# Patient Record
Sex: MALE | Race: WHITE | Employment: FULL TIME | URBAN - METROPOLITAN AREA
[De-identification: names, ages, dates, MRNs, and addresses within clinical notes are randomized per-mention and may not be internally consistent; named-entity substitution may affect disease eponyms.]

---

## 2018-05-21 ENCOUNTER — OFFICE VISIT (OUTPATIENT)
Dept: INTERNAL MEDICINE | Age: 28
End: 2018-05-21

## 2018-05-21 VITALS
DIASTOLIC BLOOD PRESSURE: 120 MMHG | RESPIRATION RATE: 16 BRPM | WEIGHT: 269.8 LBS | SYSTOLIC BLOOD PRESSURE: 160 MMHG | TEMPERATURE: 97.9 F | HEART RATE: 107 BPM | HEIGHT: 69 IN | BODY MASS INDEX: 39.96 KG/M2

## 2018-05-21 DIAGNOSIS — E10.10 TYPE 1 DIABETES MELLITUS WITH KETOACIDOSIS WITHOUT COMA (HCC): ICD-10-CM

## 2018-05-21 DIAGNOSIS — E78.2 MIXED HYPERLIPIDEMIA: ICD-10-CM

## 2018-05-21 DIAGNOSIS — I10 ESSENTIAL HYPERTENSION: Primary | ICD-10-CM

## 2018-05-21 PROCEDURE — 99212 OFFICE O/P EST SF 10 MIN: CPT | Performed by: INTERNAL MEDICINE

## 2018-05-21 PROCEDURE — 99213 OFFICE O/P EST LOW 20 MIN: CPT | Performed by: INTERNAL MEDICINE

## 2018-05-21 RX ORDER — LISINOPRIL 20 MG/1
20 TABLET ORAL DAILY
Qty: 30 TABLET | Refills: 2 | Status: SHIPPED | OUTPATIENT
Start: 2018-05-21 | End: 2018-05-21 | Stop reason: SDUPTHER

## 2018-05-21 RX ORDER — LISINOPRIL 20 MG/1
20 TABLET ORAL DAILY
Qty: 30 TABLET | Refills: 2 | Status: ON HOLD | OUTPATIENT
Start: 2018-05-21 | End: 2018-06-11

## 2018-05-21 RX ORDER — IBUPROFEN 200 MG
TABLET ORAL
Qty: 100 EACH | Refills: 3 | Status: SHIPPED | OUTPATIENT
Start: 2018-05-21 | End: 2018-12-10 | Stop reason: SDUPTHER

## 2018-05-21 RX ORDER — LANCETS 30 GAUGE
EACH MISCELLANEOUS
Qty: 100 EACH | Refills: 5 | Status: SHIPPED | OUTPATIENT
Start: 2018-05-21 | End: 2019-06-22 | Stop reason: ALTCHOICE

## 2018-05-21 RX ORDER — INSULIN GLARGINE 100 [IU]/ML
67 INJECTION, SOLUTION SUBCUTANEOUS NIGHTLY
Qty: 6 VIAL | Refills: 3 | Status: SHIPPED | OUTPATIENT
Start: 2018-05-21 | End: 2018-05-21 | Stop reason: SDUPTHER

## 2018-05-21 RX ORDER — INSULIN GLARGINE 100 [IU]/ML
67 INJECTION, SOLUTION SUBCUTANEOUS NIGHTLY
Qty: 7 VIAL | Refills: 3 | Status: SHIPPED | OUTPATIENT
Start: 2018-05-21 | End: 2018-12-10 | Stop reason: SDUPTHER

## 2018-05-21 RX ORDER — ATORVASTATIN CALCIUM 40 MG/1
40 TABLET, FILM COATED ORAL DAILY
Qty: 30 TABLET | Refills: 0 | Status: SHIPPED | OUTPATIENT
Start: 2018-05-21 | End: 2018-12-10 | Stop reason: SDUPTHER

## 2018-05-22 ASSESSMENT — ENCOUNTER SYMPTOMS
SORE THROAT: 0
SHORTNESS OF BREATH: 0
CONSTIPATION: 0
DIARRHEA: 0
ABDOMINAL PAIN: 0
NAUSEA: 0
VOMITING: 0
EYE DISCHARGE: 0
HEMOPTYSIS: 0
HEARTBURN: 0
COUGH: 0
BLURRED VISION: 0
WHEEZING: 0

## 2018-06-09 ENCOUNTER — HOSPITAL ENCOUNTER (INPATIENT)
Age: 28
LOS: 1 days | Discharge: HOME | DRG: 195 | End: 2018-06-11
Attending: EMERGENCY MEDICINE | Admitting: INTERNAL MEDICINE

## 2018-06-09 ENCOUNTER — APPOINTMENT (OUTPATIENT)
Dept: GENERAL RADIOLOGY | Age: 28
DRG: 195 | End: 2018-06-09

## 2018-06-09 DIAGNOSIS — E10.10 TYPE 1 DIABETES MELLITUS WITH KETOACIDOSIS WITHOUT COMA (HCC): ICD-10-CM

## 2018-06-09 DIAGNOSIS — J18.9 PNEUMONIA DUE TO ORGANISM: ICD-10-CM

## 2018-06-09 DIAGNOSIS — J02.9 ACUTE PHARYNGITIS, UNSPECIFIED ETIOLOGY: Primary | ICD-10-CM

## 2018-06-09 DIAGNOSIS — I10 ESSENTIAL HYPERTENSION: ICD-10-CM

## 2018-06-09 LAB
ALBUMIN SERPL-MCNC: 3.5 G/DL (ref 3.5–5.2)
ALP BLD-CCNC: 147 U/L (ref 40–129)
ALT SERPL-CCNC: 73 U/L (ref 0–40)
ANION GAP SERPL CALCULATED.3IONS-SCNC: 15 MMOL/L (ref 7–16)
AST SERPL-CCNC: 39 U/L (ref 0–39)
BASOPHILS ABSOLUTE: 0.1 E9/L (ref 0–0.2)
BASOPHILS RELATIVE PERCENT: 0.7 % (ref 0–2)
BETA-HYDROXYBUTYRATE: 1.25 MMOL/L (ref 0.02–0.27)
BILIRUB SERPL-MCNC: 0.2 MG/DL (ref 0–1.2)
BUN BLDV-MCNC: 7 MG/DL (ref 6–20)
CALCIUM SERPL-MCNC: 9 MG/DL (ref 8.6–10.2)
CHLORIDE BLD-SCNC: 97 MMOL/L (ref 98–107)
CHP ED QC CHECK: YES
CO2: 27 MMOL/L (ref 22–29)
CREAT SERPL-MCNC: 0.9 MG/DL (ref 0.7–1.2)
EOSINOPHILS ABSOLUTE: 0.3 E9/L (ref 0.05–0.5)
EOSINOPHILS RELATIVE PERCENT: 2 % (ref 0–6)
GFR AFRICAN AMERICAN: >60
GFR NON-AFRICAN AMERICAN: >60 ML/MIN/1.73
GLUCOSE BLD-MCNC: 133 MG/DL
GLUCOSE BLD-MCNC: 158 MG/DL (ref 74–109)
HCT VFR BLD CALC: 44.5 % (ref 37–54)
HEMOGLOBIN: 15.1 G/DL (ref 12.5–16.5)
IMMATURE GRANULOCYTES #: 0.05 E9/L
IMMATURE GRANULOCYTES %: 0.3 % (ref 0–5)
LACTIC ACID: 1 MMOL/L (ref 0.5–2.2)
LIPASE: 17 U/L (ref 13–60)
LYMPHOCYTES ABSOLUTE: 1.74 E9/L (ref 1.5–4)
LYMPHOCYTES RELATIVE PERCENT: 11.8 % (ref 20–42)
MCH RBC QN AUTO: 31.8 PG (ref 26–35)
MCHC RBC AUTO-ENTMCNC: 33.9 % (ref 32–34.5)
MCV RBC AUTO: 93.7 FL (ref 80–99.9)
METER GLUCOSE: 133 MG/DL (ref 70–110)
MONO TEST: NEGATIVE
MONOCYTES ABSOLUTE: 1.48 E9/L (ref 0.1–0.95)
MONOCYTES RELATIVE PERCENT: 10 % (ref 2–12)
NEUTROPHILS ABSOLUTE: 11.1 E9/L (ref 1.8–7.3)
NEUTROPHILS RELATIVE PERCENT: 75.2 % (ref 43–80)
PDW BLD-RTO: 12.4 FL (ref 11.5–15)
PLATELET # BLD: 409 E9/L (ref 130–450)
PMV BLD AUTO: 9.3 FL (ref 7–12)
POTASSIUM SERPL-SCNC: 3.8 MMOL/L (ref 3.5–5)
RBC # BLD: 4.75 E12/L (ref 3.8–5.8)
SODIUM BLD-SCNC: 139 MMOL/L (ref 132–146)
STREP GRP A PCR: NEGATIVE
STREP GRP A PCR: NORMAL
TOTAL PROTEIN: 7 G/DL (ref 6.4–8.3)
WBC # BLD: 14.8 E9/L (ref 4.5–11.5)

## 2018-06-09 PROCEDURE — 82010 KETONE BODYS QUAN: CPT

## 2018-06-09 PROCEDURE — 83605 ASSAY OF LACTIC ACID: CPT

## 2018-06-09 PROCEDURE — 2580000003 HC RX 258: Performed by: NURSE PRACTITIONER

## 2018-06-09 PROCEDURE — 83690 ASSAY OF LIPASE: CPT

## 2018-06-09 PROCEDURE — 6360000002 HC RX W HCPCS: Performed by: EMERGENCY MEDICINE

## 2018-06-09 PROCEDURE — 96375 TX/PRO/DX INJ NEW DRUG ADDON: CPT

## 2018-06-09 PROCEDURE — 99285 EMERGENCY DEPT VISIT HI MDM: CPT

## 2018-06-09 PROCEDURE — 2580000003 HC RX 258: Performed by: EMERGENCY MEDICINE

## 2018-06-09 PROCEDURE — 71046 X-RAY EXAM CHEST 2 VIEWS: CPT

## 2018-06-09 PROCEDURE — 80053 COMPREHEN METABOLIC PANEL: CPT

## 2018-06-09 PROCEDURE — 87880 STREP A ASSAY W/OPTIC: CPT

## 2018-06-09 PROCEDURE — 82962 GLUCOSE BLOOD TEST: CPT

## 2018-06-09 PROCEDURE — 96365 THER/PROPH/DIAG IV INF INIT: CPT

## 2018-06-09 PROCEDURE — 86308 HETEROPHILE ANTIBODY SCREEN: CPT

## 2018-06-09 PROCEDURE — 85025 COMPLETE CBC W/AUTO DIFF WBC: CPT

## 2018-06-09 RX ORDER — 0.9 % SODIUM CHLORIDE 0.9 %
1000 INTRAVENOUS SOLUTION INTRAVENOUS ONCE
Status: COMPLETED | OUTPATIENT
Start: 2018-06-09 | End: 2018-06-10

## 2018-06-09 RX ORDER — AZITHROMYCIN 250 MG/1
TABLET, FILM COATED ORAL
Qty: 1 PACKET | Refills: 0 | Status: SHIPPED | OUTPATIENT
Start: 2018-06-09 | End: 2018-06-10

## 2018-06-09 RX ORDER — SODIUM CHLORIDE 9 MG/ML
INJECTION, SOLUTION INTRAVENOUS ONCE
Status: COMPLETED | OUTPATIENT
Start: 2018-06-09 | End: 2018-06-10

## 2018-06-09 RX ORDER — 0.9 % SODIUM CHLORIDE 0.9 %
1000 INTRAVENOUS SOLUTION INTRAVENOUS ONCE
Status: COMPLETED | OUTPATIENT
Start: 2018-06-09 | End: 2018-06-09

## 2018-06-09 RX ORDER — DEXAMETHASONE SODIUM PHOSPHATE 10 MG/ML
10 INJECTION INTRAMUSCULAR; INTRAVENOUS ONCE
Status: COMPLETED | OUTPATIENT
Start: 2018-06-09 | End: 2018-06-09

## 2018-06-09 RX ADMIN — SODIUM CHLORIDE 1000 ML: 9 INJECTION, SOLUTION INTRAVENOUS at 21:25

## 2018-06-09 RX ADMIN — DEXAMETHASONE SODIUM PHOSPHATE 10 MG: 10 INJECTION INTRAMUSCULAR; INTRAVENOUS at 22:40

## 2018-06-09 RX ADMIN — AMPICILLIN AND SULBACTAM 3 G: 2; 1 INJECTION, POWDER, FOR SOLUTION INTRAVENOUS at 23:08

## 2018-06-09 RX ADMIN — SODIUM CHLORIDE 1000 ML: 9 INJECTION, SOLUTION INTRAVENOUS at 23:09

## 2018-06-09 ASSESSMENT — PAIN SCALES - GENERAL: PAINLEVEL_OUTOF10: 8

## 2018-06-09 ASSESSMENT — PAIN DESCRIPTION - LOCATION: LOCATION: THROAT

## 2018-06-09 ASSESSMENT — PAIN DESCRIPTION - PAIN TYPE: TYPE: ACUTE PAIN

## 2018-06-10 ENCOUNTER — APPOINTMENT (OUTPATIENT)
Dept: CT IMAGING | Age: 28
DRG: 195 | End: 2018-06-10

## 2018-06-10 PROBLEM — E78.5 HYPERLIPIDEMIA: Chronic | Status: ACTIVE | Noted: 2018-06-10

## 2018-06-10 PROBLEM — R03.0 ELEVATED BP WITHOUT DIAGNOSIS OF HYPERTENSION: Status: ACTIVE | Noted: 2018-06-10

## 2018-06-10 PROBLEM — R09.02 HYPOXEMIA: Status: ACTIVE | Noted: 2018-06-10

## 2018-06-10 PROBLEM — E10.65 TYPE 1 DIABETES MELLITUS WITH HYPERGLYCEMIA (HCC): Status: ACTIVE | Noted: 2018-06-10

## 2018-06-10 PROBLEM — J18.9 PNEUMONIA: Status: ACTIVE | Noted: 2018-06-10

## 2018-06-10 PROBLEM — I10 HTN (HYPERTENSION): Chronic | Status: ACTIVE | Noted: 2018-06-10

## 2018-06-10 LAB
ALBUMIN SERPL-MCNC: 2.7 G/DL (ref 3.5–5.2)
ALBUMIN SERPL-MCNC: 3 G/DL (ref 3.5–5.2)
ALP BLD-CCNC: 139 U/L (ref 40–129)
ALP BLD-CCNC: 141 U/L (ref 40–129)
ALT SERPL-CCNC: 59 U/L (ref 0–40)
ALT SERPL-CCNC: 63 U/L (ref 0–40)
ANION GAP SERPL CALCULATED.3IONS-SCNC: 10 MMOL/L (ref 7–16)
ANION GAP SERPL CALCULATED.3IONS-SCNC: 11 MMOL/L (ref 7–16)
ANION GAP SERPL CALCULATED.3IONS-SCNC: 14 MMOL/L (ref 7–16)
ANION GAP SERPL CALCULATED.3IONS-SCNC: 20 MMOL/L (ref 7–16)
ANION GAP SERPL CALCULATED.3IONS-SCNC: 21 MMOL/L (ref 7–16)
AST SERPL-CCNC: 33 U/L (ref 0–39)
AST SERPL-CCNC: 56 U/L (ref 0–39)
BASOPHILS ABSOLUTE: 0.04 E9/L (ref 0–0.2)
BASOPHILS RELATIVE PERCENT: 0.2 % (ref 0–2)
BETA-HYDROXYBUTYRATE: 3.9 MMOL/L (ref 0.02–0.27)
BILIRUB SERPL-MCNC: 0.2 MG/DL (ref 0–1.2)
BILIRUB SERPL-MCNC: <0.2 MG/DL (ref 0–1.2)
BUN BLDV-MCNC: 10 MG/DL (ref 6–20)
BUN BLDV-MCNC: 10 MG/DL (ref 6–20)
BUN BLDV-MCNC: 11 MG/DL (ref 6–20)
BUN BLDV-MCNC: 7 MG/DL (ref 6–20)
BUN BLDV-MCNC: 8 MG/DL (ref 6–20)
CALCIUM SERPL-MCNC: 7.9 MG/DL (ref 8.6–10.2)
CALCIUM SERPL-MCNC: 8.1 MG/DL (ref 8.6–10.2)
CALCIUM SERPL-MCNC: 8.2 MG/DL (ref 8.6–10.2)
CALCIUM SERPL-MCNC: 8.3 MG/DL (ref 8.6–10.2)
CALCIUM SERPL-MCNC: 8.3 MG/DL (ref 8.6–10.2)
CHLORIDE BLD-SCNC: 100 MMOL/L (ref 98–107)
CHLORIDE BLD-SCNC: 100 MMOL/L (ref 98–107)
CHLORIDE BLD-SCNC: 101 MMOL/L (ref 98–107)
CHLORIDE BLD-SCNC: 95 MMOL/L (ref 98–107)
CHLORIDE BLD-SCNC: 97 MMOL/L (ref 98–107)
CO2: 15 MMOL/L (ref 22–29)
CO2: 19 MMOL/L (ref 22–29)
CO2: 24 MMOL/L (ref 22–29)
CREAT SERPL-MCNC: 0.8 MG/DL (ref 0.7–1.2)
CREAT SERPL-MCNC: 0.9 MG/DL (ref 0.7–1.2)
EOSINOPHILS ABSOLUTE: 0 E9/L (ref 0.05–0.5)
EOSINOPHILS RELATIVE PERCENT: 0 % (ref 0–6)
GFR AFRICAN AMERICAN: >60
GFR NON-AFRICAN AMERICAN: >60 ML/MIN/1.73
GLUCOSE BLD-MCNC: 154 MG/DL (ref 74–109)
GLUCOSE BLD-MCNC: 196 MG/DL (ref 74–109)
GLUCOSE BLD-MCNC: 212 MG/DL (ref 74–109)
GLUCOSE BLD-MCNC: 337 MG/DL (ref 74–109)
GLUCOSE BLD-MCNC: 339 MG/DL (ref 74–109)
HCT VFR BLD CALC: 40.6 % (ref 37–54)
HEMOGLOBIN: 13.4 G/DL (ref 12.5–16.5)
IMMATURE GRANULOCYTES #: 0.08 E9/L
IMMATURE GRANULOCYTES %: 0.5 % (ref 0–5)
LACTIC ACID: 1.7 MMOL/L (ref 0.5–2.2)
LYMPHOCYTES ABSOLUTE: 0.91 E9/L (ref 1.5–4)
LYMPHOCYTES RELATIVE PERCENT: 5.5 % (ref 20–42)
MAGNESIUM: 1.9 MG/DL (ref 1.6–2.6)
MAGNESIUM: 2 MG/DL (ref 1.6–2.6)
MAGNESIUM: 2 MG/DL (ref 1.6–2.6)
MCH RBC QN AUTO: 32.1 PG (ref 26–35)
MCHC RBC AUTO-ENTMCNC: 33 % (ref 32–34.5)
MCV RBC AUTO: 97.1 FL (ref 80–99.9)
METER GLUCOSE: 166 MG/DL (ref 70–110)
METER GLUCOSE: 175 MG/DL (ref 70–110)
METER GLUCOSE: 178 MG/DL (ref 70–110)
METER GLUCOSE: 184 MG/DL (ref 70–110)
METER GLUCOSE: 186 MG/DL (ref 70–110)
METER GLUCOSE: 191 MG/DL (ref 70–110)
METER GLUCOSE: 196 MG/DL (ref 70–110)
METER GLUCOSE: 200 MG/DL (ref 70–110)
METER GLUCOSE: 223 MG/DL (ref 70–110)
METER GLUCOSE: 272 MG/DL (ref 70–110)
METER GLUCOSE: 298 MG/DL (ref 70–110)
METER GLUCOSE: 302 MG/DL (ref 70–110)
MONOCYTES ABSOLUTE: 0.26 E9/L (ref 0.1–0.95)
MONOCYTES RELATIVE PERCENT: 1.6 % (ref 2–12)
NEUTROPHILS ABSOLUTE: 15.13 E9/L (ref 1.8–7.3)
NEUTROPHILS RELATIVE PERCENT: 92.2 % (ref 43–80)
PDW BLD-RTO: 12.6 FL (ref 11.5–15)
PHOSPHORUS: 2 MG/DL (ref 2.5–4.5)
PHOSPHORUS: 2.1 MG/DL (ref 2.5–4.5)
PHOSPHORUS: 3.3 MG/DL (ref 2.5–4.5)
PLATELET # BLD: 372 E9/L (ref 130–450)
PMV BLD AUTO: 9.5 FL (ref 7–12)
POTASSIUM REFLEX MAGNESIUM: 5.8 MMOL/L (ref 3.5–5)
POTASSIUM SERPL-SCNC: 3.9 MMOL/L (ref 3.5–5)
POTASSIUM SERPL-SCNC: 4 MMOL/L (ref 3.5–5)
POTASSIUM SERPL-SCNC: 4.2 MMOL/L (ref 3.5–5)
POTASSIUM SERPL-SCNC: 4.5 MMOL/L (ref 3.5–5)
RBC # BLD: 4.18 E12/L (ref 3.8–5.8)
SODIUM BLD-SCNC: 131 MMOL/L (ref 132–146)
SODIUM BLD-SCNC: 135 MMOL/L (ref 132–146)
SODIUM BLD-SCNC: 135 MMOL/L (ref 132–146)
SODIUM BLD-SCNC: 136 MMOL/L (ref 132–146)
SODIUM BLD-SCNC: 138 MMOL/L (ref 132–146)
TOTAL PROTEIN: 6.4 G/DL (ref 6.4–8.3)
TOTAL PROTEIN: 6.9 G/DL (ref 6.4–8.3)
WBC # BLD: 16.4 E9/L (ref 4.5–11.5)

## 2018-06-10 PROCEDURE — 84100 ASSAY OF PHOSPHORUS: CPT

## 2018-06-10 PROCEDURE — 6370000000 HC RX 637 (ALT 250 FOR IP): Performed by: INTERNAL MEDICINE

## 2018-06-10 PROCEDURE — 6360000002 HC RX W HCPCS

## 2018-06-10 PROCEDURE — 2500000003 HC RX 250 WO HCPCS: Performed by: INTERNAL MEDICINE

## 2018-06-10 PROCEDURE — 6360000002 HC RX W HCPCS: Performed by: INTERNAL MEDICINE

## 2018-06-10 PROCEDURE — 2580000003 HC RX 258: Performed by: EMERGENCY MEDICINE

## 2018-06-10 PROCEDURE — 2500000003 HC RX 250 WO HCPCS: Performed by: EMERGENCY MEDICINE

## 2018-06-10 PROCEDURE — 2580000003 HC RX 258: Performed by: INTERNAL MEDICINE

## 2018-06-10 PROCEDURE — 94664 DEMO&/EVAL PT USE INHALER: CPT

## 2018-06-10 PROCEDURE — 71275 CT ANGIOGRAPHY CHEST: CPT

## 2018-06-10 PROCEDURE — 87081 CULTURE SCREEN ONLY: CPT

## 2018-06-10 PROCEDURE — 80053 COMPREHEN METABOLIC PANEL: CPT

## 2018-06-10 PROCEDURE — 85025 COMPLETE CBC W/AUTO DIFF WBC: CPT

## 2018-06-10 PROCEDURE — 87040 BLOOD CULTURE FOR BACTERIA: CPT

## 2018-06-10 PROCEDURE — 82010 KETONE BODYS QUAN: CPT

## 2018-06-10 PROCEDURE — 83735 ASSAY OF MAGNESIUM: CPT

## 2018-06-10 PROCEDURE — 83605 ASSAY OF LACTIC ACID: CPT

## 2018-06-10 PROCEDURE — 36415 COLL VENOUS BLD VENIPUNCTURE: CPT

## 2018-06-10 PROCEDURE — 82962 GLUCOSE BLOOD TEST: CPT

## 2018-06-10 PROCEDURE — 94640 AIRWAY INHALATION TREATMENT: CPT

## 2018-06-10 PROCEDURE — 80048 BASIC METABOLIC PNL TOTAL CA: CPT

## 2018-06-10 PROCEDURE — 2000000000 HC ICU R&B

## 2018-06-10 PROCEDURE — 6370000000 HC RX 637 (ALT 250 FOR IP): Performed by: EMERGENCY MEDICINE

## 2018-06-10 PROCEDURE — 6360000004 HC RX CONTRAST MEDICATION: Performed by: RADIOLOGY

## 2018-06-10 RX ORDER — SODIUM CHLORIDE 9 MG/ML
INJECTION, SOLUTION INTRAVENOUS CONTINUOUS
Status: DISCONTINUED | OUTPATIENT
Start: 2018-06-10 | End: 2018-06-10

## 2018-06-10 RX ORDER — BENZONATATE 100 MG/1
100 CAPSULE ORAL EVERY 8 HOURS
Status: DISCONTINUED | OUTPATIENT
Start: 2018-06-10 | End: 2018-06-11 | Stop reason: HOSPADM

## 2018-06-10 RX ORDER — ONDANSETRON 2 MG/ML
4 INJECTION INTRAMUSCULAR; INTRAVENOUS EVERY 6 HOURS PRN
Status: DISCONTINUED | OUTPATIENT
Start: 2018-06-10 | End: 2018-06-11 | Stop reason: HOSPADM

## 2018-06-10 RX ORDER — MAGNESIUM SULFATE 1 G/100ML
1 INJECTION INTRAVENOUS PRN
Status: DISCONTINUED | OUTPATIENT
Start: 2018-06-10 | End: 2018-06-11 | Stop reason: HOSPADM

## 2018-06-10 RX ORDER — LISINOPRIL 20 MG/1
20 TABLET ORAL DAILY
Status: DISCONTINUED | OUTPATIENT
Start: 2018-06-10 | End: 2018-06-11 | Stop reason: HOSPADM

## 2018-06-10 RX ORDER — ACETAMINOPHEN 325 MG/1
650 TABLET ORAL EVERY 4 HOURS PRN
Status: DISCONTINUED | OUTPATIENT
Start: 2018-06-10 | End: 2018-06-11 | Stop reason: HOSPADM

## 2018-06-10 RX ORDER — INSULIN GLARGINE 100 [IU]/ML
67 INJECTION, SOLUTION SUBCUTANEOUS NIGHTLY
Status: DISCONTINUED | OUTPATIENT
Start: 2018-06-10 | End: 2018-06-11 | Stop reason: HOSPADM

## 2018-06-10 RX ORDER — 0.9 % SODIUM CHLORIDE 0.9 %
1000 INTRAVENOUS SOLUTION INTRAVENOUS ONCE
Status: COMPLETED | OUTPATIENT
Start: 2018-06-10 | End: 2018-06-10

## 2018-06-10 RX ORDER — HYDRALAZINE HYDROCHLORIDE 20 MG/ML
INJECTION INTRAMUSCULAR; INTRAVENOUS
Status: COMPLETED
Start: 2018-06-10 | End: 2018-06-10

## 2018-06-10 RX ORDER — FAMOTIDINE 20 MG/1
20 TABLET, FILM COATED ORAL 2 TIMES DAILY
Status: DISCONTINUED | OUTPATIENT
Start: 2018-06-10 | End: 2018-06-11 | Stop reason: HOSPADM

## 2018-06-10 RX ORDER — SODIUM CHLORIDE 9 MG/ML
INJECTION, SOLUTION INTRAVENOUS CONTINUOUS
Status: DISCONTINUED | OUTPATIENT
Start: 2018-06-10 | End: 2018-06-11 | Stop reason: HOSPADM

## 2018-06-10 RX ORDER — POTASSIUM CHLORIDE 7.45 MG/ML
10 INJECTION INTRAVENOUS PRN
Status: DISCONTINUED | OUTPATIENT
Start: 2018-06-10 | End: 2018-06-11 | Stop reason: HOSPADM

## 2018-06-10 RX ORDER — 0.9 % SODIUM CHLORIDE 0.9 %
15 INTRAVENOUS SOLUTION INTRAVENOUS ONCE
Status: COMPLETED | OUTPATIENT
Start: 2018-06-10 | End: 2018-06-10

## 2018-06-10 RX ORDER — SODIUM CHLORIDE 0.9 % (FLUSH) 0.9 %
10 SYRINGE (ML) INJECTION EVERY 12 HOURS SCHEDULED
Status: DISCONTINUED | OUTPATIENT
Start: 2018-06-10 | End: 2018-06-11 | Stop reason: HOSPADM

## 2018-06-10 RX ORDER — SODIUM CHLORIDE 0.9 % (FLUSH) 0.9 %
10 SYRINGE (ML) INJECTION PRN
Status: DISCONTINUED | OUTPATIENT
Start: 2018-06-10 | End: 2018-06-11 | Stop reason: HOSPADM

## 2018-06-10 RX ORDER — GUAIFENESIN/DEXTROMETHORPHAN 100-10MG/5
5 SYRUP ORAL EVERY 4 HOURS PRN
Status: DISCONTINUED | OUTPATIENT
Start: 2018-06-10 | End: 2018-06-11 | Stop reason: HOSPADM

## 2018-06-10 RX ORDER — HYDRALAZINE HYDROCHLORIDE 20 MG/ML
10 INJECTION INTRAMUSCULAR; INTRAVENOUS EVERY 4 HOURS PRN
Status: DISCONTINUED | OUTPATIENT
Start: 2018-06-10 | End: 2018-06-11 | Stop reason: HOSPADM

## 2018-06-10 RX ORDER — IPRATROPIUM BROMIDE AND ALBUTEROL SULFATE 2.5; .5 MG/3ML; MG/3ML
1 SOLUTION RESPIRATORY (INHALATION)
Status: COMPLETED | OUTPATIENT
Start: 2018-06-10 | End: 2018-06-10

## 2018-06-10 RX ORDER — DEXTROSE MONOHYDRATE 25 G/50ML
12.5 INJECTION, SOLUTION INTRAVENOUS PRN
Status: DISCONTINUED | OUTPATIENT
Start: 2018-06-10 | End: 2018-06-11 | Stop reason: HOSPADM

## 2018-06-10 RX ORDER — IPRATROPIUM BROMIDE AND ALBUTEROL SULFATE 2.5; .5 MG/3ML; MG/3ML
1 SOLUTION RESPIRATORY (INHALATION) EVERY 4 HOURS PRN
Status: DISCONTINUED | OUTPATIENT
Start: 2018-06-10 | End: 2018-06-11 | Stop reason: HOSPADM

## 2018-06-10 RX ORDER — DEXTROSE, SODIUM CHLORIDE, AND POTASSIUM CHLORIDE 5; .45; .15 G/100ML; G/100ML; G/100ML
INJECTION INTRAVENOUS CONTINUOUS PRN
Status: DISCONTINUED | OUTPATIENT
Start: 2018-06-10 | End: 2018-06-10

## 2018-06-10 RX ORDER — HYDRALAZINE HYDROCHLORIDE 20 MG/ML
5 INJECTION INTRAMUSCULAR; INTRAVENOUS ONCE
Status: COMPLETED | OUTPATIENT
Start: 2018-06-10 | End: 2018-06-10

## 2018-06-10 RX ADMIN — LISINOPRIL 20 MG: 20 TABLET ORAL at 18:40

## 2018-06-10 RX ADMIN — Medication 10 ML: at 21:02

## 2018-06-10 RX ADMIN — INSULIN LISPRO 10 UNITS: 100 INJECTION, SOLUTION INTRAVENOUS; SUBCUTANEOUS at 09:19

## 2018-06-10 RX ADMIN — POTASSIUM CHLORIDE, DEXTROSE MONOHYDRATE AND SODIUM CHLORIDE: 150; 5; 450 INJECTION, SOLUTION INTRAVENOUS at 21:01

## 2018-06-10 RX ADMIN — IPRATROPIUM BROMIDE AND ALBUTEROL SULFATE 1 AMPULE: .5; 3 SOLUTION RESPIRATORY (INHALATION) at 01:03

## 2018-06-10 RX ADMIN — SODIUM CHLORIDE 11.7 UNITS/HR: 9 INJECTION, SOLUTION INTRAVENOUS at 13:29

## 2018-06-10 RX ADMIN — SODIUM CHLORIDE 1000 ML: 9 INJECTION, SOLUTION INTRAVENOUS at 13:20

## 2018-06-10 RX ADMIN — IOPAMIDOL 80 ML: 755 INJECTION, SOLUTION INTRAVENOUS at 00:46

## 2018-06-10 RX ADMIN — INSULIN LISPRO 8 UNITS: 100 INJECTION, SOLUTION INTRAVENOUS; SUBCUTANEOUS at 11:54

## 2018-06-10 RX ADMIN — POTASSIUM CHLORIDE 10 MEQ: 10 INJECTION, SOLUTION INTRAVENOUS at 18:18

## 2018-06-10 RX ADMIN — BENZONATATE 100 MG: 100 CAPSULE ORAL at 15:44

## 2018-06-10 RX ADMIN — BENZONATATE 100 MG: 100 CAPSULE ORAL at 23:37

## 2018-06-10 RX ADMIN — IPRATROPIUM BROMIDE AND ALBUTEROL SULFATE 1 AMPULE: .5; 3 SOLUTION RESPIRATORY (INHALATION) at 01:13

## 2018-06-10 RX ADMIN — DOXYCYCLINE 100 MG: 100 INJECTION, POWDER, LYOPHILIZED, FOR SOLUTION INTRAVENOUS at 01:37

## 2018-06-10 RX ADMIN — FAMOTIDINE 20 MG: 20 TABLET ORAL at 21:02

## 2018-06-10 RX ADMIN — WATER 1 G: 1 INJECTION INTRAMUSCULAR; INTRAVENOUS; SUBCUTANEOUS at 09:04

## 2018-06-10 RX ADMIN — SODIUM CHLORIDE: 9 INJECTION, SOLUTION INTRAVENOUS at 00:04

## 2018-06-10 RX ADMIN — INSULIN LISPRO 3 UNITS: 100 INJECTION, SOLUTION INTRAVENOUS; SUBCUTANEOUS at 09:21

## 2018-06-10 RX ADMIN — SODIUM PHOSPHATE, MONOBASIC, MONOHYDRATE 10 MMOL: 276; 142 INJECTION, SOLUTION INTRAVENOUS at 17:41

## 2018-06-10 RX ADMIN — INSULIN HUMAN 5 UNITS: 100 INJECTION, SOLUTION PARENTERAL at 10:33

## 2018-06-10 RX ADMIN — INSULIN GLARGINE 67 UNITS: 100 INJECTION, SOLUTION SUBCUTANEOUS at 21:35

## 2018-06-10 RX ADMIN — INSULIN LISPRO 11 UNITS: 100 INJECTION, SOLUTION INTRAVENOUS; SUBCUTANEOUS at 21:36

## 2018-06-10 RX ADMIN — HYDRALAZINE HYDROCHLORIDE 5 MG: 20 INJECTION INTRAMUSCULAR; INTRAVENOUS at 17:34

## 2018-06-10 RX ADMIN — POTASSIUM CHLORIDE 10 MEQ: 10 INJECTION, SOLUTION INTRAVENOUS at 17:15

## 2018-06-10 RX ADMIN — POTASSIUM CHLORIDE, DEXTROSE MONOHYDRATE AND SODIUM CHLORIDE: 150; 5; 450 INJECTION, SOLUTION INTRAVENOUS at 14:40

## 2018-06-10 RX ADMIN — DOXYCYCLINE 100 MG: 100 INJECTION, POWDER, LYOPHILIZED, FOR SOLUTION INTRAVENOUS at 15:42

## 2018-06-10 RX ADMIN — FAMOTIDINE 20 MG: 20 TABLET ORAL at 09:07

## 2018-06-10 RX ADMIN — BENZONATATE 100 MG: 100 CAPSULE ORAL at 09:04

## 2018-06-10 RX ADMIN — SODIUM CHLORIDE: 9 INJECTION, SOLUTION INTRAVENOUS at 03:57

## 2018-06-10 RX ADMIN — Medication 10 ML: at 09:05

## 2018-06-10 RX ADMIN — ENOXAPARIN SODIUM 40 MG: 40 INJECTION, SOLUTION INTRAVENOUS; SUBCUTANEOUS at 09:04

## 2018-06-10 RX ADMIN — IPRATROPIUM BROMIDE AND ALBUTEROL SULFATE 1 AMPULE: .5; 3 SOLUTION RESPIRATORY (INHALATION) at 00:53

## 2018-06-10 RX ADMIN — HYDRALAZINE HYDROCHLORIDE 10 MG: 20 INJECTION INTRAMUSCULAR; INTRAVENOUS at 23:37

## 2018-06-10 RX ADMIN — POTASSIUM CHLORIDE 10 MEQ: 10 INJECTION, SOLUTION INTRAVENOUS at 18:42

## 2018-06-10 RX ADMIN — SODIUM CHLORIDE 1000 ML: 9 INJECTION, SOLUTION INTRAVENOUS at 10:17

## 2018-06-10 ASSESSMENT — PAIN SCALES - GENERAL
PAINLEVEL_OUTOF10: 0

## 2018-06-11 VITALS
WEIGHT: 261.47 LBS | HEART RATE: 103 BPM | OXYGEN SATURATION: 94 % | DIASTOLIC BLOOD PRESSURE: 77 MMHG | HEIGHT: 70 IN | SYSTOLIC BLOOD PRESSURE: 161 MMHG | RESPIRATION RATE: 17 BRPM | TEMPERATURE: 98.5 F | BODY MASS INDEX: 37.43 KG/M2

## 2018-06-11 PROBLEM — E66.9 OBESITY: Chronic | Status: ACTIVE | Noted: 2018-06-10

## 2018-06-11 PROBLEM — G47.33 OSA (OBSTRUCTIVE SLEEP APNEA): Status: ACTIVE | Noted: 2018-06-11

## 2018-06-11 LAB
ALBUMIN SERPL-MCNC: 2.9 G/DL (ref 3.5–5.2)
ALP BLD-CCNC: 116 U/L (ref 40–129)
ALT SERPL-CCNC: 48 U/L (ref 0–40)
ANION GAP SERPL CALCULATED.3IONS-SCNC: 8 MMOL/L (ref 7–16)
AST SERPL-CCNC: 27 U/L (ref 0–39)
BASOPHILS ABSOLUTE: 0.09 E9/L (ref 0–0.2)
BASOPHILS RELATIVE PERCENT: 0.8 % (ref 0–2)
BILIRUB SERPL-MCNC: <0.2 MG/DL (ref 0–1.2)
BUN BLDV-MCNC: 8 MG/DL (ref 6–20)
CALCIUM SERPL-MCNC: 8.5 MG/DL (ref 8.6–10.2)
CHLORIDE BLD-SCNC: 101 MMOL/L (ref 98–107)
CO2: 25 MMOL/L (ref 22–29)
CREAT SERPL-MCNC: 0.8 MG/DL (ref 0.7–1.2)
EOSINOPHILS ABSOLUTE: 0.31 E9/L (ref 0.05–0.5)
EOSINOPHILS RELATIVE PERCENT: 2.7 % (ref 0–6)
GFR AFRICAN AMERICAN: >60
GFR NON-AFRICAN AMERICAN: >60 ML/MIN/1.73
GLUCOSE BLD-MCNC: 337 MG/DL (ref 74–109)
HCT VFR BLD CALC: 39.5 % (ref 37–54)
HEMOGLOBIN: 13.1 G/DL (ref 12.5–16.5)
IMMATURE GRANULOCYTES #: 0.03 E9/L
IMMATURE GRANULOCYTES %: 0.3 % (ref 0–5)
LYMPHOCYTES ABSOLUTE: 2.71 E9/L (ref 1.5–4)
LYMPHOCYTES RELATIVE PERCENT: 23.6 % (ref 20–42)
MAGNESIUM: 2 MG/DL (ref 1.6–2.6)
MCH RBC QN AUTO: 32 PG (ref 26–35)
MCHC RBC AUTO-ENTMCNC: 33.2 % (ref 32–34.5)
MCV RBC AUTO: 96.3 FL (ref 80–99.9)
METER GLUCOSE: 270 MG/DL (ref 70–110)
METER GLUCOSE: 300 MG/DL (ref 70–110)
MONOCYTES ABSOLUTE: 0.89 E9/L (ref 0.1–0.95)
MONOCYTES RELATIVE PERCENT: 7.7 % (ref 2–12)
NEUTROPHILS ABSOLUTE: 7.47 E9/L (ref 1.8–7.3)
NEUTROPHILS RELATIVE PERCENT: 64.9 % (ref 43–80)
PDW BLD-RTO: 12.5 FL (ref 11.5–15)
PHOSPHORUS: 3.3 MG/DL (ref 2.5–4.5)
PLATELET # BLD: 369 E9/L (ref 130–450)
PMV BLD AUTO: 9.3 FL (ref 7–12)
POTASSIUM REFLEX MAGNESIUM: 4 MMOL/L (ref 3.5–5)
RBC # BLD: 4.1 E12/L (ref 3.8–5.8)
SODIUM BLD-SCNC: 134 MMOL/L (ref 132–146)
TOTAL PROTEIN: 5.9 G/DL (ref 6.4–8.3)
WBC # BLD: 11.5 E9/L (ref 4.5–11.5)

## 2018-06-11 PROCEDURE — 85025 COMPLETE CBC W/AUTO DIFF WBC: CPT

## 2018-06-11 PROCEDURE — 83735 ASSAY OF MAGNESIUM: CPT

## 2018-06-11 PROCEDURE — 84100 ASSAY OF PHOSPHORUS: CPT

## 2018-06-11 PROCEDURE — 2580000003 HC RX 258: Performed by: INTERNAL MEDICINE

## 2018-06-11 PROCEDURE — 80053 COMPREHEN METABOLIC PANEL: CPT

## 2018-06-11 PROCEDURE — 36415 COLL VENOUS BLD VENIPUNCTURE: CPT

## 2018-06-11 PROCEDURE — 2500000003 HC RX 250 WO HCPCS: Performed by: INTERNAL MEDICINE

## 2018-06-11 PROCEDURE — 82962 GLUCOSE BLOOD TEST: CPT

## 2018-06-11 PROCEDURE — 6360000002 HC RX W HCPCS: Performed by: INTERNAL MEDICINE

## 2018-06-11 PROCEDURE — 6370000000 HC RX 637 (ALT 250 FOR IP): Performed by: INTERNAL MEDICINE

## 2018-06-11 RX ORDER — LEVOFLOXACIN 750 MG/1
750 TABLET ORAL DAILY
Qty: 10 TABLET | Refills: 0 | Status: SHIPPED | OUTPATIENT
Start: 2018-06-12 | End: 2018-06-22

## 2018-06-11 RX ORDER — GUAIFENESIN/DEXTROMETHORPHAN 100-10MG/5
5 SYRUP ORAL EVERY 4 HOURS PRN
Qty: 120 ML | Refills: 0 | Status: SHIPPED | OUTPATIENT
Start: 2018-06-11 | End: 2018-06-21

## 2018-06-11 RX ORDER — LISINOPRIL 20 MG/1
40 TABLET ORAL DAILY
Qty: 30 TABLET | Refills: 2 | Status: SHIPPED | OUTPATIENT
Start: 2018-06-11 | End: 2018-12-10 | Stop reason: SDUPTHER

## 2018-06-11 RX ADMIN — INSULIN LISPRO 8 UNITS: 100 INJECTION, SOLUTION INTRAVENOUS; SUBCUTANEOUS at 04:12

## 2018-06-11 RX ADMIN — SODIUM PHOSPHATE, MONOBASIC, MONOHYDRATE 15 MMOL: 276; 142 INJECTION, SOLUTION INTRAVENOUS at 00:06

## 2018-06-11 RX ADMIN — DOXYCYCLINE 100 MG: 100 INJECTION, POWDER, LYOPHILIZED, FOR SOLUTION INTRAVENOUS at 01:29

## 2018-06-11 RX ADMIN — HYDRALAZINE HYDROCHLORIDE 10 MG: 20 INJECTION INTRAMUSCULAR; INTRAVENOUS at 04:14

## 2018-06-11 ASSESSMENT — PAIN SCALES - GENERAL
PAINLEVEL_OUTOF10: 0
PAINLEVEL_OUTOF10: 0

## 2018-06-12 LAB — MRSA CULTURE ONLY: NORMAL

## 2018-06-15 LAB
BLOOD CULTURE, ROUTINE: NORMAL
CULTURE, BLOOD 2: NORMAL

## 2018-07-09 ENCOUNTER — TELEPHONE (OUTPATIENT)
Dept: INTERNAL MEDICINE | Age: 28
End: 2018-07-09

## 2018-09-21 ENCOUNTER — TELEPHONE (OUTPATIENT)
Dept: INTERNAL MEDICINE | Age: 28
End: 2018-09-21

## 2018-11-16 ENCOUNTER — TELEPHONE (OUTPATIENT)
Dept: INTERNAL MEDICINE | Age: 28
End: 2018-11-16

## 2018-11-16 NOTE — TELEPHONE ENCOUNTER
Pt's significant other here to  pt's PAP lantus and humalog. Pt last seen 5-21-18. Pt canceled his Aug appt via my chart. Voicemail message left for patient 9-21-18 that Humalog was here and that pt needs to reschedule appt. Alejandra Weber states pt is in school and will be finished in December. Notified that pt can come in Monday morning to be seen. States pt can't due to school schedule. Notified that pt off on Friday. Notified that office closed next Friday due to holiday. States pt is done with school at 1 PM. Notified we could see patient Wed at 3 PM. She called and spoke with pt and notified of same. States Pt does not wish to schedule an appt. Reviewed with Dr. Nakita Brian. Pt's SO was given 1 vial of his Lantus and 1 vial of his Humalog. Notified of times he could come in to be seen and or call. Per chart, Lantus should last approx 2 weeks. When patient comes in he can  the remaining 7 vials of Lantus and Humalog. Asked if pt would be seen without paying the $35.00 copay. Notified that we would see him without paying the copay. Instructed her to have patient ask for Mateo Zamorano  when he comes in if he has any questions regarding financial assistance or recent hospital bills.  Feels one reason pt does not want to come in is due to financial.

## 2018-12-10 ENCOUNTER — HOSPITAL ENCOUNTER (OUTPATIENT)
Age: 28
Discharge: HOME | End: 2018-12-12
Payer: MEDICAID

## 2018-12-10 ENCOUNTER — OFFICE VISIT (OUTPATIENT)
Dept: INTERNAL MEDICINE | Age: 28
End: 2018-12-10
Payer: MEDICAID

## 2018-12-10 VITALS
BODY MASS INDEX: 36.76 KG/M2 | RESPIRATION RATE: 18 BRPM | WEIGHT: 248.2 LBS | SYSTOLIC BLOOD PRESSURE: 175 MMHG | DIASTOLIC BLOOD PRESSURE: 105 MMHG | HEART RATE: 93 BPM | HEIGHT: 69 IN | TEMPERATURE: 98.1 F

## 2018-12-10 DIAGNOSIS — I10 ESSENTIAL HYPERTENSION: ICD-10-CM

## 2018-12-10 DIAGNOSIS — E10.10 TYPE 1 DIABETES MELLITUS WITH KETOACIDOSIS WITHOUT COMA (HCC): ICD-10-CM

## 2018-12-10 DIAGNOSIS — E78.2 MIXED HYPERLIPIDEMIA: ICD-10-CM

## 2018-12-10 LAB
ALBUMIN SERPL-MCNC: 4 G/DL (ref 3.5–5.2)
ALP BLD-CCNC: 80 U/L (ref 40–129)
ALT SERPL-CCNC: 22 U/L (ref 0–40)
ANION GAP SERPL CALCULATED.3IONS-SCNC: 13 MMOL/L (ref 7–16)
AST SERPL-CCNC: 23 U/L (ref 0–39)
BILIRUB SERPL-MCNC: 0.2 MG/DL (ref 0–1.2)
BUN BLDV-MCNC: 12 MG/DL (ref 6–20)
CALCIUM SERPL-MCNC: 9.1 MG/DL (ref 8.6–10.2)
CHLORIDE BLD-SCNC: 98 MMOL/L (ref 98–107)
CHOLESTEROL, TOTAL: 231 MG/DL (ref 0–199)
CO2: 24 MMOL/L (ref 22–29)
CREAT SERPL-MCNC: 1.2 MG/DL (ref 0.7–1.2)
GFR AFRICAN AMERICAN: >60
GFR NON-AFRICAN AMERICAN: >60 ML/MIN/1.73
GLUCOSE BLD-MCNC: 385 MG/DL (ref 74–99)
HBA1C MFR BLD: 11.4 %
HDLC SERPL-MCNC: 43 MG/DL
LDL CHOLESTEROL CALCULATED: 161 MG/DL (ref 0–99)
POTASSIUM SERPL-SCNC: 5.1 MMOL/L (ref 3.5–5)
SODIUM BLD-SCNC: 135 MMOL/L (ref 132–146)
TOTAL PROTEIN: 7.2 G/DL (ref 6.4–8.3)
TRIGL SERPL-MCNC: 137 MG/DL (ref 0–149)
VLDLC SERPL CALC-MCNC: 27 MG/DL

## 2018-12-10 PROCEDURE — 4004F PT TOBACCO SCREEN RCVD TLK: CPT | Performed by: INTERNAL MEDICINE

## 2018-12-10 PROCEDURE — G8484 FLU IMMUNIZE NO ADMIN: HCPCS | Performed by: INTERNAL MEDICINE

## 2018-12-10 PROCEDURE — 83036 HEMOGLOBIN GLYCOSYLATED A1C: CPT | Performed by: INTERNAL MEDICINE

## 2018-12-10 PROCEDURE — G8417 CALC BMI ABV UP PARAM F/U: HCPCS | Performed by: INTERNAL MEDICINE

## 2018-12-10 PROCEDURE — 99212 OFFICE O/P EST SF 10 MIN: CPT | Performed by: INTERNAL MEDICINE

## 2018-12-10 PROCEDURE — 80053 COMPREHEN METABOLIC PANEL: CPT

## 2018-12-10 PROCEDURE — 36415 COLL VENOUS BLD VENIPUNCTURE: CPT | Performed by: INTERNAL MEDICINE

## 2018-12-10 PROCEDURE — 2022F DILAT RTA XM EVC RTNOPTHY: CPT | Performed by: INTERNAL MEDICINE

## 2018-12-10 PROCEDURE — 36415 COLL VENOUS BLD VENIPUNCTURE: CPT

## 2018-12-10 PROCEDURE — 80061 LIPID PANEL: CPT

## 2018-12-10 PROCEDURE — G8427 DOCREV CUR MEDS BY ELIG CLIN: HCPCS | Performed by: INTERNAL MEDICINE

## 2018-12-10 PROCEDURE — 3046F HEMOGLOBIN A1C LEVEL >9.0%: CPT | Performed by: INTERNAL MEDICINE

## 2018-12-10 PROCEDURE — 99214 OFFICE O/P EST MOD 30 MIN: CPT | Performed by: INTERNAL MEDICINE

## 2018-12-10 RX ORDER — IBUPROFEN 200 MG
TABLET ORAL
Qty: 100 EACH | Refills: 3 | Status: SHIPPED | OUTPATIENT
Start: 2018-12-10 | End: 2019-06-22 | Stop reason: ALTCHOICE

## 2018-12-10 RX ORDER — LISINOPRIL 20 MG/1
40 TABLET ORAL DAILY
Qty: 30 TABLET | Refills: 3 | Status: SHIPPED | OUTPATIENT
Start: 2018-12-10 | End: 2018-12-17 | Stop reason: SDUPTHER

## 2018-12-10 RX ORDER — ATORVASTATIN CALCIUM 40 MG/1
40 TABLET, FILM COATED ORAL DAILY
Qty: 30 TABLET | Refills: 3 | Status: SHIPPED | OUTPATIENT
Start: 2018-12-10 | End: 2019-06-22 | Stop reason: ALTCHOICE

## 2018-12-10 RX ORDER — INSULIN GLARGINE 100 [IU]/ML
67 INJECTION, SOLUTION SUBCUTANEOUS NIGHTLY
Qty: 7 VIAL | Refills: 3 | Status: SHIPPED | OUTPATIENT
Start: 2018-12-10 | End: 2018-12-17 | Stop reason: SDUPTHER

## 2018-12-10 RX ORDER — HYDROCHLOROTHIAZIDE 25 MG/1
12.5 TABLET ORAL DAILY
Qty: 30 TABLET | Refills: 3 | Status: SHIPPED | OUTPATIENT
Start: 2018-12-10 | End: 2018-12-17 | Stop reason: SDUPTHER

## 2018-12-10 ASSESSMENT — ENCOUNTER SYMPTOMS
COUGH: 0
DIARRHEA: 0

## 2018-12-10 NOTE — PROGRESS NOTES
compatible with glucometer 100 strip 5    insulin glargine (LANTUS) 100 UNIT/ML injection vial Inject 67 Units into the skin nightly 7 vial 3    glucose monitoring kit (FREESTYLE) monitoring kit Use once. 1 kit 0     No current facility-administered medications on file prior to visit. OBJECTIVE:    VS: BP (!) 175/105 (Site: Left Upper Arm, Position: Sitting)   Pulse 93   Temp 98.1 °F (36.7 °C) (Oral)   Resp 18   Ht 5' 9\" (1.753 m)   Wt 248 lb 3.2 oz (112.6 kg)   BMI 36.65 kg/m²   Physical Exam   Constitutional: He is oriented to person, place, and time. He appears well-developed and well-nourished. HENT:   Head: Normocephalic and atraumatic. Eyes: Pupils are equal, round, and reactive to light. Conjunctivae and EOM are normal.   Neck: Normal range of motion. Cardiovascular: Normal rate, regular rhythm and normal heart sounds. Pulmonary/Chest: Effort normal and breath sounds normal.   Abdominal: Soft. Bowel sounds are normal.   Musculoskeletal: Normal range of motion. Neurological: He is alert and oriented to person, place, and time. Skin: Skin is warm. Capillary refill takes less than 2 seconds. Psychiatric: He has a normal mood and affect. ASSESSMENT/PLAN:    Essential hypertension  - lisinopril (PRINIVIL;ZESTRIL) 20 MG tablet; Take 2 tablets by mouth daily  -check CMP  -Start HCTZ 25 mg daily today     Mixed hyperlipidemia  - atorvastatin (LIPITOR) 40 MG tablet;  Take 1 tablet by mouth daily  -check lipid panel    Diabetes Mellitus  -Lantus 67 units daily  -Continue Humalog   -Discussed need for compliance    RTC: Follow up in 1 week for blood pressure recheck  I have reviewed my findings and recommendations with Yolette Welch and Dr Penny Crystal MD PGY-2  12/10/2018 9:18 AM

## 2018-12-17 ENCOUNTER — OFFICE VISIT (OUTPATIENT)
Dept: INTERNAL MEDICINE | Age: 28
End: 2018-12-17
Payer: MEDICAID

## 2018-12-17 VITALS
RESPIRATION RATE: 16 BRPM | WEIGHT: 252 LBS | HEART RATE: 96 BPM | DIASTOLIC BLOOD PRESSURE: 103 MMHG | TEMPERATURE: 97.8 F | BODY MASS INDEX: 37.33 KG/M2 | HEIGHT: 69 IN | SYSTOLIC BLOOD PRESSURE: 163 MMHG

## 2018-12-17 DIAGNOSIS — E10.10 TYPE 1 DIABETES MELLITUS WITH KETOACIDOSIS WITHOUT COMA (HCC): ICD-10-CM

## 2018-12-17 DIAGNOSIS — I10 ESSENTIAL HYPERTENSION: ICD-10-CM

## 2018-12-17 PROCEDURE — 99214 OFFICE O/P EST MOD 30 MIN: CPT | Performed by: STUDENT IN AN ORGANIZED HEALTH CARE EDUCATION/TRAINING PROGRAM

## 2018-12-17 PROCEDURE — G8417 CALC BMI ABV UP PARAM F/U: HCPCS | Performed by: STUDENT IN AN ORGANIZED HEALTH CARE EDUCATION/TRAINING PROGRAM

## 2018-12-17 PROCEDURE — 2022F DILAT RTA XM EVC RTNOPTHY: CPT | Performed by: STUDENT IN AN ORGANIZED HEALTH CARE EDUCATION/TRAINING PROGRAM

## 2018-12-17 PROCEDURE — 99212 OFFICE O/P EST SF 10 MIN: CPT | Performed by: STUDENT IN AN ORGANIZED HEALTH CARE EDUCATION/TRAINING PROGRAM

## 2018-12-17 PROCEDURE — G8427 DOCREV CUR MEDS BY ELIG CLIN: HCPCS | Performed by: STUDENT IN AN ORGANIZED HEALTH CARE EDUCATION/TRAINING PROGRAM

## 2018-12-17 PROCEDURE — 4004F PT TOBACCO SCREEN RCVD TLK: CPT | Performed by: STUDENT IN AN ORGANIZED HEALTH CARE EDUCATION/TRAINING PROGRAM

## 2018-12-17 PROCEDURE — G8484 FLU IMMUNIZE NO ADMIN: HCPCS | Performed by: STUDENT IN AN ORGANIZED HEALTH CARE EDUCATION/TRAINING PROGRAM

## 2018-12-17 PROCEDURE — 3046F HEMOGLOBIN A1C LEVEL >9.0%: CPT | Performed by: STUDENT IN AN ORGANIZED HEALTH CARE EDUCATION/TRAINING PROGRAM

## 2018-12-17 RX ORDER — INSULIN GLARGINE 100 [IU]/ML
68 INJECTION, SOLUTION SUBCUTANEOUS NIGHTLY
Qty: 7 VIAL | Refills: 3
Start: 2018-12-17 | End: 2019-02-20 | Stop reason: ALTCHOICE

## 2018-12-17 RX ORDER — LISINOPRIL 40 MG/1
40 TABLET ORAL DAILY
Qty: 30 TABLET | Refills: 3 | Status: SHIPPED | OUTPATIENT
Start: 2018-12-17 | End: 2019-06-22 | Stop reason: DRUGHIGH

## 2018-12-17 RX ORDER — HYDROCHLOROTHIAZIDE 25 MG/1
25 TABLET ORAL DAILY
Qty: 30 TABLET | Refills: 3 | Status: ON HOLD
Start: 2018-12-17 | End: 2019-06-19 | Stop reason: HOSPADM

## 2018-12-17 ASSESSMENT — ENCOUNTER SYMPTOMS
CONSTIPATION: 0
VOMITING: 0
TROUBLE SWALLOWING: 0
SHORTNESS OF BREATH: 0
NAUSEA: 0
CHEST TIGHTNESS: 0
DIARRHEA: 0

## 2018-12-17 ASSESSMENT — PATIENT HEALTH QUESTIONNAIRE - PHQ9
SUM OF ALL RESPONSES TO PHQ QUESTIONS 1-9: 0
SUM OF ALL RESPONSES TO PHQ QUESTIONS 1-9: 0
2. FEELING DOWN, DEPRESSED OR HOPELESS: 0
SUM OF ALL RESPONSES TO PHQ9 QUESTIONS 1 & 2: 0
1. LITTLE INTEREST OR PLEASURE IN DOING THINGS: 0

## 2018-12-17 NOTE — PATIENT INSTRUCTIONS
serving is 1 slice of bread, 1 ounce of dry cereal, or ½ cup of cooked rice, pasta, or cooked cereal. Try to choose whole-grain products as much as possible. · Limit lean meat, poultry, and fish to 2 servings each day. A serving is 3 ounces, about the size of a deck of cards. · Eat 4 to 5 servings of nuts, seeds, and legumes (cooked dried beans, lentils, and split peas) each week. A serving is 1/3 cup of nuts, 2 tablespoons of seeds, or ½ cup of cooked beans or peas. · Limit fats and oils to 2 to 3 servings each day. A serving is 1 teaspoon of vegetable oil or 2 tablespoons of salad dressing. · Limit sweets and added sugars to 5 servings or less a week. A serving is 1 tablespoon jelly or jam, ½ cup sorbet, or 1 cup of lemonade. · Eat less than 2,300 milligrams (mg) of sodium a day. If you limit your sodium to 1,500 mg a day, you can lower your blood pressure even more. Tips for success  · Start small. Do not try to make dramatic changes to your diet all at once. You might feel that you are missing out on your favorite foods and then be more likely to not follow the plan. Make small changes, and stick with them. Once those changes become habit, add a few more changes. · Try some of the following:  ? Make it a goal to eat a fruit or vegetable at every meal and at snacks. This will make it easy to get the recommended amount of fruits and vegetables each day. ? Try yogurt topped with fruit and nuts for a snack or healthy dessert. ? Add lettuce, tomato, cucumber, and onion to sandwiches. ? Combine a ready-made pizza crust with low-fat mozzarella cheese and lots of vegetable toppings. Try using tomatoes, squash, spinach, broccoli, carrots, cauliflower, and onions. ? Have a variety of cut-up vegetables with a low-fat dip as an appetizer instead of chips and dip. ? Sprinkle sunflower seeds or chopped almonds over salads. Or try adding chopped walnuts or almonds to cooked vegetables.   ? Try some vegetarian meals using beans and peas. Add garbanzo or kidney beans to salads. Make burritos and tacos with mashed crowe beans or black beans. Where can you learn more? Go to https://renan.loanDepot. org and sign in to your [employer] account. Enter I521 in the [employer] box to learn more about \"DASH Diet: Care Instructions. \"     If you do not have an account, please click on the \"Sign Up Now\" link. Current as of: December 6, 2017  Content Version: 11.8  © 7827-2261 Healthwise, Incorporated. Care instructions adapted under license by [facility] [place] ([facility]). If you have questions about a medical condition or this instruction, always ask your healthcare professional. [address] any warranty or liability for your use of this information. Medications adjusted. Will take the 40mg tab of Lisinopril daily. Will take the full 25 mg Tab of hydrochlorothiazide daily. Will take 68 instead of 67 units for Lantus. Please record blood sugar readings for next visit.

## 2018-12-17 NOTE — PROGRESS NOTES
Patient verbalized understanding of office instructions. He will call with questions or concerns. Pt was given discharge instructions, all questions were fully answered.   Instructed to stop at  for printed AVS

## 2018-12-19 DIAGNOSIS — E10.65 TYPE 1 DIABETES MELLITUS WITH HYPERGLYCEMIA (HCC): Primary | ICD-10-CM

## 2019-02-20 ENCOUNTER — OFFICE VISIT (OUTPATIENT)
Dept: INTERNAL MEDICINE | Age: 29
End: 2019-02-20
Payer: MEDICAID

## 2019-02-20 ENCOUNTER — TELEPHONE (OUTPATIENT)
Dept: INTERNAL MEDICINE | Age: 29
End: 2019-02-20

## 2019-02-20 VITALS
DIASTOLIC BLOOD PRESSURE: 86 MMHG | TEMPERATURE: 98.2 F | SYSTOLIC BLOOD PRESSURE: 141 MMHG | RESPIRATION RATE: 18 BRPM | HEIGHT: 69 IN | WEIGHT: 253 LBS | HEART RATE: 98 BPM | BODY MASS INDEX: 37.47 KG/M2

## 2019-02-20 DIAGNOSIS — E10.65 TYPE 1 DIABETES MELLITUS WITH HYPERGLYCEMIA (HCC): Primary | ICD-10-CM

## 2019-02-20 PROCEDURE — G8484 FLU IMMUNIZE NO ADMIN: HCPCS | Performed by: INTERNAL MEDICINE

## 2019-02-20 PROCEDURE — 4004F PT TOBACCO SCREEN RCVD TLK: CPT | Performed by: INTERNAL MEDICINE

## 2019-02-20 PROCEDURE — 99213 OFFICE O/P EST LOW 20 MIN: CPT | Performed by: INTERNAL MEDICINE

## 2019-02-20 PROCEDURE — 3046F HEMOGLOBIN A1C LEVEL >9.0%: CPT | Performed by: INTERNAL MEDICINE

## 2019-02-20 PROCEDURE — 2022F DILAT RTA XM EVC RTNOPTHY: CPT | Performed by: INTERNAL MEDICINE

## 2019-02-20 PROCEDURE — G8427 DOCREV CUR MEDS BY ELIG CLIN: HCPCS | Performed by: INTERNAL MEDICINE

## 2019-02-20 PROCEDURE — G8417 CALC BMI ABV UP PARAM F/U: HCPCS | Performed by: INTERNAL MEDICINE

## 2019-02-20 PROCEDURE — 99212 OFFICE O/P EST SF 10 MIN: CPT | Performed by: INTERNAL MEDICINE

## 2019-02-20 RX ORDER — INSULIN GLARGINE 100 [IU]/ML
65 INJECTION, SOLUTION SUBCUTANEOUS NIGHTLY
Qty: 1 VIAL | Refills: 5 | Status: SHIPPED | OUTPATIENT
Start: 2019-02-20 | End: 2019-02-20 | Stop reason: CLARIF

## 2019-02-20 ASSESSMENT — ENCOUNTER SYMPTOMS
SHORTNESS OF BREATH: 0
RHINORRHEA: 0
ABDOMINAL PAIN: 0
DIARRHEA: 0
BACK PAIN: 0
WHEEZING: 0

## 2019-02-20 ASSESSMENT — PATIENT HEALTH QUESTIONNAIRE - PHQ9
2. FEELING DOWN, DEPRESSED OR HOPELESS: 0
SUM OF ALL RESPONSES TO PHQ QUESTIONS 1-9: 0
1. LITTLE INTEREST OR PLEASURE IN DOING THINGS: 0
SUM OF ALL RESPONSES TO PHQ9 QUESTIONS 1 & 2: 0
SUM OF ALL RESPONSES TO PHQ QUESTIONS 1-9: 0

## 2019-03-06 ENCOUNTER — PATIENT MESSAGE (OUTPATIENT)
Dept: INTERNAL MEDICINE | Age: 29
End: 2019-03-06

## 2019-03-29 ENCOUNTER — TELEPHONE (OUTPATIENT)
Dept: INTERNAL MEDICINE | Age: 29
End: 2019-03-29

## 2019-04-24 ENCOUNTER — TELEPHONE (OUTPATIENT)
Dept: INTERNAL MEDICINE | Age: 29
End: 2019-04-24

## 2019-06-13 ENCOUNTER — HOSPITAL ENCOUNTER (EMERGENCY)
Age: 29
Discharge: PSYCHIATRIC OR SUBSTANCE USE FACILITY | DRG: 420 | End: 2019-06-13
Attending: EMERGENCY MEDICINE
Payer: MEDICAID

## 2019-06-13 VITALS
SYSTOLIC BLOOD PRESSURE: 148 MMHG | HEART RATE: 94 BPM | BODY MASS INDEX: 37.03 KG/M2 | TEMPERATURE: 98.5 F | HEIGHT: 69 IN | OXYGEN SATURATION: 98 % | DIASTOLIC BLOOD PRESSURE: 86 MMHG | RESPIRATION RATE: 16 BRPM | WEIGHT: 250 LBS

## 2019-06-13 DIAGNOSIS — R45.851 SUICIDAL IDEATION: Primary | ICD-10-CM

## 2019-06-13 LAB
ACETAMINOPHEN LEVEL: <5 MCG/ML (ref 10–30)
ALBUMIN SERPL-MCNC: 4.3 G/DL (ref 3.5–5.2)
ALP BLD-CCNC: 101 U/L (ref 40–129)
ALT SERPL-CCNC: 16 U/L (ref 0–40)
AMPHETAMINE SCREEN, URINE: NOT DETECTED
ANION GAP SERPL CALCULATED.3IONS-SCNC: 12 MMOL/L (ref 7–16)
AST SERPL-CCNC: 19 U/L (ref 0–39)
BACTERIA: NORMAL /HPF
BARBITURATE SCREEN URINE: NOT DETECTED
BASOPHILS ABSOLUTE: 0.08 E9/L (ref 0–0.2)
BASOPHILS RELATIVE PERCENT: 0.9 % (ref 0–2)
BENZODIAZEPINE SCREEN, URINE: NOT DETECTED
BILIRUB SERPL-MCNC: 0.6 MG/DL (ref 0–1.2)
BILIRUBIN URINE: NEGATIVE
BLOOD, URINE: ABNORMAL
BUN BLDV-MCNC: 15 MG/DL (ref 6–20)
CALCIUM SERPL-MCNC: 9.5 MG/DL (ref 8.6–10.2)
CANNABINOID SCREEN URINE: NOT DETECTED
CHLORIDE BLD-SCNC: 99 MMOL/L (ref 98–107)
CHP ED QC CHECK: NORMAL
CLARITY: CLEAR
CO2: 28 MMOL/L (ref 22–29)
COCAINE METABOLITE SCREEN URINE: NOT DETECTED
COLOR: YELLOW
CREAT SERPL-MCNC: 1.2 MG/DL (ref 0.7–1.2)
EKG ATRIAL RATE: 93 BPM
EKG P AXIS: 30 DEGREES
EKG P-R INTERVAL: 166 MS
EKG Q-T INTERVAL: 346 MS
EKG QRS DURATION: 104 MS
EKG QTC CALCULATION (BAZETT): 430 MS
EKG R AXIS: -11 DEGREES
EKG T AXIS: 29 DEGREES
EKG VENTRICULAR RATE: 93 BPM
EOSINOPHILS ABSOLUTE: 0.12 E9/L (ref 0.05–0.5)
EOSINOPHILS RELATIVE PERCENT: 1.4 % (ref 0–6)
ETHANOL: <10 MG/DL (ref 0–0.08)
GFR AFRICAN AMERICAN: >60
GFR NON-AFRICAN AMERICAN: >60 ML/MIN/1.73
GLUCOSE BLD-MCNC: 209 MG/DL
GLUCOSE BLD-MCNC: 228 MG/DL (ref 74–99)
GLUCOSE URINE: 250 MG/DL
HCT VFR BLD CALC: 46.6 % (ref 37–54)
HEMOGLOBIN: 15.7 G/DL (ref 12.5–16.5)
IMMATURE GRANULOCYTES #: 0.03 E9/L
IMMATURE GRANULOCYTES %: 0.4 % (ref 0–5)
KETONES, URINE: 15 MG/DL
LEUKOCYTE ESTERASE, URINE: NEGATIVE
LYMPHOCYTES ABSOLUTE: 1.61 E9/L (ref 1.5–4)
LYMPHOCYTES RELATIVE PERCENT: 18.9 % (ref 20–42)
MCH RBC QN AUTO: 30.8 PG (ref 26–35)
MCHC RBC AUTO-ENTMCNC: 33.7 % (ref 32–34.5)
MCV RBC AUTO: 91.6 FL (ref 80–99.9)
METER GLUCOSE: 209 MG/DL (ref 74–99)
METHADONE SCREEN, URINE: NOT DETECTED
MONOCYTES ABSOLUTE: 0.5 E9/L (ref 0.1–0.95)
MONOCYTES RELATIVE PERCENT: 5.9 % (ref 2–12)
NEUTROPHILS ABSOLUTE: 6.17 E9/L (ref 1.8–7.3)
NEUTROPHILS RELATIVE PERCENT: 72.5 % (ref 43–80)
NITRITE, URINE: NEGATIVE
OPIATE SCREEN URINE: NOT DETECTED
PDW BLD-RTO: 11.7 FL (ref 11.5–15)
PH UA: 6.5 (ref 5–9)
PHENCYCLIDINE SCREEN URINE: NOT DETECTED
PLATELET # BLD: 324 E9/L (ref 130–450)
PMV BLD AUTO: 9.8 FL (ref 7–12)
POTASSIUM SERPL-SCNC: 4.7 MMOL/L (ref 3.5–5)
PROPOXYPHENE SCREEN: NOT DETECTED
PROTEIN UA: 100 MG/DL
RBC # BLD: 5.09 E12/L (ref 3.8–5.8)
RBC UA: NORMAL /HPF (ref 0–2)
SALICYLATE, SERUM: <0.3 MG/DL (ref 0–30)
SODIUM BLD-SCNC: 139 MMOL/L (ref 132–146)
SPECIFIC GRAVITY UA: 1.02 (ref 1–1.03)
T4 TOTAL: 4.7 MCG/DL (ref 4.5–11.7)
TOTAL PROTEIN: 7.4 G/DL (ref 6.4–8.3)
TRICYCLIC ANTIDEPRESSANTS SCREEN SERUM: NEGATIVE NG/ML
TSH SERPL DL<=0.05 MIU/L-ACNC: 8.72 UIU/ML (ref 0.27–4.2)
UROBILINOGEN, URINE: 0.2 E.U./DL
WBC # BLD: 8.5 E9/L (ref 4.5–11.5)
WBC UA: NORMAL /HPF (ref 0–5)

## 2019-06-13 PROCEDURE — 84443 ASSAY THYROID STIM HORMONE: CPT

## 2019-06-13 PROCEDURE — 82962 GLUCOSE BLOOD TEST: CPT

## 2019-06-13 PROCEDURE — 81001 URINALYSIS AUTO W/SCOPE: CPT

## 2019-06-13 PROCEDURE — 85025 COMPLETE CBC W/AUTO DIFF WBC: CPT

## 2019-06-13 PROCEDURE — 93005 ELECTROCARDIOGRAM TRACING: CPT | Performed by: EMERGENCY MEDICINE

## 2019-06-13 PROCEDURE — 93010 ELECTROCARDIOGRAM REPORT: CPT | Performed by: INTERNAL MEDICINE

## 2019-06-13 PROCEDURE — 99285 EMERGENCY DEPT VISIT HI MDM: CPT

## 2019-06-13 PROCEDURE — 36415 COLL VENOUS BLD VENIPUNCTURE: CPT

## 2019-06-13 PROCEDURE — 80053 COMPREHEN METABOLIC PANEL: CPT

## 2019-06-13 PROCEDURE — G0480 DRUG TEST DEF 1-7 CLASSES: HCPCS

## 2019-06-13 PROCEDURE — 80307 DRUG TEST PRSMV CHEM ANLYZR: CPT

## 2019-06-13 PROCEDURE — 84436 ASSAY OF TOTAL THYROXINE: CPT

## 2019-06-13 NOTE — ED NOTES
Pt instructed on need for urine offered fluids pt refused fluids at this time     Demetrius Gamez RN  06/13/19 2050
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
## 2019-06-13 NOTE — ED NOTES
PT HAS BEEN ACCEPTED TO [address] [place] [address] BY DR. Jacqui Bartholomew TO ROOM 204B    [id number] [phone] PRESS 1      SCARLETT IS ARRANGING [address] [facility]  06/13/19 3557

## 2019-06-13 NOTE — ED NOTES
Offered pt lunch tray pt refused pt cont with flat affect makes no eye contact.  Will cont to monitor Co order remains in place     [facility]  06/13/19 [id number]

## 2019-06-15 ENCOUNTER — APPOINTMENT (OUTPATIENT)
Dept: GENERAL RADIOLOGY | Age: 29
DRG: 420 | End: 2019-06-15
Payer: MEDICAID

## 2019-06-15 ENCOUNTER — APPOINTMENT (OUTPATIENT)
Dept: ULTRASOUND IMAGING | Age: 29
DRG: 420 | End: 2019-06-15
Payer: MEDICAID

## 2019-06-15 ENCOUNTER — HOSPITAL ENCOUNTER (INPATIENT)
Age: 29
LOS: 4 days | Discharge: ANOTHER HOSPITAL | DRG: 420 | End: 2019-06-19
Attending: EMERGENCY MEDICINE | Admitting: INTERNAL MEDICINE
Payer: MEDICAID

## 2019-06-15 DIAGNOSIS — E87.5 HYPERKALEMIA: ICD-10-CM

## 2019-06-15 DIAGNOSIS — E86.0 DEHYDRATION: ICD-10-CM

## 2019-06-15 DIAGNOSIS — E87.20 LACTIC ACIDOSIS: ICD-10-CM

## 2019-06-15 DIAGNOSIS — E13.10 DIABETIC KETOACIDOSIS WITHOUT COMA ASSOCIATED WITH OTHER SPECIFIED DIABETES MELLITUS (HCC): Primary | ICD-10-CM

## 2019-06-15 PROBLEM — E10.10 DKA, TYPE 1, NOT AT GOAL (HCC): Status: ACTIVE | Noted: 2019-06-15

## 2019-06-15 LAB
ACETAMINOPHEN LEVEL: <5 MCG/ML (ref 10–30)
ALBUMIN SERPL-MCNC: 4.3 G/DL (ref 3.5–5.2)
ALP BLD-CCNC: 107 U/L (ref 40–129)
ALT SERPL-CCNC: 14 U/L (ref 0–40)
AMPHETAMINE SCREEN, URINE: NOT DETECTED
ANION GAP SERPL CALCULATED.3IONS-SCNC: 34 MMOL/L (ref 7–16)
ANION GAP SERPL CALCULATED.3IONS-SCNC: 41 MMOL/L (ref 7–16)
AST SERPL-CCNC: 16 U/L (ref 0–39)
BACTERIA: ABNORMAL /HPF
BARBITURATE SCREEN URINE: NOT DETECTED
BASOPHILS ABSOLUTE: 0.1 E9/L (ref 0–0.2)
BASOPHILS RELATIVE PERCENT: 0.4 % (ref 0–2)
BENZODIAZEPINE SCREEN, URINE: NOT DETECTED
BETA-HYDROXYBUTYRATE: >4.5 MMOL/L (ref 0.02–0.27)
BILIRUB SERPL-MCNC: 0.4 MG/DL (ref 0–1.2)
BILIRUBIN URINE: ABNORMAL
BLOOD, URINE: NEGATIVE
BUN BLDV-MCNC: 61 MG/DL (ref 6–20)
BUN BLDV-MCNC: 66 MG/DL (ref 6–20)
CALCIUM SERPL-MCNC: 8 MG/DL (ref 8.6–10.2)
CALCIUM SERPL-MCNC: 8.9 MG/DL (ref 8.6–10.2)
CANNABINOID SCREEN URINE: NOT DETECTED
CHLORIDE BLD-SCNC: 82 MMOL/L (ref 98–107)
CHLORIDE BLD-SCNC: 95 MMOL/L (ref 98–107)
CHP ED QC CHECK: NORMAL
CLARITY: CLEAR
CO2: 6 MMOL/L (ref 22–29)
CO2: 8 MMOL/L (ref 22–29)
COCAINE METABOLITE SCREEN URINE: NOT DETECTED
COLOR: YELLOW
CREAT SERPL-MCNC: 2.1 MG/DL (ref 0.7–1.2)
CREAT SERPL-MCNC: 2.5 MG/DL (ref 0.7–1.2)
EOSINOPHILS ABSOLUTE: 0.01 E9/L (ref 0.05–0.5)
EOSINOPHILS RELATIVE PERCENT: 0 % (ref 0–6)
ETHANOL: <10 MG/DL (ref 0–0.08)
GFR AFRICAN AMERICAN: 37
GFR AFRICAN AMERICAN: 45
GFR AFRICAN AMERICAN: 45
GFR AFRICAN AMERICAN: 47
GFR NON-AFRICAN AMERICAN: 30 ML/MIN/1.73
GFR NON-AFRICAN AMERICAN: 37 ML/MIN/1.73
GFR NON-AFRICAN AMERICAN: 37 ML/MIN/1.73
GFR NON-AFRICAN AMERICAN: 39 ML/MIN/1.73
GLUCOSE BLD-MCNC: 645 MG/DL (ref 74–99)
GLUCOSE BLD-MCNC: 663 MG/DL (ref 74–99)
GLUCOSE BLD-MCNC: 696 MG/DL (ref 74–99)
GLUCOSE BLD-MCNC: 820 MG/DL (ref 74–99)
GLUCOSE URINE: >=1000 MG/DL
HCT VFR BLD CALC: 44.9 % (ref 37–54)
HEMOGLOBIN: 14.4 G/DL (ref 12.5–16.5)
IMMATURE GRANULOCYTES #: 0.38 E9/L
IMMATURE GRANULOCYTES %: 1.4 % (ref 0–5)
KETONES, URINE: >=80 MG/DL
LACTIC ACID: 4.9 MMOL/L (ref 0.5–2.2)
LEUKOCYTE ESTERASE, URINE: NEGATIVE
LYMPHOCYTES ABSOLUTE: 1.79 E9/L (ref 1.5–4)
LYMPHOCYTES RELATIVE PERCENT: 6.5 % (ref 20–42)
MAGNESIUM: 2.4 MG/DL (ref 1.6–2.6)
MCH RBC QN AUTO: 31.6 PG (ref 26–35)
MCHC RBC AUTO-ENTMCNC: 32.1 % (ref 32–34.5)
MCV RBC AUTO: 98.5 FL (ref 80–99.9)
METER GLUCOSE: 432 MG/DL (ref 74–99)
METER GLUCOSE: >500 MG/DL (ref 74–99)
METHADONE SCREEN, URINE: NOT DETECTED
MONOCYTES ABSOLUTE: 1.4 E9/L (ref 0.1–0.95)
MONOCYTES RELATIVE PERCENT: 5.1 % (ref 2–12)
NEUTROPHILS ABSOLUTE: 23.89 E9/L (ref 1.8–7.3)
NEUTROPHILS RELATIVE PERCENT: 86.6 % (ref 43–80)
NITRITE, URINE: NEGATIVE
OPIATE SCREEN URINE: NOT DETECTED
OSMOLALITY: 357 MOSM/KG (ref 285–310)
PDW BLD-RTO: 11.9 FL (ref 11.5–15)
PERFORMED ON: ABNORMAL
PERFORMED ON: ABNORMAL
PH UA: 5.5 (ref 5–9)
PH VENOUS: 7.11 (ref 7.35–7.45)
PH VENOUS: 7.2 (ref 7.35–7.45)
PHENCYCLIDINE SCREEN URINE: NOT DETECTED
PHOSPHORUS: 4 MG/DL (ref 2.5–4.5)
PLATELET # BLD: 372 E9/L (ref 130–450)
PMV BLD AUTO: 10.7 FL (ref 7–12)
POC CHLORIDE: 106 MMOL/L (ref 100–108)
POC CHLORIDE: 108 MMOL/L (ref 100–108)
POC CREATININE: 2 MG/DL (ref 0.7–1.2)
POC CREATININE: 2.1 MG/DL (ref 0.7–1.2)
POC POTASSIUM: 5.2 MMOL/L (ref 3.5–5)
POC POTASSIUM: 5.9 MMOL/L (ref 3.5–5)
POC SODIUM: 133 MMOL/L (ref 132–146)
POC SODIUM: 134 MMOL/L (ref 132–146)
POTASSIUM SERPL-SCNC: 4.9 MMOL/L (ref 3.5–5)
POTASSIUM SERPL-SCNC: 8.2 MMOL/L (ref 3.5–5)
PROPOXYPHENE SCREEN: NOT DETECTED
PROTEIN UA: ABNORMAL MG/DL
RBC # BLD: 4.56 E12/L (ref 3.8–5.8)
RBC UA: ABNORMAL /HPF (ref 0–2)
SALICYLATE, SERUM: <0.3 MG/DL (ref 0–30)
SODIUM BLD-SCNC: 129 MMOL/L (ref 132–146)
SODIUM BLD-SCNC: 137 MMOL/L (ref 132–146)
SPECIFIC GRAVITY UA: 1.02 (ref 1–1.03)
TOTAL PROTEIN: 7.4 G/DL (ref 6.4–8.3)
TRICYCLIC ANTIDEPRESSANTS SCREEN SERUM: NEGATIVE NG/ML
TROPONIN: <0.01 NG/ML (ref 0–0.03)
UROBILINOGEN, URINE: 0.2 E.U./DL
WBC # BLD: 27.6 E9/L (ref 4.5–11.5)
WBC UA: ABNORMAL /HPF (ref 0–5)

## 2019-06-15 PROCEDURE — 82947 ASSAY GLUCOSE BLOOD QUANT: CPT

## 2019-06-15 PROCEDURE — 76770 US EXAM ABDO BACK WALL COMP: CPT

## 2019-06-15 PROCEDURE — 83605 ASSAY OF LACTIC ACID: CPT

## 2019-06-15 PROCEDURE — 93005 ELECTROCARDIOGRAM TRACING: CPT | Performed by: EMERGENCY MEDICINE

## 2019-06-15 PROCEDURE — 82565 ASSAY OF CREATININE: CPT

## 2019-06-15 PROCEDURE — 87040 BLOOD CULTURE FOR BACTERIA: CPT

## 2019-06-15 PROCEDURE — 81001 URINALYSIS AUTO W/SCOPE: CPT

## 2019-06-15 PROCEDURE — 82435 ASSAY OF BLOOD CHLORIDE: CPT

## 2019-06-15 PROCEDURE — 85025 COMPLETE CBC W/AUTO DIFF WBC: CPT

## 2019-06-15 PROCEDURE — 2000000000 HC ICU R&B

## 2019-06-15 PROCEDURE — 82800 BLOOD PH: CPT

## 2019-06-15 PROCEDURE — 80048 BASIC METABOLIC PNL TOTAL CA: CPT

## 2019-06-15 PROCEDURE — 84484 ASSAY OF TROPONIN QUANT: CPT

## 2019-06-15 PROCEDURE — 83735 ASSAY OF MAGNESIUM: CPT

## 2019-06-15 PROCEDURE — 83930 ASSAY OF BLOOD OSMOLALITY: CPT

## 2019-06-15 PROCEDURE — 2580000003 HC RX 258: Performed by: EMERGENCY MEDICINE

## 2019-06-15 PROCEDURE — 2500000003 HC RX 250 WO HCPCS: Performed by: EMERGENCY MEDICINE

## 2019-06-15 PROCEDURE — 36415 COLL VENOUS BLD VENIPUNCTURE: CPT

## 2019-06-15 PROCEDURE — 6360000002 HC RX W HCPCS: Performed by: EMERGENCY MEDICINE

## 2019-06-15 PROCEDURE — G0480 DRUG TEST DEF 1-7 CLASSES: HCPCS

## 2019-06-15 PROCEDURE — 6370000000 HC RX 637 (ALT 250 FOR IP): Performed by: EMERGENCY MEDICINE

## 2019-06-15 PROCEDURE — 87088 URINE BACTERIA CULTURE: CPT

## 2019-06-15 PROCEDURE — 99285 EMERGENCY DEPT VISIT HI MDM: CPT

## 2019-06-15 PROCEDURE — 84100 ASSAY OF PHOSPHORUS: CPT

## 2019-06-15 PROCEDURE — 82010 KETONE BODYS QUAN: CPT

## 2019-06-15 PROCEDURE — 82962 GLUCOSE BLOOD TEST: CPT

## 2019-06-15 PROCEDURE — 84295 ASSAY OF SERUM SODIUM: CPT

## 2019-06-15 PROCEDURE — 80053 COMPREHEN METABOLIC PANEL: CPT

## 2019-06-15 PROCEDURE — 80307 DRUG TEST PRSMV CHEM ANLYZR: CPT

## 2019-06-15 PROCEDURE — 84132 ASSAY OF SERUM POTASSIUM: CPT

## 2019-06-15 PROCEDURE — 71045 X-RAY EXAM CHEST 1 VIEW: CPT

## 2019-06-15 RX ORDER — HYDROXYZINE 50 MG/1
50 TABLET, FILM COATED ORAL EVERY 6 HOURS PRN
COMMUNITY
End: 2019-06-22 | Stop reason: ALTCHOICE

## 2019-06-15 RX ORDER — HALOPERIDOL 5 MG
5 TABLET ORAL EVERY 6 HOURS PRN
COMMUNITY
End: 2019-06-22 | Stop reason: ALTCHOICE

## 2019-06-15 RX ORDER — ONDANSETRON 2 MG/ML
4 INJECTION INTRAMUSCULAR; INTRAVENOUS EVERY 6 HOURS PRN
Status: DISCONTINUED | OUTPATIENT
Start: 2019-06-15 | End: 2019-06-20 | Stop reason: HOSPADM

## 2019-06-15 RX ORDER — ACETAMINOPHEN 650 MG/1
650 SUPPOSITORY RECTAL EVERY 4 HOURS PRN
Status: DISCONTINUED | OUTPATIENT
Start: 2019-06-15 | End: 2019-06-16

## 2019-06-15 RX ORDER — POTASSIUM CHLORIDE 7.45 MG/ML
10 INJECTION INTRAVENOUS PRN
Status: DISCONTINUED | OUTPATIENT
Start: 2019-06-15 | End: 2019-06-15 | Stop reason: SDUPTHER

## 2019-06-15 RX ORDER — ROSUVASTATIN CALCIUM 10 MG/1
10 TABLET, COATED ORAL NIGHTLY
Status: ON HOLD | COMMUNITY
End: 2020-04-09

## 2019-06-15 RX ORDER — DEXTROSE AND SODIUM CHLORIDE 5; .45 G/100ML; G/100ML
INJECTION, SOLUTION INTRAVENOUS CONTINUOUS PRN
Status: DISCONTINUED | OUTPATIENT
Start: 2019-06-15 | End: 2019-06-15 | Stop reason: SDUPTHER

## 2019-06-15 RX ORDER — DEXTROSE MONOHYDRATE 25 G/50ML
12.5 INJECTION, SOLUTION INTRAVENOUS PRN
Status: DISCONTINUED | OUTPATIENT
Start: 2019-06-15 | End: 2019-06-20 | Stop reason: HOSPADM

## 2019-06-15 RX ORDER — ONDANSETRON 2 MG/ML
8 INJECTION INTRAMUSCULAR; INTRAVENOUS ONCE
Status: COMPLETED | OUTPATIENT
Start: 2019-06-15 | End: 2019-06-15

## 2019-06-15 RX ORDER — POTASSIUM CHLORIDE 7.45 MG/ML
10 INJECTION INTRAVENOUS PRN
Status: DISCONTINUED | OUTPATIENT
Start: 2019-06-15 | End: 2019-06-17

## 2019-06-15 RX ORDER — DIPHENHYDRAMINE HCL 50 MG
50 CAPSULE ORAL EVERY 6 HOURS PRN
COMMUNITY
End: 2019-06-22 | Stop reason: ALTCHOICE

## 2019-06-15 RX ORDER — LEVOTHYROXINE SODIUM 0.03 MG/1
25 TABLET ORAL DAILY
Status: ON HOLD | COMMUNITY
End: 2019-06-19 | Stop reason: HOSPADM

## 2019-06-15 RX ORDER — 0.9 % SODIUM CHLORIDE 0.9 %
1000 INTRAVENOUS SOLUTION INTRAVENOUS ONCE
Status: COMPLETED | OUTPATIENT
Start: 2019-06-15 | End: 2019-06-15

## 2019-06-15 RX ORDER — SODIUM CHLORIDE 9 MG/ML
INJECTION, SOLUTION INTRAVENOUS CONTINUOUS
Status: DISCONTINUED | OUTPATIENT
Start: 2019-06-15 | End: 2019-06-17

## 2019-06-15 RX ORDER — DIPHENHYDRAMINE HYDROCHLORIDE 50 MG/ML
25 INJECTION INTRAMUSCULAR; INTRAVENOUS EVERY 6 HOURS PRN
COMMUNITY
End: 2019-06-22 | Stop reason: ALTCHOICE

## 2019-06-15 RX ORDER — DEXTROSE AND SODIUM CHLORIDE 5; .45 G/100ML; G/100ML
INJECTION, SOLUTION INTRAVENOUS CONTINUOUS PRN
Status: DISCONTINUED | OUTPATIENT
Start: 2019-06-15 | End: 2019-06-20 | Stop reason: HOSPADM

## 2019-06-15 RX ORDER — MAGNESIUM SULFATE 1 G/100ML
1 INJECTION INTRAVENOUS PRN
Status: DISCONTINUED | OUTPATIENT
Start: 2019-06-15 | End: 2019-06-20 | Stop reason: HOSPADM

## 2019-06-15 RX ORDER — SODIUM CHLORIDE 0.9 % (FLUSH) 0.9 %
10 SYRINGE (ML) INJECTION PRN
Status: DISCONTINUED | OUTPATIENT
Start: 2019-06-15 | End: 2019-06-20 | Stop reason: HOSPADM

## 2019-06-15 RX ORDER — HEPARIN SODIUM 10000 [USP'U]/ML
5000 INJECTION, SOLUTION INTRAVENOUS; SUBCUTANEOUS EVERY 8 HOURS SCHEDULED
Status: DISCONTINUED | OUTPATIENT
Start: 2019-06-15 | End: 2019-06-18

## 2019-06-15 RX ORDER — ONDANSETRON 4 MG/1
4 TABLET, FILM COATED ORAL EVERY 8 HOURS PRN
Status: ON HOLD | COMMUNITY
End: 2019-06-25 | Stop reason: HOSPADM

## 2019-06-15 RX ORDER — SODIUM CHLORIDE 9 MG/ML
INJECTION, SOLUTION INTRAVENOUS CONTINUOUS
Status: DISCONTINUED | OUTPATIENT
Start: 2019-06-15 | End: 2019-06-15 | Stop reason: SDUPTHER

## 2019-06-15 RX ORDER — DEXTROSE MONOHYDRATE 25 G/50ML
12.5 INJECTION, SOLUTION INTRAVENOUS PRN
Status: DISCONTINUED | OUTPATIENT
Start: 2019-06-15 | End: 2019-06-15 | Stop reason: SDUPTHER

## 2019-06-15 RX ORDER — 0.9 % SODIUM CHLORIDE 0.9 %
2400 INTRAVENOUS SOLUTION INTRAVENOUS ONCE
Status: DISCONTINUED | OUTPATIENT
Start: 2019-06-15 | End: 2019-06-20 | Stop reason: HOSPADM

## 2019-06-15 RX ORDER — HALOPERIDOL 5 MG/ML
INJECTION INTRAMUSCULAR EVERY 6 HOURS PRN
COMMUNITY
End: 2019-06-22 | Stop reason: ALTCHOICE

## 2019-06-15 RX ORDER — MAGNESIUM SULFATE 1 G/100ML
1 INJECTION INTRAVENOUS PRN
Status: DISCONTINUED | OUTPATIENT
Start: 2019-06-15 | End: 2019-06-15 | Stop reason: SDUPTHER

## 2019-06-15 RX ORDER — 0.9 % SODIUM CHLORIDE 0.9 %
15 INTRAVENOUS SOLUTION INTRAVENOUS ONCE
Status: COMPLETED | OUTPATIENT
Start: 2019-06-15 | End: 2019-06-15

## 2019-06-15 RX ADMIN — SODIUM CHLORIDE 16.9 UNITS/HR: 9 INJECTION, SOLUTION INTRAVENOUS at 21:28

## 2019-06-15 RX ADMIN — Medication 1 G: at 16:44

## 2019-06-15 RX ADMIN — SODIUM CHLORIDE: 9 INJECTION, SOLUTION INTRAVENOUS at 16:36

## 2019-06-15 RX ADMIN — ONDANSETRON 8 MG: 2 INJECTION INTRAMUSCULAR; INTRAVENOUS at 16:52

## 2019-06-15 RX ADMIN — SODIUM BICARBONATE 50 MEQ: 84 INJECTION, SOLUTION INTRAVENOUS at 17:20

## 2019-06-15 RX ADMIN — SODIUM BICARBONATE 50 MEQ: 84 INJECTION, SOLUTION INTRAVENOUS at 17:03

## 2019-06-15 RX ADMIN — SODIUM CHLORIDE 0.1 UNITS/KG/HR: 9 INJECTION, SOLUTION INTRAVENOUS at 19:15

## 2019-06-15 RX ADMIN — SODIUM CHLORIDE: 9 INJECTION, SOLUTION INTRAVENOUS at 21:18

## 2019-06-15 RX ADMIN — SODIUM CHLORIDE 0.05 UNITS/KG/HR: 9 INJECTION, SOLUTION INTRAVENOUS at 16:56

## 2019-06-15 RX ADMIN — SODIUM CHLORIDE 1000 ML: 9 INJECTION, SOLUTION INTRAVENOUS at 15:27

## 2019-06-15 RX ADMIN — SODIUM BICARBONATE: 84 INJECTION, SOLUTION INTRAVENOUS at 17:05

## 2019-06-15 RX ADMIN — SODIUM CHLORIDE 1701 ML: 9 INJECTION, SOLUTION INTRAVENOUS at 16:36

## 2019-06-15 ASSESSMENT — ENCOUNTER SYMPTOMS
SORE THROAT: 0
SHORTNESS OF BREATH: 0
WHEEZING: 0
STRIDOR: 0
COUGH: 0
EYE DISCHARGE: 0
EYE PAIN: 0
VOMITING: 1
NAUSEA: 1
NAUSEA: 0
RHINORRHEA: 0
CONSTIPATION: 0
SINUS PAIN: 0
EYE REDNESS: 0
BACK PAIN: 0
ABDOMINAL PAIN: 0
VOMITING: 0
SINUS PRESSURE: 0
BLOOD IN STOOL: 0
DIARRHEA: 0

## 2019-06-15 ASSESSMENT — PAIN DESCRIPTION - LOCATION: LOCATION: GENERALIZED

## 2019-06-15 ASSESSMENT — PAIN DESCRIPTION - PAIN TYPE: TYPE: ACUTE PAIN

## 2019-06-15 NOTE — ED NOTES
Bed: 17B-17  Expected date:   Expected time:   Means of arrival:   Comments:  Aletha Chapa 56, RN  06/15/19 5298

## 2019-06-15 NOTE — ED NOTES
Dr. Umang Sánchez notified of critical [facility]     Nichelle Leonard, EMT-P  06/15/19 [id number]

## 2019-06-15 NOTE — CONSULTS
[facility])  Department of Internal Medicine   MICU H&P Note    Patient:  Willy Piedra 30 y.o. male   MRN: [id number]       Date of Service: 6/15/2019      Chief Complaint:  Nausea/vomiting/abdominal pain    History of Present Illness      History obtained from the patient  This is a 30year old male with T1DM diagnosed 21 years ago, HTN, HLD, Psychiatric history with suicidal ideation 2 days ago where patient was found by police with a loaded gun and attempting to put the gun in his mouth. He was brought to the ED 6/13/19 and discharged to [facility]. He mentions a 3 day history of nausea, vomiting x 11 times a day, poor appetite and skipping out on a couple of doses of Insulin. He also mentions his BG running high and endorses some fever, chills, polyuria, polydipsia, chest tightness and productive cough. He denies diarrhea, dysuria, sob, leg swelling, headache and visual changes. While in the ED, VS were significant for tachycardia, hypotension. Labs revealed Na 129, K 8.2, Cl 82, HCO3 6, BUN 66, Cr 2.5, AG 41, LA 4.9, , measured serum osmolality 357, calculated 327, BHB >4.5, wbc 27.6, pH 7.11  EKG showing sinus tachycardia with hyperacute T waves. CXR and Retroperitoneal US unremarkable. He was started on DKA protocol, bicarb drip, received IVF boluses, got calcium gluconate and was transferred to the Unit for further management    PastMedical History:      Diagnosis Date    Abscess of back 8/2011    [facility] obstruction ([facility] [address]     Diabetes mellitus type 1 ([facility] [address] Diagnosed at 6years of age/ in 1998    HTN (hypertension)     Hyperlipidemia        Past Surgical History:    History reviewed. No pertinent surgical history. Prior to Admission medications    Medication Sig Start Date End Date Taking?  Authorizing Provider   insulin lispro (HUMALOG) 100 UNIT/ML injection vial Inject into the skin 4 times daily (before meals and nightly) 0-10 units AC and HS   Yes Historical Provider, MD   levothyroxine (SYNTHROID) 25 MCG tablet Take 25 mcg by mouth Daily   Yes Historical Provider, MD   rosuvastatin (CRESTOR) 10 MG tablet Take 10 mg by mouth daily   Yes Historical Provider, MD   sertraline (ZOLOFT) 50 MG tablet Take 50 mg by mouth daily   Yes Historical Provider, MD   diphenhydrAMINE (BENADRYL) 50 MG/ML injection Infuse 25 mg intravenously every 6 hours as needed for Itching   Yes Historical Provider, MD   diphenhydrAMINE (BENADRYL) 50 MG capsule Take 50 mg by mouth every 6 hours as needed for Itching   Yes Historical Provider, MD   Dextrose, Diabetic Use, (GLUCOSE PO) Take by mouth daily as needed   Yes Historical Provider, MD   haloperidol lactate (HALDOL) 5 MG/ML injection Inject into the muscle every 6 hours as needed for Agitation   Yes Historical Provider, MD   haloperidol (HALDOL) 5 MG tablet Take 5 mg by mouth every 6 hours as needed for Agitation   Yes Historical Provider, MD   hydrOXYzine (ATARAX) 50 MG tablet Take 50 mg by mouth every 6 hours as needed for Itching   Yes Historical Provider, MD   ondansetron (ZOFRAN) 4 MG tablet Take 4 mg by mouth every 8 hours as needed for Nausea or Vomiting   Yes Historical Provider, MD   insulin glargine (BASAGLAR KWIKPEN) 100 UNIT/ML injection pen Inject 65 Units into the skin nightly 3/7/19  Yes Kushal Reid MD   lisinopril (PRINIVIL;ZESTRIL) 40 MG tablet Take 1 tablet by mouth daily  Patient taking differently: Take 20 mg by mouth daily  12/17/18  Yes Girish Ramírez MD   hydrochlorothiazide (HYDRODIURIL) 25 MG tablet Take 1 tablet by mouth daily  Patient taking differently: Take 12.5 mg by mouth daily  12/17/18  Yes Girish Ramírez MD   Insulin Pen Needle 31G X 6 MM MISC Pt to inject medication two times daily.  3/25/19   Kushal Reid MD   Insulin Syringe-Needle U-100 25G X 1\" 1 ML MISC Use as directed 2/20/19   Rony Webster MD   INSULIN SYRINGE 1CC/29G (AIMSCO INSULIN SYR ULTRA THIN) 29G X 1/2\" 1 ML MISC Inject insulin up to 4 times daily 12/10/18   Celia MD Elizabeth   atorvastatin (LIPITOR) 40 MG tablet Take 1 tablet by mouth daily 12/10/18   Bibi Ron MD   Lancets MISC Provide any brand that is covered 5/21/18   Best Giraldo MD   glucose blood VI test strips (ASCENSIA AUTODISC VI;ONE TOUCH ULTRA TEST VI) strip Provide any that is covered and compatible with glucometer 5/21/18   Best Giraldo MD   glucose monitoring kit (FREESTYLE) monitoring kit Use once. 5/23/17   Elisabet Alvarez MD       Allergies:  Pineapple    Social History:   TOBACCO:   reports that he has never smoked. His smokeless tobacco use includes chew. ETOH:   reports that he drinks alcohol. OCCUPATION:      Family History:       Problem Relation Age of Onset    Diabetes Mother         REVIEW OF SYSTEMS:  Review of Systems   Constitutional: Negative for chills, fatigue and fever. HENT: Negative for congestion, rhinorrhea and sinus pain. Respiratory: Negative for cough, shortness of breath, wheezing and stridor. Cardiovascular: Negative for chest pain, palpitations and leg swelling. Gastrointestinal: Negative for abdominal pain, blood in stool, constipation, diarrhea, nausea and vomiting. Genitourinary: Negative for dysuria, frequency and hematuria. Musculoskeletal: Negative for gait problem and myalgias. Skin: Negative for rash. Allergic/Immunologic: Negative for environmental allergies. Neurological: Negative for dizziness, weakness, numbness and headaches. Hematological: Does not bruise/bleed easily. Psychiatric/Behavioral: Negative for suicidal ideas. Physical Exam       Physical Exam:  · Vitals: /64   Pulse 128   Temp 97.4 °F (36.3 °C)   Resp 26   Ht 5' 9\" (1.753 m)   Wt 250 lb (113.4 kg)   SpO2 100%   BMI 36.92 kg/m²     · I & O - 24hr: I/O this shift:  · In: 1000 [IV Piggyback:1000]  · Out: -    Physical Exam   Constitutional: He is oriented to person, place, and time. He appears well-developed and well-nourished.  He appears distressed. HENT:   Head: Normocephalic and atraumatic. Eyes: Pupils are equal, round, and reactive to light. Conjunctivae are normal. No scleral icterus. Cardiovascular: Regular rhythm, S1 normal, S2 normal, normal heart sounds and intact distal pulses. No murmur heard. Tachycardic    Pulmonary/Chest: Effort normal and breath sounds normal. No respiratory distress. He has no wheezes. He has no rales. Abdominal: Soft. Bowel sounds are normal. He exhibits no distension and no mass. There is no tenderness. There is no guarding. Musculoskeletal: He exhibits no edema or tenderness. Neurological: He is alert and oriented to person, place, and time. No cranial nerve deficit. Skin: Skin is warm. He is diaphoretic.      Labs     CBC:   Lab Results   Component Value Date    WBC 27.6 06/15/2019    RBC 4.56 06/15/2019    HGB 14.4 06/15/2019    HCT 44.9 06/15/2019     06/15/2019    MCV 98.5 06/15/2019     BMP:  @BRIEFLAB(NA,K,CL,CO2,BUN,CREATININE,GLUCOSE,CALCIUM)@  Hepatic Function Panel:    Lab Results   Component Value Date    ALKPHOS 107 06/15/2019    AST 16 06/15/2019    ALT 14 06/15/2019    PROT 7.4 06/15/2019    LABALBU 4.3 06/15/2019    LABALBU 5.3 05/18/2012    BILITOT 0.4 06/15/2019     Cardiac Enzymes: @RESUFAST(CKTOTAL:3,CKMB:3,CKMBINDEX:3,TROPONINI:3)@  PT/INR:    Lab Results   Component Value Date    PROTIME 11.3 01/20/2013    PROTIME 10.4 05/18/2012    INR 1.0 01/20/2013     ABG:    Lab Results   Component Value Date    K6FGVCQS 98.0 06/26/2012     TSH:    Lab Results   Component Value Date    TSH 8.720 06/13/2019     EKG: Rhythm Strip:     Resident's Assessment & Plan     Endocrine  DKA   Likely 2/2 noncompliance in the setting of an underlying infection  Blood glucose 820 on presentation  Na 129  K 8.1 CO2 6,Anion gap 41, BHB >4.5, pH 7.1  HbA1c 11.6 6/16/19  Continue DKA protocol for now with Insulin gtt and IVF  BMP with Mg and phos Q 4, replace electrolytes as needed  NPO for now, hold all home Insulin  Hypoglycemia protocol  Resume diet and bridge once gap closes x 2  Consult diabetic educator  F/U Pan cultures    Type 1 DM  Diagnosed at the age of 6  Suboptimally controlled    Hypothyroidism  On synthroid 25 mcg daily    Cardiovascular  HTN  On HCTZ and lisinopril at home (held for ASHLEY)  Continue PRN HLZ  Monitor blood pressure    Elevated troponins  Initially <0.01-->0.06--->0.11  Likely 2/2 ASHLEY  Pt is currently without chest pain  Repeat EKG this am negative for signs of ischemia  Continue to trend    Renal  ASHLEY  Improving  Likely hemodynamically mediated 2/2 dehydration  Baseline Cr normal, admitting 2.5--->1.5  Continue to monitor urine output and renal function  Nephrology following    Hyperkalemia  2/2 Acidosis  Now resolved  Presenting K 8.2 with EKG changes, now down to 3.8    HAGMA  Likely 2/2 DKA vs LA  Acidosis resolved-->24 AG 12  S/p bicarb drip  Serum osmolar gap 30, concerns for ingestion of other toxins  Examination of the eyes normal, urinalysis negative for oxalate crystals  F/U methanol and ethylene glycol    Lactic acidosis  Resolved: 4.9--->1.7-->1.2  D/C trend    Infectious Disease  Likely CAP  Pt complained of productive cough with subjective fever and chills  Leukocytosis 27.6, PMN 86.6%, procalcitonin 2.83  CXR showing right hilar infiltrates  F/u respiratory culture, viral panel, strep and legionella Ag  Start Levaquin 500 mg daily  Monitor respiratory status     DVT/GI prophylaxis: Heparin/Protonix  [facility]    Evelyn Sosa M.D. , PGY-1  Internal Medicine    Attending Physician: Dr. Silviano Quijano Attending Addendum:    Patient seen and examined with the house staff. EKG and X-rays personally reviewed through the PACS. Family is updated at the bedside as available. Additional findings listed below as necessary. Additional comments:  1. DKA resolving but will need close monitoring of glucose today so will keep in ICU.  Bridged this am..  2. ASHLEY improved with hydration   3. Hyperkalemia resolved. 4. Elevated procalcitonin cxr clear but watch on levofloxacin today. 5. Agree ekg without acute ischemic signs.

## 2019-06-15 NOTE — ED NOTES
Spoke with Gurmeet Crow at [facility], patient was previously, but now voluntary at their facility, but states that he is still obligated to stay and not discharged.       Timothy Vasquez RN  06/15/19 [id number]

## 2019-06-15 NOTE — CONSULTS
Department of Internal Medicine  Nephrology Attending Consult Note      Reason for Consult: Renal failure, metabolic acidosis and hyperkalemia  Requesting Physician: Dr. Sharla Harley: Nausea and vomiting and hyperglycemia    History Obtained From:  patient, electronic medical record    HISTORY OF PRESENT ILLNESS: Briefly Mr. Sergio Lawrence is a 30year old man with h/o type 1 DM, suicidal ideation recently admitted to a [facility] facility, who was admitted to this hospital on Tammi 15, 2019 after he presented to the ER reporting nausea and vomiting and elevated blood sugar. After evaluation he was found to have hyperkalemia with a potassium of 8.2, metabolic acidosis with a bicarbonate of 6 and renal failure with a creatinine level 2.5 m/dL. Of note his blood work on June 13 was completely normal.      Past Medical History:        Diagnosis Date    Abscess of back 8/2011    [facility]el obstruction ([facility]     Diabetes mellitus type 1 ([facility] [address] Diagnosed at 6years of age/ in 1998    HTN (hypertension)     Hyperlipidemia      Past Surgical History:    History reviewed. No pertinent surgical history.   Current Medications:    Current Facility-Administered Medications: sodium chloride flush 0.9 % injection 10 mL, 10 mL, Intravenous, PRN  dextrose 50 % IV solution, 12.5 g, Intravenous, PRN  potassium chloride 10 mEq/100 mL IVPB (Peripheral Line), 10 mEq, Intravenous, PRN  magnesium sulfate 1 g in dextrose 5% 100 mL IVPB, 1 g, Intravenous, PRN  sodium phosphate 10 mmol in dextrose 5 % 250 mL IVPB, 10 mmol, Intravenous, PRN **OR** sodium phosphate 15 mmol in dextrose 5 % 250 mL IVPB, 15 mmol, Intravenous, PRN **OR** sodium phosphate 20 mmol in dextrose 5 % 500 mL IVPB, 20 mmol, Intravenous, PRN  [COMPLETED] 0.9 % sodium chloride bolus, 15 mL/kg, Intravenous, Once **FOLLOWED BY** 0.9 % sodium chloride infusion, , Intravenous, Continuous  dextrose 5 % and 0.45 % sodium chloride infusion, , Intravenous, Continuous PRN  insulin 98.5 06/15/2019    MCH 31.6 06/15/2019    MCHC 32.1 06/15/2019    RDW 11.9 06/15/2019     06/15/2019    MPV 10.7 06/15/2019     CMP:    Lab Results   Component Value Date     06/15/2019    K 8.2 06/15/2019    K 4.0 06/11/2018    CL 82 06/15/2019    CO2 6 06/15/2019    BUN 66 06/15/2019    CREATININE 2.1 06/15/2019    CREATININE 2.5 06/15/2019    GFRAA 45 06/15/2019    LABGLOM 37 06/15/2019    GLUCOSE 820 06/15/2019    GLUCOSE 57 05/18/2012    PROT 7.4 06/15/2019    LABALBU 4.3 06/15/2019    LABALBU 5.3 05/18/2012    CALCIUM 8.9 06/15/2019    BILITOT 0.4 06/15/2019    ALKPHOS 107 06/15/2019    AST 16 06/15/2019    ALT 14 06/15/2019     Magnesium:    Lab Results   Component Value Date    MG 2.0 06/11/2018     Phosphorus:    Lab Results   Component Value Date    PHOS 3.3 06/11/2018     Radiology Review:      Chest x-ray Tammi 15, 2018       NO ACUTE CARDIOPULMONARY PROCESS             IMPRESSION/RECOMMENDATIONS:      Briefly Mr. Blank Rey is a 30year old man with h/o type 1 DM, suicidal ideation recently admitted to a [facility] facility, who was admitted to this hospital on Tammi 15, 2019 after he presented to the ER reporting nausea and vomiting and elevated blood sugar. After evaluation he was found to have hyperkalemia with a potassium of 8.2, metabolic acidosis with a bicarbonate of 6 and renal failure with a creatinine level 2.5 m/dL. Of note his blood work on June 13 was completely normal.    1. Hyperkalemia, multifactorial, due to extracellular shifting (potassium dragging, driven by hyperglycemia, osmotic water movement), insulin depletion, and decreased GFR. 2. ASHLEY stage II, consistent with volume responsive prerenal ASHLEY, due to urinary losses related to osmotic diuresis and vomiting  3. Severe metabolic acidosis secondary to DKA  4. Relative hypochloremia (expected corrected chloride 94), due to vomiting  5.  Translocation hyponatremia due to hyperglycemia, corrected sodium 140 mEq/L    Plan:    · Continue bicarbonate drip  · Change bicarbonate drip to normal saline when bicarbonate level > 10 meq/L  · Monitor potassium levels  · Monitor bicarbonate levels  · Monitor renal function    Thank you very much Dr. Niles Andrade for allowing us to participate in the care of Mr. Leny Ryan.

## 2019-06-15 NOTE — ED NOTES
Blood culture specimen obtained, per policy, from [facility] by Artur Casarez RN. Specimen one of two collected at this time.      Cristobal Lester RN  06/15/19 [id number]

## 2019-06-15 NOTE — ED PROVIDER NOTES
49-year-old male history of type 1 diabetes who is currently being held at [facility] for suicidal ideation that involved him attempting to put a gun in his mouth. Patient states over the last 2 days she had some nausea and found his blood sugars to be 449. Patient states some nausea and chest pain. Patient states he was taking his insulin at [facility] but the symptoms progressed over the last 2 days and eventually sent him in here for further evaluation. The history is provided by the patient. Hyperglycemia   Blood sugar level PTA:  449  Severity:  Mild  Onset quality:  Gradual  Duration:  2 days  Timing:  Intermittent  Progression:  Waxing and waning  Chronicity:  Recurrent  Diabetes status:  Controlled with insulin  Current diabetic therapy:  Lispro, Lantus  Context: noncompliance    Relieved by:  Nothing  Ineffective treatments:  None tried  Associated symptoms: chest pain, nausea and vomiting    Associated symptoms: no abdominal pain, no dehydration, no diaphoresis, no dysuria, no fever, no increased appetite, no malaise, no polyuria, no shortness of breath, no syncope, no weakness and no weight change    Chest pain:     Quality: aching      Severity:  Mild    Onset quality:  Gradual    Duration:  2 days    Timing:  Intermittent    Progression:  Waxing and waning    Chronicity:  New  Nausea:     Severity:  Mild    Onset quality:  Gradual    Duration:  2 days    Timing:  Intermittent    Progression:  Waxing and waning  Vomiting:     Quality:  Stomach contents    Severity:  Mild    Duration:  2 days    Timing:  Intermittent  Risk factors: hx of DKA        Review of Systems   Constitutional: Negative for chills, diaphoresis and fever. HENT: Negative for ear pain, sinus pressure and sore throat. Eyes: Negative for pain, discharge and redness. Respiratory: Negative for cough, shortness of breath and wheezing. Cardiovascular: Positive for chest pain. Negative for syncope.    Gastrointestinal: Positive for nausea and vomiting. Negative for abdominal pain and diarrhea. Endocrine: Negative for polyuria. Genitourinary: Negative for dysuria and frequency. Musculoskeletal: Negative for arthralgias and back pain. Skin: Negative for rash and wound. Neurological: Negative for weakness and headaches. Hematological: Negative for adenopathy. All other systems reviewed and are negative. Physical Exam   Constitutional: He is oriented to person, place, and time. He appears well-developed and well-nourished. HENT:   Head: Normocephalic and atraumatic. Eyes: Pupils are equal, round, and reactive to light. EOM are normal.   Neck: Normal range of motion. Neck supple. Cardiovascular: Regular rhythm. Tachycardia present. Abdominal: Soft. Normal appearance and bowel sounds are normal. There is no tenderness. Neurological: He is alert and oriented to person, place, and time. Skin: He is diaphoretic. Procedures    MDM    ED Course as of Jim 15 1715   Sat Jim 15, 2019   1545 Patient was seen and examined concern for DKA patient having chest pain so a EKG and troponin has been ordered. IV fluids initiated waiting concern for DKA. Venous pH and beta hydroxybutyrate ordered. [CF]   1644 The report of abnormal labs including a potassium 8.2. Potassium bicarbonate with bicarb drip IV fluids additionally an insulin drip have been ordered for DKA. Spoke with Dr. Ricci Martin who will admit to the ICU, Dr. Ayden Bryson will provide consultation and a consult was placed to nephrology given the potassium >8.    [CF]   [phone] with nephrology who requested patient receive another amp of bicarbonate for repeat potassium performed.     [CF]      ED Course User Index  [CF] Elizabeth Hernandez, DO     --------------------------------------------- PAST HISTORY ---------------------------------------------  Past Medical History:  has a past medical history of Abscess of back, Bowel obstruction ([address] Diabetes mellitus type 1 ([facility] [address] HTN (hypertension), and Hyperlipidemia. Past Surgical History:  has no past surgical history on file. Social History:  reports that he has never smoked. His smokeless tobacco use includes chew. He reports that he drinks alcohol. He reports that he does not use drugs. Family History: family history includes Diabetes in his mother. The patients home medications have been reviewed.     Allergies: Pineapple    -------------------------------------------------- RESULTS -------------------------------------------------    Lab  Results for orders placed or performed during the hospital encounter of 06/15/19   CBC Auto Differential   Result Value Ref Range    WBC 27.6 (H) 4.5 - 11.5 E9/L    RBC 4.56 3.80 - 5.80 E12/L    Hemoglobin 14.4 12.5 - 16.5 g/dL    Hematocrit 44.9 37.0 - 54.0 %    MCV 98.5 80.0 - 99.9 fL    MCH 31.6 26.0 - 35.0 pg    MCHC 32.1 32.0 - 34.5 %    RDW 11.9 11.5 - 15.0 fL    Platelets 814 636 - 516 E9/L    MPV 10.7 7.0 - 12.0 fL    Neutrophils % 86.6 (H) 43.0 - 80.0 %    Immature Granulocytes % 1.4 0.0 - 5.0 %    Lymphocytes % 6.5 (L) 20.0 - 42.0 %    Monocytes % 5.1 2.0 - 12.0 %    Eosinophils % 0.0 0.0 - 6.0 %    Basophils % 0.4 0.0 - 2.0 %    Neutrophils # 23.89 (H) 1.80 - 7.30 E9/L    Immature Granulocytes # 0.38 E9/L    Lymphocytes # 1.79 1.50 - 4.00 E9/L    Monocytes # 1.40 (H) 0.10 - 0.95 E9/L    Eosinophils # 0.01 (L) 0.05 - 0.50 E9/L    Basophils # 0.10 0.00 - 0.20 E9/L   Comprehensive Metabolic Panel   Result Value Ref Range    Sodium 129 (L) 132 - 146 mmol/L    Potassium 8.2 (HH) 3.5 - 5.0 mmol/L    Chloride 82 (L) 98 - 107 mmol/L    CO2 6 (LL) 22 - 29 mmol/L    Anion Gap 41 (H) 7 - 16 mmol/L    Glucose 820 (HH) 74 - 99 mg/dL    BUN 66 (H) 6 - 20 mg/dL    CREATININE 2.5 (H) 0.7 - 1.2 mg/dL    GFR Non-African American 30 >=60 mL/min/1.73    GFR African American 37     Calcium 8.9 8.6 - 10.2 mg/dL    Total Protein 7.4 6.4 - 8.3 g/dL    Alb 4.3 3.5 - 5.2 g/dL Total Bilirubin 0.4 0.0 - 1.2 mg/dL    Alkaline Phosphatase 107 40 - 129 U/L    ALT 14 0 - 40 U/L    AST 16 0 - 39 U/L   Troponin   Result Value Ref Range    Troponin <0.01 0.00 - 0.03 ng/mL   Lactic Acid, Plasma   Result Value Ref Range    Lactic Acid 4.9 (HH) 0.5 - 2.2 mmol/L   Serum Drug Screen   Result Value Ref Range    Ethanol Lvl <10 mg/dL    Acetaminophen Level <5.0 (L) 10.0 - 79.4 mcg/mL    Salicylate, Serum <4.8 0.0 - 30.0 mg/dL    TCA Scrn NEGATIVE Cutoff:300 ng/mL   pH, venous   Result Value Ref Range    pH, Arik 7.11 (LL) 7.35 - 7.45   POCT Glucose   Result Value Ref Range    QC OK? y    POCT Glucose   Result Value Ref Range    Meter Glucose >500 (H) 74 - 99 mg/dL   POCT Glucose   Result Value Ref Range    Meter Glucose >500 (H) 74 - 99 mg/dL       Radiology  Xr Chest Portable    Result Date: 6/15/2019  Patient MRN: [id number] : 1990 Age:  30 years Gender: Male Order Date: 6/15/2019 3:45 PM Exam: XR CHEST PORTABLE Number of Images: 1 view Indication:   chest pain, DKA chest pain, DKA Comparison: Prior chest radiograph from 2018 is available Findings: The lungs are clear. There is no evidence of pulmonary infiltrate or pleural effusion. The pulmonary vascularity is unremarkable. The cardiac, hilar and mediastinal silhouettes are satisfactory. The bony thorax demonstrates no gross abnormality. NO ACUTE CARDIOPULMONARY PROCESS       EKG: This EKG is signed and interpreted by me. Rate: 125  Rhythm: Sinus  Interpretation: sinus tachycardia  Comparison: changes compared to previous EKG      ------------------------- NURSING NOTES AND VITALS REVIEWED ---------------------------  Date / Time Roomed:  6/15/2019  3:22 PM  ED Bed Assignment:  [id number]    The nursing notes within the ED encounter and vital signs as below have been reviewed.    Patient Vitals for the past 24 hrs:   BP Temp Pulse Resp SpO2 Height Weight   06/15/19 1630 103/64 -- 128 26 100 % -- --   06/15/19 1620 119/66 -- 127 23 100 % -- --   06/15/19 1610 (!) 135/58 -- 129 25 100 % -- --   06/15/19 1605 137/66 -- 128 26 100 % -- --   06/15/19 1558 -- -- 127 -- -- -- --   06/15/19 1555 (!) 130/55 -- 135 26 100 % -- --   06/15/19 1540 129/61 -- 127 26 100 % -- --   06/15/19 1526 117/60 97.4 °F (36.3 °C) 129 18 100 % 5' 9\" (1.753 m) 250 lb (113.4 kg)       Oxygen Saturation Interpretation: Normal      ------------------------------------------ PROGRESS NOTES ------------------------------------------  I have spoken with the patient and discussed todays results, in addition to providing specific details for the plan of care and counseling regarding the diagnosis and prognosis. Their questions are answered at this time and they are agreeable with the plan.      --------------------------------- ADDITIONAL PROVIDER NOTES ---------------------------------  This patient's ED course included: a personal history and physicial examination, re-evaluation prior to disposition, multiple bedside re-evaluations, IV medications, cardiac monitoring and continuous pulse oximetry    This patient has remained hemodynamically stable and remained unchanged during their ED course. Please note that the withdrawal or failure to initiate urgent interventions for this patient would likely result in a life threatening deterioration or permanent disability. Accordingly this patient received 30 minutes of critical care time, excluding separately billable procedures. Clinical Impression  1. Diabetic ketoacidosis without coma associated with other specified diabetes mellitus ([facility] [address]    2. Hyperkalemia    3. Dehydration    4. Lactic acidosis          Disposition  Patient's disposition: Admit to CCU/ICU  Patient's condition is serious.            Travis Quintanilla DO  Resident  06/15/19 [address] [facility],   Resident  06/15/19 1207

## 2019-06-16 LAB
ACETAMINOPHEN LEVEL: <5 MCG/ML (ref 10–30)
AMYLASE: 59 U/L (ref 20–100)
ANION GAP SERPL CALCULATED.3IONS-SCNC: 12 MMOL/L (ref 7–16)
ANION GAP SERPL CALCULATED.3IONS-SCNC: 21 MMOL/L (ref 7–16)
ANION GAP SERPL CALCULATED.3IONS-SCNC: 9 MMOL/L (ref 7–16)
B.E.: -3.1 MMOL/L (ref -3–3)
BASOPHILS ABSOLUTE: 0.05 E9/L (ref 0–0.2)
BASOPHILS RELATIVE PERCENT: 0.2 % (ref 0–2)
BETA-HYDROXYBUTYRATE: >4.5 MMOL/L (ref 0.02–0.27)
BUN BLDV-MCNC: 41 MG/DL (ref 6–20)
BUN BLDV-MCNC: 44 MG/DL (ref 6–20)
BUN BLDV-MCNC: 52 MG/DL (ref 6–20)
CALCIUM SERPL-MCNC: 7.8 MG/DL (ref 8.6–10.2)
CALCIUM SERPL-MCNC: 7.9 MG/DL (ref 8.6–10.2)
CALCIUM SERPL-MCNC: 8 MG/DL (ref 8.6–10.2)
CHLORIDE BLD-SCNC: 102 MMOL/L (ref 98–107)
CHLORIDE BLD-SCNC: 106 MMOL/L (ref 98–107)
CHLORIDE BLD-SCNC: 107 MMOL/L (ref 98–107)
CK MB: 7.4 NG/ML (ref 0–7.7)
CO2: 18 MMOL/L (ref 22–29)
CO2: 24 MMOL/L (ref 22–29)
CO2: 28 MMOL/L (ref 22–29)
COHB: 0.6 % (ref 0–1.5)
CREAT SERPL-MCNC: 1.5 MG/DL (ref 0.7–1.2)
CREAT SERPL-MCNC: 1.6 MG/DL (ref 0.7–1.2)
CREAT SERPL-MCNC: 2 MG/DL (ref 0.7–1.2)
CRITICAL: ABNORMAL
DATE ANALYZED: ABNORMAL
DATE OF COLLECTION: ABNORMAL
EOSINOPHILS ABSOLUTE: 0 E9/L (ref 0.05–0.5)
EOSINOPHILS RELATIVE PERCENT: 0 % (ref 0–6)
ETHANOL: <10 MG/DL (ref 0–0.08)
FILM ARRAY ADENOVIRUS: NORMAL
FILM ARRAY BORDETELLA PERTUSSIS: NORMAL
FILM ARRAY CHLAMYDOPHILIA PNEUMONIAE: NORMAL
FILM ARRAY CORONAVIRUS 229E: NORMAL
FILM ARRAY CORONAVIRUS HKU1: NORMAL
FILM ARRAY CORONAVIRUS NL63: NORMAL
FILM ARRAY CORONAVIRUS OC43: NORMAL
FILM ARRAY INFLUENZA A VIRUS 09H1: NORMAL
FILM ARRAY INFLUENZA A VIRUS H1: NORMAL
FILM ARRAY INFLUENZA A VIRUS H3: NORMAL
FILM ARRAY INFLUENZA A VIRUS: NORMAL
FILM ARRAY INFLUENZA B: NORMAL
FILM ARRAY METAPNEUMOVIRUS: NORMAL
FILM ARRAY MYCOPLASMA PNEUMONIAE: NORMAL
FILM ARRAY PARAINFLUENZA VIRUS 1: NORMAL
FILM ARRAY PARAINFLUENZA VIRUS 2: NORMAL
FILM ARRAY PARAINFLUENZA VIRUS 3: NORMAL
FILM ARRAY PARAINFLUENZA VIRUS 4: NORMAL
FILM ARRAY RESPIRATORY SYNCITIAL VIRUS: NORMAL
FILM ARRAY RHINOVIRUS/ENTEROVIRUS: NORMAL
GFR AFRICAN AMERICAN: 47
GFR AFRICAN AMERICAN: >60
GFR AFRICAN AMERICAN: >60
GFR NON-AFRICAN AMERICAN: 39 ML/MIN/1.73
GFR NON-AFRICAN AMERICAN: 51 ML/MIN/1.73
GFR NON-AFRICAN AMERICAN: 54 ML/MIN/1.73
GLUCOSE BLD-MCNC: 219 MG/DL (ref 74–99)
GLUCOSE BLD-MCNC: 264 MG/DL (ref 74–99)
GLUCOSE BLD-MCNC: 454 MG/DL (ref 74–99)
HBA1C MFR BLD: 11.6 % (ref 4–5.6)
HCO3: 21.2 MMOL/L (ref 22–26)
HCT VFR BLD CALC: 36.7 % (ref 37–54)
HEMOGLOBIN: 12.4 G/DL (ref 12.5–16.5)
HHB: 3.2 % (ref 0–5)
IMMATURE GRANULOCYTES #: 0.15 E9/L
IMMATURE GRANULOCYTES %: 0.7 % (ref 0–5)
L. PNEUMOPHILA SEROGP 1 UR AG: NORMAL
LAB: ABNORMAL
LACTIC ACID: 1.2 MMOL/L (ref 0.5–2.2)
LACTIC ACID: 1.7 MMOL/L (ref 0.5–2.2)
LIPASE: 20 U/L (ref 13–60)
LYMPHOCYTES ABSOLUTE: 2.44 E9/L (ref 1.5–4)
LYMPHOCYTES RELATIVE PERCENT: 10.6 % (ref 20–42)
Lab: ABNORMAL
MAGNESIUM: 2.4 MG/DL (ref 1.6–2.6)
MAGNESIUM: 2.6 MG/DL (ref 1.6–2.6)
MCH RBC QN AUTO: 31.4 PG (ref 26–35)
MCHC RBC AUTO-ENTMCNC: 33.8 % (ref 32–34.5)
MCV RBC AUTO: 92.9 FL (ref 80–99.9)
METER GLUCOSE: 130 MG/DL (ref 74–99)
METER GLUCOSE: 164 MG/DL (ref 74–99)
METER GLUCOSE: 166 MG/DL (ref 74–99)
METER GLUCOSE: 170 MG/DL (ref 74–99)
METER GLUCOSE: 173 MG/DL (ref 74–99)
METER GLUCOSE: 177 MG/DL (ref 74–99)
METER GLUCOSE: 195 MG/DL (ref 74–99)
METER GLUCOSE: 218 MG/DL (ref 74–99)
METER GLUCOSE: 227 MG/DL (ref 74–99)
METER GLUCOSE: 302 MG/DL (ref 74–99)
METER GLUCOSE: 308 MG/DL (ref 74–99)
METER GLUCOSE: 313 MG/DL (ref 74–99)
METER GLUCOSE: 422 MG/DL (ref 74–99)
METHB: 0.3 % (ref 0–1.5)
MODE: ABNORMAL
MONOCYTES ABSOLUTE: 2.22 E9/L (ref 0.1–0.95)
MONOCYTES RELATIVE PERCENT: 9.7 % (ref 2–12)
NEUTROPHILS ABSOLUTE: 18.1 E9/L (ref 1.8–7.3)
NEUTROPHILS RELATIVE PERCENT: 78.8 % (ref 43–80)
O2 SATURATION: 96.8 % (ref 92–98.5)
O2HB: 95.9 % (ref 94–97)
OPERATOR ID: ABNORMAL
PATIENT TEMP: 37 C
PCO2: 35.7 MMHG (ref 35–45)
PDW BLD-RTO: 11.7 FL (ref 11.5–15)
PH BLOOD GAS: 7.39 (ref 7.35–7.45)
PHOSPHORUS: 2.3 MG/DL (ref 2.5–4.5)
PHOSPHORUS: 2.4 MG/DL (ref 2.5–4.5)
PLATELET # BLD: 284 E9/L (ref 130–450)
PMV BLD AUTO: 9.9 FL (ref 7–12)
PO2: 90 MMHG (ref 60–100)
POTASSIUM SERPL-SCNC: 3.8 MMOL/L (ref 3.5–5)
POTASSIUM SERPL-SCNC: 4.2 MMOL/L (ref 3.5–5)
POTASSIUM SERPL-SCNC: 4.6 MMOL/L (ref 3.5–5)
PROCALCITONIN: 2.83 NG/ML (ref 0–0.08)
RBC # BLD: 3.95 E12/L (ref 3.8–5.8)
SALICYLATE, SERUM: <0.3 MG/DL (ref 0–30)
SODIUM BLD-SCNC: 141 MMOL/L (ref 132–146)
SODIUM BLD-SCNC: 143 MMOL/L (ref 132–146)
SODIUM BLD-SCNC: 143 MMOL/L (ref 132–146)
SOURCE, BLOOD GAS: ABNORMAL
STREP PNEUMONIAE ANTIGEN, URINE: NORMAL
THB: 12.6 G/DL (ref 11.5–16.5)
TIME ANALYZED: 152
TOTAL CK: 283 U/L (ref 20–200)
TRICYCLIC ANTIDEPRESSANTS SCREEN SERUM: NEGATIVE NG/ML
TROPONIN: 0.06 NG/ML (ref 0–0.03)
TROPONIN: 0.11 NG/ML (ref 0–0.03)
WBC # BLD: 23 E9/L (ref 4.5–11.5)

## 2019-06-16 PROCEDURE — 93005 ELECTROCARDIOGRAM TRACING: CPT | Performed by: INTERNAL MEDICINE

## 2019-06-16 PROCEDURE — 87450 HC DIRECT STREP B ANTIGEN: CPT

## 2019-06-16 PROCEDURE — 82550 ASSAY OF CK (CPK): CPT

## 2019-06-16 PROCEDURE — 6370000000 HC RX 637 (ALT 250 FOR IP): Performed by: INTERNAL MEDICINE

## 2019-06-16 PROCEDURE — 83735 ASSAY OF MAGNESIUM: CPT

## 2019-06-16 PROCEDURE — 6360000002 HC RX W HCPCS: Performed by: INTERNAL MEDICINE

## 2019-06-16 PROCEDURE — 80307 DRUG TEST PRSMV CHEM ANLYZR: CPT

## 2019-06-16 PROCEDURE — 82693 ASSAY OF ETHYLENE GLYCOL: CPT

## 2019-06-16 PROCEDURE — 2000000000 HC ICU R&B

## 2019-06-16 PROCEDURE — 85025 COMPLETE CBC W/AUTO DIFF WBC: CPT

## 2019-06-16 PROCEDURE — 82150 ASSAY OF AMYLASE: CPT

## 2019-06-16 PROCEDURE — 84484 ASSAY OF TROPONIN QUANT: CPT

## 2019-06-16 PROCEDURE — 82553 CREATINE MB FRACTION: CPT

## 2019-06-16 PROCEDURE — G0480 DRUG TEST DEF 1-7 CLASSES: HCPCS

## 2019-06-16 PROCEDURE — 87081 CULTURE SCREEN ONLY: CPT

## 2019-06-16 PROCEDURE — 82962 GLUCOSE BLOOD TEST: CPT

## 2019-06-16 PROCEDURE — 80048 BASIC METABOLIC PNL TOTAL CA: CPT

## 2019-06-16 PROCEDURE — 2500000003 HC RX 250 WO HCPCS: Performed by: INTERNAL MEDICINE

## 2019-06-16 PROCEDURE — 2580000003 HC RX 258: Performed by: INTERNAL MEDICINE

## 2019-06-16 PROCEDURE — 99223 1ST HOSP IP/OBS HIGH 75: CPT | Performed by: INTERNAL MEDICINE

## 2019-06-16 PROCEDURE — 87633 RESP VIRUS 12-25 TARGETS: CPT

## 2019-06-16 PROCEDURE — 87486 CHLMYD PNEUM DNA AMP PROBE: CPT

## 2019-06-16 PROCEDURE — 83690 ASSAY OF LIPASE: CPT

## 2019-06-16 PROCEDURE — 87581 M.PNEUMON DNA AMP PROBE: CPT

## 2019-06-16 PROCEDURE — 82010 KETONE BODYS QUAN: CPT

## 2019-06-16 PROCEDURE — 36415 COLL VENOUS BLD VENIPUNCTURE: CPT

## 2019-06-16 PROCEDURE — 83605 ASSAY OF LACTIC ACID: CPT

## 2019-06-16 PROCEDURE — 84100 ASSAY OF PHOSPHORUS: CPT

## 2019-06-16 PROCEDURE — 84145 PROCALCITONIN (PCT): CPT

## 2019-06-16 PROCEDURE — 82805 BLOOD GASES W/O2 SATURATION: CPT

## 2019-06-16 PROCEDURE — 87798 DETECT AGENT NOS DNA AMP: CPT

## 2019-06-16 PROCEDURE — 83036 HEMOGLOBIN GLYCOSYLATED A1C: CPT

## 2019-06-16 RX ORDER — DEXTROSE MONOHYDRATE 25 G/50ML
12.5 INJECTION, SOLUTION INTRAVENOUS PRN
Status: DISCONTINUED | OUTPATIENT
Start: 2019-06-16 | End: 2019-06-20 | Stop reason: HOSPADM

## 2019-06-16 RX ORDER — LEVOTHYROXINE SODIUM 0.03 MG/1
25 TABLET ORAL DAILY
Status: DISCONTINUED | OUTPATIENT
Start: 2019-06-16 | End: 2019-06-20 | Stop reason: HOSPADM

## 2019-06-16 RX ORDER — NICOTINE POLACRILEX 4 MG
15 LOZENGE BUCCAL PRN
Status: DISCONTINUED | OUTPATIENT
Start: 2019-06-16 | End: 2019-06-20 | Stop reason: HOSPADM

## 2019-06-16 RX ORDER — ACETAMINOPHEN 325 MG/1
650 TABLET ORAL EVERY 4 HOURS PRN
Status: DISCONTINUED | OUTPATIENT
Start: 2019-06-16 | End: 2019-06-20 | Stop reason: HOSPADM

## 2019-06-16 RX ORDER — LEVOFLOXACIN 500 MG/1
500 TABLET, FILM COATED ORAL DAILY
Status: DISCONTINUED | OUTPATIENT
Start: 2019-06-17 | End: 2019-06-18

## 2019-06-16 RX ORDER — HYDROCHLOROTHIAZIDE 25 MG/1
25 TABLET ORAL DAILY
Status: DISCONTINUED | OUTPATIENT
Start: 2019-06-16 | End: 2019-06-17

## 2019-06-16 RX ORDER — DEXTROSE MONOHYDRATE 50 MG/ML
100 INJECTION, SOLUTION INTRAVENOUS PRN
Status: DISCONTINUED | OUTPATIENT
Start: 2019-06-16 | End: 2019-06-20 | Stop reason: HOSPADM

## 2019-06-16 RX ORDER — LEVOFLOXACIN 5 MG/ML
500 INJECTION, SOLUTION INTRAVENOUS EVERY 24 HOURS
Status: DISCONTINUED | OUTPATIENT
Start: 2019-06-16 | End: 2019-06-16

## 2019-06-16 RX ORDER — AMLODIPINE BESYLATE 5 MG/1
5 TABLET ORAL DAILY
Status: DISCONTINUED | OUTPATIENT
Start: 2019-06-16 | End: 2019-06-17

## 2019-06-16 RX ORDER — LABETALOL HYDROCHLORIDE 5 MG/ML
10 INJECTION, SOLUTION INTRAVENOUS EVERY 4 HOURS PRN
Status: DISCONTINUED | OUTPATIENT
Start: 2019-06-16 | End: 2019-06-19

## 2019-06-16 RX ORDER — INSULIN GLARGINE 100 [IU]/ML
50 INJECTION, SOLUTION SUBCUTANEOUS DAILY
Status: DISCONTINUED | OUTPATIENT
Start: 2019-06-16 | End: 2019-06-17

## 2019-06-16 RX ADMIN — ACETAMINOPHEN 650 MG: 325 TABLET, FILM COATED ORAL at 12:11

## 2019-06-16 RX ADMIN — HEPARIN SODIUM 5000 UNITS: 10000 INJECTION INTRAVENOUS; SUBCUTANEOUS at 06:26

## 2019-06-16 RX ADMIN — HEPARIN SODIUM 5000 UNITS: 10000 INJECTION INTRAVENOUS; SUBCUTANEOUS at 01:23

## 2019-06-16 RX ADMIN — ACETAMINOPHEN 650 MG: 325 TABLET, FILM COATED ORAL at 20:00

## 2019-06-16 RX ADMIN — DEXTROSE AND SODIUM CHLORIDE: 5; 450 INJECTION, SOLUTION INTRAVENOUS at 05:00

## 2019-06-16 RX ADMIN — HEPARIN SODIUM 5000 UNITS: 10000 INJECTION INTRAVENOUS; SUBCUTANEOUS at 21:55

## 2019-06-16 RX ADMIN — INSULIN LISPRO 5 UNITS: 100 INJECTION, SOLUTION INTRAVENOUS; SUBCUTANEOUS at 13:02

## 2019-06-16 RX ADMIN — SODIUM CHLORIDE 12.75 UNITS/HR: 9 INJECTION, SOLUTION INTRAVENOUS at 00:33

## 2019-06-16 RX ADMIN — HYDROCHLOROTHIAZIDE 25 MG: 25 TABLET ORAL at 10:27

## 2019-06-16 RX ADMIN — SERTRALINE 25 MG: 50 TABLET, FILM COATED ORAL at 10:27

## 2019-06-16 RX ADMIN — SODIUM BICARBONATE: 84 INJECTION, SOLUTION INTRAVENOUS at 00:17

## 2019-06-16 RX ADMIN — AMLODIPINE BESYLATE 5 MG: 5 TABLET ORAL at 20:00

## 2019-06-16 RX ADMIN — HEPARIN SODIUM 5000 UNITS: 10000 INJECTION INTRAVENOUS; SUBCUTANEOUS at 13:45

## 2019-06-16 RX ADMIN — LABETALOL HYDROCHLORIDE 10 MG: 5 INJECTION INTRAVENOUS at 20:00

## 2019-06-16 RX ADMIN — INSULIN LISPRO 5 UNITS: 100 INJECTION, SOLUTION INTRAVENOUS; SUBCUTANEOUS at 16:24

## 2019-06-16 RX ADMIN — INSULIN GLARGINE 50 UNITS: 100 INJECTION, SOLUTION SUBCUTANEOUS at 10:28

## 2019-06-16 RX ADMIN — LEVOFLOXACIN 500 MG: 5 INJECTION, SOLUTION INTRAVENOUS at 07:04

## 2019-06-16 RX ADMIN — SODIUM CHLORIDE: 9 INJECTION, SOLUTION INTRAVENOUS at 01:36

## 2019-06-16 RX ADMIN — LEVOTHYROXINE SODIUM 25 MCG: 25 TABLET ORAL at 07:04

## 2019-06-16 ASSESSMENT — PAIN DESCRIPTION - FREQUENCY: FREQUENCY: CONTINUOUS

## 2019-06-16 ASSESSMENT — PAIN DESCRIPTION - LOCATION: LOCATION: HEAD

## 2019-06-16 ASSESSMENT — PAIN DESCRIPTION - PROGRESSION: CLINICAL_PROGRESSION: GRADUALLY WORSENING

## 2019-06-16 ASSESSMENT — PAIN SCALES - GENERAL
PAINLEVEL_OUTOF10: 0
PAINLEVEL_OUTOF10: 6
PAINLEVEL_OUTOF10: 6
PAINLEVEL_OUTOF10: 0

## 2019-06-16 ASSESSMENT — PAIN DESCRIPTION - PAIN TYPE: TYPE: ACUTE PAIN

## 2019-06-16 ASSESSMENT — PAIN DESCRIPTION - DESCRIPTORS: DESCRIPTORS: HEADACHE

## 2019-06-16 ASSESSMENT — PAIN DESCRIPTION - ONSET: ONSET: GRADUAL

## 2019-06-16 ASSESSMENT — PAIN - FUNCTIONAL ASSESSMENT: PAIN_FUNCTIONAL_ASSESSMENT: PREVENTS OR INTERFERES SOME ACTIVE ACTIVITIES AND ADLS

## 2019-06-16 NOTE — H&P
nightly) 0-10 units AC and HS   Yes Historical Provider, MD   levothyroxine (SYNTHROID) 25 MCG tablet Take 25 mcg by mouth Daily   Yes Historical Provider, MD   rosuvastatin (CRESTOR) 10 MG tablet Take 10 mg by mouth daily   Yes Historical Provider, MD   sertraline (ZOLOFT) 50 MG tablet Take 50 mg by mouth daily   Yes Historical Provider, MD   diphenhydrAMINE (BENADRYL) 50 MG/ML injection Infuse 25 mg intravenously every 6 hours as needed for Itching   Yes Historical Provider, MD   diphenhydrAMINE (BENADRYL) 50 MG capsule Take 50 mg by mouth every 6 hours as needed for Itching   Yes Historical Provider, MD   Dextrose, Diabetic Use, (GLUCOSE PO) Take by mouth daily as needed   Yes Historical Provider, MD   haloperidol lactate (HALDOL) 5 MG/ML injection Inject into the muscle every 6 hours as needed for Agitation   Yes Historical Provider, MD   haloperidol (HALDOL) 5 MG tablet Take 5 mg by mouth every 6 hours as needed for Agitation   Yes Historical Provider, MD   hydrOXYzine (ATARAX) 50 MG tablet Take 50 mg by mouth every 6 hours as needed for Itching   Yes Historical Provider, MD   ondansetron (ZOFRAN) 4 MG tablet Take 4 mg by mouth every 8 hours as needed for Nausea or Vomiting   Yes Historical Provider, MD   insulin glargine (BASAGLAR KWIKPEN) 100 UNIT/ML injection pen Inject 65 Units into the skin nightly 3/7/19  Yes Edvin Mills MD   lisinopril (PRINIVIL;ZESTRIL) 40 MG tablet Take 1 tablet by mouth daily  Patient taking differently: Take 20 mg by mouth daily  12/17/18  Yes Blair Fallon MD   hydrochlorothiazide (HYDRODIURIL) 25 MG tablet Take 1 tablet by mouth daily  Patient taking differently: Take 12.5 mg by mouth daily  12/17/18  Yes Blair Fallon MD   Insulin Pen Needle 31G X 6 MM MISC Pt to inject medication two times daily.  3/25/19   Edvin Mills MD   Insulin Syringe-Needle U-100 25G X 1\" 1 ML MISC Use as directed 2/20/19   Karlene Shaffer MD   INSULIN SYRINGE 1CC/29G (AIMSCO INSULIN SYR ULTRA THIN) 29G X 1/2\" 1 ML MISC Inject insulin up to 4 times daily 12/10/18   Eyal Jean MD   atorvastatin (LIPITOR) 40 MG tablet Take 1 tablet by mouth daily 12/10/18   Eyal Jean MD   Lancets MISC Provide any brand that is covered 5/21/18   Get Waldrop MD   glucose blood VI test strips (ASCENSIA AUTODISC VI;ONE TOUCH ULTRA TEST VI) strip Provide any that is covered and compatible with glucometer 5/21/18   Get Waldrop MD   glucose monitoring kit (FREESTYLE) monitoring kit Use once. 5/23/17   Elisabet Baxter MD       Allergies:  Pineapple    Social History:   TOBACCO:   reports that he has never smoked. His smokeless tobacco use includes chew. ETOH:   reports that he drinks alcohol. Family History:       Problem Relation Age of Onset    Diabetes Mother        REVIEW OF SYSTEMS:    · Review of Systems  Constitutional: Negative for chills, fatigue and fever. HENT: Negative for congestion, rhinorrhea and sinus pain. Respiratory: Negative for cough, shortness of breath, wheezing and stridor. Cardiovascular: Negative for chest pain, palpitations and leg swelling. Gastrointestinal: Negative for abdominal pain, blood in stool, constipation, diarrhea, nausea and vomiting. Genitourinary: Negative for dysuria, frequency and hematuria. Musculoskeletal: Negative for gait problem and myalgias. Skin: Negative for rash. Allergic/Immunologic: Negative for environmental allergies. Neurological: Negative for dizziness, weakness, numbness and headaches. Hematological: Does not bruise/bleed easily. Psychiatric/Behavioral: Negative for suicidal ideas. Physical Exam     Vitals:    06/16/19 0600   BP:    Pulse: 110   Resp: 16   Temp:    SpO2: 97%   ·     · Physical Exam   Constitutional: He is oriented to person, place, and time. He appears well-developed and well-nourished. He appears distressed. HENT:   Head: Normocephalic and atraumatic.    Eyes: Pupils are equal, round, and reactive to cough with subjective fever and chills  Leukocytosis 27.6, PMN 86.6%, procalcitonin 2.83  CXR showing right hilar infiltrates  F/u respiratory culture, viral panel, strep and legionella Ag  Start Levaquin 500 mg daily  Monitor respiratory status      DVT/GI prophylaxis: Heparin/Protonix  Disposition: MARCY Tripp M.D. , PGY-1  Internal Medicine    Attending Physician: Dr. Katlin Luevano  [facility]    Attending Physician Statement:  Devika Flower M.D., F.A.C.P. I have discussed the case, including pertinent history and exam findings with the resident/NP. I have seen and examined the patient and the key elements of the encounter have been performed by me. I agree with the resident ROS, PMHx, PSHx, meds reviewed and assessment, plan and orders as documented by the resident/NP      Hospital charts reviewed, including other providers notes, relevant labs and imaging. Severe DKA to vomiting and NOT taking and insulin (he wasn't allowed insulin or glucose candy at [facility] procalcitonin,  treating pneumonia, Productive cough, fevers-- ?uri  ruleout aspiration pnuemonia  Vomiting recently -- ? He claims bc PUD/stomach acid, but also recently entered drug rehab, ? opiate withdrawals vomiting etc..  DKA and dehyration and initially high K+ treated  Lactic acid better  Abuse issues concern but UA on admission negative, rule out withdrawals      >50% of time spent coordinating care with other providers and/or counseling patient/family  Remainder of medical problems as per resident note.

## 2019-06-16 NOTE — PROGRESS NOTES
Department of Internal Medicine  Nephrology Attending Consult Note      SUBJECTIVE: We are following Mr. Brady for ASHLEY, metabolic acidosis and hyperkalemia. Reports feeling better.       PHYSICAL EXAM:      Vitals:    VITALS:  BP (!) 152/81   Pulse 108   Temp 98.5 °F (36.9 °C) (Oral)   Resp 20   Ht 5' 9\" (1.753 m)   Wt 238 lb (108 kg)   SpO2 97%   BMI 35.15 kg/m²   24HR INTAKE/OUTPUT:      Intake/Output Summary (Last 24 hours) at 6/16/2019 1025  Last data filed at 6/16/2019 0900  Gross per 24 hour   Intake 6030 ml   Output 3450 ml   Net 2580 ml       Constitutional:  Awake, somewhat lethargic  HEENT:  PERRLA  Respiratory:  Lungs clear  Cardiovascular/Edema:  Heart sounds regular  Gastrointestinal:  Abdomen soft   Neurologic:  Non focal   Skin:  No lesions  Other:  No edema      DATA:    CBC:   Lab Results   Component Value Date    WBC 23.0 06/16/2019    RBC 3.95 06/16/2019    HGB 12.4 06/16/2019    HCT 36.7 06/16/2019    MCV 92.9 06/16/2019    MCH 31.4 06/16/2019    MCHC 33.8 06/16/2019    RDW 11.7 06/16/2019     06/16/2019    MPV 9.9 06/16/2019     CMP:    Lab Results   Component Value Date     06/16/2019    K 4.2 06/16/2019    K 4.0 06/11/2018     06/16/2019    CO2 28 06/16/2019    BUN 41 06/16/2019    CREATININE 1.5 06/16/2019    GFRAA >60 06/16/2019    LABGLOM 54 06/16/2019    GLUCOSE 219 06/16/2019    GLUCOSE 57 05/18/2012    PROT 7.4 06/15/2019    LABALBU 4.3 06/15/2019    LABALBU 5.3 05/18/2012    CALCIUM 7.9 06/16/2019    BILITOT 0.4 06/15/2019    ALKPHOS 107 06/15/2019    AST 16 06/15/2019    ALT 14 06/15/2019     Magnesium:    Lab Results   Component Value Date    MG 2.4 06/16/2019     Phosphorus:    Lab Results   Component Value Date    PHOS 2.3 06/16/2019     Recent Labs     06/16/19  0152   PH 7.392   PO2 90.0   PCO2 35.7   HCO3 21.2*   BE -3.1*   O2SAT 96.8       Radiology Review:      Chest x-ray Tammi 15, 2018       NO ACUTE CARDIOPULMONARY PROCESS             BRIEF SUMMARY OF INITIAL CONSULT:    Briefly Mr. Earl Schilder is a 30year old man with h/o type 1 DM, suicidal ideation recently admitted to a psych facility, who was admitted to this hospital on Tammi 15, 2019 after he presented to the ER reporting nausea and vomiting and elevated blood sugar. After evaluation he was found to have hyperkalemia with a potassium of 8.2, metabolic acidosis with a bicarbonate of 6 and renal failure with a creatinine level 2.5 m/dL. Of note his blood work on June 13 was completely normal.    IMPRESSION/RECOMMENDATIONS:      1. Hyperkalemia, multifactorial, due to extracellular shifting (potassium dragging, driven by hyperglycemia, osmotic water movement), insulin depletion, and decreased GFR. Resolved    2. ASHLEY stage II, consistent with volume responsive prerenal ASHLEY, due to urinary losses related to osmotic diuresis and vomiting. Renal function improved with adequate urine output. 3. Severe HAG metabolic acidosis secondary to DKA. Resolved  4. Relative hypochloremia (expected corrected chloride 94), due to vomiting, resolved  5.  Translocation hyponatremia due to hyperglycemia, corrected sodium 140 mEq/L; resolved    Plan:    · Replace as per DKA protocol  · Continue to monitor renal function

## 2019-06-16 NOTE — PROGRESS NOTES
P Quality Flow/Interdisciplinary Rounds Progress Note        Quality Flow Rounds held on June 16, 2019    Disciplines Attending:  Bedside Nurse, Charge nurse, SAMMY, OT, PT, , and . Mary Jo Man was admitted on 6/15/2019  3:22 PM    Anticipated Discharge Date:       Disposition:    Lev Score:  Lev Scale Score: 20    Readmission Risk              Risk of Unplanned Readmission:        25           Discussed patient goal for the day, patient clinical progression, and barriers to discharge.   The following Goal(s) of the Day/Commitment(s) have been identified:  Bridge insulin gtt with Lantus and d/c gtt 2 hours after, maintain in ICU      Dwight Rodriguez  June 16, 2019

## 2019-06-16 NOTE — PLAN OF CARE
Gap has been closed x 2. (+) dry mouth and abdominal discomfort (likely from the ketoacidosis) but no nausea, vomiting. Also denies cough, SOB, chest pain, palpitations. 1. Total insulin requirements since midnight was 58.95 units. Currently taking 65 units at home, was on scheduled Novolog (3 units before bfast, 6 units before lunch, 8 units before dinner) + SS but were discontinued for hypoglycemic episodes. Will likely benefit from long acting + premeals +/- SS. Also prescribed victoza but did not receive pre-auth. · Will start 50 units this am + 5 units premeals + hypoglycemic protocol. Will likely need dose adjustments or add another GLP - 1 agonist to increase sensitivity to insulin. · Monitor sugars every 2 hours until this pm then achs if stable or continue Q2 is sugars are labile. 2. Troponin also trending up (creatinine trending down). Although hypertensive, patient asymptomatic. Will rpt EKG and get CK ration. Resume htctz, hold lisinopril until renal function has fully recovered. 3. IV levaquin to oral, will reassess karie. Monitor respiratory status. 4. Denies SI and HI at this time. Resume SSRI.    5. Constant observer in room    Electronically signed by Momo Rodriguez MD on 6/16/2019 at 9:56 AM

## 2019-06-16 NOTE — PLAN OF CARE
Problem: Serum Glucose Level - Abnormal:  Goal: Ability to maintain appropriate glucose levels will improve  Description  Ability to maintain appropriate glucose levels will improve  6/16/2019 0838 by Samantha Orozco RN  Outcome: Met This Shift     Problem: Risk for Impaired Skin Integrity  Goal: Tissue integrity - skin and mucous membranes  Description  Structural intactness and normal physiological function of skin and  mucous membranes. 6/16/2019 0838 by Samantha Orozco RN  Outcome: Met This Shift     Problem: Falls - Risk of:  Goal: Will remain free from falls  Description  Will remain free from falls  6/16/2019 0838 by Samantha Orozco RN  Outcome: Met This Shift     Problem: Falls - Risk of:  Goal: Absence of physical injury  Description  Absence of physical injury  6/16/2019 0838 by Samantha Orozco RN  Outcome: Met This Shift   Plan of care discussed with patient / family.

## 2019-06-17 PROBLEM — N17.9 ACUTE KIDNEY INJURY (HCC): Status: ACTIVE | Noted: 2019-06-17

## 2019-06-17 PROBLEM — E10.10 TYPE 1 DIABETES MELLITUS WITH KETOACIDOSIS (HCC): Status: ACTIVE | Noted: 2018-06-10

## 2019-06-17 LAB
ANION GAP SERPL CALCULATED.3IONS-SCNC: 15 MMOL/L (ref 7–16)
BASOPHILS ABSOLUTE: 0.05 E9/L (ref 0–0.2)
BASOPHILS RELATIVE PERCENT: 0.3 % (ref 0–2)
BUN BLDV-MCNC: 16 MG/DL (ref 6–20)
CALCIUM SERPL-MCNC: 8.6 MG/DL (ref 8.6–10.2)
CHLORIDE BLD-SCNC: 96 MMOL/L (ref 98–107)
CO2: 25 MMOL/L (ref 22–29)
CREAT SERPL-MCNC: 1 MG/DL (ref 0.7–1.2)
CREATININE URINE: 48 MG/DL (ref 40–278)
EKG ATRIAL RATE: 107 BPM
EKG ATRIAL RATE: 112 BPM
EKG ATRIAL RATE: 125 BPM
EKG P AXIS: 45 DEGREES
EKG P AXIS: 48 DEGREES
EKG P-R INTERVAL: 128 MS
EKG P-R INTERVAL: 162 MS
EKG P-R INTERVAL: 164 MS
EKG Q-T INTERVAL: 308 MS
EKG Q-T INTERVAL: 342 MS
EKG Q-T INTERVAL: 368 MS
EKG QRS DURATION: 106 MS
EKG QRS DURATION: 106 MS
EKG QRS DURATION: 126 MS
EKG QTC CALCULATION (BAZETT): 420 MS
EKG QTC CALCULATION (BAZETT): 456 MS
EKG QTC CALCULATION (BAZETT): 531 MS
EKG R AXIS: 26 DEGREES
EKG R AXIS: 6 DEGREES
EKG R AXIS: 94 DEGREES
EKG T AXIS: 31 DEGREES
EKG T AXIS: 34 DEGREES
EKG T AXIS: 97 DEGREES
EKG VENTRICULAR RATE: 107 BPM
EKG VENTRICULAR RATE: 112 BPM
EKG VENTRICULAR RATE: 125 BPM
EOSINOPHILS ABSOLUTE: 0.04 E9/L (ref 0.05–0.5)
EOSINOPHILS RELATIVE PERCENT: 0.3 % (ref 0–6)
GFR AFRICAN AMERICAN: >60
GFR NON-AFRICAN AMERICAN: >60 ML/MIN/1.73
GLUCOSE BLD-MCNC: 248 MG/DL (ref 74–99)
HCT VFR BLD CALC: 38.5 % (ref 37–54)
HEMOGLOBIN: 13 G/DL (ref 12.5–16.5)
IMMATURE GRANULOCYTES #: 0.06 E9/L
IMMATURE GRANULOCYTES %: 0.4 % (ref 0–5)
LYMPHOCYTES ABSOLUTE: 2.12 E9/L (ref 1.5–4)
LYMPHOCYTES RELATIVE PERCENT: 14.5 % (ref 20–42)
MAGNESIUM: 2.1 MG/DL (ref 1.6–2.6)
MCH RBC QN AUTO: 31.2 PG (ref 26–35)
MCHC RBC AUTO-ENTMCNC: 33.8 % (ref 32–34.5)
MCV RBC AUTO: 92.3 FL (ref 80–99.9)
METER GLUCOSE: 146 MG/DL (ref 74–99)
METER GLUCOSE: 163 MG/DL (ref 74–99)
METER GLUCOSE: 174 MG/DL (ref 74–99)
METER GLUCOSE: 210 MG/DL (ref 74–99)
METER GLUCOSE: 240 MG/DL (ref 74–99)
METER GLUCOSE: 242 MG/DL (ref 74–99)
METER GLUCOSE: 253 MG/DL (ref 74–99)
MICROALBUMIN UR-MCNC: 132.1 MG/L
MICROALBUMIN/CREAT UR-RTO: 275.2 (ref 0–30)
MONOCYTES ABSOLUTE: 0.97 E9/L (ref 0.1–0.95)
MONOCYTES RELATIVE PERCENT: 6.6 % (ref 2–12)
MRSA CULTURE ONLY: NORMAL
NEUTROPHILS ABSOLUTE: 11.36 E9/L (ref 1.8–7.3)
NEUTROPHILS RELATIVE PERCENT: 77.9 % (ref 43–80)
PDW BLD-RTO: 11.8 FL (ref 11.5–15)
PHOSPHORUS: 2 MG/DL (ref 2.5–4.5)
PLATELET # BLD: 256 E9/L (ref 130–450)
PMV BLD AUTO: 10.2 FL (ref 7–12)
POTASSIUM SERPL-SCNC: 4.2 MMOL/L (ref 3.5–5)
PROCALCITONIN: 1.19 NG/ML (ref 0–0.08)
PROTEIN PROTEIN: 26 MG/DL (ref 0–12)
PROTEIN/CREAT RATIO: 0.5
PROTEIN/CREAT RATIO: 0.5 (ref 0–0.2)
RBC # BLD: 4.17 E12/L (ref 3.8–5.8)
REPORT: NORMAL
REPORT: NORMAL
SODIUM BLD-SCNC: 136 MMOL/L (ref 132–146)
TROPONIN: 0.04 NG/ML (ref 0–0.03)
URINE CULTURE, ROUTINE: NORMAL
WBC # BLD: 14.6 E9/L (ref 4.5–11.5)

## 2019-06-17 PROCEDURE — 93010 ELECTROCARDIOGRAM REPORT: CPT | Performed by: INTERNAL MEDICINE

## 2019-06-17 PROCEDURE — 84100 ASSAY OF PHOSPHORUS: CPT

## 2019-06-17 PROCEDURE — 2580000003 HC RX 258: Performed by: INTERNAL MEDICINE

## 2019-06-17 PROCEDURE — 6370000000 HC RX 637 (ALT 250 FOR IP): Performed by: INTERNAL MEDICINE

## 2019-06-17 PROCEDURE — 1200000000 HC SEMI PRIVATE

## 2019-06-17 PROCEDURE — 99233 SBSQ HOSP IP/OBS HIGH 50: CPT | Performed by: INTERNAL MEDICINE

## 2019-06-17 PROCEDURE — 82962 GLUCOSE BLOOD TEST: CPT

## 2019-06-17 PROCEDURE — 97530 THERAPEUTIC ACTIVITIES: CPT

## 2019-06-17 PROCEDURE — 6360000002 HC RX W HCPCS: Performed by: INTERNAL MEDICINE

## 2019-06-17 PROCEDURE — 80048 BASIC METABOLIC PNL TOTAL CA: CPT

## 2019-06-17 PROCEDURE — 82044 UR ALBUMIN SEMIQUANTITATIVE: CPT

## 2019-06-17 PROCEDURE — 84156 ASSAY OF PROTEIN URINE: CPT

## 2019-06-17 PROCEDURE — 85025 COMPLETE CBC W/AUTO DIFF WBC: CPT

## 2019-06-17 PROCEDURE — 97162 PT EVAL MOD COMPLEX 30 MIN: CPT

## 2019-06-17 PROCEDURE — 2500000003 HC RX 250 WO HCPCS: Performed by: INTERNAL MEDICINE

## 2019-06-17 PROCEDURE — 36415 COLL VENOUS BLD VENIPUNCTURE: CPT

## 2019-06-17 PROCEDURE — 82570 ASSAY OF URINE CREATININE: CPT

## 2019-06-17 PROCEDURE — 83735 ASSAY OF MAGNESIUM: CPT

## 2019-06-17 PROCEDURE — 84145 PROCALCITONIN (PCT): CPT

## 2019-06-17 PROCEDURE — 84484 ASSAY OF TROPONIN QUANT: CPT

## 2019-06-17 RX ORDER — HYDRALAZINE HYDROCHLORIDE 20 MG/ML
10 INJECTION INTRAMUSCULAR; INTRAVENOUS EVERY 4 HOURS PRN
Status: DISCONTINUED | OUTPATIENT
Start: 2019-06-17 | End: 2019-06-19

## 2019-06-17 RX ORDER — INSULIN GLARGINE 100 [IU]/ML
55 INJECTION, SOLUTION SUBCUTANEOUS DAILY
Status: DISCONTINUED | OUTPATIENT
Start: 2019-06-18 | End: 2019-06-18

## 2019-06-17 RX ORDER — HYDROCHLOROTHIAZIDE 25 MG/1
25 TABLET ORAL 2 TIMES DAILY
Status: DISCONTINUED | OUTPATIENT
Start: 2019-06-17 | End: 2019-06-19

## 2019-06-17 RX ORDER — LISINOPRIL 20 MG/1
40 TABLET ORAL DAILY
Status: DISCONTINUED | OUTPATIENT
Start: 2019-06-17 | End: 2019-06-20 | Stop reason: HOSPADM

## 2019-06-17 RX ORDER — AMLODIPINE BESYLATE 10 MG/1
10 TABLET ORAL DAILY
Status: DISCONTINUED | OUTPATIENT
Start: 2019-06-17 | End: 2019-06-17

## 2019-06-17 RX ADMIN — LABETALOL HYDROCHLORIDE 10 MG: 5 INJECTION INTRAVENOUS at 07:08

## 2019-06-17 RX ADMIN — ACETAMINOPHEN 650 MG: 325 TABLET, FILM COATED ORAL at 21:26

## 2019-06-17 RX ADMIN — SERTRALINE 25 MG: 50 TABLET, FILM COATED ORAL at 08:30

## 2019-06-17 RX ADMIN — INSULIN LISPRO 6 UNITS: 100 INJECTION, SOLUTION INTRAVENOUS; SUBCUTANEOUS at 11:40

## 2019-06-17 RX ADMIN — INSULIN LISPRO 4 UNITS: 100 INJECTION, SOLUTION INTRAVENOUS; SUBCUTANEOUS at 08:31

## 2019-06-17 RX ADMIN — INSULIN LISPRO 5 UNITS: 100 INJECTION, SOLUTION INTRAVENOUS; SUBCUTANEOUS at 06:50

## 2019-06-17 RX ADMIN — INSULIN LISPRO 2 UNITS: 100 INJECTION, SOLUTION INTRAVENOUS; SUBCUTANEOUS at 21:20

## 2019-06-17 RX ADMIN — ACETAMINOPHEN 650 MG: 325 TABLET, FILM COATED ORAL at 11:45

## 2019-06-17 RX ADMIN — LABETALOL HYDROCHLORIDE 10 MG: 5 INJECTION INTRAVENOUS at 15:06

## 2019-06-17 RX ADMIN — LISINOPRIL 40 MG: 20 TABLET ORAL at 08:30

## 2019-06-17 RX ADMIN — INSULIN LISPRO 6 UNITS: 100 INJECTION, SOLUTION INTRAVENOUS; SUBCUTANEOUS at 18:24

## 2019-06-17 RX ADMIN — HEPARIN SODIUM 5000 UNITS: 10000 INJECTION INTRAVENOUS; SUBCUTANEOUS at 21:20

## 2019-06-17 RX ADMIN — INSULIN GLARGINE 50 UNITS: 100 INJECTION, SOLUTION SUBCUTANEOUS at 08:31

## 2019-06-17 RX ADMIN — LEVOFLOXACIN 500 MG: 500 TABLET, FILM COATED ORAL at 08:30

## 2019-06-17 RX ADMIN — HEPARIN SODIUM 5000 UNITS: 10000 INJECTION INTRAVENOUS; SUBCUTANEOUS at 13:44

## 2019-06-17 RX ADMIN — HYDRALAZINE HYDROCHLORIDE 10 MG: 20 INJECTION INTRAMUSCULAR; INTRAVENOUS at 16:20

## 2019-06-17 RX ADMIN — HEPARIN SODIUM 5000 UNITS: 10000 INJECTION INTRAVENOUS; SUBCUTANEOUS at 05:12

## 2019-06-17 RX ADMIN — LEVOTHYROXINE SODIUM 25 MCG: 25 TABLET ORAL at 05:12

## 2019-06-17 RX ADMIN — POTASSIUM & SODIUM PHOSPHATES POWDER PACK 280-160-250 MG 250 MG: 280-160-250 PACK at 08:30

## 2019-06-17 RX ADMIN — Medication 10 ML: at 08:32

## 2019-06-17 RX ADMIN — HYDROCHLOROTHIAZIDE 25 MG: 25 TABLET ORAL at 08:30

## 2019-06-17 RX ADMIN — INSULIN LISPRO 6 UNITS: 100 INJECTION, SOLUTION INTRAVENOUS; SUBCUTANEOUS at 18:25

## 2019-06-17 RX ADMIN — LABETALOL HYDROCHLORIDE 10 MG: 5 INJECTION INTRAVENOUS at 00:29

## 2019-06-17 RX ADMIN — HYDROCHLOROTHIAZIDE 25 MG: 25 TABLET ORAL at 19:17

## 2019-06-17 RX ADMIN — LABETALOL HYDROCHLORIDE 10 MG: 5 INJECTION INTRAVENOUS at 04:33

## 2019-06-17 RX ADMIN — ONDANSETRON HYDROCHLORIDE 4 MG: 2 SOLUTION INTRAMUSCULAR; INTRAVENOUS at 10:35

## 2019-06-17 ASSESSMENT — PAIN DESCRIPTION - PROGRESSION: CLINICAL_PROGRESSION: NOT CHANGED

## 2019-06-17 ASSESSMENT — PAIN DESCRIPTION - FREQUENCY: FREQUENCY: CONTINUOUS

## 2019-06-17 ASSESSMENT — PAIN SCALES - GENERAL
PAINLEVEL_OUTOF10: 0
PAINLEVEL_OUTOF10: 5
PAINLEVEL_OUTOF10: 9
PAINLEVEL_OUTOF10: 0

## 2019-06-17 ASSESSMENT — PAIN DESCRIPTION - PAIN TYPE: TYPE: ACUTE PAIN

## 2019-06-17 ASSESSMENT — PAIN - FUNCTIONAL ASSESSMENT: PAIN_FUNCTIONAL_ASSESSMENT: PREVENTS OR INTERFERES SOME ACTIVE ACTIVITIES AND ADLS

## 2019-06-17 ASSESSMENT — PAIN DESCRIPTION - LOCATION: LOCATION: HEAD

## 2019-06-17 ASSESSMENT — PAIN DESCRIPTION - DESCRIPTORS: DESCRIPTORS: HEADACHE

## 2019-06-17 ASSESSMENT — PAIN DESCRIPTION - ONSET: ONSET: ON-GOING

## 2019-06-17 NOTE — PROGRESS NOTES
Department of Internal Medicine  Nephrology Attending Consult Note      SUBJECTIVE: We are following Mr. Brady for ASHLEY, metabolic acidosis and hyperkalemia. Reports feeling better.       PHYSICAL EXAM:      Vitals:    VITALS:  BP (!) 161/99   Pulse 98   Temp 98.3 °F (36.8 °C) (Oral)   Resp 16   Ht 5' 9\" (1.753 m)   Wt 240 lb (108.9 kg)   SpO2 98%   BMI 35.44 kg/m²   24HR INTAKE/OUTPUT:      Intake/Output Summary (Last 24 hours) at 6/17/2019 1025  Last data filed at 6/17/2019 1022  Gross per 24 hour   Intake 2270 ml   Output 2175 ml   Net 95 ml       Constitutional:  Awake, somewhat lethargic  HEENT:  PERRLA  Respiratory:  Lungs clear  Cardiovascular/Edema:  Heart sounds regular  Gastrointestinal:  Abdomen soft   Neurologic:  Non focal   Skin:  No lesions  Other:  No edema      DATA:    CBC:   Lab Results   Component Value Date    WBC 14.6 06/17/2019    RBC 4.17 06/17/2019    HGB 13.0 06/17/2019    HCT 38.5 06/17/2019    MCV 92.3 06/17/2019    MCH 31.2 06/17/2019    MCHC 33.8 06/17/2019    RDW 11.8 06/17/2019     06/17/2019    MPV 10.2 06/17/2019     CMP:    Lab Results   Component Value Date     06/17/2019    K 4.2 06/17/2019    K 4.0 06/11/2018    CL 96 06/17/2019    CO2 25 06/17/2019    BUN 16 06/17/2019    CREATININE 1.0 06/17/2019    GFRAA >60 06/17/2019    LABGLOM >60 06/17/2019    GLUCOSE 248 06/17/2019    GLUCOSE 57 05/18/2012    PROT 7.4 06/15/2019    LABALBU 4.3 06/15/2019    LABALBU 5.3 05/18/2012    CALCIUM 8.6 06/17/2019    BILITOT 0.4 06/15/2019    ALKPHOS 107 06/15/2019    AST 16 06/15/2019    ALT 14 06/15/2019     Magnesium:    Lab Results   Component Value Date    MG 2.1 06/17/2019     Phosphorus:    Lab Results   Component Value Date    PHOS 2.0 06/17/2019     Recent Labs     06/16/19  0152   PH 7.392   PO2 90.0   PCO2 35.7   HCO3 21.2*   BE -3.1*   O2SAT 96.8     Urine albumin/creatinine: 275 mg/gr  Urine protein/creatinine: 542 mg/gr    Radiology Review:      Chest x-ray Tammi 15, 2018       NO ACUTE CARDIOPULMONARY PROCESS           BRIEF SUMMARY OF INITIAL CONSULT:    Briefly Mr. Carmella Lopez is a 30year old man with h/o type 1 DM, suicidal ideation recently admitted to a psych facility, who was admitted to this hospital on Tammi 15, 2019 after he presented to the ER reporting nausea and vomiting and elevated blood sugar. After evaluation he was found to have hyperkalemia with a potassium of 8.2, metabolic acidosis with a bicarbonate of 6 and renal failure with a creatinine level 2.5 m/dL. Of note his blood work on June 13 was completely normal.    Problems resolved:    · Hyperkalemia, multifactorial, due to extracellular shifting (potassium dragging, driven by hyperglycemia, osmotic water movement), insulin depletion, and decreased GFR. Resolved  · Severe HAG metabolic acidosis secondary to DKA. Resolved  · Translocation hyponatremia due to hyperglycemia, corrected sodium 140 mEq/L; resolved  · S/p DKA    IMPRESSION/RECOMMENDATIONS:        1. ASHLEY stage II, consistent with volume responsive prerenal ASHLEY, due to urinary losses related to osmotic diuresis and vomiting. Resolved. 2. Probably CKD stage I, with mild proteinuria (urine albumin/creatinine: 275 mg/gr), early diabetic nephropathy  3.  Type I DM    Plan:    · Discontinue IV fluids  · Continue to monitor renal function  · Repeat urine albumin/creatinine ratio in 3 to 6 months

## 2019-06-17 NOTE — PROGRESS NOTES
Patient denies any suicidal or homicidal ideations. Resting comfortably in bed. Stable vital signs.  Will continue to monitor

## 2019-06-17 NOTE — PROGRESS NOTES
[facility]  Internal Medicine Residency Program  Progress Note - House Team 1    Patient:  Mckayla Cabezas 30 y.o. male MRN: [id number]     Date of Service: 2019     CC: nausea/vomiting/abd pain  Subjective   Overnight events: B-240, Insulin requirements 65 units    Pt seen, met lying in bed. Has no complaints this am.  Was bridged to Lantus 50 units and 5 pre-meals. ASHLEY resolved, HAGMA resolved  Cultures so far negative, on Levaquin 500 mg PO daily (day 2). Overall appears to be improving, will probably transfer out of the Unit    Objective     Physical Exam:  Vitals:    19 0700   BP: (!) 177/101   Pulse: 95   Resp: 17   Temp:    SpO2: 96%   ·   Physical Exam   Constitutional: He is oriented to person, place, and time. He appears well-developed and well-nourished. No distress. HENT:   Head: Normocephalic and atraumatic. Eyes: Pupils are equal, round, and reactive to light. Conjunctivae are normal. No scleral icterus. Cardiovascular: Normal rate, regular rhythm, S1 normal, S2 normal, normal heart sounds and intact distal pulses. No murmur heard. Pulmonary/Chest: Effort normal and breath sounds normal. No respiratory distress. He has no wheezes. He has no rales. Abdominal: Soft. Bowel sounds are normal. He exhibits no distension and no mass. There is no tenderness. There is no guarding. Musculoskeletal: He exhibits no edema or tenderness. Neurological: He is alert and oriented to person, place, and time. No cranial nerve deficit. Skin: Skin is warm and dry. He is not diaphoretic.      Subject  Pertinent Labs & Imaging Studies   Basic Labs  CBC:   Recent Labs     06/15/19  1540 19  0500 19  0423   WBC 27.6* 23.0* 14.6*   RBC 4.56 3.95 4.17   HGB 14.4 12.4* 13.0   HCT 44.9 36.7* 38.5   MCV 98.5 92.9 92.3   MCH 31.6 31.4 31.2   MCHC 32.1 33.8 33.8   RDW 11.9 11.7 11.8    284 256   MPV 10.7 9.9 10.2       BMP:    Recent Labs     06/15/19  1540 06/16/19  0500 06/16/19  0800 06/17/19  0423   *   < > 143 143 136   K 8.2*   < > 3.8 4.2 4.2   CL 82*   < > 107 106 96*   CO2 6*   < > 24 28 25   BUN 66*   < > 44* 41* 16   CREATININE 2.5*   < > 1.6* 1.5* 1.0   GLUCOSE 820*   < > 264* 219* 248*   CALCIUM 8.9   < > 7.8* 7.9* 8.6   PROT 7.4  --   --   --   --    LABALBU 4.3  --   --   --   --    BILITOT 0.4  --   --   --   --    ALKPHOS 107  --   --   --   --    AST 16  --   --   --   --    ALT 14  --   --   --   --     < > = values in this interval not displayed. LIVER PROFILE:   Recent Labs     06/15/19  1540 06/16/19  0030   AST 16  --    ALT 14  --    LIPASE  --  20   BILITOT 0.4  --    ALKPHOS 107  --        PT/INR:   No results for input(s): PROTIME, INR in the last 72 hours. APTT:   No results for input(s): APTT in the last 72 hours. Fasting Lipid Panel:    Lab Results   Component Value Date    CHOL 231 12/10/2018    TRIG 137 12/10/2018    HDL 43 12/10/2018       Cardiac Enzymes:    Lab Results   Component Value Date    CKTOTAL 283 (H) 06/16/2019    CKTOTAL 65 05/21/2017    CKTOTAL 41 07/08/2013    CKMB 7.4 06/16/2019    CKMB 1.1 07/07/2013    CKMB 0.7 04/20/2013    TROPONINI 0.11 (H) 06/16/2019    TROPONINI 0.06 (H) 06/16/2019    TROPONINI <0.01 06/15/2019       Resident's Assessment and Plan     Endocrine  DKA -resolved     Type 1 DM  Diagnosed at the age of 6  Suboptimally controlled, HbA1c 11.6 6/16/19  Total insulin requirements since midnight was 65 units. Currently on 50 Lantus, 5 novolog pre-meals  Was on 65 units at home, was on scheduled Novolog (3 units before bfast, 6 units before lunch, 8 units before dinner) + SS but were discontinued for hypoglycemic episodes.    Consult diabetic educator    Hypothyroidism  On synthroid 25 mcg daily     Cardiovascular  HTN-suboptimally controlled  Norvasc increased to 10 mg, HCTZ reintroduced at 25 mg daily, and Labetalol PRN  Lisinopril at home (held for ASHLEY)  Monitor blood pressure     Elevated troponins  Initially <0.01-->0.06--->0.11  Likely 2/2 ASHLEY  Pt is currently without chest pain  Repeat EKG this am negative for signs of ischemia    Renal  ASHLEY-resolved  Likely hemodynamically mediated 2/2 dehydration  Baseline Cr normal, admitting 2.5--->1.5-->1.0  Continue to monitor urine output and renal function  Nephrology following     Hyperkalemia-resolved  Presenting K 8.2 with EKG changes, now down to 4.2     HAGMA  Likely 2/2 DKA vs LA  Acidosis resolved-->24 AG 12  S/p bicarb drip  Serum osmolar gap 30, concerns for ingestion of other toxins  Methanol and ethylene glycol negative     Lactic acidosis  Resolved: 4.9--->1.7-->1. 2     Infectious Disease  Likely CAP  Pt complained of productive cough with subjective fever and chills  Leukocytosis 27.6-->14.6, procalcitonin 2.83  Cultures so far negative  Switched Levaquin 500 mg daily to PO (day 2)  Monitor respiratory status     Psychiatry  Hx of suicidal ideation  Presented from generations  Contact sitter     DVT/GI prophylaxis: Heparin/Protonix  Caitlyn Leo M.D. , PGY-1  Internal Medicine     Attending Physician: Dr. Zaina Cao     Attending Physician Statement:  Daniel Campos M.D., F.A.C.P.     I have discussed the case, including pertinent history and exam findings with the resident/NP. I have seen and examined the patient and the key elements of the encounter have been performed by me. I agree with the resident ROS, PMHx, PSHx, meds reviewed and assessment, plan and orders as documented by the resident/NP    [facility] charts reviewed, including other providers notes, relevant labs and imaging. Severe DKA to vomiting and NOT taking and insulin (he wasn't allowed insulin or glucose candy at [facility] 6 procalcitonin,  treating pneumonia, Productive cough, fevers-- ?uri  ruleout aspiration pnuemonia  Vomiting recently -- ?  He claims bc PUD/stomach acid, but also recently entered drug rehab, ? opiate withdrawals vomiting etc..  DKA and dehyration and initially high K+ treated  Lactic acid better  Abuse issues concern but Urine on admission negative, rule out withdrawals   --now improved GI distress, no fevers, better stomach upset  - switch to augmentin to cover aspiration pneumonia    Used to be on premeals-- but recurrent hypoglycemia  Only on lantus qd  65--might adjust then back to facility in next 24 hours    >50% of time spent coordinating care with other providers and/or counseling patient/family  Remainder of medical problems as per resident note.

## 2019-06-17 NOTE — PLAN OF CARE
Problem: Serum Glucose Level - Abnormal:  Goal: Ability to maintain appropriate glucose levels will improve  Description  Ability to maintain appropriate glucose levels will improve  6/17/2019 0817 by Alis Galdamez RN  Outcome: Met This Shift     Problem: Suicide risk  Goal: Provide patient with safe environment  Description  Provide patient with safe environment  6/17/2019 0817 by Alis Galdamez RN  Outcome: Met This Shift     Problem: Risk for Impaired Skin Integrity  Goal: Tissue integrity - skin and mucous membranes  Description  Structural intactness and normal physiological function of skin and  mucous membranes. Outcome: Met This Shift     Problem: Falls - Risk of:  Goal: Will remain free from falls  Description  Will remain free from falls  Outcome: Met This Shift     Problem: Falls - Risk of:  Goal: Absence of physical injury  Description  Absence of physical injury  Outcome: Met This Shift   Plan of care discussed with patient / family.

## 2019-06-17 NOTE — PLAN OF CARE
Problem: Serum Glucose Level - Abnormal:  Goal: Ability to maintain appropriate glucose levels will improve  Description  Ability to maintain appropriate glucose levels will improve  Outcome: Met This Shift     Problem: Suicide risk  Goal: Provide patient with safe environment  Description  Provide patient with safe environment  6/16/2019 1737 by Lamar Adams RN  Outcome: Met This Shift

## 2019-06-17 NOTE — PROGRESS NOTES
Physical Therapy  Initial Assessment     Name: Marjorie Dill  : 1990  MRN: [id number]    Date of Service: 2019    Evaluating PT:  Suri Yates, PT, DPT [id number]    Room #:  7883/8882-P  Diagnosis:  DKA  PMHx:  Abscess of back, bowel obstruction, DM, HTN, HLD  Precautions:  Falls, O2,    Equipment Needs:  NA    Pt reports he was living in a house but is not returning there at discharge. States his home set-up is TBD. Pt was independent prior with no AD. Initial Evaluation  Date:    AM-PAC 6 Clicks    Was pt agreeable to Eval/treatment? Yes   Does pt have pain? Pt reports pain in head, throat, and chest.  Unspecified severity    Bed Mobility  Rolling: Independent   Supine to sit: Independent   Sit to supine: Independent   Scooting: Independent    Transfers Sit to stand: Supervision  Stand to sit: Supervision  Stand pivot: Supervision with no AD   Ambulation    Few steps to chair with no AD SBA   Stair negotiation: ascended and descended NT   ROM BUE:  WFL  BLE:  WFL   Strength BUE:  NT  BLE:  Grossly 4+/5   Balance Sitting EOB:  Independent   Dynamic Standing:  SBA with no AD     Pt is A & O x 4  RASS:  0  CAM-ICU:  Negative   Sensation:  Pt reports numbness and tingling to B feet d/t neuropathy   Edema:  Unremarkable     Vitals:  Blood Pressure at rest 161/98 Blood Pressure post session 177/106   Heart Rate at rest 92 bpm Heart Rate post session 93 bpm   SPO2 at rest 97% on 2 L SPO2 post session 97% on 2 L     Functional Status Score-Intensive Care Unit (FSS-ICU)   Rolling    Supine to sit transfer    Unsupported sitting     Sit to stand transfers    Ambulation    Total       Patient education  Pt educated on PT role, safety during functional mobility, and importance of mobility.      Patient response to education:   Pt verbalized understanding Pt demonstrated skill Pt requires further education in this area   Yes  Yes  No      Comments:  Pt received supine with sitter present and agreeable to PT evaluation. Pt cleared for participation by RN prior to session. Vitals monitored during session. Pt c/o fatigue and HA. No physical assist during bed mobility. Skilled monitoring of vitals and line management. Pt sat EOB and c/o dizziness and states it \"feels like my teeth are going to pop out. \"  BP and vitals assessed. /83. HR and SpO2 WNL. Pt performed STS and transfer to chair. Assisted with donning pants. Educated on safe transfers. Pt declined further ambulation d/t fatigue. Pt states he wanted to return to bed. Assisted back to bed and returned himself to supine. Pt placed in semi-chair position. Pt left with call button in reach, lines attached, and needs met. Sitting present. Discussed pt case at interdisciplinary rounds in AM.      Pts/ family goals   1. None stated     Patient and or family understand(s) diagnosis, prognosis, and plan of care. Yes     PLAN  PT care will be discontinued. Pt limited by dizziness and fatigue but is not displaying a physical strength, endurance, or balance deficit. Pt would benefit from mobility in hospital via gait team and defer positioning to nursing at this time. Please re-consult physical therapy if pt displays weakness or functional mobility deficits.       Time in  1205  Time out  425 Zackery Lin, PT, DPT  [id number]

## 2019-06-17 NOTE — PROGRESS NOTES
[address] [facility]  Department of Internal Medicine  Internal Medicine Residency Program  Resident MICU Progress  Note      Patient:  Mckayla Cabezas 30 y.o. male MRN: [id number]     Date of Service: 6/17/2019    Allergy: Pineapple    Follow DKA  Subjective       Patient seen and examined this morning. Still with some nausea but no vomiting, (+) occasional crampy abdominal pain. He is eating only a little, feels like food is dry but is able to tolerate soups and liquids. No headache, chest pain, palpitations, SOB. Objective   Physical Exam:  · Vitals: BP (!) 170/102   Pulse 94   Temp 98.4 °F (36.9 °C)   Resp 13   Ht 5' 9\" (1.753 m)   Wt 240 lb (108.9 kg)   SpO2 97%   BMI 35.44 kg/m²     · I & O - 24hr: In: 690 [P.O.:680; I.V.:10]  · Out: 9953 [Urine:3675]    Physical Exam   Constitutional: He is oriented to person, place, and time. No distress. HENT:   Mouth/Throat: Oropharynx is clear and moist. No oropharyngeal exudate. Eyes: Conjunctivae are normal. Right eye exhibits no discharge. Left eye exhibits no discharge. Neck: Neck supple. Cardiovascular: Normal rate and regular rhythm. No murmur heard. Pulmonary/Chest: Effort normal and breath sounds normal. No stridor. No respiratory distress. Abdominal: Soft. Bowel sounds are normal. He exhibits no distension. There is no tenderness. Musculoskeletal: He exhibits no edema or tenderness. Lymphadenopathy:     He has no cervical adenopathy. Neurological: He is alert and oriented to person, place, and time. Skin: Skin is warm. No rash noted. He is not diaphoretic. No pallor.        Pertinent New Labs & Imaging Studies     CBC:   Recent Labs     06/15/19  1540 06/16/19  0500 06/17/19  0423   WBC 27.6* 23.0* 14.6*   HGB 14.4 12.4* 13.0   HCT 44.9 36.7* 38.5   MCV 98.5 92.9 92.3    284 256       BMP:    Recent Labs     06/16/19  0500 06/16/19  0800 06/17/19  0423    143 136   K 3.8 4.2 4.2    106 96*   CO2 24 28 25 BUN 44* 41* 16   CREATININE 1.6* 1.5* 1.0   GLUCOSE 264* 219* 248*       LIVER PROFILE:   Recent Labs     06/15/19  1540 19  0030   AST 16  --    ALT 14  --    LIPASE  --  20   BILITOT 0.4  --    ALKPHOS 107  --        PT/INR:   No results for input(s): PROTIME, INR in the last 72 hours. APTT:   No results for input(s): APTT in the last 72 hours. Fasting Lipid Panel:    Lab Results   Component Value Date    CHOL 231 12/10/2018    TRIG 137 12/10/2018    HDL 43 12/10/2018       Cardiac Enzymes:    Lab Results   Component Value Date    CKTOTAL 283 (H) 2019    CKTOTAL 65 2017    CKTOTAL 41 2013    CKMB 7.4 2019    CKMB 1.1 2013    CKMB 0.7 2013    TROPONINI 0.04 (H) 2019    TROPONINI 0.11 (H) 2019    TROPONINI 0.06 (H) 2019         Notable Cultures:       Culture  Date sent  Result    Nasal      Sputum      Urine Strep pneumonia Ag        Urine Legionella Ag        Blood        Urine          Antibiotic  Day started  Days     Levaquin                              Oxygen:   Nasal cannula L/min  2   Face mask %     Reservoirs mask %         Lines:  Site   Date inserted Days     TLC              PICC              Arterial line              Peripheral line   x                        DVT Prophylaxis      Heparin         Enoxaparin   x     PCDs          PPI Prophylaxis      Protonix        Famotidine        Diet   x     Imaging studies:  Xr Chest Portable    Result Date: 6/15/2019  Patient MRN: [id number] : 1990 Age:  30 years Gender: Male Order Date: 6/15/2019 3:45 PM Exam: XR CHEST PORTABLE Number of Images: 1 view Indication:   chest pain, DKA chest pain, DKA Comparison: Prior chest radiograph from 2018 is available Findings: The lungs are clear. There is no evidence of pulmonary infiltrate or pleural effusion. The pulmonary vascularity is unremarkable. The cardiac, hilar and mediastinal silhouettes are satisfactory.   The bony thorax demonstrates no gross abnormality. NO ACUTE CARDIOPULMONARY PROCESS     Us Retroperitoneal Complete    Result Date: 6/15/2019  Real-time and Doppler sonography of the kidneys and urinary bladder (retroperitoneal). Right kidney measures 13.6 x 6.8 x 5.7 cm. Left kidney measures 12.4 x 5.8 x 6.3 cm. Urinary bladder volume is 93.3 mL. De Leon catheter has largely evacuated urinary bladder. The kidneys are negative for evidence of solid or cystic mass, hydronephrosis or calculus disease. Unremarkable appearance of the kidneys. De Leon catheter within the urinary bladder. Resident's Assessment & Plan     Assessment  1. Uncontrolled DM Type 1 in DKA 2/2 non-compliance, resolved. AG on am labs 15 but he is not acidotic, chloride also low. 2. Hypertension. Diagnosed about 2 weeks ago, has also not been getting his medications at the facility. Hctz started yesterday, remains hypertensive today, responds to prn labe  3. HyperK, resolved  4. ASHLEY, resolved  5. CAP, on levaquin  6. Leukocytosis from #1 and # 5, improved with control of DKA and antitbiotics  7. Suicidal ideation    Plan  1. Lantus increased to 55 units + 6 units premeals + SS. Continue to monitor BS ACHS. Rpt BMP at 9pm.  2. Lisinopril 40 mg OD added this morning, also increased hctz to BID. He also has prn labetalol and hydralazine as well. Continue to monitor BP. 3. Procalcitonin, leukocytosos improved with levaquin - continue abx  4.  Continue CO     Diet: Carb control  DVT/GI prophylaxis: lovenox/diet  Disposition: transfer to general floor      Jeri Villegas MD, PGY-2    Attending physician: Dr. Kimberly Serrano  DKA   Resolved  BMP 9 pm today  Transfer  Carefull follow up for hsi BS to avoid hypoglycemia  House medicine following  Glenda Yung

## 2019-06-18 LAB
ANION GAP SERPL CALCULATED.3IONS-SCNC: 13 MMOL/L (ref 7–16)
ANION GAP SERPL CALCULATED.3IONS-SCNC: 16 MMOL/L (ref 7–16)
BASOPHILS ABSOLUTE: 0.04 E9/L (ref 0–0.2)
BASOPHILS RELATIVE PERCENT: 0.4 % (ref 0–2)
BUN BLDV-MCNC: 9 MG/DL (ref 6–20)
BUN BLDV-MCNC: 9 MG/DL (ref 6–20)
CALCIUM SERPL-MCNC: 9.5 MG/DL (ref 8.6–10.2)
CALCIUM SERPL-MCNC: 9.5 MG/DL (ref 8.6–10.2)
CHLORIDE BLD-SCNC: 95 MMOL/L (ref 98–107)
CHLORIDE BLD-SCNC: 96 MMOL/L (ref 98–107)
CO2: 26 MMOL/L (ref 22–29)
CO2: 28 MMOL/L (ref 22–29)
CREAT SERPL-MCNC: 0.9 MG/DL (ref 0.7–1.2)
CREAT SERPL-MCNC: 1 MG/DL (ref 0.7–1.2)
EOSINOPHILS ABSOLUTE: 0.07 E9/L (ref 0.05–0.5)
EOSINOPHILS RELATIVE PERCENT: 0.7 % (ref 0–6)
GFR AFRICAN AMERICAN: >60
GFR AFRICAN AMERICAN: >60
GFR NON-AFRICAN AMERICAN: >60 ML/MIN/1.73
GFR NON-AFRICAN AMERICAN: >60 ML/MIN/1.73
GLUCOSE BLD-MCNC: 144 MG/DL (ref 74–99)
GLUCOSE BLD-MCNC: 247 MG/DL (ref 74–99)
HCT VFR BLD CALC: 41.7 % (ref 37–54)
HEMOGLOBIN: 14 G/DL (ref 12.5–16.5)
IMMATURE GRANULOCYTES #: 0.03 E9/L
IMMATURE GRANULOCYTES %: 0.3 % (ref 0–5)
LYMPHOCYTES ABSOLUTE: 2.05 E9/L (ref 1.5–4)
LYMPHOCYTES RELATIVE PERCENT: 21.8 % (ref 20–42)
MAGNESIUM: 2.2 MG/DL (ref 1.6–2.6)
MCH RBC QN AUTO: 30.7 PG (ref 26–35)
MCHC RBC AUTO-ENTMCNC: 33.6 % (ref 32–34.5)
MCV RBC AUTO: 91.4 FL (ref 80–99.9)
METER GLUCOSE: 236 MG/DL (ref 74–99)
METER GLUCOSE: 236 MG/DL (ref 74–99)
METER GLUCOSE: 256 MG/DL (ref 74–99)
METER GLUCOSE: 326 MG/DL (ref 74–99)
MONOCYTES ABSOLUTE: 0.85 E9/L (ref 0.1–0.95)
MONOCYTES RELATIVE PERCENT: 9 % (ref 2–12)
NEUTROPHILS ABSOLUTE: 6.37 E9/L (ref 1.8–7.3)
NEUTROPHILS RELATIVE PERCENT: 67.8 % (ref 43–80)
PDW BLD-RTO: 11.4 FL (ref 11.5–15)
PHOSPHORUS: 3.1 MG/DL (ref 2.5–4.5)
PLATELET # BLD: 259 E9/L (ref 130–450)
PMV BLD AUTO: 10.1 FL (ref 7–12)
POTASSIUM SERPL-SCNC: 3.8 MMOL/L (ref 3.5–5)
POTASSIUM SERPL-SCNC: 4.4 MMOL/L (ref 3.5–5)
RBC # BLD: 4.56 E12/L (ref 3.8–5.8)
SODIUM BLD-SCNC: 137 MMOL/L (ref 132–146)
SODIUM BLD-SCNC: 137 MMOL/L (ref 132–146)
WBC # BLD: 9.4 E9/L (ref 4.5–11.5)

## 2019-06-18 PROCEDURE — 36415 COLL VENOUS BLD VENIPUNCTURE: CPT

## 2019-06-18 PROCEDURE — 6360000002 HC RX W HCPCS: Performed by: INTERNAL MEDICINE

## 2019-06-18 PROCEDURE — 84100 ASSAY OF PHOSPHORUS: CPT

## 2019-06-18 PROCEDURE — 6370000000 HC RX 637 (ALT 250 FOR IP): Performed by: INTERNAL MEDICINE

## 2019-06-18 PROCEDURE — 1200000000 HC SEMI PRIVATE

## 2019-06-18 PROCEDURE — 80048 BASIC METABOLIC PNL TOTAL CA: CPT

## 2019-06-18 PROCEDURE — 85025 COMPLETE CBC W/AUTO DIFF WBC: CPT

## 2019-06-18 PROCEDURE — 83735 ASSAY OF MAGNESIUM: CPT

## 2019-06-18 PROCEDURE — 99233 SBSQ HOSP IP/OBS HIGH 50: CPT | Performed by: INTERNAL MEDICINE

## 2019-06-18 PROCEDURE — 82962 GLUCOSE BLOOD TEST: CPT

## 2019-06-18 RX ORDER — INSULIN GLARGINE 100 [IU]/ML
50 INJECTION, SOLUTION SUBCUTANEOUS DAILY
Status: DISCONTINUED | OUTPATIENT
Start: 2019-06-18 | End: 2019-06-19

## 2019-06-18 RX ORDER — AMOXICILLIN AND CLAVULANATE POTASSIUM 500; 125 MG/1; MG/1
1 TABLET, FILM COATED ORAL EVERY 12 HOURS SCHEDULED
Status: DISCONTINUED | OUTPATIENT
Start: 2019-06-18 | End: 2019-06-20 | Stop reason: HOSPADM

## 2019-06-18 RX ORDER — KETOROLAC TROMETHAMINE 30 MG/ML
15 INJECTION, SOLUTION INTRAMUSCULAR; INTRAVENOUS ONCE
Status: COMPLETED | OUTPATIENT
Start: 2019-06-18 | End: 2019-06-18

## 2019-06-18 RX ORDER — AMLODIPINE BESYLATE 10 MG/1
10 TABLET ORAL DAILY
Status: DISCONTINUED | OUTPATIENT
Start: 2019-06-18 | End: 2019-06-20 | Stop reason: HOSPADM

## 2019-06-18 RX ORDER — PANTOPRAZOLE SODIUM 40 MG/1
40 TABLET, DELAYED RELEASE ORAL
Status: DISCONTINUED | OUTPATIENT
Start: 2019-06-18 | End: 2019-06-20 | Stop reason: HOSPADM

## 2019-06-18 RX ADMIN — LEVOTHYROXINE SODIUM 25 MCG: 25 TABLET ORAL at 06:05

## 2019-06-18 RX ADMIN — INSULIN LISPRO 8 UNITS: 100 INJECTION, SOLUTION INTRAVENOUS; SUBCUTANEOUS at 12:11

## 2019-06-18 RX ADMIN — ACETAMINOPHEN 650 MG: 325 TABLET, FILM COATED ORAL at 23:02

## 2019-06-18 RX ADMIN — AMLODIPINE BESYLATE 10 MG: 10 TABLET ORAL at 12:10

## 2019-06-18 RX ADMIN — HYDROCHLOROTHIAZIDE 25 MG: 25 TABLET ORAL at 17:11

## 2019-06-18 RX ADMIN — AMOXICILLIN AND CLAVULANATE POTASSIUM 1 TABLET: 500; 125 TABLET, FILM COATED ORAL at 22:52

## 2019-06-18 RX ADMIN — AMOXICILLIN AND CLAVULANATE POTASSIUM 1 TABLET: 500; 125 TABLET, FILM COATED ORAL at 08:53

## 2019-06-18 RX ADMIN — INSULIN LISPRO 4 UNITS: 100 INJECTION, SOLUTION INTRAVENOUS; SUBCUTANEOUS at 17:11

## 2019-06-18 RX ADMIN — ONDANSETRON HYDROCHLORIDE 4 MG: 2 SOLUTION INTRAMUSCULAR; INTRAVENOUS at 06:05

## 2019-06-18 RX ADMIN — ACETAMINOPHEN 650 MG: 325 TABLET, FILM COATED ORAL at 06:05

## 2019-06-18 RX ADMIN — INSULIN LISPRO 10 UNITS: 100 INJECTION, SOLUTION INTRAVENOUS; SUBCUTANEOUS at 12:10

## 2019-06-18 RX ADMIN — INSULIN LISPRO 3 UNITS: 100 INJECTION, SOLUTION INTRAVENOUS; SUBCUTANEOUS at 22:58

## 2019-06-18 RX ADMIN — PANTOPRAZOLE SODIUM 40 MG: 40 TABLET, DELAYED RELEASE ORAL at 17:11

## 2019-06-18 RX ADMIN — INSULIN GLARGINE 50 UNITS: 100 INJECTION, SOLUTION SUBCUTANEOUS at 08:54

## 2019-06-18 RX ADMIN — LISINOPRIL 40 MG: 20 TABLET ORAL at 08:53

## 2019-06-18 RX ADMIN — INSULIN LISPRO 4 UNITS: 100 INJECTION, SOLUTION INTRAVENOUS; SUBCUTANEOUS at 08:55

## 2019-06-18 RX ADMIN — HYDROCHLOROTHIAZIDE 25 MG: 25 TABLET ORAL at 08:53

## 2019-06-18 RX ADMIN — SERTRALINE 25 MG: 50 TABLET, FILM COATED ORAL at 08:53

## 2019-06-18 RX ADMIN — INSULIN LISPRO 10 UNITS: 100 INJECTION, SOLUTION INTRAVENOUS; SUBCUTANEOUS at 17:10

## 2019-06-18 RX ADMIN — KETOROLAC TROMETHAMINE 15 MG: 30 INJECTION, SOLUTION INTRAMUSCULAR at 08:53

## 2019-06-18 RX ADMIN — INSULIN LISPRO 5 UNITS: 100 INJECTION, SOLUTION INTRAVENOUS; SUBCUTANEOUS at 08:55

## 2019-06-18 ASSESSMENT — PAIN DESCRIPTION - PAIN TYPE: TYPE: ACUTE PAIN

## 2019-06-18 ASSESSMENT — PAIN DESCRIPTION - DESCRIPTORS: DESCRIPTORS: HEADACHE

## 2019-06-18 ASSESSMENT — PAIN SCALES - GENERAL
PAINLEVEL_OUTOF10: 10
PAINLEVEL_OUTOF10: 4
PAINLEVEL_OUTOF10: 4

## 2019-06-18 ASSESSMENT — PAIN DESCRIPTION - PROGRESSION: CLINICAL_PROGRESSION: NOT CHANGED

## 2019-06-18 ASSESSMENT — PAIN DESCRIPTION - ONSET: ONSET: ON-GOING

## 2019-06-18 ASSESSMENT — PAIN - FUNCTIONAL ASSESSMENT: PAIN_FUNCTIONAL_ASSESSMENT: PREVENTS OR INTERFERES SOME ACTIVE ACTIVITIES AND ADLS

## 2019-06-18 ASSESSMENT — PAIN DESCRIPTION - LOCATION: LOCATION: HEAD

## 2019-06-18 ASSESSMENT — PAIN DESCRIPTION - FREQUENCY: FREQUENCY: CONTINUOUS

## 2019-06-18 NOTE — PROGRESS NOTES
[facility]  Internal Medicine Residency Program  Progress Note - House Team 2    Patient:  Mary Jo Man 30 y.o. male MRN: [id number]     Date of Service: 2019     CC: nausea/vomiting/abd pain  Subjective   Overnight events: B-230, Insulin requirements 74 units    Pt seen, met lying in bed. Has no complaints this am.      Objective     Physical Exam:  Vitals:    19 2330   BP: (!) 149/91   Pulse: 107   Resp: 16   Temp: 98.7 °F (37.1 °C)   SpO2: 98%     Physical Exam   Constitutional: He is oriented to person, place, and time. He appears well-developed and well-nourished. No distress. HENT:   Head: Normocephalic and atraumatic. Eyes: Pupils are equal, round, and reactive to light. Conjunctivae are normal. No scleral icterus. Cardiovascular: Normal rate, regular rhythm, S1 normal, S2 normal, normal heart sounds and intact distal pulses. No murmur heard. Pulmonary/Chest: Effort normal and breath sounds normal. No respiratory distress. He has no wheezes. He has no rales. Abdominal: Soft. Bowel sounds are normal. He exhibits no distension and no mass. There is no tenderness. There is no guarding. Musculoskeletal: He exhibits no edema or tenderness. Neurological: He is alert and oriented to person, place, and time. No cranial nerve deficit. Skin: Skin is warm and dry. He is not diaphoretic.      Subject  Pertinent Labs & Imaging Studies   Basic Labs  CBC:   Recent Labs     19  0500 19  0423 19  0455   WBC 23.0* 14.6* 9.4   RBC 3.95 4.17 4.56   HGB 12.4* 13.0 14.0   HCT 36.7* 38.5 41.7   MCV 92.9 92.3 91.4   MCH 31.4 31.2 30.7   MCHC 33.8 33.8 33.6   RDW 11.7 11.8 11.4*    256 259   MPV 9.9 10.2 10.1       BMP:    Recent Labs     06/15/19  1540  19  0423 19  0048 19  0455   *   < > 136 137 137   K 8.2*   < > 4.2 3.8 4.4   CL 82*   < > 96* 96* 95*   CO2 6*   < > 25 28 26   BUN 66*   < > 16 9 9   CREATININE 2.5*   < > 1.0 1.0 admitting 2.5--->1.5-->1.0  Continue to monitor urine output and renal function  Nephrology following     Hyperkalemia-resolved  Presenting K 8.2 with EKG changes, now down to 4.2     HAGMA, resolved  Likely 2/2 DKA vs LA     Lactic acidosis, resolved  Resolved: 4.9--->1.7-->1. 2     Infectious Disease  Likely CAP  Pt complained of productive cough with subjective fever and chills  Leukocytosis 27.6-->14.6, procalcitonin 2.83  Cultures so far negative  Switched Levaquin 500 mg daily to PO (day 2)  Monitor respiratory status     Psychiatry  Hx of suicidal ideation  Presented from [facility]  Contact sitter     Discharge today    DVT/GI prophylaxis: Heparin/Protonix  Disposition: general floor     Ankit Beth M.D. , PGY-2  Internal Medicine     Attending Physician: Dr. Tamela Florez     Attending Physician Statement:  Ana Frost M.D., F.A.C.P.     I have discussed the case, including pertinent history and exam findings with the resident/NP. I have seen and examined the patient and the key elements of the encounter have been performed by me.  I agree with the resident ROS, PMHx, PSHx, meds reviewed and assessment, plan and orders as documented by the resident/NP [facility] [place] [facility] charts reviewed, including other providers notes, relevant labs and imaging.   Severe DKA to vomiting and NOT taking and insulin ([facility] pro[facility]onin,  treating pneumonia, Productive cough, fevers-- ?uri  ruleout aspiration pnuemonia  Vomiting recently -- ? He claims bc PUD/stomach acid, but also recently entered drug rehab, ? opiate withdrawals vomiting etc..  DKA and dehyration and initially high K+ treated  Lactic acid better  Abuse issues concern but Urine on admission negative, rule out withdrawals   --now improved GI distress, no fevers, better stomach upset  - switch to augmentin to cover aspiration pneumonia   -- ongoing sore throat, viral-- no thrush, rule out gerd/rachel-- but likely URI related      Adjusting premeals-- dec long acting bc  recurrent hypoglycemia   -- adjusting insulin regiment    BP high -- resumed hctz, ace,, resume norvasc (wasn't given)  tsh high, ?25 taking synthroid prior compliance - if so then inc dose    >50% of time spent coordinating care with other providers and/or counseling patient/family  Remainder of medical problems as per resident note.

## 2019-06-18 NOTE — CARE COORDINATION
6/18/2019 social work:discharge planning   Initial assessment done with patient. Patient states he lives with his wife and family. He has a glucometer and no hhc. Discused sw role and discharge plan/transiton of care. Per patient he was sent to [facility] for [facility] ideations and states he was not there long but lost trac of time and how long he was there. He states he has a gun and thought about killing himself but did not go through with it and he has hunting guns and pocket knives and the guns are in a gun safe. Patient was alert and had good eye contact with sw. He denies jonny suicidal ideation now and states he was just so stressed out and it got to him. He states he goes nowhere for counseling for behavioral health at this time. awaiting psych consult.

## 2019-06-18 NOTE — PROGRESS NOTES
Department of Internal Medicine  Nephrology Attending Consult Note      SUBJECTIVE: We are following Mr. Brady for ASHLEY, metabolic acidosis and hyperkalemia. Reports feeling better.       PHYSICAL EXAM:      Vitals:    VITALS:  BP (!) 155/80   Pulse 110   Temp 98.3 °F (36.8 °C) (Temporal)   Resp 18   Ht 5' 9\" (1.753 m)   Wt 233 lb 9.6 oz (106 kg)   SpO2 94%   BMI 34.50 kg/m²   24HR INTAKE/OUTPUT:      Intake/Output Summary (Last 24 hours) at 6/18/2019 0932  Last data filed at 6/18/2019 0610  Gross per 24 hour   Intake 1052 ml   Output 2500 ml   Net -1448 ml       Constitutional:  Awake, somewhat lethargic  HEENT:  PERRLA  Respiratory:  Lungs clear  Cardiovascular/Edema:  Heart sounds regular  Gastrointestinal:  Abdomen soft   Neurologic:  Non focal   Skin:  No lesions  Other:  No edema      DATA:    CBC:   Lab Results   Component Value Date    WBC 9.4 06/18/2019    RBC 4.56 06/18/2019    HGB 14.0 06/18/2019    HCT 41.7 06/18/2019    MCV 91.4 06/18/2019    MCH 30.7 06/18/2019    MCHC 33.6 06/18/2019    RDW 11.4 06/18/2019     06/18/2019    MPV 10.1 06/18/2019     CMP:    Lab Results   Component Value Date     06/18/2019    K 4.4 06/18/2019    K 4.0 06/11/2018    CL 95 06/18/2019    CO2 26 06/18/2019    BUN 9 06/18/2019    CREATININE 0.9 06/18/2019    GFRAA >60 06/18/2019    LABGLOM >60 06/18/2019    GLUCOSE 247 06/18/2019    GLUCOSE 57 05/18/2012    PROT 7.4 06/15/2019    LABALBU 4.3 06/15/2019    LABALBU 5.3 05/18/2012    CALCIUM 9.5 06/18/2019    BILITOT 0.4 06/15/2019    ALKPHOS 107 06/15/2019    AST 16 06/15/2019    ALT 14 06/15/2019     Magnesium:    Lab Results   Component Value Date    MG 2.2 06/18/2019     Phosphorus:    Lab Results   Component Value Date    PHOS 3.1 06/18/2019     Recent Labs     06/16/19  0152   PH 7.392   PO2 90.0   PCO2 35.7   HCO3 21.2*   BE -3.1*   O2SAT 96.8     Urine albumin/creatinine: 275 mg/gr  Urine protein/creatinine: 542 mg/gr    Radiology Review:      Chest x-ray Tammi 15, 2018       NO ACUTE CARDIOPULMONARY PROCESS           BRIEF SUMMARY OF INITIAL CONSULT:    Briefly Mr. Darshana Duenas is a 30year old man with h/o type 1 DM, suicidal ideation recently admitted to a psych facility, who was admitted to this hospital on Tammi 15, 2019 after he presented to the ER reporting nausea and vomiting and elevated blood sugar. After evaluation he was found to have hyperkalemia with a potassium of 8.2, metabolic acidosis with a bicarbonate of 6 and renal failure with a creatinine level 2.5 m/dL. Of note his blood work on June 13 was completely normal.    Problems resolved:    · Hyperkalemia, multifactorial, due to extracellular shifting (potassium dragging, driven by hyperglycemia, osmotic water movement), insulin depletion, and decreased GFR. Resolved  · Severe HAG metabolic acidosis secondary to DKA. Resolved  · Translocation hyponatremia due to hyperglycemia, corrected sodium 140 mEq/L; resolved  · S/p DKA  · ASHLEY stage II, consistent with volume responsive prerenal ASHLEY, due to urinary losses related to osmotic diuresis and vomiting. Resolved. IMPRESSION/RECOMMENDATIONS:        1. Probably CKD stage I, with mild proteinuria (urine albumin/creatinine: 275 mg/gr), early diabetic nephropathy  2.  Type I DM    Plan:    · Repeat urine albumin/creatinine ratio in 3 to 6 months  · Follow-up as an outpatient in about 4 to 8 weeks  · We will sign off

## 2019-06-18 NOTE — CARE COORDINATION
Per RN, patient is from [facility]. I called and spoke to Agnieszka Moore at [facility] to check the patient's bed status there. She said once the patient leaves [facility] to come to the hospital, they are discharged from there and would need a new referral to come back. Charge nurse notified to request a psych consult for full evaluation.  Harvey Read RN, CM

## 2019-06-18 NOTE — PLAN OF CARE
Problem: Suicide risk  Goal: Provide patient with safe environment  Description  Provide patient with safe environment  Outcome: Met This Shift     Problem: Risk for Impaired Skin Integrity  Goal: Tissue integrity - skin and mucous membranes  Description  Structural intactness and normal physiological function of skin and  mucous membranes.   Outcome: Met This Shift     Problem: Falls - Risk of:  Goal: Will remain free from falls  Description  Will remain free from falls  Outcome: Met This Shift  Goal: Absence of physical injury  Description  Absence of physical injury  Outcome: Met This Shift

## 2019-06-19 VITALS
RESPIRATION RATE: 18 BRPM | BODY MASS INDEX: 34.6 KG/M2 | DIASTOLIC BLOOD PRESSURE: 72 MMHG | SYSTOLIC BLOOD PRESSURE: 130 MMHG | OXYGEN SATURATION: 94 % | WEIGHT: 233.6 LBS | HEART RATE: 102 BPM | HEIGHT: 69 IN | TEMPERATURE: 97.6 F

## 2019-06-19 LAB
ANION GAP SERPL CALCULATED.3IONS-SCNC: 16 MMOL/L (ref 7–16)
BASOPHILS ABSOLUTE: 0.08 E9/L (ref 0–0.2)
BASOPHILS RELATIVE PERCENT: 0.9 % (ref 0–2)
BUN BLDV-MCNC: 17 MG/DL (ref 6–20)
CALCIUM SERPL-MCNC: 9.6 MG/DL (ref 8.6–10.2)
CHLORIDE BLD-SCNC: 93 MMOL/L (ref 98–107)
CO2: 26 MMOL/L (ref 22–29)
CREAT SERPL-MCNC: 1 MG/DL (ref 0.7–1.2)
EOSINOPHILS ABSOLUTE: 0.23 E9/L (ref 0.05–0.5)
EOSINOPHILS RELATIVE PERCENT: 2.5 % (ref 0–6)
GFR AFRICAN AMERICAN: >60
GFR NON-AFRICAN AMERICAN: >60 ML/MIN/1.73
GLUCOSE BLD-MCNC: 320 MG/DL (ref 74–99)
HCT VFR BLD CALC: 45.2 % (ref 37–54)
HEMOGLOBIN: 15 G/DL (ref 12.5–16.5)
IMMATURE GRANULOCYTES #: 0.04 E9/L
IMMATURE GRANULOCYTES %: 0.4 % (ref 0–5)
LYMPHOCYTES ABSOLUTE: 3.08 E9/L (ref 1.5–4)
LYMPHOCYTES RELATIVE PERCENT: 32.8 % (ref 20–42)
MAGNESIUM: 2.2 MG/DL (ref 1.6–2.6)
MCH RBC QN AUTO: 30.9 PG (ref 26–35)
MCHC RBC AUTO-ENTMCNC: 33.2 % (ref 32–34.5)
MCV RBC AUTO: 93.2 FL (ref 80–99.9)
METER GLUCOSE: 109 MG/DL (ref 74–99)
METER GLUCOSE: 154 MG/DL (ref 74–99)
METER GLUCOSE: 302 MG/DL (ref 74–99)
METER GLUCOSE: 331 MG/DL (ref 74–99)
METER GLUCOSE: 50 MG/DL (ref 74–99)
MONOCYTES ABSOLUTE: 0.82 E9/L (ref 0.1–0.95)
MONOCYTES RELATIVE PERCENT: 8.7 % (ref 2–12)
NEUTROPHILS ABSOLUTE: 5.13 E9/L (ref 1.8–7.3)
NEUTROPHILS RELATIVE PERCENT: 54.7 % (ref 43–80)
PDW BLD-RTO: 11.4 FL (ref 11.5–15)
PHOSPHORUS: 3.9 MG/DL (ref 2.5–4.5)
PLATELET # BLD: 204 E9/L (ref 130–450)
PMV BLD AUTO: 10.7 FL (ref 7–12)
POTASSIUM SERPL-SCNC: 4.4 MMOL/L (ref 3.5–5)
RBC # BLD: 4.85 E12/L (ref 3.8–5.8)
SODIUM BLD-SCNC: 135 MMOL/L (ref 132–146)
WBC # BLD: 9.4 E9/L (ref 4.5–11.5)

## 2019-06-19 PROCEDURE — 82962 GLUCOSE BLOOD TEST: CPT

## 2019-06-19 PROCEDURE — 83735 ASSAY OF MAGNESIUM: CPT

## 2019-06-19 PROCEDURE — 84100 ASSAY OF PHOSPHORUS: CPT

## 2019-06-19 PROCEDURE — 6370000000 HC RX 637 (ALT 250 FOR IP): Performed by: INTERNAL MEDICINE

## 2019-06-19 PROCEDURE — 80048 BASIC METABOLIC PNL TOTAL CA: CPT

## 2019-06-19 PROCEDURE — 85025 COMPLETE CBC W/AUTO DIFF WBC: CPT

## 2019-06-19 PROCEDURE — 36415 COLL VENOUS BLD VENIPUNCTURE: CPT

## 2019-06-19 PROCEDURE — 99233 SBSQ HOSP IP/OBS HIGH 50: CPT | Performed by: INTERNAL MEDICINE

## 2019-06-19 RX ORDER — INSULIN GLARGINE 100 [IU]/ML
45 INJECTION, SOLUTION SUBCUTANEOUS DAILY
Qty: 1 VIAL | Refills: 3 | Status: SHIPPED | OUTPATIENT
Start: 2019-06-20 | End: 2019-06-22 | Stop reason: ALTCHOICE

## 2019-06-19 RX ORDER — ACETAMINOPHEN 325 MG/1
650 TABLET ORAL EVERY 4 HOURS PRN
Status: CANCELLED | OUTPATIENT
Start: 2019-06-19

## 2019-06-19 RX ORDER — DIPHENHYDRAMINE HCL 50 MG
50 CAPSULE ORAL EVERY 6 HOURS PRN
Status: CANCELLED | OUTPATIENT
Start: 2019-06-19

## 2019-06-19 RX ORDER — PANTOPRAZOLE SODIUM 40 MG/1
40 TABLET, DELAYED RELEASE ORAL
Status: CANCELLED | OUTPATIENT
Start: 2019-06-20

## 2019-06-19 RX ORDER — INSULIN GLARGINE 100 [IU]/ML
45 INJECTION, SOLUTION SUBCUTANEOUS DAILY
Status: CANCELLED | OUTPATIENT
Start: 2019-06-20

## 2019-06-19 RX ORDER — AMOXICILLIN AND CLAVULANATE POTASSIUM 500; 125 MG/1; MG/1
1 TABLET, FILM COATED ORAL EVERY 12 HOURS SCHEDULED
Status: CANCELLED | OUTPATIENT
Start: 2019-06-19

## 2019-06-19 RX ORDER — SERTRALINE HYDROCHLORIDE 25 MG/1
25 TABLET, FILM COATED ORAL DAILY
Qty: 30 TABLET | Refills: 3 | Status: SHIPPED | OUTPATIENT
Start: 2019-06-20 | End: 2019-06-22 | Stop reason: DRUGHIGH

## 2019-06-19 RX ORDER — LANCETS 30 GAUGE
100 EACH MISCELLANEOUS DAILY
Status: CANCELLED | OUTPATIENT
Start: 2019-06-19

## 2019-06-19 RX ORDER — CARVEDILOL 6.25 MG/1
6.25 TABLET ORAL 2 TIMES DAILY PRN
Qty: 60 TABLET | Refills: 3 | Status: SHIPPED | OUTPATIENT
Start: 2019-06-19 | End: 2019-06-22 | Stop reason: DRUGHIGH

## 2019-06-19 RX ORDER — PANTOPRAZOLE SODIUM 40 MG/1
40 TABLET, DELAYED RELEASE ORAL
Qty: 30 TABLET | Refills: 3 | Status: SHIPPED | OUTPATIENT
Start: 2019-06-20 | End: 2019-07-31 | Stop reason: SDUPTHER

## 2019-06-19 RX ORDER — LISINOPRIL 20 MG/1
40 TABLET ORAL DAILY
Status: CANCELLED | OUTPATIENT
Start: 2019-06-20

## 2019-06-19 RX ORDER — IBUPROFEN 200 MG
100 TABLET ORAL DAILY
Status: CANCELLED | OUTPATIENT
Start: 2019-06-19

## 2019-06-19 RX ORDER — AMLODIPINE BESYLATE 10 MG/1
10 TABLET ORAL DAILY
Qty: 30 TABLET | Refills: 3 | Status: SHIPPED | OUTPATIENT
Start: 2019-06-20 | End: 2019-07-31 | Stop reason: SDUPTHER

## 2019-06-19 RX ORDER — DEXTROSE MONOHYDRATE 50 MG/ML
100 INJECTION, SOLUTION INTRAVENOUS PRN
Status: CANCELLED | OUTPATIENT
Start: 2019-06-19

## 2019-06-19 RX ORDER — SODIUM CHLORIDE 0.9 % (FLUSH) 0.9 %
10 SYRINGE (ML) INJECTION PRN
Status: CANCELLED | OUTPATIENT
Start: 2019-06-19

## 2019-06-19 RX ORDER — ONDANSETRON 2 MG/ML
4 INJECTION INTRAMUSCULAR; INTRAVENOUS EVERY 6 HOURS PRN
Status: CANCELLED | OUTPATIENT
Start: 2019-06-19

## 2019-06-19 RX ORDER — LEVOTHYROXINE SODIUM 0.03 MG/1
25 TABLET ORAL DAILY
Status: CANCELLED | OUTPATIENT
Start: 2019-06-20

## 2019-06-19 RX ORDER — LEVOTHYROXINE SODIUM 0.03 MG/1
25 TABLET ORAL DAILY
Qty: 30 TABLET | Refills: 3 | Status: SHIPPED | OUTPATIENT
Start: 2019-06-20 | End: 2019-07-31 | Stop reason: SDUPTHER

## 2019-06-19 RX ORDER — CARVEDILOL 6.25 MG/1
6.25 TABLET ORAL 2 TIMES DAILY PRN
Status: DISCONTINUED | OUTPATIENT
Start: 2019-06-19 | End: 2019-06-20 | Stop reason: HOSPADM

## 2019-06-19 RX ORDER — BLOOD-GLUCOSE METER
1 KIT MISCELLANEOUS DAILY
Status: CANCELLED | OUTPATIENT
Start: 2019-06-19

## 2019-06-19 RX ORDER — HYDROCHLOROTHIAZIDE 12.5 MG/1
12.5 TABLET ORAL 2 TIMES DAILY
Status: DISCONTINUED | OUTPATIENT
Start: 2019-06-19 | End: 2019-06-20 | Stop reason: HOSPADM

## 2019-06-19 RX ORDER — AMLODIPINE BESYLATE 10 MG/1
10 TABLET ORAL DAILY
Status: CANCELLED | OUTPATIENT
Start: 2019-06-20

## 2019-06-19 RX ORDER — CARVEDILOL 6.25 MG/1
6.25 TABLET ORAL 2 TIMES DAILY WITH MEALS
Status: CANCELLED | OUTPATIENT
Start: 2019-06-19

## 2019-06-19 RX ORDER — INSULIN GLARGINE 100 [IU]/ML
45 INJECTION, SOLUTION SUBCUTANEOUS DAILY
Status: DISCONTINUED | OUTPATIENT
Start: 2019-06-20 | End: 2019-06-20 | Stop reason: HOSPADM

## 2019-06-19 RX ORDER — AMOXICILLIN AND CLAVULANATE POTASSIUM 500; 125 MG/1; MG/1
1 TABLET, FILM COATED ORAL EVERY 12 HOURS SCHEDULED
Qty: 10 TABLET | Refills: 0 | Status: ON HOLD | OUTPATIENT
Start: 2019-06-19 | End: 2019-06-25 | Stop reason: HOSPADM

## 2019-06-19 RX ORDER — DEXTROSE MONOHYDRATE 25 G/50ML
12.5 INJECTION, SOLUTION INTRAVENOUS PRN
Status: CANCELLED | OUTPATIENT
Start: 2019-06-19

## 2019-06-19 RX ORDER — HYDROCHLOROTHIAZIDE 12.5 MG/1
12.5 TABLET ORAL 2 TIMES DAILY
Qty: 30 TABLET | Refills: 3 | Status: SHIPPED | OUTPATIENT
Start: 2019-06-20 | End: 2019-07-31 | Stop reason: SDUPTHER

## 2019-06-19 RX ORDER — HYDROCHLOROTHIAZIDE 12.5 MG/1
12.5 TABLET ORAL 2 TIMES DAILY
Status: CANCELLED | OUTPATIENT
Start: 2019-06-19

## 2019-06-19 RX ORDER — NICOTINE POLACRILEX 4 MG
15 LOZENGE BUCCAL PRN
Status: CANCELLED | OUTPATIENT
Start: 2019-06-19

## 2019-06-19 RX ADMIN — HYDROCHLOROTHIAZIDE 25 MG: 25 TABLET ORAL at 08:13

## 2019-06-19 RX ADMIN — AMOXICILLIN AND CLAVULANATE POTASSIUM 1 TABLET: 500; 125 TABLET, FILM COATED ORAL at 08:13

## 2019-06-19 RX ADMIN — INSULIN LISPRO 15 UNITS: 100 INJECTION, SOLUTION INTRAVENOUS; SUBCUTANEOUS at 17:08

## 2019-06-19 RX ADMIN — INSULIN GLARGINE 50 UNITS: 100 INJECTION, SOLUTION SUBCUTANEOUS at 08:15

## 2019-06-19 RX ADMIN — AMLODIPINE BESYLATE 10 MG: 10 TABLET ORAL at 08:13

## 2019-06-19 RX ADMIN — AMOXICILLIN AND CLAVULANATE POTASSIUM 1 TABLET: 500; 125 TABLET, FILM COATED ORAL at 21:13

## 2019-06-19 RX ADMIN — INSULIN LISPRO 8 UNITS: 100 INJECTION, SOLUTION INTRAVENOUS; SUBCUTANEOUS at 08:15

## 2019-06-19 RX ADMIN — DEXTROSE 15 G: 15 GEL ORAL at 18:29

## 2019-06-19 RX ADMIN — ACETAMINOPHEN 650 MG: 325 TABLET, FILM COATED ORAL at 21:51

## 2019-06-19 RX ADMIN — LISINOPRIL 40 MG: 20 TABLET ORAL at 08:13

## 2019-06-19 RX ADMIN — SERTRALINE 25 MG: 50 TABLET, FILM COATED ORAL at 08:13

## 2019-06-19 RX ADMIN — LEVOTHYROXINE SODIUM 25 MCG: 25 TABLET ORAL at 06:40

## 2019-06-19 RX ADMIN — INSULIN LISPRO 15 UNITS: 100 INJECTION, SOLUTION INTRAVENOUS; SUBCUTANEOUS at 13:00

## 2019-06-19 RX ADMIN — PANTOPRAZOLE SODIUM 40 MG: 40 TABLET, DELAYED RELEASE ORAL at 06:40

## 2019-06-19 RX ADMIN — HYDROCHLOROTHIAZIDE 12.5 MG: 25 TABLET ORAL at 17:08

## 2019-06-19 RX ADMIN — DEXTROSE 15 G: 15 GEL ORAL at 18:30

## 2019-06-19 ASSESSMENT — PAIN SCALES - GENERAL
PAINLEVEL_OUTOF10: 0

## 2019-06-19 NOTE — CONSULTS
PSYCHIATRIC EVALUATION  (CONSULT)     CHIEF COMPLAINT:   [x] Mood Problems [x] Anxiety Problems [] Psychosis                    [x] Suicidal/Homicidal   [] Aggression  [] Other    HISTORY OF PRESENT ILLNESS: Marjorie Dill  is a 30 y.o. male who has a previous psychiatric history of depression and presents for admission after not buying his insulin and ending up in DKA and also states that on Thursday he put a loaded gun in his mouth. Patient admits to a lot of things going on in his life and admits to feeling sad. Denies HI or AVH. Please transfer to psych when medically clear.      Precipitating Factors:     [] Family Stress   [] Recent loss/grief Stress   [] Health Stress   [] Relationship Stress    [] Legal Stress   [x] Environmental Stress    [] Occupational Stress   [] Financial Stress   [] Substance Abuse [] Other      PAST PSYCHIATRIC HISTORY:   History of psychiatric Hospitalization:    [x] Denies    [] yes  [] Days ago     []  Weeks Ago    [] Months ago  [] Years ago              [] [facility]  [] [facility]  [] Other:        [] Once  [] More than once    Outpatient treatment:  [] [facility] Eisen[facility]  [] [facility]  [] [facility]              [] [facility]  [] Chandra Mohs      [] [address] [facility] [] [facility]      [] [facility] [] [facility]  [] [facility] [] [facility]             [] currently  [x] in the past  [] Non-Compliant    [x] Denies    Previous suicide attempt: [x]Denies            [] yes  [] OD  [] Cutting  [] Hanging  [] Gun  [] Other    Previous psych medications:  [] Was prescribed               [] Currently Taking       [x] Never taken medications      PAST MEDICAL HISTORY:       Diagnosis Date    Abscess of back 8/2011    Bowel obstruction ([facility] [address]     Diabetes mellitus type 1 ([facility] [address] Diagnosed at 6years of age/ in 1998    HTN (hypertension)     Hyperlipidemia        FAMILY PSYCHIATRIC HISTORY:  Family History   Problem Relation Age of Onset    Diabetes Mother            [x] Denies       [] Endorses [] Father                [] Depression  [] Anxiety  [] Bipolar  [] Psychosis  []  Other                  [] Mother               [] Depression  [] Anxiety  [] Bipolar  [] Psychosis  []  Other                  [] Sibling               [] Depression  [] Anxiety  [] Bipolar  [] Psychosis  []  Other                  [] Grandparent               [] Depression  [] Anxiety  [] Bipolar  [] Psychosis  []  Other       SOCIAL HISTORY:     1. Living Situation:[x] Private Residence [] Homeless [] [address]             [] Assisted Living [] [address]  [] Shelter [] Other   2. Employment:  [] Unemployed  [x] Employed  [] Disabled  [] Retired   1. Legal History: [] No Arrest [x] Arrest  [] Theft  []  Assault  [] Substances   4. History of Trama/ Abuse: [x] Denies  [] Emotional [] Physical [] Sexual   5. Spirituality: [x] Spiritual [] Not Spiritual   6. Substance Abuse: [] Denies  [x] Drug of choice      [] Amphetamines [] Marijuana [] Cocaine      [] Opioids  [x] Alcohol  [] Benzodiazepines     For further SH review SW note. Risk Assessment:  1. Risk Factors:   [x] Depression  [x] Anxiety  [] Psychosis   [x] Suicidal/Homicidal Thoughts [x] Suicide Attempt [x] Substance Abuse     2. Protective Factors: [x] Controlled Environment         [x] Supportive Family []         [] Rastafari Support     3. Level of Risk: [] Mild [] Moderate [x] Severe      Strengths & Weaknesses:    1. Strengths: [x] Ability to communicate feelings     [x] Independent ADL's     [x] Supportive Family    [] Current Health Status     2.  Weaknesses: [x] Emotional          [x] Motivational     MEDICATIONS: Current Facility-Administered Medications: [START ON 6/20/2019] insulin lispro (HUMALOG) injection vial 7 Units, 7 Units, Subcutaneous, QAM AC  insulin lispro (HUMALOG) injection vial 15 Units, 15 Units, Subcutaneous, BID AC  [START ON 6/20/2019] insulin glargine (LANTUS) injection vial 45 Units, 45 Units, Subcutaneous, Daily  carvedilol (COREG) tablet 6.25 mg, 6.25 mg, Oral, BID PRN  hydrochlorothiazide (HYDRODIURIL) tablet 12.5 mg, 12.5 mg, Oral, BID  amoxicillin-clavulanate (AUGMENTIN) 500-125 MG per tablet 1 tablet, 1 tablet, Oral, 2 times per day  amLODIPine (NORVASC) tablet 10 mg, 10 mg, Oral, Daily  pantoprazole (PROTONIX) tablet 40 mg, 40 mg, Oral, QAM AC  benzocaine-menthol (CEPACOL SORE THROAT) lozenge 1 lozenge, 1 lozenge, Oral, Q2H PRN  lisinopril (PRINIVIL;ZESTRIL) tablet 40 mg, 40 mg, Oral, Daily  levothyroxine (SYNTHROID) tablet 25 mcg, 25 mcg, Oral, Daily  glucose (GLUTOSE) 40 % oral gel 15 g, 15 g, Oral, PRN  dextrose 50 % IV solution, 12.5 g, Intravenous, PRN  glucagon (rDNA) injection 1 mg, 1 mg, Intramuscular, PRN  dextrose 5 % solution, 100 mL/hr, Intravenous, PRN  sertraline (ZOLOFT) tablet 25 mg, 25 mg, Oral, Daily  acetaminophen (TYLENOL) tablet 650 mg, 650 mg, Oral, Q4H PRN  sodium chloride flush 0.9 % injection 10 mL, 10 mL, Intravenous, PRN  magnesium sulfate 1 g in dextrose 5% 100 mL IVPB, 1 g, Intravenous, PRN  dextrose 5 % and 0.45 % sodium chloride infusion, , Intravenous, Continuous PRN  0.9 % sodium chloride bolus, 2,400 mL, Intravenous, Once  dextrose 50 % IV solution, 12.5 g, Intravenous, PRN  ondansetron (ZOFRAN) injection 4 mg, 4 mg, Intravenous, Q6H PRN        VITALS: BP (!) 140/92   Pulse 102   Temp 98.5 °F (36.9 °C) (Temporal)   Resp 16   Ht 5' 9\" (1.753 m)   Wt 233 lb 9.6 oz (106 kg)   SpO2 94%   BMI 34.50 kg/m²     ALLERGIES: Pineapple        MENTAL STATUS EXAM:       Mental Status Examination:    Cognition:      [x] Alert  [x] Awake  [x] Oriented  [x] Person  [x] Place [x] Time      [] drowsy  [] tired  [] lethargic  [] distractable  []     Attention/Concentration:   [x] Attentive  [] Distracted        Memory Recent and Remote: [x] Intact   [] Impaired [] Partially Impaired     Language: [] Able to recognize and name objects          [] Unable to recognize and name Objects    Fund of Knowledge:  [] Poor [] Please transfer to psych when medically clear.         Signed:  Sarbjit Bryson  6/19/2019  1:48 PM

## 2019-06-19 NOTE — PROGRESS NOTES
Patient denies suicidal ideations at this time. Patient has been calm and cooperative through out the night. Will continue to monitor.

## 2019-06-19 NOTE — DISCHARGE INSTR - COC
Continuity of Care Form    Patient Name: Humble Riley   :  1990  MRN:  [id number]    Admit date:  6/15/2019  Discharge date:  2019    Code Status Order: Full Code   Advance Directives:   [facility] Documentation     Date/Time Healthcare Directive Type of Healthcare Directive Copy in [address] [place] [address] [address] Agent's Name Healthcare Agent's Phone Number    [phone]  No, patient does not have an advance directive for healthcare treatment -- -- -- -- --          Admitting Physician:  Leann Carballo MD  PCP: Erick Rodriguez MD    Discharging Nurse: Abi Lazar Unit/Room#: 9676/5275-B  Discharging Unit Phone Number: [phone]    Emergency Contact:   Extended Emergency Contact Information  Primary Emergency Contact: Milagro Brady  Address: [address] Phone: [phone]  Relation: Parent    Past Surgical History:  History reviewed. No pertinent surgical history.     Immunization History:   Immunization History   Administered Date(s) Administered    Influenza, Quadv, 6 mo and older, IM (Flulaval) 10/05/2017    Td, unspecified formulation 2013       Active Problems:  Patient Active Problem List   Diagnosis Code    Type 1 diabetes mellitus with ketoacidosis without coma ([facility] [address] E10.10    Diabetic nephropathy ([facility] [address] E11.21    Hypothyroid E03.9    Type 1 diabetes mellitus with ketoacidosis ([address] E10.10    DKA (diabetic ketoacidoses) ([address] E13.10    Pneumonia J18.9    Hypoxemia R09.02    HTN (hypertension) I10    Hyperlipidemia E78.5    C/f of JOSÉ MIGUEL (obstructive sleep apnea) G47.33    Obesity E66.9    DKA, type 1, not at goal [facility]) E10.10    Acute kidney injury ([facility] [address] N17.9       Isolation/Infection:   Isolation          No Isolation            Nurse Assessment:  Last Vital Signs: /77   Pulse 104   Temp 97.8 °F (36.6 °C) (Temporal)   Resp 16   Ht 5' 9\" (1.753 m)   Wt 233 lb · Name:  · Address:  · Dialysis Schedule:  · Phone:  · Fax:    / signature: {Esignature:[id number]}    PHYSICIAN SECTION    Prognosis: {Prognosis:0799926418}    Condition at Discharge: [address] Virgen Patient Condition:865859923}    Rehab Potential (if transferring to Rehab): {Prognosis:5095917306}    Recommended Labs or Other Treatments After Discharge: ***    Physician Certification: I certify the above information and transfer of Frankey Satchel  is necessary for the continuing treatment of the diagnosis listed and that he requires {Admit to Appropriate Level of Care:82023} for {GREATER/LESS:672662382} 30 days.      Update Admission H&P: {[facility]05[facility]85}    PHYSICIAN SIGNATURE:  {Esignature:[id number]}

## 2019-06-19 NOTE — DISCHARGE SUMMARY
[address]  Discharge Summary    PCP: Francisco Napier MD    Admit Date:6/15/2019  Discharge Date: 6/19/2019    Admission Diagnosis:   1. DKA-resolved   2. T1DM  3. ASHLEY-resolved  4. Hyperkalemia-corrected  5. Hypothyroidism  6. HAGMA-resolved  7. Lactic acidosis-resolved  8. HTN  9. CAP  10. Elevated troponins  11. Hx of suicidal ideation    Discharge Diagnosis:  1. DKA-resolved  2. T1DM  3. ASHLEY-resolved  4. CAP  5. Hx of suicidal ideation  6. Hypothyroidism  7. HTN    Hospital Course: This is a 30year old male with T1DM diagnosed 21 years ago, HTN, HLD, Psychiatric history with suicidal ideation 2 days ago where patient was found by police with a loaded gun and attempting to put the gun in his mouth. He was brought to the ED 6/13/19 and discharged to [facility]. He mentions a 3 day history of nausea, vomiting x 11 times a day, poor appetite and skipping out on a couple of doses of Insulin. He also mentions his BG running high and endorses some fever, chills, polyuria, polydipsia, chest tightness and productive cough. He denies diarrhea, dysuria, sob, leg swelling, headache and visual changes.     While in the ED, VS were significant for tachycardia, hypotension. Labs revealed Na 129, K 8.2, Cl 82, HCO3 6, BUN 66, Cr 2.5, AG 41, LA 4.9, , measured serum osmolality 357, calculated 327, BHB >4.5, wbc 27.6, pH 7.11  EKG showing sinus tachycardia with hyperacute T waves. CXR and Retroperitoneal US unremarkable. He was started on DKA protocol, bicarb drip, received IVF boluses, got calcium gluconate and was transferred to the Unit for further management. DKA resolved, renal function improved and patient was transferred out of the unit. He will be discharged on 50 units of Lantus, pre-meal regimen of 7-15-15 and SSI. He was treated for CAP initially with Levaquin and then switched to Augmentin to complete a 7 day course.   He will be transferred to [facility] for continued management. Significant findings (history and exam, laboratory, radiological, pathology, other tests):   · Physical Exam  Constitutional: He is oriented to person, place, and time. He appears well-developed and well-nourished. No distress. HENT:   Head: Normocephalic and atraumatic. Eyes: Pupils are equal, round, and reactive to light. Conjunctivae are normal. No scleral icterus. Cardiovascular: Normal rate, regular rhythm, S1 normal, S2 normal, normal heart sounds and intact distal pulses. No murmur heard. Pulmonary/Chest: Effort normal and breath sounds normal. No respiratory distress. He has no wheezes. He has no rales. Abdominal: Soft. Bowel sounds are normal. He exhibits no distension and no mass. There is no tenderness. There is no guarding. Musculoskeletal: He exhibits no edema or tenderness. Neurological: He is alert and oriented to person, place, and time. No cranial nerve deficit. Skin: Skin is warm and dry. He is not diaphoretic. Consults:  1. Nephrology  2. Critical care  3.  Psychiatry    Condition at discharge: Stable    Disposition: [facility]    Discharge Medications:  Current Discharge Medication List      CONTINUE these medications which have NOT CHANGED    Details   insulin lispro (HUMALOG) 100 UNIT/ML injection vial Inject into the skin 4 times daily (before meals and nightly) 0-10 units AC and HS      levothyroxine (SYNTHROID) 25 MCG tablet Take 25 mcg by mouth Daily      rosuvastatin (CRESTOR) 10 MG tablet Take 10 mg by mouth daily      sertraline (ZOLOFT) 50 MG tablet Take 50 mg by mouth daily      diphenhydrAMINE (BENADRYL) 50 MG/ML injection Infuse 25 mg intravenously every 6 hours as needed for Itching      diphenhydrAMINE (BENADRYL) 50 MG capsule Take 50 mg by mouth every 6 hours as needed for Itching      Dextrose, Diabetic Use, (GLUCOSE PO) Take by mouth daily as needed      haloperidol lactate (HALDOL) 5 MG/ML injection Inject into the diabetes mellitus with ketoacidosis without coma (HCC)      glucose monitoring kit (FREESTYLE) monitoring kit Use once. Qty: 1 kit, Refills: 0       !! - Potential duplicate medications found. Please discuss with provider. Activity: activity as tolerated  Diet: diabetic diet    Special Instructions:     Hospital Follow up:   1) With Dr Jessi Quiles MD   Be compliant with your medications and take them as prescribed. Other than any new prescriptions given to you today, the list of home going meds on this After Visit Summary are based on information provided to us from you. This information, including the list, dose, and frequency of medications is only as accurate as the information you provided. If you have any questions or concerns about your home  medications, please contact your Primary Care Physician for further clarification.           Ronnell Rodriguez MD  PGY 1   3:00 PM 6/19/2019

## 2019-06-19 NOTE — CARE COORDINATION
Called the [facility] AN AFFILIATE OF [facility] and spoke to [facility] to make sure they were aware that the patient has been medically cleared for inpatient psych and also that he is from [facility]. 2.. I asked her to check, if there is no inpatient psych bed available today if he could return to [facility] today since they know him well and there is documentation of him actively presenting with severe psychiatric symptoms. She said that she will check. Await bed availability for our inpatient psych or return to [facility] today.   Kenrick Humphrey RN CM

## 2019-06-19 NOTE — CARE COORDINATION
Per internal med note today, Instead of \"[facility]\" -- plan psych transfer; Suicidal ideation,  Per psych up to floor; Medically stable. Charge nurse aware.   Evaristo Felty RN CM

## 2019-06-19 NOTE — CARE COORDINATION
Received call back from our JOHNATHON. Patient can discharge to [facility] phone# [phone], fax# [phone]. Called and spoke to wayne Bean for the referral. Faxed all requested information to her. Await acceptance. If patient is accepted to [facility], call the access center to set up ambulance transport, phone# [phone]. Ambulance form and face sheet in envelope in soft chart. Sw notifying patient of referral to [facility], awaiting acceptance.   León Pinto RN CM

## 2019-06-19 NOTE — CARE COORDINATION
6/19/2019 social work:discharge planning   Patient notified need for inpatient behavioral health and pink slip and referral made to [facility] as [facility] has no beds here. He was upset and angry states he does not want to wait for a bed. He preceded to try to call a  and would not talk with sw anymore. Will follow.

## 2019-06-19 NOTE — PROGRESS NOTES
[facility]  Internal Medicine Residency Program  Progress Note - House Team 2    Patient:  Demario Núñez 30 y.o. male MRN: [id number]     Date of Service: 2019     CC: nausea/vomiting/abd pain  Subjective   Overnight events: B-326 Insulin requirements 94 units (50 Lantus and 44 short acting). Pt seen, met lying in bed. Has no complaints this am.  Awaiting placement as [facility] denied. Consult placed to Psychiatry, awaiting recommendations. Objective     Physical Exam:  Vitals:    19 1500   BP: (!) 148/70   Pulse: 104   Resp: 18   Temp: 98.2 °F (36.8 °C)   SpO2: 92%     Physical Exam   Constitutional: He is oriented to person, place, and time. He appears well-developed and well-nourished. No distress. HENT:   Head: Normocephalic and atraumatic. Eyes: Pupils are equal, round, and reactive to light. Conjunctivae are normal. No scleral icterus. Cardiovascular: Normal rate, regular rhythm, S1 normal, S2 normal, normal heart sounds and intact distal pulses. No murmur heard. Pulmonary/Chest: Effort normal and breath sounds normal. No respiratory distress. He has no wheezes. He has no rales. Abdominal: Soft. Bowel sounds are normal. He exhibits no distension and no mass. There is no tenderness. There is no guarding. Musculoskeletal: He exhibits no edema or tenderness. Neurological: He is alert and oriented to person, place, and time. No cranial nerve deficit. Skin: Skin is warm and dry. He is not diaphoretic.      Subject  Pertinent Labs & Imaging Studies   Basic Labs  CBC:   Recent Labs     19  0423 19  0455 19  0513   WBC 14.6* 9.4 9.4   RBC 4.17 4.56 4.85   HGB 13.0 14.0 15.0   HCT 38.5 41.7 45.2   MCV 92.3 91.4 93.2   MCH 31.2 30.7 30.9   MCHC 33.8 33.6 33.2   RDW 11.8 11.4* 11.4*    259 204   MPV 10.2 10.1 10.7       BMP:    Recent Labs     19  0048 19  0455 19  0513    137 135   K 3.8 4.4 4.4   CL 96* 95* 93* CO2 28 26 26   BUN 9 9 17   CREATININE 1.0 0.9 1.0   GLUCOSE 144* 247* 320*   CALCIUM 9.5 9.5 9.6       LIVER PROFILE:   No results for input(s): AST, ALT, LIPASE, BILIDIR, BILITOT, ALKPHOS in the last 72 hours. Invalid input(s): AMYLASE,  ALB    PT/INR:   No results for input(s): PROTIME, INR in the last 72 hours. APTT:   No results for input(s): APTT in the last 72 hours. Fasting Lipid Panel:    Lab Results   Component Value Date    CHOL 231 12/10/2018    TRIG 137 12/10/2018    HDL 43 12/10/2018       Cardiac Enzymes:    Lab Results   Component Value Date    CKTOTAL 283 (H) 06/16/2019    CKTOTAL 65 05/21/2017    CKTOTAL 41 07/08/2013    CKMB 7.4 06/16/2019    CKMB 1.1 07/07/2013    CKMB 0.7 04/20/2013    TROPONINI 0.04 (H) 06/17/2019    TROPONINI 0.11 (H) 06/16/2019    TROPONINI 0.06 (H) 06/16/2019       Resident's Assessment and Plan     Endocrine  DKA -resolved     Type 1 DM  Diagnosed at the age of 6  Suboptimally controlled, HbA1c 11.6 6/16/19  Was on 65 units at home, was on scheduled Novolog (3 units before bfast, 6 units before lunch, 8 units before dinner) + SS but were discontinued for hypoglycemic episodes. BG in the last 24 hours ranging from 236-326  Required 94 units during past 24 hours without hypoglycemic episode  Will increase regimen to 50 units lantus + 7-15-15 with meals and SSI  Consult diabetic educator    Hypothyroidism  On synthroid 25 mcg daily     Cardiovascular  HTN  On Norvasc 10 mg, HCTZ 25 mg bid and Lisinopril 40 mg daily resumed  Now better controlled     Elevated troponins  Initially <0.01-->0.06--->0.11  Likely 2/2 ASHLEY  Repeated troponin was 0.04 once ASHLEY resolved  Never complained chest pain, no EKG changeds    Renal  ASHLEY-resolved  Nephrology signed off     Hyperkalemia-resolved  Presenting K 8.2 with EKG changes, now down to 4.2     HAGMA, resolved  Likely 2/2 DKA vs LA     Lactic acidosis, resolved  Resolved: 4.9--->1.7-->1. 2     Infectious Disease  Likely

## 2019-06-20 LAB — BLOOD CULTURE, ROUTINE: NORMAL

## 2019-06-22 ENCOUNTER — APPOINTMENT (OUTPATIENT)
Dept: GENERAL RADIOLOGY | Age: 29
DRG: 420 | End: 2019-06-22
Payer: MEDICAID

## 2019-06-22 ENCOUNTER — HOSPITAL ENCOUNTER (INPATIENT)
Age: 29
LOS: 3 days | Discharge: HOME | DRG: 420 | End: 2019-06-25
Attending: EMERGENCY MEDICINE | Admitting: INTERNAL MEDICINE
Payer: MEDICAID

## 2019-06-22 ENCOUNTER — APPOINTMENT (OUTPATIENT)
Dept: CT IMAGING | Age: 29
DRG: 420 | End: 2019-06-22
Payer: MEDICAID

## 2019-06-22 DIAGNOSIS — N17.9 ACUTE RENAL FAILURE, UNSPECIFIED ACUTE RENAL FAILURE TYPE (HCC): ICD-10-CM

## 2019-06-22 DIAGNOSIS — E87.5 HYPERKALEMIA: ICD-10-CM

## 2019-06-22 DIAGNOSIS — E10.10 DIABETIC KETOACIDOSIS WITHOUT COMA ASSOCIATED WITH TYPE 1 DIABETES MELLITUS (HCC): Primary | ICD-10-CM

## 2019-06-22 LAB
ACETAMINOPHEN LEVEL: <5 MCG/ML (ref 10–30)
ALBUMIN SERPL-MCNC: 4.1 G/DL (ref 3.5–5.2)
ALP BLD-CCNC: 104 U/L (ref 40–129)
ALT SERPL-CCNC: 18 U/L (ref 0–40)
AMPHETAMINE SCREEN, URINE: NOT DETECTED
ANION GAP SERPL CALCULATED.3IONS-SCNC: 33 MMOL/L (ref 7–16)
ANION GAP SERPL CALCULATED.3IONS-SCNC: 34 MMOL/L (ref 7–16)
ANION GAP SERPL CALCULATED.3IONS-SCNC: 41 MMOL/L (ref 7–16)
APTT: 33.7 SEC (ref 24.5–35.1)
AST SERPL-CCNC: 18 U/L (ref 0–39)
B.E.: -20.4 MMOL/L (ref -3–3)
BARBITURATE SCREEN URINE: NOT DETECTED
BASOPHILS ABSOLUTE: 0.08 E9/L (ref 0–0.2)
BASOPHILS RELATIVE PERCENT: 0.4 % (ref 0–2)
BENZODIAZEPINE SCREEN, URINE: NOT DETECTED
BETA-HYDROXYBUTYRATE: >4.5 MMOL/L (ref 0.02–0.27)
BILIRUB SERPL-MCNC: 0.5 MG/DL (ref 0–1.2)
BILIRUBIN URINE: NEGATIVE
BLOOD, URINE: NEGATIVE
BUN BLDV-MCNC: 65 MG/DL (ref 6–20)
BUN BLDV-MCNC: 66 MG/DL (ref 6–20)
BUN BLDV-MCNC: 70 MG/DL (ref 6–20)
CALCIUM SERPL-MCNC: 8.3 MG/DL (ref 8.6–10.2)
CALCIUM SERPL-MCNC: 8.6 MG/DL (ref 8.6–10.2)
CALCIUM SERPL-MCNC: 9.5 MG/DL (ref 8.6–10.2)
CANNABINOID SCREEN URINE: NOT DETECTED
CHLORIDE BLD-SCNC: 77 MMOL/L (ref 98–107)
CHLORIDE BLD-SCNC: 88 MMOL/L (ref 98–107)
CHLORIDE BLD-SCNC: 92 MMOL/L (ref 98–107)
CHLORIDE URINE RANDOM: <20 MMOL/L
CHP ED QC CHECK: NORMAL
CHP ED QC CHECK: NORMAL
CHP ED QC CHECK: YES
CK MB: 2.6 NG/ML (ref 0–7.7)
CLARITY: CLEAR
CO2: 7 MMOL/L (ref 22–29)
CO2: 8 MMOL/L (ref 22–29)
CO2: 8 MMOL/L (ref 22–29)
COCAINE METABOLITE SCREEN URINE: NOT DETECTED
COHB: 0.1 % (ref 0–1.5)
COHB: 0.5 % (ref 0–1.5)
COLOR: YELLOW
COMMENT: ABNORMAL
CORTISOL TOTAL: 77.1 MCG/DL (ref 2.68–18.4)
CREAT SERPL-MCNC: 2.1 MG/DL (ref 0.7–1.2)
CREAT SERPL-MCNC: 2.3 MG/DL (ref 0.7–1.2)
CREAT SERPL-MCNC: 2.4 MG/DL (ref 0.7–1.2)
CREATININE URINE: 57 MG/DL (ref 40–278)
CRITICAL: ABNORMAL
CRITICAL: ABNORMAL
DATE ANALYZED: ABNORMAL
DATE ANALYZED: ABNORMAL
DATE OF COLLECTION: ABNORMAL
DATE OF COLLECTION: ABNORMAL
EKG ATRIAL RATE: 111 BPM
EKG P AXIS: 54 DEGREES
EKG P-R INTERVAL: 158 MS
EKG Q-T INTERVAL: 356 MS
EKG QRS DURATION: 110 MS
EKG QTC CALCULATION (BAZETT): 484 MS
EKG R AXIS: 54 DEGREES
EKG T AXIS: 23 DEGREES
EKG VENTRICULAR RATE: 111 BPM
EOSINOPHILS ABSOLUTE: 0.01 E9/L (ref 0.05–0.5)
EOSINOPHILS RELATIVE PERCENT: 0.1 % (ref 0–6)
ETHANOL: <10 MG/DL (ref 0–0.08)
GFR AFRICAN AMERICAN: 37
GFR AFRICAN AMERICAN: 38
GFR AFRICAN AMERICAN: 40
GFR AFRICAN AMERICAN: 45
GFR AFRICAN AMERICAN: 47
GFR NON-AFRICAN AMERICAN: 30 ML/MIN/1.73
GFR NON-AFRICAN AMERICAN: 32 ML/MIN/1.73
GFR NON-AFRICAN AMERICAN: 33 ML/MIN/1.73
GFR NON-AFRICAN AMERICAN: 37 ML/MIN/1.73
GFR NON-AFRICAN AMERICAN: 39 ML/MIN/1.73
GLUCOSE BLD-MCNC: 467 MG/DL (ref 74–99)
GLUCOSE BLD-MCNC: 470 MG/DL
GLUCOSE BLD-MCNC: 484 MG/DL (ref 74–99)
GLUCOSE BLD-MCNC: 637 MG/DL (ref 74–99)
GLUCOSE BLD-MCNC: 891 MG/DL (ref 74–99)
GLUCOSE BLD-MCNC: >700 MG/DL (ref 74–99)
GLUCOSE BLD-MCNC: NORMAL MG/DL
GLUCOSE BLD-MCNC: NORMAL MG/DL
GLUCOSE URINE: >=1000 MG/DL
HCO3: 5.5 MMOL/L (ref 22–26)
HCT VFR BLD CALC: 44.5 % (ref 37–54)
HEMOGLOBIN: 14.3 G/DL (ref 12.5–16.5)
HHB: 2.8 % (ref 0–5)
HHB: 9.9 % (ref 0–5)
IMMATURE GRANULOCYTES #: 0.1 E9/L
IMMATURE GRANULOCYTES %: 0.6 % (ref 0–5)
INR BLD: 1
KETONES, URINE: >=80 MG/DL
LAB: ABNORMAL
LAB: ABNORMAL
LACTIC ACID: 2 MMOL/L (ref 0.5–2.2)
LACTIC ACID: 3 MMOL/L (ref 0.5–2.2)
LEUKOCYTE ESTERASE, URINE: NEGATIVE
LIPASE: 50 U/L (ref 13–60)
LYMPHOCYTES ABSOLUTE: 1.16 E9/L (ref 1.5–4)
LYMPHOCYTES RELATIVE PERCENT: 6.5 % (ref 20–42)
Lab: ABNORMAL
Lab: ABNORMAL
MAGNESIUM: 2.5 MG/DL (ref 1.6–2.6)
MAGNESIUM: 2.7 MG/DL (ref 1.6–2.6)
MAGNESIUM: 2.7 MG/DL (ref 1.6–2.6)
MCH RBC QN AUTO: 31 PG (ref 26–35)
MCHC RBC AUTO-ENTMCNC: 32.1 % (ref 32–34.5)
MCV RBC AUTO: 96.5 FL (ref 80–99.9)
METER GLUCOSE: 355 MG/DL (ref 74–99)
METER GLUCOSE: 409 MG/DL (ref 74–99)
METER GLUCOSE: 446 MG/DL (ref 74–99)
METER GLUCOSE: 450 MG/DL (ref 74–99)
METER GLUCOSE: 470 MG/DL (ref 74–99)
METER GLUCOSE: >500 MG/DL (ref 74–99)
METHADONE SCREEN, URINE: NOT DETECTED
METHB: 0.4 % (ref 0–1.5)
METHB: 0.5 % (ref 0–1.5)
MODE: ABNORMAL
MODE: ABNORMAL
MONOCYTES ABSOLUTE: 0.32 E9/L (ref 0.1–0.95)
MONOCYTES RELATIVE PERCENT: 1.8 % (ref 2–12)
NEUTROPHILS ABSOLUTE: 16.28 E9/L (ref 1.8–7.3)
NEUTROPHILS RELATIVE PERCENT: 90.6 % (ref 43–80)
NITRITE, URINE: NEGATIVE
O2 SATURATION: 90.1 % (ref 92–98.5)
O2 SATURATION: 97.2 % (ref 92–98.5)
O2HB: 89.6 % (ref 94–97)
O2HB: 96.2 % (ref 94–97)
OPERATOR ID: 1868
OPERATOR ID: ABNORMAL
OPIATE SCREEN URINE: NOT DETECTED
OSMOLALITY: 344 MOSM/KG (ref 285–310)
PATIENT TEMP: 37 C
PATIENT TEMP: 37 C
PCO2: 15 MMHG (ref 35–45)
PCO2: <15 MMHG (ref 35–45)
PDW BLD-RTO: 11.7 FL (ref 11.5–15)
PERFORMED ON: ABNORMAL
PERFORMED ON: ABNORMAL
PH BLOOD GAS: 7.19 (ref 7.35–7.45)
PH BLOOD GAS: 7.28 (ref 7.35–7.45)
PH UA: 5 (ref 5–9)
PHENCYCLIDINE SCREEN URINE: NOT DETECTED
PHOSPHORUS: 3.5 MG/DL (ref 2.5–4.5)
PHOSPHORUS: 5.3 MG/DL (ref 2.5–4.5)
PLATELET # BLD: 376 E9/L (ref 130–450)
PMV BLD AUTO: 10.8 FL (ref 7–12)
PO2: 122.2 MMHG (ref 60–100)
PO2: 64.9 MMHG (ref 60–100)
POC CHLORIDE: 104 MMOL/L (ref 100–108)
POC CHLORIDE: 94 MMOL/L (ref 100–108)
POC CREATININE: 2 MG/DL (ref 0.7–1.2)
POC CREATININE: 2.5 MG/DL (ref 0.7–1.2)
POC POTASSIUM: 5.1 MMOL/L (ref 3.5–5)
POC POTASSIUM: 6.6 MMOL/L (ref 3.5–5)
POC SODIUM: 120 MMOL/L (ref 132–146)
POC SODIUM: 130 MMOL/L (ref 132–146)
POTASSIUM SERPL-SCNC: 5.1 MMOL/L (ref 3.5–5)
POTASSIUM SERPL-SCNC: 5.7 MMOL/L (ref 3.5–5)
POTASSIUM SERPL-SCNC: 6.8 MMOL/L (ref 3.5–5)
POTASSIUM, UR: 39.2 MMOL/L
PROCALCITONIN: 2.24 NG/ML (ref 0–0.08)
PROPOXYPHENE SCREEN: NOT DETECTED
PROTEIN UA: NEGATIVE MG/DL
PROTHROMBIN TIME: 11.8 SEC (ref 9.3–12.4)
RBC # BLD: 4.61 E12/L (ref 3.8–5.8)
SALICYLATE, SERUM: <0.3 MG/DL (ref 0–30)
SODIUM BLD-SCNC: 125 MMOL/L (ref 132–146)
SODIUM BLD-SCNC: 130 MMOL/L (ref 132–146)
SODIUM BLD-SCNC: 133 MMOL/L (ref 132–146)
SODIUM URINE: 50 MMOL/L
SOURCE, BLOOD GAS: ABNORMAL
SOURCE, BLOOD GAS: ABNORMAL
SPECIFIC GRAVITY UA: 1.01 (ref 1–1.03)
T4 TOTAL: 5.8 MCG/DL (ref 4.5–11.7)
THB: 10.7 G/DL (ref 11.5–16.5)
THB: 12.7 G/DL (ref 11.5–16.5)
TIME ANALYZED: 1450
TIME ANALYZED: 2042
TOTAL CK: 63 U/L (ref 20–200)
TOTAL CK: 68 U/L (ref 20–200)
TOTAL PROTEIN: 7.4 G/DL (ref 6.4–8.3)
TRICYCLIC ANTIDEPRESSANTS SCREEN SERUM: NEGATIVE NG/ML
TROPONIN: 0.03 NG/ML (ref 0–0.03)
TROPONIN: 0.06 NG/ML (ref 0–0.03)
TSH SERPL DL<=0.05 MIU/L-ACNC: 8.84 UIU/ML (ref 0.27–4.2)
UREA NITROGEN, UR: 605 MG/DL (ref 800–1666)
UROBILINOGEN, URINE: 0.2 E.U./DL
WBC # BLD: 18 E9/L (ref 4.5–11.5)

## 2019-06-22 PROCEDURE — 80307 DRUG TEST PRSMV CHEM ANLYZR: CPT

## 2019-06-22 PROCEDURE — 6360000002 HC RX W HCPCS: Performed by: EMERGENCY MEDICINE

## 2019-06-22 PROCEDURE — 2500000003 HC RX 250 WO HCPCS: Performed by: EMERGENCY MEDICINE

## 2019-06-22 PROCEDURE — 85610 PROTHROMBIN TIME: CPT

## 2019-06-22 PROCEDURE — 84300 ASSAY OF URINE SODIUM: CPT

## 2019-06-22 PROCEDURE — 87040 BLOOD CULTURE FOR BACTERIA: CPT

## 2019-06-22 PROCEDURE — 36592 COLLECT BLOOD FROM PICC: CPT

## 2019-06-22 PROCEDURE — 96375 TX/PRO/DX INJ NEW DRUG ADDON: CPT

## 2019-06-22 PROCEDURE — 83605 ASSAY OF LACTIC ACID: CPT

## 2019-06-22 PROCEDURE — 84145 PROCALCITONIN (PCT): CPT

## 2019-06-22 PROCEDURE — 6360000002 HC RX W HCPCS: Performed by: INTERNAL MEDICINE

## 2019-06-22 PROCEDURE — 82533 TOTAL CORTISOL: CPT

## 2019-06-22 PROCEDURE — 82553 CREATINE MB FRACTION: CPT

## 2019-06-22 PROCEDURE — 2580000003 HC RX 258: Performed by: EMERGENCY MEDICINE

## 2019-06-22 PROCEDURE — 80053 COMPREHEN METABOLIC PANEL: CPT

## 2019-06-22 PROCEDURE — 6370000000 HC RX 637 (ALT 250 FOR IP): Performed by: INTERNAL MEDICINE

## 2019-06-22 PROCEDURE — 82436 ASSAY OF URINE CHLORIDE: CPT

## 2019-06-22 PROCEDURE — 36556 INSERT NON-TUNNEL CV CATH: CPT

## 2019-06-22 PROCEDURE — 02HV33Z INSERTION OF INFUSION DEVICE INTO SUPERIOR VENA CAVA, PERCUTANEOUS APPROACH: ICD-10-PCS | Performed by: INTERNAL MEDICINE

## 2019-06-22 PROCEDURE — 2000000000 HC ICU R&B

## 2019-06-22 PROCEDURE — 82947 ASSAY GLUCOSE BLOOD QUANT: CPT

## 2019-06-22 PROCEDURE — 82435 ASSAY OF BLOOD CHLORIDE: CPT

## 2019-06-22 PROCEDURE — 83930 ASSAY OF BLOOD OSMOLALITY: CPT

## 2019-06-22 PROCEDURE — 82570 ASSAY OF URINE CREATININE: CPT

## 2019-06-22 PROCEDURE — 87633 RESP VIRUS 12-25 TARGETS: CPT

## 2019-06-22 PROCEDURE — 93005 ELECTROCARDIOGRAM TRACING: CPT | Performed by: EMERGENCY MEDICINE

## 2019-06-22 PROCEDURE — 82962 GLUCOSE BLOOD TEST: CPT

## 2019-06-22 PROCEDURE — 87581 M.PNEUMON DNA AMP PROBE: CPT

## 2019-06-22 PROCEDURE — 83690 ASSAY OF LIPASE: CPT

## 2019-06-22 PROCEDURE — 99223 1ST HOSP IP/OBS HIGH 75: CPT | Performed by: INTERNAL MEDICINE

## 2019-06-22 PROCEDURE — 71045 X-RAY EXAM CHEST 1 VIEW: CPT

## 2019-06-22 PROCEDURE — 36415 COLL VENOUS BLD VENIPUNCTURE: CPT

## 2019-06-22 PROCEDURE — 96374 THER/PROPH/DIAG INJ IV PUSH: CPT

## 2019-06-22 PROCEDURE — 84295 ASSAY OF SERUM SODIUM: CPT

## 2019-06-22 PROCEDURE — 87486 CHLMYD PNEUM DNA AMP PROBE: CPT

## 2019-06-22 PROCEDURE — 87798 DETECT AGENT NOS DNA AMP: CPT

## 2019-06-22 PROCEDURE — 87088 URINE BACTERIA CULTURE: CPT

## 2019-06-22 PROCEDURE — 85730 THROMBOPLASTIN TIME PARTIAL: CPT

## 2019-06-22 PROCEDURE — 84132 ASSAY OF SERUM POTASSIUM: CPT

## 2019-06-22 PROCEDURE — G0480 DRUG TEST DEF 1-7 CLASSES: HCPCS

## 2019-06-22 PROCEDURE — 93010 ELECTROCARDIOGRAM REPORT: CPT | Performed by: INTERNAL MEDICINE

## 2019-06-22 PROCEDURE — 51702 INSERT TEMP BLADDER CATH: CPT

## 2019-06-22 PROCEDURE — 82565 ASSAY OF CREATININE: CPT

## 2019-06-22 PROCEDURE — 80048 BASIC METABOLIC PNL TOTAL CA: CPT

## 2019-06-22 PROCEDURE — 85025 COMPLETE CBC W/AUTO DIFF WBC: CPT

## 2019-06-22 PROCEDURE — 84540 ASSAY OF URINE/UREA-N: CPT

## 2019-06-22 PROCEDURE — 81003 URINALYSIS AUTO W/O SCOPE: CPT

## 2019-06-22 PROCEDURE — 84484 ASSAY OF TROPONIN QUANT: CPT

## 2019-06-22 PROCEDURE — 99285 EMERGENCY DEPT VISIT HI MDM: CPT

## 2019-06-22 PROCEDURE — 6370000000 HC RX 637 (ALT 250 FOR IP): Performed by: EMERGENCY MEDICINE

## 2019-06-22 PROCEDURE — 74176 CT ABD & PELVIS W/O CONTRAST: CPT

## 2019-06-22 PROCEDURE — 82805 BLOOD GASES W/O2 SATURATION: CPT

## 2019-06-22 PROCEDURE — 87081 CULTURE SCREEN ONLY: CPT

## 2019-06-22 PROCEDURE — 82010 KETONE BODYS QUAN: CPT

## 2019-06-22 PROCEDURE — 84443 ASSAY THYROID STIM HORMONE: CPT

## 2019-06-22 PROCEDURE — 82550 ASSAY OF CK (CPK): CPT

## 2019-06-22 PROCEDURE — 83735 ASSAY OF MAGNESIUM: CPT

## 2019-06-22 PROCEDURE — 84436 ASSAY OF TOTAL THYROXINE: CPT

## 2019-06-22 PROCEDURE — 84133 ASSAY OF URINE POTASSIUM: CPT

## 2019-06-22 PROCEDURE — 2580000003 HC RX 258: Performed by: INTERNAL MEDICINE

## 2019-06-22 PROCEDURE — 84100 ASSAY OF PHOSPHORUS: CPT

## 2019-06-22 RX ORDER — MAGNESIUM SULFATE 1 G/100ML
1 INJECTION INTRAVENOUS PRN
Status: DISCONTINUED | OUTPATIENT
Start: 2019-06-22 | End: 2019-06-23

## 2019-06-22 RX ORDER — SODIUM CHLORIDE 0.9 % (FLUSH) 0.9 %
10 SYRINGE (ML) INJECTION PRN
Status: DISCONTINUED | OUTPATIENT
Start: 2019-06-22 | End: 2019-06-25 | Stop reason: HOSPADM

## 2019-06-22 RX ORDER — 0.9 % SODIUM CHLORIDE 0.9 %
1000 INTRAVENOUS SOLUTION INTRAVENOUS ONCE
Status: COMPLETED | OUTPATIENT
Start: 2019-06-22 | End: 2019-06-22

## 2019-06-22 RX ORDER — DEXTROSE MONOHYDRATE 25 G/50ML
12.5 INJECTION, SOLUTION INTRAVENOUS PRN
Status: DISCONTINUED | OUTPATIENT
Start: 2019-06-22 | End: 2019-06-25 | Stop reason: HOSPADM

## 2019-06-22 RX ORDER — DIPHENHYDRAMINE HYDROCHLORIDE 50 MG/ML
25 INJECTION INTRAMUSCULAR; INTRAVENOUS ONCE
Status: COMPLETED | OUTPATIENT
Start: 2019-06-22 | End: 2019-06-22

## 2019-06-22 RX ORDER — SODIUM CHLORIDE 450 MG/100ML
INJECTION, SOLUTION INTRAVENOUS CONTINUOUS
Status: DISCONTINUED | OUTPATIENT
Start: 2019-06-22 | End: 2019-06-23

## 2019-06-22 RX ORDER — 0.9 % SODIUM CHLORIDE 0.9 %
500 INTRAVENOUS SOLUTION INTRAVENOUS ONCE
Status: COMPLETED | OUTPATIENT
Start: 2019-06-22 | End: 2019-06-22

## 2019-06-22 RX ORDER — LISINOPRIL 20 MG/1
20 TABLET ORAL DAILY
Status: ON HOLD | COMMUNITY
End: 2020-04-09

## 2019-06-22 RX ORDER — DEXTROSE AND SODIUM CHLORIDE 5; .45 G/100ML; G/100ML
INJECTION, SOLUTION INTRAVENOUS CONTINUOUS PRN
Status: DISCONTINUED | OUTPATIENT
Start: 2019-06-22 | End: 2019-06-23

## 2019-06-22 RX ORDER — 0.9 % SODIUM CHLORIDE 0.9 %
2000 INTRAVENOUS SOLUTION INTRAVENOUS ONCE
Status: COMPLETED | OUTPATIENT
Start: 2019-06-22 | End: 2019-06-22

## 2019-06-22 RX ORDER — LEVOTHYROXINE SODIUM 0.03 MG/1
25 TABLET ORAL DAILY
Status: DISCONTINUED | OUTPATIENT
Start: 2019-06-23 | End: 2019-06-25 | Stop reason: HOSPADM

## 2019-06-22 RX ORDER — POTASSIUM CHLORIDE 7.45 MG/ML
10 INJECTION INTRAVENOUS PRN
Status: DISCONTINUED | OUTPATIENT
Start: 2019-06-22 | End: 2019-06-22

## 2019-06-22 RX ORDER — SODIUM CHLORIDE 0.9 % (FLUSH) 0.9 %
10 SYRINGE (ML) INJECTION EVERY 12 HOURS SCHEDULED
Status: DISCONTINUED | OUTPATIENT
Start: 2019-06-22 | End: 2019-06-25 | Stop reason: HOSPADM

## 2019-06-22 RX ORDER — ROSUVASTATIN CALCIUM 10 MG/1
10 TABLET, COATED ORAL NIGHTLY
Status: DISCONTINUED | OUTPATIENT
Start: 2019-06-22 | End: 2019-06-25 | Stop reason: HOSPADM

## 2019-06-22 RX ORDER — PANTOPRAZOLE SODIUM 40 MG/1
40 TABLET, DELAYED RELEASE ORAL
Status: DISCONTINUED | OUTPATIENT
Start: 2019-06-23 | End: 2019-06-25 | Stop reason: HOSPADM

## 2019-06-22 RX ORDER — CARVEDILOL 3.12 MG/1
6.25 TABLET ORAL 2 TIMES DAILY WITH MEALS
Status: ON HOLD | COMMUNITY
End: 2020-04-09

## 2019-06-22 RX ORDER — SENNA PLUS 8.6 MG/1
1 TABLET ORAL DAILY PRN
Status: DISCONTINUED | OUTPATIENT
Start: 2019-06-22 | End: 2019-06-25 | Stop reason: HOSPADM

## 2019-06-22 RX ORDER — ONDANSETRON 2 MG/ML
4 INJECTION INTRAMUSCULAR; INTRAVENOUS EVERY 6 HOURS PRN
Status: DISCONTINUED | OUTPATIENT
Start: 2019-06-22 | End: 2019-06-25 | Stop reason: HOSPADM

## 2019-06-22 RX ORDER — METOCLOPRAMIDE HYDROCHLORIDE 5 MG/ML
10 INJECTION INTRAMUSCULAR; INTRAVENOUS ONCE
Status: COMPLETED | OUTPATIENT
Start: 2019-06-22 | End: 2019-06-22

## 2019-06-22 RX ADMIN — HYDROCORTISONE SODIUM SUCCINATE 100 MG: 100 INJECTION, POWDER, FOR SOLUTION INTRAMUSCULAR; INTRAVENOUS at 14:45

## 2019-06-22 RX ADMIN — SODIUM CHLORIDE: 4.5 INJECTION, SOLUTION INTRAVENOUS at 20:40

## 2019-06-22 RX ADMIN — SODIUM CHLORIDE 0.1 UNITS/KG/HR: 9 INJECTION, SOLUTION INTRAVENOUS at 14:48

## 2019-06-22 RX ADMIN — METOCLOPRAMIDE 10 MG: 5 INJECTION, SOLUTION INTRAMUSCULAR; INTRAVENOUS at 13:38

## 2019-06-22 RX ADMIN — SODIUM CHLORIDE 2000 ML: 9 INJECTION, SOLUTION INTRAVENOUS at 14:26

## 2019-06-22 RX ADMIN — Medication 10 ML: at 21:00

## 2019-06-22 RX ADMIN — CEFEPIME 2 G: 2 INJECTION, POWDER, FOR SOLUTION INTRAVENOUS at 21:54

## 2019-06-22 RX ADMIN — SODIUM CHLORIDE 500 ML: 9 INJECTION, SOLUTION INTRAVENOUS at 17:36

## 2019-06-22 RX ADMIN — SODIUM CHLORIDE: 4.5 INJECTION, SOLUTION INTRAVENOUS at 16:30

## 2019-06-22 RX ADMIN — SODIUM CHLORIDE 1000 ML: 9 INJECTION, SOLUTION INTRAVENOUS at 13:38

## 2019-06-22 RX ADMIN — Medication 10 ML: at 20:30

## 2019-06-22 RX ADMIN — DIPHENHYDRAMINE HYDROCHLORIDE 25 MG: 50 INJECTION, SOLUTION INTRAMUSCULAR; INTRAVENOUS at 13:39

## 2019-06-22 RX ADMIN — CALCIUM GLUCONATE 1 G: 98 INJECTION, SOLUTION INTRAVENOUS at 20:26

## 2019-06-22 RX ADMIN — Medication 2 MCG/MIN: at 17:30

## 2019-06-22 RX ADMIN — ROSUVASTATIN CALCIUM 10 MG: 10 TABLET, FILM COATED ORAL at 21:59

## 2019-06-22 RX ADMIN — DEXTROSE AND SODIUM CHLORIDE: 5; 450 INJECTION, SOLUTION INTRAVENOUS at 23:59

## 2019-06-22 RX ADMIN — HUMAN INSULIN 10 UNITS: 100 INJECTION, SOLUTION SUBCUTANEOUS at 13:39

## 2019-06-22 RX ADMIN — SODIUM CHLORIDE 18 UNITS/HR: 9 INJECTION, SOLUTION INTRAVENOUS at 23:00

## 2019-06-22 RX ADMIN — ENOXAPARIN SODIUM 40 MG: 40 INJECTION SUBCUTANEOUS at 21:59

## 2019-06-22 RX ADMIN — Medication 10 ML: at 21:13

## 2019-06-22 ASSESSMENT — PAIN DESCRIPTION - FREQUENCY: FREQUENCY: CONTINUOUS

## 2019-06-22 ASSESSMENT — PAIN SCALES - WONG BAKER
WONGBAKER_NUMERICALRESPONSE: 8
WONGBAKER_NUMERICALRESPONSE: 2

## 2019-06-22 ASSESSMENT — PAIN DESCRIPTION - PROGRESSION: CLINICAL_PROGRESSION: GRADUALLY WORSENING

## 2019-06-22 ASSESSMENT — PAIN DESCRIPTION - LOCATION: LOCATION: GENERALIZED

## 2019-06-22 ASSESSMENT — PAIN SCALES - GENERAL: PAINLEVEL_OUTOF10: 3

## 2019-06-22 NOTE — ED NOTES
Delay in transferring pt to unit due to determination as to whether pt requires a central line     Venus Perea RN  06/22/19 6561

## 2019-06-22 NOTE — ED NOTES
Dr Valdene Alpers stated that pt can go to the unit without a central line.      Amie Hull, RN  06/22/19 1954

## 2019-06-22 NOTE — ED PROVIDER NOTES
Venous   Result Value Ref Range    POC Sodium 120 (L) 132 - 146 mmol/L    POC Potassium 6.6 (HH) 3.5 - 5.0 mmol/L    POC Chloride 94 (L) 100 - 108 mmol/L    POC Glucose >700 (HH) 74 - 99 mg/dl    POC Creatinine 2.5 (H) 0.7 - 1.2 mg/dL    GFR Non-African American 30 >=60 mL/min/1.73    GFR  37     Performed on SEE BELOW        RADIOLOGY:  Interpreted by Radiologist.  XR CHEST PORTABLE    (Results Pending)       ------------------------- NURSING NOTES AND VITALS REVIEWED ---------------------------   The nursing notes within the ED encounter and vital signs as below have been reviewed. BP (!) 90/57   Pulse 101   Temp 96.9 °F (36.1 °C) (Temporal)   Resp 20   Ht 5' 9\" (1.753 m)   Wt 233 lb (105.7 kg)   SpO2 98%   BMI 34.41 kg/m²   Oxygen Saturation Interpretation: Normal      ---------------------------------------------------PHYSICAL EXAM--------------------------------------       Constitutional/General: Alert and oriented x3, ill appearing   Head: NC/AT  Eyes: PERRL, EOMI  Mouth: Oropharynx clear, handling secretions, no trismus  Dry MM, dry vomit on mouth   Neck: Supple, full ROM, no meningeal signs  Pulmonary: Diminished throughout with tachypnea and mild Kussmaul not in respiratory distress  Cardiovascular: Tachycardic 2+ distal pulses  Abdomen: Soft, she is nonfocal tenderness non distended,   Extremities: Moves all extremities x 4. Warm and well perfused   Skin: warm and dry without rash poor skin turgor. There is tape residue noted mostly in the right upper extremity from recent hospitalization  Neurologic: GCS 15,  Psych: anxious Affect    EKG: This EKG is signed and interpreted by me.     Rate: 111  Rhythm: Sinus  Interpretation: Sinus rhythm sinus tachycardia, normal axis, nonspecific ST changes, mildly prominent T waves but appear generally stable compared to 6/6/2019  Comparison: stable as compared to patient's most recent EKG and no previous EKG available    POC Testing preformed in the ED:    Test  Normal Range  Result  Na  138 - 146         120  K  3.5 - 4.9   6.6  Cl  98 - 109             94  TCO2  24 - 29              -    Glucose 70 - 105  >700  BUN  8 - 26    -  Cr  0.6 - 1.3   2.46  An Gap 10 - 20   -        ------------------------------ ED COURSE/MEDICAL DECISION MAKING----------------------  Medications   0.9 % sodium chloride bolus (has no administration in time range)   metoclopramide (REGLAN) injection 10 mg (has no administration in time range)   diphenhydrAMINE (BENADRYL) injection 25 mg (has no administration in time range)         Medical Decision Making:    Diabetic ketoacidosis, hyperkalemia, acute renal failure, multiple consultations, treated, patient will be admitted to the ICU         Re-evaluation. Patients symptoms are improving  Repeat physical examination is improved    Consult- Internal med, CCM, Nephrology     Please note that the withdrawal or failure to initiate urgent interventions for this patient would likely result in a life threatening deterioration or permanent disability. Accordingly this patient received 30 minutes of critical care time, excluding separately billable procedures. Counseling: The emergency provider has spoken with the patient and discussed todays results, in addition to providing specific details for the plan of care and counseling regarding the diagnosis and prognosis. Questions are answered at this time and they are agreeable with the plan.      --------------------------------- IMPRESSION AND DISPOSITION ---------------------------------    IMPRESSION  1. Diabetic ketoacidosis without coma associated with type 1 diabetes mellitus ([facility] [address]    2. Hyperkalemia    3.  Acute renal failure, unspecified acute renal failure type ([facility]        DISPOSITION  Disposition: Admit to CCU/ICU  Patient condition is serious                 Pavan Ding DO  07/01/19 1300

## 2019-06-22 NOTE — ED NOTES
Bed: 10  Expected date:   Expected time:   Means of arrival:   Comments:  franco Aguilar RN  06/22/19 6550

## 2019-06-23 LAB
ANION GAP SERPL CALCULATED.3IONS-SCNC: 10 MMOL/L (ref 7–16)
ANION GAP SERPL CALCULATED.3IONS-SCNC: 13 MMOL/L (ref 7–16)
ANION GAP SERPL CALCULATED.3IONS-SCNC: 14 MMOL/L (ref 7–16)
ANION GAP SERPL CALCULATED.3IONS-SCNC: 15 MMOL/L (ref 7–16)
ANION GAP SERPL CALCULATED.3IONS-SCNC: 22 MMOL/L (ref 7–16)
ANISOCYTOSIS: ABNORMAL
BASOPHILS ABSOLUTE: 0 E9/L (ref 0–0.2)
BASOPHILS RELATIVE PERCENT: 0.3 % (ref 0–2)
BUN BLDV-MCNC: 36 MG/DL (ref 6–20)
BUN BLDV-MCNC: 44 MG/DL (ref 6–20)
BUN BLDV-MCNC: 49 MG/DL (ref 6–20)
BUN BLDV-MCNC: 56 MG/DL (ref 6–20)
BUN BLDV-MCNC: 60 MG/DL (ref 6–20)
BURR CELLS: ABNORMAL
CALCIUM SERPL-MCNC: 8.2 MG/DL (ref 8.6–10.2)
CALCIUM SERPL-MCNC: 8.2 MG/DL (ref 8.6–10.2)
CALCIUM SERPL-MCNC: 8.3 MG/DL (ref 8.6–10.2)
CALCIUM SERPL-MCNC: 8.4 MG/DL (ref 8.6–10.2)
CALCIUM SERPL-MCNC: 8.4 MG/DL (ref 8.6–10.2)
CHLORIDE BLD-SCNC: 100 MMOL/L (ref 98–107)
CHLORIDE BLD-SCNC: 102 MMOL/L (ref 98–107)
CHLORIDE BLD-SCNC: 98 MMOL/L (ref 98–107)
CK MB: 2.2 NG/ML (ref 0–7.7)
CO2: 15 MMOL/L (ref 22–29)
CO2: 21 MMOL/L (ref 22–29)
CO2: 22 MMOL/L (ref 22–29)
CO2: 23 MMOL/L (ref 22–29)
CO2: 25 MMOL/L (ref 22–29)
CREAT SERPL-MCNC: 1.2 MG/DL (ref 0.7–1.2)
CREAT SERPL-MCNC: 1.3 MG/DL (ref 0.7–1.2)
CREAT SERPL-MCNC: 1.4 MG/DL (ref 0.7–1.2)
CREAT SERPL-MCNC: 1.7 MG/DL (ref 0.7–1.2)
CREAT SERPL-MCNC: 1.9 MG/DL (ref 0.7–1.2)
EOSINOPHILS ABSOLUTE: 0 E9/L (ref 0.05–0.5)
EOSINOPHILS RELATIVE PERCENT: 0.1 % (ref 0–6)
FILM ARRAY ADENOVIRUS: NORMAL
FILM ARRAY BORDETELLA PERTUSSIS: NORMAL
FILM ARRAY CHLAMYDOPHILIA PNEUMONIAE: NORMAL
FILM ARRAY CORONAVIRUS 229E: NORMAL
FILM ARRAY CORONAVIRUS HKU1: NORMAL
FILM ARRAY CORONAVIRUS NL63: NORMAL
FILM ARRAY CORONAVIRUS OC43: NORMAL
FILM ARRAY INFLUENZA A VIRUS 09H1: NORMAL
FILM ARRAY INFLUENZA A VIRUS H1: NORMAL
FILM ARRAY INFLUENZA A VIRUS H3: NORMAL
FILM ARRAY INFLUENZA A VIRUS: NORMAL
FILM ARRAY INFLUENZA B: NORMAL
FILM ARRAY METAPNEUMOVIRUS: NORMAL
FILM ARRAY MYCOPLASMA PNEUMONIAE: NORMAL
FILM ARRAY PARAINFLUENZA VIRUS 1: NORMAL
FILM ARRAY PARAINFLUENZA VIRUS 2: NORMAL
FILM ARRAY PARAINFLUENZA VIRUS 3: NORMAL
FILM ARRAY PARAINFLUENZA VIRUS 4: NORMAL
FILM ARRAY RESPIRATORY SYNCITIAL VIRUS: NORMAL
FILM ARRAY RHINOVIRUS/ENTEROVIRUS: NORMAL
GFR AFRICAN AMERICAN: 50
GFR AFRICAN AMERICAN: 57
GFR AFRICAN AMERICAN: >60
GFR NON-AFRICAN AMERICAN: 41 ML/MIN/1.73
GFR NON-AFRICAN AMERICAN: 47 ML/MIN/1.73
GFR NON-AFRICAN AMERICAN: 59 ML/MIN/1.73
GFR NON-AFRICAN AMERICAN: >60 ML/MIN/1.73
GFR NON-AFRICAN AMERICAN: >60 ML/MIN/1.73
GLUCOSE BLD-MCNC: 135 MG/DL (ref 74–99)
GLUCOSE BLD-MCNC: 146 MG/DL (ref 74–99)
GLUCOSE BLD-MCNC: 157 MG/DL (ref 74–99)
GLUCOSE BLD-MCNC: 243 MG/DL (ref 74–99)
GLUCOSE BLD-MCNC: 313 MG/DL (ref 74–99)
HCT VFR BLD CALC: 37.1 % (ref 37–54)
HEMOGLOBIN: 12.5 G/DL (ref 12.5–16.5)
LYMPHOCYTES ABSOLUTE: 1.2 E9/L (ref 1.5–4)
LYMPHOCYTES RELATIVE PERCENT: 6.1 % (ref 20–42)
MAGNESIUM: 2.3 MG/DL (ref 1.6–2.6)
MAGNESIUM: 2.4 MG/DL (ref 1.6–2.6)
MAGNESIUM: 2.5 MG/DL (ref 1.6–2.6)
MCH RBC QN AUTO: 30.9 PG (ref 26–35)
MCHC RBC AUTO-ENTMCNC: 33.7 % (ref 32–34.5)
MCV RBC AUTO: 91.8 FL (ref 80–99.9)
METER GLUCOSE: 103 MG/DL (ref 74–99)
METER GLUCOSE: 119 MG/DL (ref 74–99)
METER GLUCOSE: 122 MG/DL (ref 74–99)
METER GLUCOSE: 123 MG/DL (ref 74–99)
METER GLUCOSE: 127 MG/DL (ref 74–99)
METER GLUCOSE: 157 MG/DL (ref 74–99)
METER GLUCOSE: 170 MG/DL (ref 74–99)
METER GLUCOSE: 195 MG/DL (ref 74–99)
METER GLUCOSE: 212 MG/DL (ref 74–99)
METER GLUCOSE: 225 MG/DL (ref 74–99)
METER GLUCOSE: 242 MG/DL (ref 74–99)
METER GLUCOSE: 270 MG/DL (ref 74–99)
METER GLUCOSE: 287 MG/DL (ref 74–99)
METER GLUCOSE: 84 MG/DL (ref 74–99)
MONOCYTES ABSOLUTE: 1.2 E9/L (ref 0.1–0.95)
MONOCYTES RELATIVE PERCENT: 6.1 % (ref 2–12)
NEUTROPHILS ABSOLUTE: 17.6 E9/L (ref 1.8–7.3)
NEUTROPHILS RELATIVE PERCENT: 87.7 % (ref 43–80)
PDW BLD-RTO: 11.6 FL (ref 11.5–15)
PHOSPHORUS: 2.7 MG/DL (ref 2.5–4.5)
PHOSPHORUS: 3.1 MG/DL (ref 2.5–4.5)
PHOSPHORUS: 3.3 MG/DL (ref 2.5–4.5)
PLATELET # BLD: 357 E9/L (ref 130–450)
PMV BLD AUTO: 9.7 FL (ref 7–12)
POTASSIUM SERPL-SCNC: 3.9 MMOL/L (ref 3.5–5)
POTASSIUM SERPL-SCNC: 4 MMOL/L (ref 3.5–5)
POTASSIUM SERPL-SCNC: 4.1 MMOL/L (ref 3.5–5)
POTASSIUM SERPL-SCNC: 4.1 MMOL/L (ref 3.5–5)
POTASSIUM SERPL-SCNC: 4.8 MMOL/L (ref 3.5–5)
PROCALCITONIN: 3.32 NG/ML (ref 0–0.08)
RBC # BLD: 4.04 E12/L (ref 3.8–5.8)
SODIUM BLD-SCNC: 135 MMOL/L (ref 132–146)
SODIUM BLD-SCNC: 136 MMOL/L (ref 132–146)
SODIUM BLD-SCNC: 137 MMOL/L (ref 132–146)
SODIUM BLD-SCNC: 138 MMOL/L (ref 132–146)
SODIUM BLD-SCNC: 138 MMOL/L (ref 132–146)
TOTAL CK: 59 U/L (ref 20–200)
TROPONIN: 0.02 NG/ML (ref 0–0.03)
WBC # BLD: 20 E9/L (ref 4.5–11.5)

## 2019-06-23 PROCEDURE — 85025 COMPLETE CBC W/AUTO DIFF WBC: CPT

## 2019-06-23 PROCEDURE — 2580000003 HC RX 258: Performed by: INTERNAL MEDICINE

## 2019-06-23 PROCEDURE — 84145 PROCALCITONIN (PCT): CPT

## 2019-06-23 PROCEDURE — 36415 COLL VENOUS BLD VENIPUNCTURE: CPT

## 2019-06-23 PROCEDURE — 84484 ASSAY OF TROPONIN QUANT: CPT

## 2019-06-23 PROCEDURE — 2000000000 HC ICU R&B

## 2019-06-23 PROCEDURE — 82550 ASSAY OF CK (CPK): CPT

## 2019-06-23 PROCEDURE — 2580000003 HC RX 258: Performed by: EMERGENCY MEDICINE

## 2019-06-23 PROCEDURE — 6370000000 HC RX 637 (ALT 250 FOR IP): Performed by: INTERNAL MEDICINE

## 2019-06-23 PROCEDURE — 83735 ASSAY OF MAGNESIUM: CPT

## 2019-06-23 PROCEDURE — 80048 BASIC METABOLIC PNL TOTAL CA: CPT

## 2019-06-23 PROCEDURE — 82553 CREATINE MB FRACTION: CPT

## 2019-06-23 PROCEDURE — 99233 SBSQ HOSP IP/OBS HIGH 50: CPT | Performed by: INTERNAL MEDICINE

## 2019-06-23 PROCEDURE — 6360000002 HC RX W HCPCS: Performed by: INTERNAL MEDICINE

## 2019-06-23 PROCEDURE — 36592 COLLECT BLOOD FROM PICC: CPT

## 2019-06-23 PROCEDURE — 82962 GLUCOSE BLOOD TEST: CPT

## 2019-06-23 PROCEDURE — 84100 ASSAY OF PHOSPHORUS: CPT

## 2019-06-23 RX ORDER — SODIUM CHLORIDE, SODIUM LACTATE, POTASSIUM CHLORIDE, CALCIUM CHLORIDE 600; 310; 30; 20 MG/100ML; MG/100ML; MG/100ML; MG/100ML
INJECTION, SOLUTION INTRAVENOUS CONTINUOUS
Status: DISCONTINUED | OUTPATIENT
Start: 2019-06-23 | End: 2019-06-24 | Stop reason: ALTCHOICE

## 2019-06-23 RX ORDER — INSULIN GLARGINE 100 [IU]/ML
50 INJECTION, SOLUTION SUBCUTANEOUS NIGHTLY
Status: DISCONTINUED | OUTPATIENT
Start: 2019-06-23 | End: 2019-06-25 | Stop reason: HOSPADM

## 2019-06-23 RX ADMIN — PANTOPRAZOLE SODIUM 40 MG: 40 TABLET, DELAYED RELEASE ORAL at 06:06

## 2019-06-23 RX ADMIN — SERTRALINE 50 MG: 50 TABLET, FILM COATED ORAL at 12:11

## 2019-06-23 RX ADMIN — Medication 10 ML: at 04:03

## 2019-06-23 RX ADMIN — SODIUM CHLORIDE, POTASSIUM CHLORIDE, SODIUM LACTATE AND CALCIUM CHLORIDE: 600; 310; 30; 20 INJECTION, SOLUTION INTRAVENOUS at 13:40

## 2019-06-23 RX ADMIN — Medication 10 ML: at 08:28

## 2019-06-23 RX ADMIN — ENOXAPARIN SODIUM 40 MG: 40 INJECTION SUBCUTANEOUS at 08:30

## 2019-06-23 RX ADMIN — Medication 10 ML: at 20:47

## 2019-06-23 RX ADMIN — INSULIN LISPRO 15 UNITS: 100 INJECTION, SOLUTION INTRAVENOUS; SUBCUTANEOUS at 12:44

## 2019-06-23 RX ADMIN — Medication 10 ML: at 06:05

## 2019-06-23 RX ADMIN — INSULIN GLARGINE 50 UNITS: 100 INJECTION, SOLUTION SUBCUTANEOUS at 12:45

## 2019-06-23 RX ADMIN — Medication 10 ML: at 00:00

## 2019-06-23 RX ADMIN — INSULIN LISPRO 15 UNITS: 100 INJECTION, SOLUTION INTRAVENOUS; SUBCUTANEOUS at 17:31

## 2019-06-23 RX ADMIN — ROSUVASTATIN CALCIUM 10 MG: 10 TABLET, FILM COATED ORAL at 20:47

## 2019-06-23 RX ADMIN — LEVOTHYROXINE SODIUM 25 MCG: 25 TABLET ORAL at 06:06

## 2019-06-23 RX ADMIN — DEXTROSE AND SODIUM CHLORIDE: 5; 450 INJECTION, SOLUTION INTRAVENOUS at 06:06

## 2019-06-23 ASSESSMENT — PAIN SCALES - GENERAL
PAINLEVEL_OUTOF10: 0

## 2019-06-23 NOTE — PROGRESS NOTES
·     Pertinent New Labs & Imaging Studies     CBC:   Lab Results   Component Value Date    WBC 20.0 2019    RBC 4.04 2019    HGB 12.5 2019    HCT 37.1 2019    MCV 91.8 2019    MCH 30.9 2019    MCHC 33.7 2019    RDW 11.6 2019     2019    MPV 9.7 2019        Notable Cultures:       Culture  Date sent  Result    Nasal      Sputum      Urine Strep pneumonia Ag        Urine Legionella Ag        Blood        Urine          Antibiotic  Days  Day started                                   Oxygen:   Nasal cannula L/min     Face mask %     Reservoirs mask %       ABG:    Lab Results   Component Value Date    Y3HPUXDO 98.0 2012         Lines:  Site  Day  Date inserted     TLC              PICC              Arterial line              Peripheral line                           DVT Prophylaxis      Heparin         Enoxaparin        PCDs        Imaging studies:  Ct Abdomen Pelvis Wo Contrast Additional Contrast? None    Result Date: 2019  Patient MRN: [id number] : 1990 Age:  30 years Gender: Male Order Date: 2019 2:45 PM Exam: CT ABDOMEN PELVIS WO CONTRAST Number of Images: 362 views Indication:   abd pain, shock, thu  Comparison: None. Technique: The CT of the scan of the abdomen and pelvis was obtained with axial images from base of the diaphragm to the pubic symphysis with multiplanar reformat. The study was obtained without IV contrast. Radiation Output: CTDIvol 25.40 (mGy); DLP 1489 (mGy-cm) Finding: The lung bases are clear. The heart and pericardium appear normal. The non-opacified liver demonstrates no evidence of parenchymal lesions or intrahepatic biliary dilatation. The gallbladder is well distended without radiopaque stones. The spleen, pancreas and adrenal glands do not show any appreciable abnormality. The kidneys show normal findings. There is no stone, mass or hydronephrosis. The urinary bladder is not fully distended.  No evidence of intestinal obstruction is seen. There is no evidence of appendicitis seen. No retroperitoneal or mesenteric lymphadenopathy is detected. The abdominal aorta and iliac arteries demonstrate no evidence of atherosclerotic changes or aneurysmal dilatation. The osseous structures of the abdomen and pelvis appear to be normal. The urinary bladder is very distended with small amount air seen which may be possibly iatrogenic. The prostate appears to be normal. There is calcification of both seminal vesicles. .     NO ACUTE PATHOLOGY SEEN IN ABDOMEN OR PELVIS The urinary bladder is very distended with small amount of air is seen in the dome of the urinary bladder. There is severe calcification of both seminal vesicles. Jaleesa Mins Chest Portable    Result Date: 2019  Patient MRN: [id number] : 1990 Age:  30 years Gender: Male Order Date: 2019 1:15 PM Exam: XR CHEST PORTABLE Number of Images: 1 view Indication:   SOB, N/V, abd pain SOB, N/V, abd pain Comparison: None. Findings: The heart is unremarkable. The lung fields are unremarkable. The aorta is unremarkable. normal chest       Resident's Assessment & Plan     Assessment:      1. DKA, Type I DM, 2/2 inadequate insulin(error dose)  2. ASHLEY, likely prerenal   3. Hypothyroidism  4.  HTN  5. Elevated troponin, likely 2/2 ASHLEY and demand ischemia   6. Hyperkalemia, resolved  7. HAGMA, resolved  8. Lactic acidosis, resolved   9. Hypovolemic shock, resolved  10. Severe depression with hx of suicidal ideation      Plan:         · Bridge to lantus 50 units starting now and then nightly, SSI and premeal 7,15,15 units  · Hold lisinopril, norvasc and HCTZ  · No need for abx since no cliical source of infection identified  · Monitor UO and creat  · Resume synthroid   · Hold Zoloft for now   · Zofran PRN    lovenox/protonix    Loraine Dillon M.D., PGY 2    Attending physician: Dr. Destinee Russell personally saw, examined and provided care for the patient. Radiographs, labs and medication list were reviewed by me independently. I spoke with bedside nursing, therapists and consultants. Critical care services and times documented are independent of procedures and multidisciplinary rounds with Residents. Additionally comprehensive, multidisciplinary rounds were conducted with the MICU team. The case was discussed in detail and plans for care were established. Review of Residents documentation was conducted and revisions were made as appropriate. I agree with the above documented exam, problem list and plan of care.     DKA  Better  Will follow  Glenda Yung

## 2019-06-23 NOTE — H&P
meals)   Yes Historical Provider, MD   insulin glargine (BASAGLAR KWIKPEN) 100 UNIT/ML injection pen Inject 45 Units into the skin nightly   Yes Historical Provider, MD   insulin lispro (HUMALOG) 100 UNIT/ML injection vial Inject 0-10 Units into the skin 3 times daily (before meals)   Yes Historical Provider, MD   lisinopril (PRINIVIL;ZESTRIL) 20 MG tablet Take 20 mg by mouth daily   Yes Historical Provider, MD   sertraline (ZOLOFT) 50 MG tablet Take 50 mg by mouth daily   Yes Historical Provider, MD   hydrochlorothiazide (HYDRODIURIL) 12.5 MG tablet Take 1 tablet by mouth 2 times daily 6/20/19  Yes Silvia Rey MD   amLODIPine (NORVASC) 10 MG tablet Take 1 tablet by mouth daily 6/20/19  Yes Silvia Rey MD   pantoprazole (PROTONIX) 40 MG tablet Take 1 tablet by mouth every morning (before breakfast) 6/20/19  Yes Silvia Rey MD   levothyroxine (SYNTHROID) 25 MCG tablet Take 1 tablet by mouth Daily 6/20/19  Yes Silvia Rey MD   amoxicillin-clavulanate (AUGMENTIN) 500-125 MG per tablet Take 1 tablet by mouth every 12 hours for 5 days 6/19/19 6/24/19 Yes Silvia Rey MD   rosuvastatin (CRESTOR) 10 MG tablet Take 10 mg by mouth nightly    Yes Historical Provider, MD   ondansetron (ZOFRAN) 4 MG tablet Take 4 mg by mouth every 8 hours as needed for Nausea or Vomiting   Yes Historical Provider, MD       Allergies:  Pineapple    Social History:   TOBACCO:   reports that he has never smoked. His smokeless tobacco use includes chew. ETOH:   reports that he drinks alcohol. Family History:       Problem Relation Age of Onset    Diabetes Mother        REVIEW OF SYSTEMS:    · Constitutional: No fever, no chills, no change in weight; good appetite  · HEENT: No blurred vision, no ear problems, no sore throat, no rhinorrhea.   · Respiratory: No cough, no sputum production, no pleuritic chest pain, no shortness of breath  · Cardiology: No angina, no dyspnea on exertion, no paroxysmal nocturnal dyspnea, no orthopnea, no palpitation, no leg swelling. · Gastroenterology: No dysphagia, no reflux; no abdominal pain, no nausea or vomiting; no constipation or diarrhea.  No hematochezia   · Genitourinary: No dysuria, no frequency, hesitancy; no hematuria  · Musculoskeletal: no joint pain, no myalgia, no change in range of movement  · Neurology: no focal weakness in extremities, no slurred speech, no double vision, no tingling or numbness sensation  · Endocrinology: no temperature intolerance, no polyphagia, polydipsia or polyuria  · Hematology: no increased bleeding, no bruising, no lymphadenopathy  · Skin: no skin changes noticed by patient  · Psychology: no depressed mood, no suicidal ideation    Physical Exam   · Vitals: BP (!) 114/58   Pulse 107   Temp 98.7 °F (37.1 °C)   Resp 25   Ht 5' 9\" (1.753 m)   Wt 233 lb (105.7 kg)   SpO2 100%   BMI 34.41 kg/m²     · General Appearance: alert and oriented to person, place and time, well developed and well- nourished, in no acute distress  · Skin: warm and dry, no rash or erythema  · Head: normocephalic and atraumatic  · Eyes: pupils equal, round, and reactive to light, extraocular eye movements intact, conjunctivae normal  · ENT: tympanic membrane, external ear and ear canal normal bilaterally, nose without deformity, nasal mucosa and turbinates normal without polyps  · Neck: supple and non-tender without mass, no thyromegaly or thyroid nodules, no cervical lymphadenopathy  · Pulmonary/Chest: clear to auscultation bilaterally- no wheezes, rales or rhonchi, normal air movement, no respiratory distress  · Cardiovascular: normal rate, regular rhythm, normal S1 and S2, no murmurs, rubs, clicks, or gallops, distal pulses intact, no carotid bruits  · Abdomen: soft, non-tender, non-distended, normal bowel sounds, no masses or organomegaly  · Extremities: no cyanosis, clubbing or edema  · Musculoskeletal: normal range of motion, no joint swelling, deformity or tenderness  · Neurologic: reflexes normal and symmetric, no cranial nerve deficit, gait, coordination and speech normal   Labs and Imaging Studies   Basic Labs  Recent Labs     19  1310 19  1314 19  1615 19  1816 19  1930   *  --  130*  --   --    K 6.8*  --  5.7*  --   --    CL 77*  --  88*  --   --    CO2 7*  --  8*  --   --    BUN 65*  --  70*  --   --    CREATININE 2.4* 2.5* 2.3* 2.0*  --    GLUCOSE 891*  --  637*  --  470   CALCIUM 9.5  --  8.6  --   --        Recent Labs     19  1310   WBC 18.0*   RBC 4.61   HGB 14.3   HCT 44.5   MCV 96.5   MCH 31.0   MCHC 32.1   RDW 11.7      MPV 10.8       CBC:   Lab Results   Component Value Date    WBC 18.0 2019    RBC 4.61 2019    HGB 14.3 2019    HCT 44.5 2019    MCV 96.5 2019    RDW 11.7 2019     2019     CMP:  Lab Results   Component Value Date     2019    K 5.7 2019    K 4.0 2018    CL 88 2019    CO2 8 2019    BUN 70 2019    PROT 7.4 2019       Imaging Studies:     Ct Abdomen Pelvis Wo Contrast Additional Contrast? None    Result Date: 2019  Patient MRN: [id number] : 1990 Age:  30 years Gender: Male Order Date: 2019 2:45 PM Exam: CT ABDOMEN PELVIS WO CONTRAST Number of Images: 362 views Indication:   abd pain, shock, thu  Comparison: None. Technique: The CT of the scan of the abdomen and pelvis was obtained with axial images from base of the diaphragm to the pubic symphysis with multiplanar reformat. The study was obtained without IV contrast. Radiation Output: CTDIvol 25.40 (mGy); DLP 1489 (mGy-cm) Finding: The lung bases are clear. The heart and pericardium appear normal. The non-opacified liver demonstrates no evidence of parenchymal lesions or intrahepatic biliary dilatation. The gallbladder is well distended without radiopaque stones.  The spleen, pancreas and adrenal glands do not show any appreciable abnormality. The kidneys show normal findings. There is no stone, mass or hydronephrosis. The urinary bladder is not fully distended. No evidence of intestinal obstruction is seen. There is no evidence of appendicitis seen. No retroperitoneal or mesenteric lymphadenopathy is detected. The abdominal aorta and iliac arteries demonstrate no evidence of atherosclerotic changes or aneurysmal dilatation. The osseous structures of the abdomen and pelvis appear to be normal. The urinary bladder is very distended with small amount air seen which may be possibly iatrogenic. The prostate appears to be normal. There is calcification of both seminal vesicles. .     NO ACUTE PATHOLOGY SEEN IN ABDOMEN OR PELVIS The urinary bladder is very distended with small amount of air is seen in the dome of the urinary bladder. There is severe calcification of both seminal vesicles. Alec Wilson Chest Portable    Result Date: 2019  Patient MRN: [id number] : 1990 Age:  30 years Gender: Male Order Date: 2019 1:15 PM Exam: XR CHEST PORTABLE Number of Images: 1 view Indication:   SOB, N/V, abd pain SOB, N/V, abd pain Comparison: None. Findings: The heart is unremarkable. The lung fields are unremarkable. The aorta is unremarkable. normal chest     Xr Chest Portable    Result Date: 6/15/2019  Patient MRN: [id number] : 1990 Age:  30 years Gender: Male Order Date: 6/15/2019 3:45 PM Exam: XR CHEST PORTABLE Number of Images: 1 view Indication:   chest pain, DKA chest pain, DKA Comparison: Prior chest radiograph from 2018 is available Findings: The lungs are clear. There is no evidence of pulmonary infiltrate or pleural effusion. The pulmonary vascularity is unremarkable. The cardiac, hilar and mediastinal silhouettes are satisfactory. The bony thorax demonstrates no gross abnormality.      NO ACUTE CARDIOPULMONARY PROCESS     Us Retroperitoneal Complete    Result Date: 6/15/2019  Real-time and Doppler sonography of the

## 2019-06-23 NOTE — PLAN OF CARE
Problem: Risk for Impaired Skin Integrity  Goal: Tissue integrity - skin and mucous membranes  Outcome: Met This Shift     Problem: Sensory Perception - Impaired:  Goal: Ability to maintain a stable neurologic state will improve  Outcome: Met This Shift     Problem: Serum Glucose Level - Abnormal:  Goal: Ability to maintain appropriate glucose levels will improve  Outcome: Ongoing     Plan of care discussed with patient / family.

## 2019-06-23 NOTE — CONSULTS
[facility]  Internal Medicine Residency Program  Critical care consult     Patient:  Mckayla Cabezas 30 y.o. male MRN: [id number]     Date of Service: 6/22/2019         Hospital Day: 1      Chief complaint: had concerns including Hyperglycemia (per ems sent from [facility] for eval of elevated blood sugars patient dry heaving upon arrival complaining of nausea and body pain ). History of Present Illness   This is a 30year old male with T1DM diagnosed 21 years ago, HTN, HLD, depression with hx of suicidal ideation, presented to ED with 2-3 days of nausea and vomiting. He was managed as DKA on 6/16 and discharged to [facility] on 6/19 with adjusting insulin regimen. However he mentions after discharge, he didn't get enough insulin, he was just getting 30-40 U lantus at night, 3 U premeal. He also mentions his BG running high, > 500, and but he denies any fever, chills, polyuria, polydipsia, chest tightness and productive cough.     While in the ED, VS were significant for tachycardia, hypotension. Labs revealed Na 125, K 6.8, Cl 77, HCO3 7, BUN 65, Cr 2.4, AG 41, LA 3, , measured serum osmolality 344, calculated 323, BHB >4.5, wbc 18, pH 7.18  EKG showing sinus tachycardia, no T wave change compare to previous one   He was started on DKA protocol, and one dose of cefepime, received 3.5L IVF boluses started on low dose levo for hypotension, transferred to the MICU for further management        Past Medical History:      Diagnosis Date    Abscess of back 8/2011    Bowel obstruction ([facility]     Diabetes mellitus type 1 ([facility] [address] Diagnosed at 6years of age/ in 1998    HTN (hypertension)     Hyperlipidemia        Past Surgical History:    History reviewed. No pertinent surgical history. Medications Prior to Admission:    Prior to Admission medications    Medication Sig Start Date End Date Taking?  Authorizing Provider   carvedilol (COREG) 3.125 MG tablet Take 3.125 mg by mouth 2 times daily (with meals)   Yes Historical Provider, MD   insulin glargine (BASAGLAR KWIKPEN) 100 UNIT/ML injection pen Inject 45 Units into the skin nightly   Yes Historical Provider, MD   insulin lispro (HUMALOG) 100 UNIT/ML injection vial Inject 0-10 Units into the skin 3 times daily (before meals)   Yes Historical Provider, MD   lisinopril (PRINIVIL;ZESTRIL) 20 MG tablet Take 20 mg by mouth daily   Yes Historical Provider, MD   sertraline (ZOLOFT) 50 MG tablet Take 50 mg by mouth daily   Yes Historical Provider, MD   hydrochlorothiazide (HYDRODIURIL) 12.5 MG tablet Take 1 tablet by mouth 2 times daily 6/20/19  Yes Dejuan Toscano MD   amLODIPine (NORVASC) 10 MG tablet Take 1 tablet by mouth daily 6/20/19  Yes Dejuan Toscano MD   pantoprazole (PROTONIX) 40 MG tablet Take 1 tablet by mouth every morning (before breakfast) 6/20/19  Yes Dejuan Toscano MD   levothyroxine (SYNTHROID) 25 MCG tablet Take 1 tablet by mouth Daily 6/20/19  Yes Dejuan Toscano MD   amoxicillin-clavulanate (AUGMENTIN) 500-125 MG per tablet Take 1 tablet by mouth every 12 hours for 5 days 6/19/19 6/24/19 Yes Dejuan Toscano MD   rosuvastatin (CRESTOR) 10 MG tablet Take 10 mg by mouth nightly    Yes Historical Provider, MD   ondansetron (ZOFRAN) 4 MG tablet Take 4 mg by mouth every 8 hours as needed for Nausea or Vomiting   Yes Historical Provider, MD       Allergies:  Pineapple    Social History:   TOBACCO:   reports that he has never smoked. His smokeless tobacco use includes chew. ETOH:   reports that he drinks alcohol. Family History:       Problem Relation Age of Onset    Diabetes Mother        REVIEW OF SYSTEMS:    · Constitutional: No fever, no chills, no change in weight; good appetite  · HEENT: No blurred vision, no ear problems, no sore throat, no rhinorrhea.   · Respiratory: No cough, no sputum production, no pleuritic chest pain, no shortness of breath  · Cardiology: No angina, no dyspnea on exertion, no paroxysmal nocturnal dyspnea, no orthopnea, no palpitation, no leg swelling. · Gastroenterology: No dysphagia, no reflux; no abdominal pain, no nausea or vomiting; no constipation or diarrhea.  No hematochezia   · Genitourinary: No dysuria, no frequency, hesitancy; no hematuria  · Musculoskeletal: no joint pain, no myalgia, no change in range of movement  · Neurology: no focal weakness in extremities, no slurred speech, no double vision, no tingling or numbness sensation  · Endocrinology: no temperature intolerance, no polyphagia, polydipsia or polyuria  · Hematology: no increased bleeding, no bruising, no lymphadenopathy  · Skin: no skin changes noticed by patient  · Psychology: no depressed mood, no suicidal ideation    Physical Exam   · Vitals: /61   Pulse 113   Temp 98 °F (36.7 °C) (Temporal)   Resp 21   Ht 5' 9\" (1.753 m)   Wt 233 lb (105.7 kg)   SpO2 99%   BMI 34.41 kg/m²     · General Appearance: alert and oriented to person, place and time, well developed and well- nourished, in no acute distress  · Skin: warm and dry, no rash or erythema  · Head: normocephalic and atraumatic  · Eyes: pupils equal, round, and reactive to light, extraocular eye movements intact, conjunctivae normal  · ENT: tympanic membrane, external ear and ear canal normal bilaterally, nose without deformity, nasal mucosa and turbinates normal without polyps  · Neck: supple and non-tender without mass, no thyromegaly or thyroid nodules, no cervical lymphadenopathy  · Pulmonary/Chest: clear to auscultation bilaterally- no wheezes, rales or rhonchi, normal air movement, no respiratory distress  · Cardiovascular: normal rate, regular rhythm, normal S1 and S2, no murmurs, rubs, clicks, or gallops, distal pulses intact, no carotid bruits  · Abdomen: soft, non-tender, non-distended, normal bowel sounds, no masses or organomegaly  · Extremities: no cyanosis, clubbing or edema  · Musculoskeletal: normal range of motion, no joint swelling, deformity or tenderness  · Neurologic: reflexes normal and symmetric, no cranial nerve deficit, gait, coordination and speech normal   Labs and Imaging Studies   Basic Labs  Recent Labs     19  1310  19  1615 19  1816 19  1930 19  2115   *  --  130*  --   --  133   K 6.8*  --  5.7*  --   --  5.1*   CL 77*  --  88*  --   --  92*   CO2 7*  --  8*  --   --  8*   BUN 65*  --  70*  --   --  66*   CREATININE 2.4*   < > 2.3* 2.0*  --  2.1*   GLUCOSE 891*  --  637*  --  470 467*   CALCIUM 9.5  --  8.6  --   --  8.3*    < > = values in this interval not displayed. Recent Labs     19  1310   WBC 18.0*   RBC 4.61   HGB 14.3   HCT 44.5   MCV 96.5   MCH 31.0   MCHC 32.1   RDW 11.7      MPV 10.8       CBC:   Lab Results   Component Value Date    WBC 18.0 2019    RBC 4.61 2019    HGB 14.3 2019    HCT 44.5 2019    MCV 96.5 2019    RDW 11.7 2019     2019     CMP:  Lab Results   Component Value Date     2019    K 5.1 2019    K 4.0 2018    CL 92 2019    CO2 8 2019    BUN 66 2019    PROT 7.4 2019       Imaging Studies:     Ct Abdomen Pelvis Wo Contrast Additional Contrast? None    Result Date: 2019  Patient MRN: [id number] : 1990 Age:  30 years Gender: Male Order Date: 2019 2:45 PM Exam: CT ABDOMEN PELVIS WO CONTRAST Number of Images: 879 views Indication:   abd pain, shock, thu  Comparison: None. Technique: The CT of the scan of the abdomen and pelvis was obtained with axial images from base of the diaphragm to the pubic symphysis with multiplanar reformat. The study was obtained without IV contrast. Radiation Output: CTDIvol 25.40 (mGy); DLP 1489 (mGy-cm) Finding: The lung bases are clear. The heart and pericardium appear normal. The non-opacified liver demonstrates no evidence of parenchymal lesions or intrahepatic biliary dilatation.  The gallbladder is well distended without radiopaque stones. The spleen, pancreas and adrenal glands do not show any appreciable abnormality. The kidneys show normal findings. There is no stone, mass or hydronephrosis. The urinary bladder is not fully distended. No evidence of intestinal obstruction is seen. There is no evidence of appendicitis seen. No retroperitoneal or mesenteric lymphadenopathy is detected. The abdominal aorta and iliac arteries demonstrate no evidence of atherosclerotic changes or aneurysmal dilatation. The osseous structures of the abdomen and pelvis appear to be normal. The urinary bladder is very distended with small amount air seen which may be possibly iatrogenic. The prostate appears to be normal. There is calcification of both seminal vesicles. .     NO ACUTE PATHOLOGY SEEN IN ABDOMEN OR PELVIS The urinary bladder is very distended with small amount of air is seen in the dome of the urinary bladder. There is severe calcification of both seminal vesicles. Inocente Daniels Chest Portable    Result Date: 2019  Patient MRN: [id number] : 1990 Age:  30 years Gender: Male Order Date: 2019 1:15 PM Exam: XR CHEST PORTABLE Number of Images: 1 view Indication:   SOB, N/V, abd pain SOB, N/V, abd pain Comparison: None. Findings: The heart is unremarkable. The lung fields are unremarkable. The aorta is unremarkable. normal chest     Xr Chest Portable    Result Date: 6/15/2019  Patient MRN: [id number] : 1990 Age:  30 years Gender: Male Order Date: 6/15/2019 3:45 PM Exam: XR CHEST PORTABLE Number of Images: 1 view Indication:   chest pain, DKA chest pain, DKA Comparison: Prior chest radiograph from 2018 is available Findings: The lungs are clear. There is no evidence of pulmonary infiltrate or pleural effusion. The pulmonary vascularity is unremarkable. The cardiac, hilar and mediastinal silhouettes are satisfactory. The bony thorax demonstrates no gross abnormality.      NO ACUTE CARDIOPULMONARY PROCESS Us Retroperitoneal Complete    Result Date: 6/15/2019  Real-time and Doppler sonography of the kidneys and urinary bladder (retroperitoneal). Right kidney measures 13.6 x 6.8 x 5.7 cm. Left kidney measures 12.4 x 5.8 x 6.3 cm. Urinary bladder volume is 93.3 mL. De Leon catheter has largely evacuated urinary bladder. The kidneys are negative for evidence of solid or cystic mass, hydronephrosis or calculus disease. Unremarkable appearance of the kidneys. De Leon catheter within the urinary bladder. EKG: normal sinus rhythm, unchanged from previous tracings. Resident's Assessment and Plan     Assessment:      1. DKA, Type I DM, 2/2 inadequate insulin(error dose), rule out infections and ACS  2. ASHLEY, likely prerenal   3. Hypothyroidism  4. HTN  5. Elevated troponin, likely 2/2 ASHLEY and demand ischemia   6. Hyperkalemia, resolving    7. HAGMA, resolving   8. Lactic acidosis, resolved   9. Hypovolemic shock  10. Severe depression with hx of suicidal ideation                 Plan:         · DKA protocol, follow BMP, Mg and phos Q4hrs  · Hold lisinopril, norvasc and HCTZ, wean down levo as able   · Obtain procal, resp viral panel, resp culture  · Trend cardiac enzyme X 3  · Monitor UO and creat  · Resume synthroid   · Hold Zoloft for now   · Zofran PRN  · CXR clear, no signs of infection. Hold abx for now   · Not started bicarb drip since PH > 6.9, but if unable to wean down pressor will be more aggressive to correct Bicarb and PH    PPI/DVT prophylaxis: Protonix/lovenox     Janella Kussmaul, MD, PGY-1   Attending physician: Dr. Niko Pollard     I personally saw, examined and provided care for the patient. Radiographs, labs and medication list were reviewed by me independently. I spoke with bedside nursing, therapists and consultants. Critical care services and times documented are independent of procedures and multidisciplinary rounds with Residents.  Additionally comprehensive, multidisciplinary rounds were conducted

## 2019-06-23 NOTE — PROCEDURES
Procedure: right femoral vein Triple Lumen Catheter placement. Indications: vascular access, centrally administered medications and need for frequent blood draws    Consent: The patient provided verbal consent for this procedure. Number of sticks: 1    Number of Kits used: 1    Procedure: The patient was placed in the trendelenburg position and the skin over the right femoral vein was prepped with betadine and draped in a sterile fashion. Local anesthesia was obtained by infiltration using 1% Lidocaine without epinephrine. With Ultrasound guidance a large bore needle was used to identify the vein, dark non pulsatile blood returned. The guide wire was then inserted through the needle with minimal resistance. 2 mm nick was made in the skin beside the guidewire. Then a dilator was inserted and removed. A triple lumen catheter was then inserted into the vessel over the guide wire using the Seldinger technique to the 15 cm cadence. All ports showed good, free flowing blood return and were flushed with saline solution. The catheter was then securely fastened to the skin with sutures and covered with a bio patch and sterile dressing. A post procedure X-ray was not indicated. Complications: None   The patient tolerated the procedure with difficulty. Estimated blood loss: 1 ml.     Colette Andersen MD PGY-3  6/22/2019  9:31 PM

## 2019-06-23 NOTE — PROGRESS NOTES
Lab Results   Component Value Date     06/23/2019    K 3.9 06/23/2019    K 4.0 06/11/2018     06/23/2019    CO2 22 06/23/2019    BUN 49 06/23/2019    CREATININE 1.4 06/23/2019    GFRAA >60 06/23/2019    LABGLOM 59 06/23/2019    GLUCOSE 135 06/23/2019    GLUCOSE 57 05/18/2012    PROT 7.4 06/22/2019    LABALBU 4.1 06/22/2019    LABALBU 5.3 05/18/2012    CALCIUM 8.4 06/23/2019    BILITOT 0.5 06/22/2019    ALKPHOS 104 06/22/2019    AST 18 06/22/2019    ALT 18 06/22/2019     Albumin:    Lab Results   Component Value Date    LABALBU 4.1 06/22/2019    LABALBU 5.3 05/18/2012     Calcium:    Lab Results   Component Value Date    CALCIUM 8.4 06/23/2019     Magnesium:    Lab Results   Component Value Date    MG 2.3 06/23/2019     Phosphorus:    Lab Results   Component Value Date    PHOS 2.7 06/23/2019       Resident's Assessment and Plan     Assessment:      1. DKA, Type I DM, 2/2 inadequate insulin (erroneous dose at facility);   2. ASHLEY, likely prerenal - resolving   3. Hypothyroidism  4.  HTN - currently hypotensive   5. Elevated troponin, likely 2/2 ASHLEY and demand ischemia - resolving   6. Hyperkalemia, resolving    7. HAGMA, resolving   8. Lactic acidosis, resolved   9. Hypovolemic shock  10. Severe depression with hx of suicidal ideation   11. Leukocytosis and elevated Procalcitonin; antibiotics per ICU team;      Plan:         · DKA protocol, follow BMP, Mg and phos Q4hrs; bridge today   · Home dose insulin is 50 U at night; was only getting 30 U at [facility]; 7 U, 15 U, and 15 U with meals (was only getting 3 U TID)  · Procalcitonin elevated   · Hold lisinopril, norvasc and HCTZ, off levophed; continues to be hypotensive    · Trend cardiac enzyme X 3  · Monitor UO and creat  · Resume synthroid   · Zoloft restarted   · Zofran PRN  · CXR clear, no signs of infection.  Worsening leukocytosis and Procalcitonin; antibiotics per ICU    PT/OT evaluation: none needed   DVT prophylaxis/ GI prophylaxis: Lovenox/Protonix  Disposition: MICU; transfer out after bridging     Mitzy Sloan DO, PGY-1  Attending physician: Dr. Israel Shepard

## 2019-06-23 NOTE — CONSULTS
Medical Intensive Care Unit     Dario Gar  Resident History and Physical/consult    Date and time: 6/22/2019 8:53 PM  Patient's name:  Gracie Pina Record Number: [id number]  Patient's account/billing number: [de-identified]  Patient's YOB: 1990  Age: 30 y.o. Date of Admission: 6/22/2019 12:58 PM  Length of stay during current admission: 0    Primary Care Physician: Zia Rosario MD  ICU Attending Physician: Dr. Sarah Camacho     Code Status: Full Code    Reason for ICU admission: DKA    History of Present Illness: This is a 30year old male with T1DM diagnosed 21 years ago, HTN, HLD, depression with hx of suicidal ideation, presented to ED with 2-3 days of nausea and vomiting. He was managed as DKA on 6/16 and discharged to [facility] on 6/19 with adjusting insulin regimen. However he mentions after discharge, he didn't get enough insulin, he was just getting 30-40 U lantus at night, 3 U premeal. He also mentions his BG running high, > 500, and but he denies any fever, chills, polyuria, polydipsia, chest tightness and productive cough. While in the ED, VS were significant for tachycardia, hypotension.  Labs revealed Na 125, K 6.8, Cl 77, HCO3 7, BUN 65, Cr 2.4, AG 41, LA 3, , measured serum osmolality 344, calculated 323, BHB >4.5, wbc 18, pH 7.18  EKG showing sinus tachycardia, no T wave change compare to previous one   He was started on DKA protocol, and one dose of cefepime, received 3.5L IVF boluses started on low dose levo for hypotension, transferred to the MICU for further management       CURRENT VENTILATION STATUS:   [] Ventilator  [] BIPAP  [] Nasal Cannula [x] Room Air      IF INTUBATED, ET TUBE MARKING AT LOWER LIP:       cms    SECRETIONS   Amount:  [] Small [] Moderate  [] Large [x] None    Color:     [] White [] Colored  [] Bloody    SEDATION:  RAAS Score:  [] Propofol gtt  [] Versed gtt  [] Ativan gtt   [x] No Sedation    PARALYZED:  [x] No [] Yes    VASOPRESSORS:  [] No    [x] Yes    If yes -   [x] Levophed       [] Dopamine     [] Vasopressin       [] Dobutamine  [] Phenylephrine         [] Epinephrine    CENTRAL LINES:     [] No   [x] Yes   (Date of Insertion:   )           If yes -     [] Right IJ     [] Left IJ [x] Right Femoral [] Left Femoral                   [] Right Subclavian [] Left Subclavian     DUDLEY'S CATHETER:   [] No   [x] Yes  (Date of Insertion:   )     URINE OUTPUT:            [] Good   [x] Low              [] Anuric    REVIEW OF SYSTEMS:    · Constitutional: No fever, no chills, no change in weight; good appetite  · HEENT: No blurred vision, no ear problems, no sore throat, no rhinorrhea. · Respiratory: No cough, no sputum production, no pleuritic chest pain, no shortness of breath  · Cardiology: No angina, no dyspnea on exertion, no paroxysmal nocturnal dyspnea, no orthopnea, no palpitation, no leg swelling. · Gastroenterology: No dysphagia, no reflux; no abdominal pain, + nausea or vomiting; no constipation or diarrhea.  No hematochezia   · Genitourinary: No dysuria, no frequency, hesitancy; no hematuria  · Musculoskeletal: no joint pain, no myalgia, no change in range of movement  · Neurology: no focal weakness in extremities, no slurred speech, no double vision, no tingling or numbness sensation  · Endocrinology: no temperature intolerance, no polyphagia, polydipsia or polyuria  · Hematology: no increased bleeding, no bruising, no lymphadenopathy  · Skin: no skin changes noticed by patient  · Psychology: no depressed mood, no suicidal ideation    OBJECTIVE:     VITAL SIGNS:  BP (!) 114/58   Pulse 107   Temp 98.7 °F (37.1 °C)   Resp 25   Ht 5' 9\" (1.753 m)   Wt 233 lb (105.7 kg)   SpO2 100%   BMI 34.41 kg/m²   Tmax over 24 hours:  Temp (24hrs), Av.9 °F (36.6 °C), Min:96.9 °F (36.1 °C), Max:98.7 °F (37.1 °C)      Patient Vitals for the past 6 hrs:   BP Temp Temp src Pulse Resp SpO2   19 (!) 114/58 98.7 °F (37.1 °C) -- 107 25 --   06/22/19 1915 (!) 116/59 -- -- 106 18 100 %   06/22/19 1900 (!) 111/55 -- -- 105 20 --   06/22/19 1845 (!) 109/55 -- -- 101 17 --   06/22/19 1830 -- -- -- 101 16 --   06/22/19 1815 116/63 -- -- 104 20 --   06/22/19 1811 (!) 119/59 -- -- 106 17 --   06/22/19 1806 119/67 -- -- 106 19 --   06/22/19 1801 (!) 103/48 -- -- 103 16 98 %   06/22/19 1800 -- -- -- 103 17 99 %   06/22/19 1745 (!) 97/49 -- -- 103 17 98 %   06/22/19 1739 (!) 104/57 -- -- 103 19 100 %   06/22/19 1736 (!) 112/54 -- -- 105 18 99 %   06/22/19 1731 (!) 97/48 -- -- 103 17 99 %   06/22/19 1730 -- -- -- 103 17 98 %   06/22/19 1721 (!) 84/46 -- -- 105 19 98 %   06/22/19 1718 (!) 80/43 -- -- 104 18 98 %   06/22/19 1716 (!) 78/47 -- -- 103 18 98 %   06/22/19 1711 (!) 102/51 -- -- 105 18 99 %   06/22/19 1707 (!) 107/52 -- -- 104 16 100 %   06/22/19 1702 (!) 101/54 -- -- 106 16 100 %   06/22/19 1646 (!) 99/51 -- -- 104 17 100 %   06/22/19 1641 (!) 98/52 -- -- 105 18 100 %   06/22/19 1637 (!) 109/52 -- -- 107 16 100 %   06/22/19 1632 (!) 116/57 -- -- 109 20 100 %   06/22/19 1544 (!) 96/59 -- -- 104 22 100 %   06/22/19 1505 112/60 98.2 °F (36.8 °C) Oral 113 22 99 %       No intake or output data in the 24 hours ending 06/22/19 2053  Wt Readings from Last 2 Encounters:   06/22/19 233 lb (105.7 kg)   06/18/19 233 lb 9.6 oz (106 kg)     Body mass index is 34.41 kg/m².       PHYSICAL EXAMINATION:  General appearance - alert, well appearing, and in no distress  Mental status - alert, oriented to person, place, and time  Eyes - pupils equal and reactive, extraocular eye movements intact  Ears - bilateral TM's and external ear canals normal  Nose - normal and patent, no erythema, discharge or polyps  Mouth - mucous membranes moist, pharynx normal without lesions  Neck - supple, no significant adenopathy  Chest - clear to auscultation, no wheezes, rales or rhonchi, symmetric air entry  Heart - normal rate, regular rhythm, normal S1, S2, no murmurs, rubs, clicks or gallops  Abdomen - soft, nontender, nondistended, no masses or organomegaly  Neurological - alert, oriented, normal speech, no focal findings or movement disorder noted}  Extremities - peripheral pulses normal, no pedal edema, no clubbing or cyanosis  Skin - normal coloration and turgor, no rashes, no suspicious skin lesions noted      Any additional physical findings:    MEDICATIONS:  Scheduled Meds:   sterile water        cefepime  2 g Intravenous Once    calcium gluconate IVPB  1 g Intravenous Once    sodium chloride flush  10 mL Intravenous 2 times per day    enoxaparin  40 mg Subcutaneous Daily     Continuous Infusions:   sodium chloride 250 mL/hr at 06/22/19 2040    dextrose 5 % and 0.45 % NaCl      insulin (HUMAN R) non-weight based infusion 7.95 Units/hr (06/22/19 2016)    norepinephrine Stopped (06/22/19 2018)     PRN Meds:     dextrose 12.5 g PRN   magnesium sulfate 1 g PRN   dextrose 5 % and 0.45 % NaCl  Continuous PRN   sodium chloride flush 10 mL PRN   ondansetron 4 mg Q6H PRN   senna 1 tablet Daily PRN       VENT SETTINGS (Comprehensive) (if applicable):      Additional Respiratory  Assessments  Pulse: 107  Resp: 25  SpO2: 100 %    ABGs:   Recent Labs     06/22/19  1450 06/22/19 2042   PH 7.186* 7.280*   PCO2 15.0* <15.0*   PO2 122.2* 64.9   HCO3 5.5*  --    BE -20.4*  --    O2SAT 97.2 90.1*       Laboratory findings:  Complete Blood Count: Recent Labs     06/22/19  1310   WBC 18.0*   HGB 14.3   HCT 44.5           Last 3 Blood Glucose:   Recent Labs     06/22/19  1310 06/22/19  1615 06/22/19  1930   GLUCOSE 891* 637* 470        PT/INR:    Lab Results   Component Value Date    PROTIME 11.8 06/22/2019    PROTIME 10.4 05/18/2012    INR 1.0 06/22/2019     PTT:    Lab Results   Component Value Date    APTT 33.7 06/22/2019       Comprehensive Metabolic Profile:   Recent Labs     06/22/19  1310 06/22/19  1314 06/22/19  1615 06/22/19  1816 06/22/19  1930   * resp culture  · Trend cardiac enzyme X 3  · Monitor UO and creat  · Resume synthroid   · Hold Zoloft for now   · Zofran PRN  · CXR clear, no signs of infection. Hold abx for now   · Not started bicarb drip since PH > 6.9, but if unable to wean down pressor will be more aggressive to correct Bicarb and PH        WEAN PER PROTOCOL:  [] No   [] Yes  [x] N/A    ICU PROPHYLAXIS:  Stress ulcer:  [x] PPI Agent  [] [id number] [] Sucralfate  [] Other:  VTE:   [x] Enoxaparin  [] Unfract. Heparin Subcut  [] EPC Cuffs    NUTRITION:  [x] NPO [] Tube Feeding (Specify: ) [] TPN  [] PO (Diet: Diet NPO Effective Now)    INSULIN DRIP:   [] No   [x] Yes    CONSULTATION NEEDED:   [x] No   [] Yes    FAMILY UPDATED:    [] No   [x] Yes    TRANSFER OUT OF ICU:   [x] No   [] Yes    Citlalli Lazaro MD PGY-1  Attending Physician: Dr. Noelle Aparicio   6/22/2019, 8:53 PM   I personally saw, examined and provided care for the patient. Radiographs, labs and medication list were reviewed by me independently. I spoke with bedside nursing, therapists and consultants. Critical care services and times documented are independent of procedures and multidisciplinary rounds with Residents. Additionally comprehensive, multidisciplinary rounds were conducted with the MICU team. The case was discussed in detail and plans for care were established. Review of Residents documentation was conducted and revisions were made as appropriate. I agree with the above documented exam, problem list and plan of care.     Glenda Edouard

## 2019-06-23 NOTE — PLAN OF CARE
Problem: Falls - Risk of:  Goal: Will remain free from falls  Outcome: Met This Shift  Goal: Absence of physical injury  Outcome: Met This Shift     Plan of care discussed with patient / family.

## 2019-06-24 LAB
ANION GAP SERPL CALCULATED.3IONS-SCNC: 14 MMOL/L (ref 7–16)
BASOPHILS ABSOLUTE: 0.06 E9/L (ref 0–0.2)
BASOPHILS RELATIVE PERCENT: 0.5 % (ref 0–2)
BUN BLDV-MCNC: 24 MG/DL (ref 6–20)
CALCIUM SERPL-MCNC: 8.5 MG/DL (ref 8.6–10.2)
CHLORIDE BLD-SCNC: 98 MMOL/L (ref 98–107)
CO2: 22 MMOL/L (ref 22–29)
CREAT SERPL-MCNC: 0.9 MG/DL (ref 0.7–1.2)
EOSINOPHILS ABSOLUTE: 0.12 E9/L (ref 0.05–0.5)
EOSINOPHILS RELATIVE PERCENT: 1 % (ref 0–6)
GFR AFRICAN AMERICAN: >60
GFR NON-AFRICAN AMERICAN: >60 ML/MIN/1.73
GLUCOSE BLD-MCNC: 234 MG/DL (ref 74–99)
HCT VFR BLD CALC: 35.5 % (ref 37–54)
HEMOGLOBIN: 12 G/DL (ref 12.5–16.5)
IMMATURE GRANULOCYTES #: 0.08 E9/L
IMMATURE GRANULOCYTES %: 0.7 % (ref 0–5)
LYMPHOCYTES ABSOLUTE: 2.61 E9/L (ref 1.5–4)
LYMPHOCYTES RELATIVE PERCENT: 21.8 % (ref 20–42)
MCH RBC QN AUTO: 30.8 PG (ref 26–35)
MCHC RBC AUTO-ENTMCNC: 33.8 % (ref 32–34.5)
MCV RBC AUTO: 91 FL (ref 80–99.9)
METER GLUCOSE: 145 MG/DL (ref 74–99)
METER GLUCOSE: 149 MG/DL (ref 74–99)
METER GLUCOSE: 187 MG/DL (ref 74–99)
METER GLUCOSE: 208 MG/DL (ref 74–99)
METER GLUCOSE: 280 MG/DL (ref 74–99)
MONOCYTES ABSOLUTE: 0.88 E9/L (ref 0.1–0.95)
MONOCYTES RELATIVE PERCENT: 7.4 % (ref 2–12)
MRSA CULTURE ONLY: NORMAL
NEUTROPHILS ABSOLUTE: 8.22 E9/L (ref 1.8–7.3)
NEUTROPHILS RELATIVE PERCENT: 68.6 % (ref 43–80)
PDW BLD-RTO: 11.7 FL (ref 11.5–15)
PLATELET # BLD: 315 E9/L (ref 130–450)
PMV BLD AUTO: 9.6 FL (ref 7–12)
POTASSIUM SERPL-SCNC: 4.1 MMOL/L (ref 3.5–5)
RBC # BLD: 3.9 E12/L (ref 3.8–5.8)
SODIUM BLD-SCNC: 134 MMOL/L (ref 132–146)
URINE CULTURE, ROUTINE: NORMAL
WBC # BLD: 12 E9/L (ref 4.5–11.5)

## 2019-06-24 PROCEDURE — 82962 GLUCOSE BLOOD TEST: CPT

## 2019-06-24 PROCEDURE — 99233 SBSQ HOSP IP/OBS HIGH 50: CPT | Performed by: INTERNAL MEDICINE

## 2019-06-24 PROCEDURE — 6370000000 HC RX 637 (ALT 250 FOR IP): Performed by: INTERNAL MEDICINE

## 2019-06-24 PROCEDURE — 6360000002 HC RX W HCPCS: Performed by: INTERNAL MEDICINE

## 2019-06-24 PROCEDURE — 2580000003 HC RX 258: Performed by: INTERNAL MEDICINE

## 2019-06-24 PROCEDURE — 1200000000 HC SEMI PRIVATE

## 2019-06-24 PROCEDURE — 80048 BASIC METABOLIC PNL TOTAL CA: CPT

## 2019-06-24 PROCEDURE — 85025 COMPLETE CBC W/AUTO DIFF WBC: CPT

## 2019-06-24 PROCEDURE — 36415 COLL VENOUS BLD VENIPUNCTURE: CPT

## 2019-06-24 RX ADMIN — INSULIN LISPRO 15 UNITS: 100 INJECTION, SOLUTION INTRAVENOUS; SUBCUTANEOUS at 13:24

## 2019-06-24 RX ADMIN — PANTOPRAZOLE SODIUM 40 MG: 40 TABLET, DELAYED RELEASE ORAL at 06:23

## 2019-06-24 RX ADMIN — INSULIN LISPRO 1 UNITS: 100 INJECTION, SOLUTION INTRAVENOUS; SUBCUTANEOUS at 21:37

## 2019-06-24 RX ADMIN — INSULIN LISPRO 2 UNITS: 100 INJECTION, SOLUTION INTRAVENOUS; SUBCUTANEOUS at 17:06

## 2019-06-24 RX ADMIN — ENOXAPARIN SODIUM 40 MG: 40 INJECTION SUBCUTANEOUS at 09:30

## 2019-06-24 RX ADMIN — Medication 10 ML: at 21:44

## 2019-06-24 RX ADMIN — INSULIN GLARGINE 50 UNITS: 100 INJECTION, SOLUTION SUBCUTANEOUS at 21:36

## 2019-06-24 RX ADMIN — SERTRALINE 50 MG: 50 TABLET, FILM COATED ORAL at 09:30

## 2019-06-24 RX ADMIN — SODIUM CHLORIDE, POTASSIUM CHLORIDE, SODIUM LACTATE AND CALCIUM CHLORIDE: 600; 310; 30; 20 INJECTION, SOLUTION INTRAVENOUS at 06:12

## 2019-06-24 RX ADMIN — INSULIN LISPRO 2 UNITS: 100 INJECTION, SOLUTION INTRAVENOUS; SUBCUTANEOUS at 09:03

## 2019-06-24 RX ADMIN — ROSUVASTATIN CALCIUM 10 MG: 10 TABLET, FILM COATED ORAL at 21:36

## 2019-06-24 RX ADMIN — INSULIN LISPRO 7 UNITS: 100 INJECTION, SOLUTION INTRAVENOUS; SUBCUTANEOUS at 06:23

## 2019-06-24 RX ADMIN — INSULIN LISPRO 6 UNITS: 100 INJECTION, SOLUTION INTRAVENOUS; SUBCUTANEOUS at 13:24

## 2019-06-24 RX ADMIN — LEVOTHYROXINE SODIUM 25 MCG: 25 TABLET ORAL at 06:23

## 2019-06-24 ASSESSMENT — PAIN SCALES - GENERAL
PAINLEVEL_OUTOF10: 0

## 2019-06-24 NOTE — PROGRESS NOTES
[facility]  Internal Medicine Residency Program  Progress Note - House Team 1    Patient:  Fernanda Menchaca 30 y.o. male MRN: [id number]     Date of Service: 6/24/2019     CC: DKA  Overnight events: none     Subjective     Patient seen and examined this AM  Awake, alert, no sob, chest pain. Deny suicidal ideation   AG closed.  < 145 < 208 < 103     Objective     Physical Exam:  · Vitals: /83   Pulse 93   Temp 97.7 °F (36.5 °C) (Oral)   Resp 19   Ht 5' 9\" (1.753 m)   Wt 222 lb 14.4 oz (101.1 kg)   SpO2 97%   BMI 32.92 kg/m²     I & O - 24hr: I/O this shift:  In: 320 [P.O.:320]  · Out: -    · General Appearance: alert, appears stated age and cooperative  · HEENT:  Head: Normocephalic, no lesions, without obvious abnormality. · Eye: Normal external eye, conjunctiva, lids cornea, ERASTO. · Neck: no adenopathy, no carotid bruit, no JVD, supple, symmetrical, trachea midline and thyroid not enlarged, symmetric, no tenderness/mass/nodules  · Lung: clear to auscultation bilaterally  · Heart: regular rate and rhythm, S1, S2 normal, no murmur, click, rub or gallop  · Abdomen: soft, non-tender; bowel sounds normal; no masses,  no organomegaly  · Extremities:  extremities normal, atraumatic, no cyanosis or edema  · Musculokeletal: No joint swelling, no muscle tenderness. ROM normal in all joints of extremities.    · Neurologic: Mental status: Alert, oriented, thought content appropriate  Subject  Pertinent Labs & Imaging Studies   fabiana  CBC with Differential:    Lab Results   Component Value Date    WBC 12.0 06/24/2019    RBC 3.90 06/24/2019    HGB 12.0 06/24/2019    HCT 35.5 06/24/2019     06/24/2019    MCV 91.0 06/24/2019    MCH 30.8 06/24/2019    MCHC 33.8 06/24/2019    RDW 11.7 06/24/2019    SEGSPCT 59 07/09/2013    LYMPHOPCT 21.8 06/24/2019    MONOPCT 7.4 06/24/2019    BASOPCT 0.5 06/24/2019    MONOSABS 0.88 06/24/2019    LYMPHSABS 2.61 06/24/2019    EOSABS 0.12 06/24/2019 BASOSABS 0.06 06/24/2019     CMP:    Lab Results   Component Value Date     06/24/2019    K 4.1 06/24/2019    K 4.0 06/11/2018    CL 98 06/24/2019    CO2 22 06/24/2019    BUN 24 06/24/2019    CREATININE 0.9 06/24/2019    GFRAA >60 06/24/2019    LABGLOM >60 06/24/2019    GLUCOSE 234 06/24/2019    GLUCOSE 57 05/18/2012    PROT 7.4 06/22/2019    LABALBU 4.1 06/22/2019    LABALBU 5.3 05/18/2012    CALCIUM 8.5 06/24/2019    BILITOT 0.5 06/22/2019    ALKPHOS 104 06/22/2019    AST 18 06/22/2019    ALT 18 06/22/2019     Albumin:    Lab Results   Component Value Date    LABALBU 4.1 06/22/2019    LABALBU 5.3 05/18/2012     Calcium:    Lab Results   Component Value Date    CALCIUM 8.5 06/24/2019     Magnesium:    Lab Results   Component Value Date    MG 2.3 06/23/2019     Phosphorus:    Lab Results   Component Value Date    PHOS 2.7 06/23/2019       Resident's Assessment and Plan     Assessment:      1. DKA, Type I DM, 2/2 inadequate insulin (erroneous dose at facility); stable   2. ASHLEY, likely prerenal - resolving   3. Hypothyroidism  4.  HTN - currently hypotensive   5. Elevated troponin, likely 2/2 ASHLEY and demand ischemia - resolving   6. Hyperkalemia, resolving    7. HAGMA, resolving   8. Lactic acidosis, resolved   9. Hypovolemic shock  10. Severe depression with hx of suicidal ideation   11.  Leukocytosis and elevated Procalcitonin; antibiotics per ICU team;      Plan:         · Continue Lantus 50U, premeal 7/15/15, +sliding scale   · Resume amlodipine   · Hold lisinopril and HCTZ  · Monitor UO and creat  · Continue synthroid   · Zoloft restarted   · Psych consulted     PT/OT evaluation: none needed   DVT prophylaxis/ GI prophylaxis: Lovenox/Protonix  Disposition: MICU; transfer out    Lit James MD, PGY-1  Attending physician: Dr. Parsih Cai  Department of Internal Medicine  Internal Medicine Residency / [address]    Attending Physician Statement    Hieu Cerna case was discussed, including pertinent history and examination findings with the multidisciplinary team during bedside rounds. I have seen and examined the patient and the key elements of the encounter have been performed by myself. I have read and reviewed the documentation. If needed or disputed the following findings, counseling and treatment options which have been corrected and communicated back to the patient, family if applicable and contributing physicians. I agree with the assessment, plan and orders as noted by the resident.       Sung Thomas DO , Eve Finn  Professor of Medicine  Pulmonary, [address] [place] [address] Sleep Medicine  Internal Medicine Academic Faculty

## 2019-06-24 NOTE — PROGRESS NOTES
[address] [facility]  Department of Internal Medicine  Internal Medicine Residency Program  Resident MICU Progress  Note      Patient:  Hieu Cerna 30 y.o. male MRN: [id number]     Date of Service: 6/24/2019    Allergy: Pineapple  CC: F/u:   Subjective       Pt seen and examined at bedside. Alert and oriented, no event overnight. BGL stable. ASHLEY resolved. OK to transfer out of MICU:  - Psych consulted  - Home meds restarted except for HCTZ due to recent ASHLEY    Objective   Physical Exam:  · Vitals: /83   Pulse 93   Temp 97.7 °F (36.5 °C) (Oral)   Resp 19   Ht 5' 9\" (1.753 m)   Wt 222 lb 14.4 oz (101.1 kg)   SpO2 97%   BMI 32.92 kg/m²     I & O - 24hr: In: 9028 [P.O.:740;  I.V.:1015]  · Out: 0   § General Appearance: alert and oriented to person, place and time, well developed and well- nourished, in no acute distress  § Skin: warm and dry, no rash or erythema  § Head: normocephalic and atraumatic  § Eyes: pupils equal, round, and reactive to light, extraocular eye movements intact, conjunctivae normal  § ENT: tympanic membrane, external ear and ear canal normal bilaterally, nose without deformity, nasal mucosa and turbinates normal without polyps  § Neck: supple and non-tender without mass, no thyromegaly or thyroid nodules, no cervical lymphadenopathy  § Pulmonary/Chest: clear to auscultation bilaterally- no wheezes, rales or rhonchi, normal air movement, no respiratory distress  § Cardiovascular: normal rate, regular rhythm, normal S1 and S2, no murmurs, rubs, clicks, or gallops, distal pulses intact, no carotid bruits  § Abdomen: soft, non-tender, non-distended, normal bowel sounds, no masses or organomegaly  § Extremities: no cyanosis, clubbing or edema  § Musculoskeletal: normal range of motion, no joint swelling, deformity or tenderness  § Neurologic: reflexes normal and symmetric, no cranial nerve deficit, gait, coordination and speech normal       ·     Pertinent New Labs & Imaging Studies     CBC:   Lab Results   Component Value Date    WBC 12.0 2019    RBC 3.90 2019    HGB 12.0 2019    HCT 35.5 2019    MCV 91.0 2019    MCH 30.8 2019    MCHC 33.8 2019    RDW 11.7 2019     2019    MPV 9.6 2019        Notable Cultures:       Culture  Date sent  Result    Nasal      Sputum      Urine Strep pneumonia Ag        Urine Legionella Ag        Blood        Urine          Antibiotic  Days  Day started                                   Oxygen:   Nasal cannula L/min     Face mask %     Reservoirs mask %       ABG:    Lab Results   Component Value Date    S3JQUTVI 98.0 2012         Lines:  Site  Day  Date inserted     TLC              PICC              Arterial line              Peripheral line                           DVT Prophylaxis      Heparin         Enoxaparin        PCDs        Imaging studies:  Ct Abdomen Pelvis Wo Contrast Additional Contrast? None    Result Date: 2019  Patient MRN: [id number] : 1990 Age:  30 years Gender: Male Order Date: 2019 2:45 PM Exam: CT ABDOMEN PELVIS WO CONTRAST Number of Images: 362 views Indication:   abd pain, shock, thu  Comparison: None. Technique: The CT of the scan of the abdomen and pelvis was obtained with axial images from base of the diaphragm to the pubic symphysis with multiplanar reformat. The study was obtained without IV contrast. Radiation Output: CTDIvol 25.40 (mGy); DLP 1489 (mGy-cm) Finding: The lung bases are clear. The heart and pericardium appear normal. The non-opacified liver demonstrates no evidence of parenchymal lesions or intrahepatic biliary dilatation. The gallbladder is well distended without radiopaque stones. The spleen, pancreas and adrenal glands do not show any appreciable abnormality. The kidneys show normal findings. There is no stone, mass or hydronephrosis. The urinary bladder is not fully distended.  No evidence of intestinal obstruction is seen. There is no evidence of appendicitis seen. No retroperitoneal or mesenteric lymphadenopathy is detected. The abdominal aorta and iliac arteries demonstrate no evidence of atherosclerotic changes or aneurysmal dilatation. The osseous structures of the abdomen and pelvis appear to be normal. The urinary bladder is very distended with small amount air seen which may be possibly iatrogenic. The prostate appears to be normal. There is calcification of both seminal vesicles. .     NO ACUTE PATHOLOGY SEEN IN ABDOMEN OR PELVIS The urinary bladder is very distended with small amount of air is seen in the dome of the urinary bladder. There is severe calcification of both seminal vesicles. [place] Chest Portable    Result Date: 2019  Patient MRN: [id number] : 1990 Age:  30 years Gender: Male Order Date: 2019 1:15 PM Exam: XR CHEST PORTABLE Number of Images: 1 view Indication:   SOB, N/V, abd pain SOB, N/V, abd pain Comparison: None. Findings: The heart is unremarkable. The lung fields are unremarkable. The aorta is unremarkable. normal chest       Resident's Assessment & Plan     Assessment:      1. DKA, Type I DM, 2/2 inadequate insulin(error dose)  2. ASHLEY, likely prerenal   3. Hypothyroidism  4.  HTN  5. Elevated troponin, likely 2/2 ASHLEY and demand ischemia   6. Hyperkalemia, resolved  7. HAGMA, resolved  8. Lactic acidosis, resolved   9. Hypovolemic shock, resolved  10.  Severe depression with hx of suicidal ideation      Plan:         · Bridge to lantus 50 units starting now and then nightly, SSI and premeal 7,15,15 units  · Hold lisinopril and HCTZ, resume amlodipine  · No need for abx since no cliical source of infection identified  · Monitor UO and creat  · Resume synthroid   · Hold Zoloft for now   · Zofran PRN  · Psych consult  · Transfer out of MICU    lovenox/protonix    Burton Palacios M.D., PGY 2    Attending physician: Dr. Marion Dickerson I personally saw, examined and provided care for the patient. Radiographs, labs and medication list were reviewed by me independently. I spoke with bedside nursing, therapists and consultants. Critical care services and times documented are independent of procedures and multidisciplinary rounds with Residents. Additionally comprehensive, multidisciplinary rounds were conducted with the MICU team. The case was discussed in detail and plans for care were established. Review of Residents documentation was conducted and revisions were made as appropriate. I agree with the above documented exam, problem list and plan of care. Tonia Liao M.D.    Pulmonary/Critical Care Medicine

## 2019-06-24 NOTE — PLAN OF CARE
Problem: Risk for Impaired Skin Integrity  Goal: Tissue integrity - skin and mucous membranes  Description  Structural intactness and normal physiological function of skin and  mucous membranes.   Outcome: Met This Shift     Problem: Falls - Risk of:  Goal: Absence of physical injury  Description  Absence of physical injury  Outcome: Met This Shift     Problem: Serum Glucose Level - Abnormal:  Goal: Ability to maintain appropriate glucose levels will improve  Description  Ability to maintain appropriate glucose levels will improve  6/24/2019 1810 by Caryle Rides, RN  Outcome: Met This Shift     Problem: Sensory Perception - Impaired:  Goal: Ability to maintain a stable neurologic state will improve  Description  Ability to maintain a stable neurologic state will improve  6/24/2019 1810 by Caryle Rides, RN  Outcome: Met This Shift

## 2019-06-25 VITALS
TEMPERATURE: 98.2 F | WEIGHT: 228.3 LBS | BODY MASS INDEX: 33.82 KG/M2 | HEART RATE: 88 BPM | OXYGEN SATURATION: 98 % | HEIGHT: 69 IN | DIASTOLIC BLOOD PRESSURE: 70 MMHG | SYSTOLIC BLOOD PRESSURE: 135 MMHG | RESPIRATION RATE: 16 BRPM

## 2019-06-25 LAB
ANION GAP SERPL CALCULATED.3IONS-SCNC: 11 MMOL/L (ref 7–16)
BASOPHILS ABSOLUTE: 0.07 E9/L (ref 0–0.2)
BASOPHILS RELATIVE PERCENT: 0.8 % (ref 0–2)
BUN BLDV-MCNC: 13 MG/DL (ref 6–20)
CALCIUM SERPL-MCNC: 8.8 MG/DL (ref 8.6–10.2)
CHLORIDE BLD-SCNC: 100 MMOL/L (ref 98–107)
CO2: 26 MMOL/L (ref 22–29)
CREAT SERPL-MCNC: 0.9 MG/DL (ref 0.7–1.2)
EOSINOPHILS ABSOLUTE: 0.14 E9/L (ref 0.05–0.5)
EOSINOPHILS RELATIVE PERCENT: 1.5 % (ref 0–6)
GFR AFRICAN AMERICAN: >60
GFR NON-AFRICAN AMERICAN: >60 ML/MIN/1.73
GLUCOSE BLD-MCNC: 91 MG/DL (ref 74–99)
HCT VFR BLD CALC: 36.5 % (ref 37–54)
HEMOGLOBIN: 12.3 G/DL (ref 12.5–16.5)
IMMATURE GRANULOCYTES #: 0.04 E9/L
IMMATURE GRANULOCYTES %: 0.4 % (ref 0–5)
LYMPHOCYTES ABSOLUTE: 2.74 E9/L (ref 1.5–4)
LYMPHOCYTES RELATIVE PERCENT: 30 % (ref 20–42)
MCH RBC QN AUTO: 30.5 PG (ref 26–35)
MCHC RBC AUTO-ENTMCNC: 33.7 % (ref 32–34.5)
MCV RBC AUTO: 90.6 FL (ref 80–99.9)
METER GLUCOSE: 255 MG/DL (ref 74–99)
METER GLUCOSE: 70 MG/DL (ref 74–99)
MONOCYTES ABSOLUTE: 0.89 E9/L (ref 0.1–0.95)
MONOCYTES RELATIVE PERCENT: 9.7 % (ref 2–12)
NEUTROPHILS ABSOLUTE: 5.25 E9/L (ref 1.8–7.3)
NEUTROPHILS RELATIVE PERCENT: 57.6 % (ref 43–80)
PDW BLD-RTO: 11.3 FL (ref 11.5–15)
PLATELET # BLD: 299 E9/L (ref 130–450)
PMV BLD AUTO: 9.7 FL (ref 7–12)
POTASSIUM SERPL-SCNC: 3.8 MMOL/L (ref 3.5–5)
RBC # BLD: 4.03 E12/L (ref 3.8–5.8)
SODIUM BLD-SCNC: 137 MMOL/L (ref 132–146)
WBC # BLD: 9.1 E9/L (ref 4.5–11.5)

## 2019-06-25 PROCEDURE — 6370000000 HC RX 637 (ALT 250 FOR IP): Performed by: INTERNAL MEDICINE

## 2019-06-25 PROCEDURE — 6360000002 HC RX W HCPCS: Performed by: INTERNAL MEDICINE

## 2019-06-25 PROCEDURE — 36415 COLL VENOUS BLD VENIPUNCTURE: CPT

## 2019-06-25 PROCEDURE — 2580000003 HC RX 258: Performed by: INTERNAL MEDICINE

## 2019-06-25 PROCEDURE — 80048 BASIC METABOLIC PNL TOTAL CA: CPT

## 2019-06-25 PROCEDURE — 85025 COMPLETE CBC W/AUTO DIFF WBC: CPT

## 2019-06-25 PROCEDURE — 99253 IP/OBS CNSLTJ NEW/EST LOW 45: CPT | Performed by: NURSE PRACTITIONER

## 2019-06-25 PROCEDURE — 82962 GLUCOSE BLOOD TEST: CPT

## 2019-06-25 RX ORDER — AMLODIPINE BESYLATE 10 MG/1
10 TABLET ORAL DAILY
Status: DISCONTINUED | OUTPATIENT
Start: 2019-06-25 | End: 2019-06-25 | Stop reason: HOSPADM

## 2019-06-25 RX ORDER — CARVEDILOL 3.12 MG/1
3.12 TABLET ORAL 2 TIMES DAILY WITH MEALS
Status: DISCONTINUED | OUTPATIENT
Start: 2019-06-25 | End: 2019-06-25 | Stop reason: HOSPADM

## 2019-06-25 RX ORDER — SERTRALINE HYDROCHLORIDE 100 MG/1
100 TABLET, FILM COATED ORAL DAILY
Qty: 90 TABLET | Refills: 0 | Status: ON HOLD | OUTPATIENT
Start: 2019-06-25 | End: 2020-04-09

## 2019-06-25 RX ADMIN — LEVOTHYROXINE SODIUM 25 MCG: 25 TABLET ORAL at 06:29

## 2019-06-25 RX ADMIN — INSULIN LISPRO 15 UNITS: 100 INJECTION, SOLUTION INTRAVENOUS; SUBCUTANEOUS at 12:32

## 2019-06-25 RX ADMIN — PANTOPRAZOLE SODIUM 40 MG: 40 TABLET, DELAYED RELEASE ORAL at 06:29

## 2019-06-25 RX ADMIN — CARVEDILOL 3.12 MG: 3.12 TABLET, FILM COATED ORAL at 11:13

## 2019-06-25 RX ADMIN — Medication 10 ML: at 11:13

## 2019-06-25 RX ADMIN — CARVEDILOL 3.12 MG: 3.12 TABLET, FILM COATED ORAL at 17:00

## 2019-06-25 RX ADMIN — ENOXAPARIN SODIUM 40 MG: 40 INJECTION SUBCUTANEOUS at 10:09

## 2019-06-25 RX ADMIN — SERTRALINE 50 MG: 50 TABLET, FILM COATED ORAL at 10:09

## 2019-06-25 RX ADMIN — INSULIN LISPRO 6 UNITS: 100 INJECTION, SOLUTION INTRAVENOUS; SUBCUTANEOUS at 12:33

## 2019-06-25 RX ADMIN — AMLODIPINE BESYLATE 10 MG: 10 TABLET ORAL at 11:13

## 2019-06-25 ASSESSMENT — PAIN SCALES - GENERAL
PAINLEVEL_OUTOF10: 0
PAINLEVEL_OUTOF10: 0

## 2019-06-25 NOTE — PROGRESS NOTES
Reason for consult:  Uncontrolled type 1     A1C: 11.6%     [] Not available            Comment:          Patient states the following concerns/barriers to diabetes self-management:       [x] None       [] Medication cost   [] Food cost/availability      [] Vision     [] Reading  [] Hearing    [] Physical limitations     [] Work schedule         [] Other:      Comment:                    Diabetes survival packet provided to:   [x] Patient     [] Family member/significant other        Information reviewed: Definition of diabetes      Target glucose ranges/A1C      Self-monitoring of blood glucose      Prevention/symptoms/treatment of hypo-/hyperglycemia      Medication adherence      The plate method/meal planning guidelines      The benefits of exercise and recommendations      Reducing the risk of chronic complications       Diabetes support group    Comment:  Patient admits that he \"needs to get back to where he used to be\" with his diabetes self-management. We reviewed his discharge medications, and he prefers to use the vials and syringes for insulin delivery. (Teodoro Swanson is currently via pen, but he is alright with this). He is interested in coming back to outpatient diabetes education 1:1 to meet with a dietitian. He has had diabetes for 21 years, but feels \"it would not hurt to brush up. \"  I provided him with our contact information and he states he will call when his work schedule allows for him to attend class. Patient has a working glucometer and plenty of supplies.      Diabetes medications reviewed (use, purpose, action, side effects):  Basaglar, Humalog     Evaluation/Plan/Recommendations:   Patient's understanding of diabetes:   []Poor   []Fair    [x]Good   [] Excellent     Outpatient diabetes education is recommended:   [x] Yes  [] No  Patient is interested in outpatient diabetes education:   [x] Yes    [] No    [] Unsure    Recommended:     [] Consult to social work; patient has no insurance or financial hardship      [] Script for glucometer and supplies (per preference of patient's insurance)               [x] Script for outpatient diabetes education classes from PCP    [x] Carbohydrate-controlled diet    Thank you for this consult.

## 2019-06-25 NOTE — PROGRESS NOTES
CLINICAL PHARMACY NOTE: MEDS TO [address] Select Patient?: No  Total # of Prescriptions Filled: 11   The following medications were delivered to the patient:  · SERTRALINE 100 MG   · CARVEDILOL 6.25 MG   · PANTOPRAZOLE 40 MG   · LEVOTHYROXINE 25 MCG.    · AMLODIPINE 10 MG   · AUGMENTIN 500/125 MG   · ADMELOG   · INSULIN SYRINGES   · HCTZ 25 MG   · PEN NEEDLES   · BASAGLAR   Total # of Interventions Completed: 17  Time Spent (min): 90    Additional Documentation:  OTC LOZENGES DELIVERED ALSO

## 2019-06-25 NOTE — PLAN OF CARE
Problem: Pain:  Goal: Pain level will decrease  Description  Pain level will decrease  Outcome: Met This Shift  Goal: Control of acute pain  Description  Control of acute pain  Outcome: Met This Shift  Goal: Control of chronic pain  Description  Control of chronic pain  Outcome: Met This Shift     Problem: Risk for Impaired Skin Integrity  Goal: Tissue integrity - skin and mucous membranes  Description  Structural intactness and normal physiological function of skin and  mucous membranes.   Outcome: Met This Shift     Problem: Falls - Risk of:  Goal: Will remain free from falls  Description  Will remain free from falls  Outcome: Met This Shift  Goal: Absence of physical injury  Description  Absence of physical injury  Outcome: Met This Shift

## 2019-06-25 NOTE — DISCHARGE SUMMARY
[facility]  Discharge Summary    PCP: Sydnee Rea MD    Admit Date:6/22/2019  Discharge Date: 6/25/2019    Admission Diagnosis:   Patient Active Problem List   Diagnosis    Type 1 diabetes mellitus with ketoacidosis without coma [facility]    Diabetic nephropathy [facility]    Hypothyroid    Type 1 diabetes mellitus with ketoacidosis ([facility]    DKA (diabetic ketoacidoses) ([facility]    Pneumonia    Hypoxemia    HTN (hypertension)    Hyperlipidemia    C/f of JOSÉ MIGUEL (obstructive sleep apnea)    Obesity    DKA, type 1, not at goal [facility])    Acute kidney injury ([facility]         Discharge Diagnosis:    1. DKA, Type I DM, 2/2 inadequate insulin (erroneous dose at facility)  2. ASHLEY, resolved  3. Hypothyroidism  4.  HTN - currently hypotensive   5. Elevated troponin, likely 2/2 ASHLEY and demand ischemia   6. Hyperkalemia  7. Lactic acidosis, resolved   8. MDD with hx of suicidal ideation       Hospital Course: This is a 30year old male with T1DM diagnosed 21 years ago, HTN, HLD, depression with hx of suicidal ideation, presented to ED with 2-3 days of nausea and vomiting. He was managed as DKA on 6/16 and discharged to [facility] 6/19 with adjusting insulin regimen. However he mentions after discharge, he didn't get enough insulin, he was just getting 30-40 U lantus at night, 3 U premeal. He also mentions his BG running high, > 500. While in the ED, VS were significant for tachycardia, hypotension. Labs revealed severe acidosis with marked elevated BHB and ASHLEY, He was started on DKA protocol, and one dose of cefepime, received 3.5L IVF boluses started on low dose levo for hypotension, transferred to the MICU for further management. The next day he was bridging to SQ lantus 50 U with premeals and SSI, diet started, ASHLEY improving afterwards, he was transferred out of ICU.  Home meds restarted except for HCTZ due to recent ASHLEY; psychiatry consulted: MDD moderate, Patient is jozef for safety denies SI HI or AVH and does not meet admission criteria. SW to get collateral information from parents for safety and no access to guns. His renal function completed resolved. He will be discharged with home insulin regimen 7 U before breakfast, 15 before lunch, MD S/S and 45 U before bed. He will follow with our clinc and OP psych. Significant findings (history and exam, laboratory, radiological, pathology, other tests):   · General Appearance: alert, appears stated age and cooperative  · HEENT:  Head: Normocephalic, no lesions, without obvious abnormality. · Neck: no adenopathy, no carotid bruit, no JVD, supple, symmetrical, trachea midline and thyroid not enlarged, symmetric, no tenderness/mass/nodules  · Lung: clear to auscultation bilaterally  · Heart: regular rate and rhythm, S1, S2 normal, no murmur, click, rub or gallop  · Abdomen: soft, non-tender; bowel sounds normal; no masses,  no organomegaly  · Extremities:  extremities normal, atraumatic, no cyanosis or edema  · Musculokeletal: No joint swelling, no muscle tenderness. ROM normal in all joints of extremities. · Neurologic: Mental status: Alert, oriented, thought content appropriate        Pending test results: none     Consults:  1. Critical care     Procedures:  1.  Central line placement     Condition at discharge: Stable    Disposition: home    Discharge Medications:  Current Discharge Medication List      START taking these medications    Details   !! insulin aspart (NOVOLOG FLEXPEN) 100 UNIT/ML injection pen Check Glucose: Dose: If <139 No Insulin, 140-19 2 Units, 200-249 4 Units, 250-299 6 Units, 300-349 8 Units, 350-400 10 Units, Above 400 12 Units, do for Every meal.  Qty: 5 pen, Refills: 3      !! insulin aspart (NOVOLOG FLEXPEN) 100 UNIT/ML injection pen Inject 7 Units into the skin every morning (before breakfast)  Qty: 5 pen, Refills: 3      !! insulin aspart (NOVOLOG FLEXPEN) 100 UNIT/ML injection pen Inject 15 Units into the skin daily (before lunch)  Qty: 5 pen, Refills: 3       !! - Potential duplicate medications found. Please discuss with provider. CONTINUE these medications which have CHANGED    Details   sertraline (ZOLOFT) 100 MG tablet Take 1 tablet by mouth daily  Qty: 90 tablet, Refills: 0         CONTINUE these medications which have NOT CHANGED    Details   carvedilol (COREG) 3.125 MG tablet Take 3.125 mg by mouth 2 times daily (with meals)      insulin glargine (BASAGLAR KWIKPEN) 100 UNIT/ML injection pen Inject 45 Units into the skin nightly      lisinopril (PRINIVIL;ZESTRIL) 20 MG tablet Take 20 mg by mouth daily      hydrochlorothiazide (HYDRODIURIL) 12.5 MG tablet Take 1 tablet by mouth 2 times daily  Qty: 30 tablet, Refills: 3      amLODIPine (NORVASC) 10 MG tablet Take 1 tablet by mouth daily  Qty: 30 tablet, Refills: 3      pantoprazole (PROTONIX) 40 MG tablet Take 1 tablet by mouth every morning (before breakfast)  Qty: 30 tablet, Refills: 3      levothyroxine (SYNTHROID) 25 MCG tablet Take 1 tablet by mouth Daily  Qty: 30 tablet, Refills: 3      rosuvastatin (CRESTOR) 10 MG tablet Take 10 mg by mouth nightly          STOP taking these medications       insulin lispro (HUMALOG) 100 UNIT/ML injection vial Comments:   Reason for Stopping:         amoxicillin-clavulanate (AUGMENTIN) 500-125 MG per tablet Comments:   Reason for Stopping:         ondansetron (ZOFRAN) 4 MG tablet Comments:   Reason for Stopping:         Insulin Syringe-Needle U-100 25G X 1\" 1 ML MISC Comments:   Reason for Stopping:               Activity: activity as tolerated  Diet: diabetic diet    Follow-up appointments:   1. Internal medicine clinic in 1 week  2.  Psycho for outpatient follow up     Patient Instructions: please have regular meal, 7 U novolog before breakfast, 15 U novolog before lunch, medium dose sliding scale, 45 U basaglar before bed     Eliane Zurita MD  PGY 1   5:53 PM 6/25/2019

## 2019-06-25 NOTE — CONSULTS
PSYCHIATRIC EVALUATION  (Consult)     CHIEF COMPLAINT:   [x] Mood Problems [x] Anxiety Problems [] Psychosis                    [x] Suicidal/Homicidal   [] Aggression  [] Other    HISTORY OF PRESENT ILLNESS: Esau Grullon  is a 30 y.o. male who has a previous psychiatric history of depression and psychiatry is consulted for Suicidal thoughts. Symptoms onset was years ago and is becoming severe for the last few days. Patient admitted from [address] with DKA. Patient states that he is not having suicidal thoughts and does not feel as if the world would be a better place without him. Denies HI, delusions, and AVH. States that the Zoloft is helping with his mood. Patient denies access to weapons at home. Will be staying with parents. Precipitating Factors:     [x] Family Stress   [] Recent loss/grief Stress   [] Health Stress   [x] Relationship Stress    [] Legal Stress   [x] Environmental Stress    [x] Occupational Stress \"don't know if I will still have a job. [] Financial Stress   [] Substance Abuse [] Other      PAST PSYCHIATRIC HISTORY:   History of psychiatric Hospitalization:    [] Denies    [x] yes  [x] Days ago  Generations   []  Weeks Ago    [] Months ago  [] Years ago              [] [facility]  [] [facility]  [] Other:        [] Once  [] More than once    Outpatient treatment:  [] [facility]  [] [facility]  [] [facility]              [] [email or web address]  [] [facility]      [] [address] [place] [address] [] [facility]      [] [facility] [] [facility]  [] [facility] [] [facility]  [] Other              [] currently  [] in the past  [] Non-Compliant    [x] Denies    Previous suicide attempt: [x]Denies \"gun in car was a prop but I was not going to use it, everything in my life is going downhill.             [] yes  [] OD  [] Cutting  [] Hanging  [] Gun  [] Other    Previous psych medications:  [x] Was prescribed Zoloft                 [] Currently Taking       [] Never taken medications      PAST MEDICAL HISTORY:       Diagnosis Date  Abscess of back 8/2011    Bowel obstruction ([facility]     Diabetes mellitus type 1 ([facility] Diagnosed at 6years of age/ in 1998    HTN (hypertension)     Hyperlipidemia        FAMILY PSYCHIATRIC HISTORY:  Family History   Problem Relation Age of Onset    Diabetes Mother          [] Endorses    [] Father  [] Mother  [] Sibling [] [address] Parent      [] Depression  [] Anxiety  [] Bipolar  [] Psychosis  []  Other      [x] Denies       SOCIAL HISTORY:     1. Living Situation:[x] Private Residence was living at home with wife, but wife left him recently, will live with parents when discharged [] Homeless [] Nursing Home             [] Assisted Living [] [address]  [] Shelter [] Other   2. Employment:  [x] Yes  [] No  3. Legal History: [] No Arrest [x] Arrest Can't remember what for [] Theft  []  Assault  [] Substances   4. History of Trama/ Abuse: [x] Denies  [] Emotional [] Physical [] Sexual   5. Spirituality: [] Spiritual [x] Not Spiritual   6. Substance Abuse: [x] Denies  [] Drug of choice      [] Amphetamines [] Marijuana [] Cocaine      [] Opioids  [] Alcohol  [] Benzodiazepines  For further SH review SW note. Risk Assessment:  1. Risk Factors:   [x] Depression  [x] Anxiety  [] Psychosis   [x] Suicidal/Homicidal Thoughts [] Suicide Attempt [] Substance Abuse     2. Protective Factors: [] Controlled Environment         [x] Supportive Family []         [] Gnosticist Support     3. Level of Risk: [] Mild [] Moderate [x] Severe      Strengths & Weaknesses:    1. Strengths: [x] Ability to communicate feelings     [x] Independent ADL's     [x] Supportive Family    [] Current Health Status     2.  Weaknesses: [x] Emotional          [x] Motivational         MEDICATIONS: Current Facility-Administered Medications: amLODIPine (NORVASC) tablet 10 mg, 10 mg, Oral, Daily  carvedilol (COREG) tablet 3.125 mg, 3.125 mg, Oral, BID WC  sertraline (ZOLOFT) tablet 50 mg, 50 mg, Oral, Daily  insulin glargine (LANTUS) injection vial 50 Units, 50 Units, Subcutaneous, Nightly  insulin lispro (HUMALOG) injection vial 0-12 Units, 0-12 Units, Subcutaneous, TID WC  insulin lispro (HUMALOG) injection vial 0-6 Units, 0-6 Units, Subcutaneous, Nightly  insulin lispro (HUMALOG) injection vial 7 Units, 7 Units, Subcutaneous, QAM AC  insulin lispro (HUMALOG) injection vial 15 Units, 15 Units, Subcutaneous, Daily before lunch  dextrose 50 % IV solution, 12.5 g, Intravenous, PRN  sodium chloride flush 0.9 % injection 10 mL, 10 mL, Intravenous, 2 times per day  sodium chloride flush 0.9 % injection 10 mL, 10 mL, Intravenous, PRN  ondansetron (ZOFRAN) injection 4 mg, 4 mg, Intravenous, Q6H PRN  enoxaparin (LOVENOX) injection 40 mg, 40 mg, Subcutaneous, Daily  senna (SENOKOT) tablet 8.6 mg, 1 tablet, Oral, Daily PRN  levothyroxine (SYNTHROID) tablet 25 mcg, 25 mcg, Oral, Daily  pantoprazole (PROTONIX) tablet 40 mg, 40 mg, Oral, QAM AC  rosuvastatin (CRESTOR) tablet 10 mg, 10 mg, Oral, Nightly      MENTAL STATUS EXAM:       Mental Status Examination:    Cognition:      [x] Alert  [x] Awake  [x] Oriented  [x] Person  [x] Place [x] Time      [] drowsy  [] tired  [] lethargic  [] distractable     Attention/Concentration:   [x] Attentive  [] Distracted        Memory Recent and Remote: [x] Intact   [] Impaired [] Partially Impaired     Language:  [x] Able to recognize and name objects          [] Unable to recognize and name Objects    Fund of Knowledge:  [] Poor [x]  Fair  [] Good    Speech: [] Normal  [x] Soft  [x] Slow  [] Fast [] Pressured            [] Loud [] Dysarthria  [] Incoherent       Appearance: [] Well Groomed  [] Casual Dressed  [] Unkept  [x] Disheveled          [x] Normal weight[] Thin  [] Overweight  [] Obese           Attitude: [] Positive  [] Hostile  [] Demanding  [] Guarded  [x] Defensive         [x] Cooperative  []  Uncooperative      Behavior:  [] Normal Gait  [] Walks with Assistance  [] Yojana Chair    [] Walks with Cory Sven  [x] In Hospital Bed  [] Sitting in Chair    Muscle-Skeletal:  [x] Normal Muscle Tone [] Muscle Atrophy       [] Abnormal Muscle Movement     Eye Contact:  [] Good eye contact  [x] Intermittent Eye Contact  [] Poor Eye Contact    [] Excessive  [] Intrusive      Mood: [x] Depressed  [] Anxious  [] Irritated  [] Euthymic   [] Angry [] Restless     Affect:  [x] Congruent  [] Incongruent  [] Labile  [] Constricted  [x] Flat  [x] Bizarre     Thought Process and Association:  [x] Logical [] Illogical       [] Linear and Goal Directed  [] Tangential  [] Circumstantial     Thought Content:  [x] Denies [] Endorses [] Suicidal [] Homicidal  [] Delusional      [] Paranoid  [] Somatic  [] Grandiose    Perception: [x]  None  [] Auditory   [] Visual  [] tactile   [] olfactory  [] Illusions         Insight: [] Intact  [x] Fair  [] Limited    Judgement:  [] Intact  [x] Fair  [] Limited        ASSESSMENT    Patient Active Problem List   Diagnosis    Type 1 diabetes mellitus with ketoacidosis without coma ([facility]    Diabetic nephropathy ([facility]    Hypothyroid    Type 1 diabetes mellitus with ketoacidosis ([address]    DKA (diabetic ketoacidoses) ([facility]    Pneumonia    Hypoxemia    HTN (hypertension)    Hyperlipidemia    C/f of JOSÉ MIGUEL (obstructive sleep apnea)    Obesity    DKA, type 1, not at goal [facility])    Acute kidney injury ([facility]     Recommendations and plan of treatment:     MDD moderate    Patient is jozef for safety denies SI HI or AVH and does not meet admission criteria. Please set up with OP psych follow up. SW to get collateral information from parents for safety and no access to guns. If family has concerns about safety of patient, please reconsult.           Signed:  Kunal Wolff  6/25/2019  12:41 PM

## 2019-06-25 NOTE — PROGRESS NOTES
A detailed message was left for di mcnulty diabetic educator voice mail re:consult. Pt information was given.

## 2019-06-26 ENCOUNTER — TELEPHONE (OUTPATIENT)
Dept: INTERNAL MEDICINE | Age: 29
End: 2019-06-26

## 2019-06-27 LAB — BLOOD CULTURE, ROUTINE: NORMAL

## 2019-06-28 LAB — CULTURE, BLOOD 2: NORMAL

## 2019-07-31 ENCOUNTER — CARE COORDINATION (OUTPATIENT)
Dept: INTERNAL MEDICINE | Age: 29
End: 2019-07-31

## 2019-07-31 ENCOUNTER — OFFICE VISIT (OUTPATIENT)
Dept: INTERNAL MEDICINE | Age: 29
End: 2019-07-31
Payer: MEDICAID

## 2019-07-31 VITALS
WEIGHT: 212 LBS | BODY MASS INDEX: 31.4 KG/M2 | RESPIRATION RATE: 12 BRPM | TEMPERATURE: 98.7 F | HEIGHT: 69 IN | DIASTOLIC BLOOD PRESSURE: 82 MMHG | SYSTOLIC BLOOD PRESSURE: 126 MMHG | HEART RATE: 93 BPM

## 2019-07-31 DIAGNOSIS — F32.9 MAJOR DEPRESSIVE DISORDER WITH SINGLE EPISODE, REMISSION STATUS UNSPECIFIED: Primary | ICD-10-CM

## 2019-07-31 PROCEDURE — 4004F PT TOBACCO SCREEN RCVD TLK: CPT | Performed by: INTERNAL MEDICINE

## 2019-07-31 PROCEDURE — 99213 OFFICE O/P EST LOW 20 MIN: CPT | Performed by: INTERNAL MEDICINE

## 2019-07-31 PROCEDURE — 99214 OFFICE O/P EST MOD 30 MIN: CPT | Performed by: INTERNAL MEDICINE

## 2019-07-31 PROCEDURE — G8427 DOCREV CUR MEDS BY ELIG CLIN: HCPCS | Performed by: INTERNAL MEDICINE

## 2019-07-31 PROCEDURE — G8417 CALC BMI ABV UP PARAM F/U: HCPCS | Performed by: INTERNAL MEDICINE

## 2019-07-31 RX ORDER — AMLODIPINE BESYLATE 10 MG/1
10 TABLET ORAL DAILY
Qty: 30 TABLET | Refills: 1 | Status: SHIPPED | OUTPATIENT
Start: 2019-07-31 | End: 2019-09-24 | Stop reason: SDUPTHER

## 2019-07-31 RX ORDER — LEVOTHYROXINE SODIUM 0.03 MG/1
50 TABLET ORAL DAILY
Qty: 30 TABLET | Refills: 3 | Status: ON HOLD
Start: 2019-07-31 | End: 2020-04-09

## 2019-07-31 RX ORDER — PANTOPRAZOLE SODIUM 40 MG/1
40 TABLET, DELAYED RELEASE ORAL
Qty: 30 TABLET | Refills: 1 | Status: SHIPPED | OUTPATIENT
Start: 2019-07-31 | End: 2019-09-24 | Stop reason: SDUPTHER

## 2019-07-31 RX ORDER — HYDROCHLOROTHIAZIDE 12.5 MG/1
12.5 TABLET ORAL 2 TIMES DAILY
Qty: 30 TABLET | Refills: 1 | Status: ON HOLD
Start: 2019-07-31 | End: 2020-04-09

## 2019-07-31 ASSESSMENT — ENCOUNTER SYMPTOMS
DIARRHEA: 0
SHORTNESS OF BREATH: 0
COUGH: 0
ABDOMINAL PAIN: 0
VOMITING: 0
CONSTIPATION: 0
SORE THROAT: 0
NAUSEA: 0
RHINORRHEA: 0

## 2019-07-31 NOTE — PROGRESS NOTES
I saw and examined the patient with Dr. Joseph Harman. Patient here for hospital follow-up for suicidal ideation and depression as well as DKA . Patient with PMH for DM, HTN, hyperlipidemia and hypothyroidism. Was discharged on sertraline. On glargine and on novolog. Blood sugars 120 - 180. Last HbA1c on 6/16/19 was 11.6. Unsure as to how long he was without insulin. BP is under good control. TSH noted to be elevated at 8.840. Reports compliance with medications. Medications and allergies reviewed. Exam as per resident exam which reflects my own exam with the following additions:     Vitals:    07/31/19 0813   BP: 126/82   Pulse: 93   Resp: 12   Temp: 98.7 °F (37.1 °C)   TempSrc: Oral   Weight: 212 lb (96.2 kg)   Height: 5' 9\" (1.753 m)       Lungs:  CTA B  Neck:   No carotid bruits appreciated B.   CVS:  +s1/s2 without m/g/r appreciated. Abd:  + BS, NTND, No renal or aortic bruits   Extr:  2+ DP/PT pulses B, no pitting edema    Blood sugar - 180 this morning. Assessment/Plan:  1. Depression - Ongoing   Continue sertraline   Referral to psychiatry as patient without other follow-up    2. Diabetes - uncontrolled. Consistent dosing of novolog for ease of dosing. Recheck HbA1c at next visit in 6 weeks. Diabetic care coordinator, Sherif Pina RN to follow. 3.  Hypothyroidism - not at goal with elevated TSH   Increase levothyroxine dose to 50 mcg daily.     Corona Lee MD  7/31/2019
discussed the importance of outpatient f/u for mental health concerns due to hx. Patient in agreement. Patient states that the main barrier would be he is currently working 11-12 hour days as a  for [employer] with only two days off per week. [facility] [place] [facility]. Patient in agreement. Discussed early warning signs and sx and completed safety planning. Patient again denied SI/plan/intent. Plan to f/u with outpatient providers for depression.  Staffed with resident and attending MD.

## 2019-08-12 DIAGNOSIS — E10.65 TYPE 1 DIABETES MELLITUS WITH HYPERGLYCEMIA (HCC): Primary | ICD-10-CM

## 2019-08-21 ENCOUNTER — CARE COORDINATION (OUTPATIENT)
Dept: INTERNAL MEDICINE | Age: 29
End: 2019-08-21

## 2019-09-12 ENCOUNTER — CARE COORDINATION (OUTPATIENT)
Dept: INTERNAL MEDICINE | Age: 29
End: 2019-09-12

## 2019-09-24 RX ORDER — PANTOPRAZOLE SODIUM 40 MG/1
TABLET, DELAYED RELEASE ORAL
Qty: 30 TABLET | Refills: 0 | Status: ON HOLD
Start: 2019-09-24 | End: 2020-04-09

## 2019-09-24 RX ORDER — AMLODIPINE BESYLATE 10 MG/1
TABLET ORAL
Qty: 30 TABLET | Refills: 0 | Status: ON HOLD
Start: 2019-09-24 | End: 2020-04-09

## 2019-10-03 RX ORDER — INSULIN GLARGINE 100 [IU]/ML
INJECTION, SOLUTION SUBCUTANEOUS
Qty: 4 PEN | Refills: 0 | Status: SHIPPED | OUTPATIENT
Start: 2019-10-03 | End: 2019-10-30 | Stop reason: SDUPTHER

## 2019-10-31 RX ORDER — INSULIN GLARGINE 100 [IU]/ML
INJECTION, SOLUTION SUBCUTANEOUS
Qty: 5 PEN | Refills: 0 | Status: SHIPPED | OUTPATIENT
Start: 2019-10-31 | End: 2019-12-29 | Stop reason: SDUPTHER

## 2020-01-23 ENCOUNTER — HOSPITAL ENCOUNTER (EMERGENCY)
Age: 30
Discharge: HOME | End: 2020-01-23
Attending: EMERGENCY MEDICINE

## 2020-01-23 ENCOUNTER — APPOINTMENT (OUTPATIENT)
Dept: GENERAL RADIOLOGY | Age: 30
End: 2020-01-23

## 2020-01-23 VITALS
HEART RATE: 88 BPM | SYSTOLIC BLOOD PRESSURE: 170 MMHG | DIASTOLIC BLOOD PRESSURE: 96 MMHG | OXYGEN SATURATION: 99 % | TEMPERATURE: 99 F | RESPIRATION RATE: 16 BRPM

## 2020-01-23 LAB
ALBUMIN SERPL-MCNC: 3.9 G/DL (ref 3.5–5.2)
ALP BLD-CCNC: 97 U/L (ref 40–129)
ALT SERPL-CCNC: 17 U/L (ref 0–40)
ANION GAP SERPL CALCULATED.3IONS-SCNC: 14 MMOL/L (ref 7–16)
ANION GAP SERPL CALCULATED.3IONS-SCNC: 20 MMOL/L (ref 7–16)
AST SERPL-CCNC: 24 U/L (ref 0–39)
BACTERIA: NORMAL /HPF
BASOPHILS ABSOLUTE: 0.03 E9/L (ref 0–0.2)
BASOPHILS RELATIVE PERCENT: 0.5 % (ref 0–2)
BILIRUB SERPL-MCNC: 0.2 MG/DL (ref 0–1.2)
BILIRUBIN URINE: ABNORMAL
BLOOD, URINE: ABNORMAL
BUN BLDV-MCNC: 19 MG/DL (ref 6–20)
BUN BLDV-MCNC: 20 MG/DL (ref 6–20)
CALCIUM SERPL-MCNC: 8.5 MG/DL (ref 8.6–10.2)
CALCIUM SERPL-MCNC: 9.2 MG/DL (ref 8.6–10.2)
CHLORIDE BLD-SCNC: 91 MMOL/L (ref 98–107)
CHLORIDE BLD-SCNC: 98 MMOL/L (ref 98–107)
CHP ED QC CHECK: YES
CLARITY: CLEAR
CO2: 17 MMOL/L (ref 22–29)
CO2: 21 MMOL/L (ref 22–29)
COLOR: YELLOW
CREAT SERPL-MCNC: 1.2 MG/DL (ref 0.7–1.2)
CREAT SERPL-MCNC: 1.4 MG/DL (ref 0.7–1.2)
EOSINOPHILS ABSOLUTE: 0.02 E9/L (ref 0.05–0.5)
EOSINOPHILS RELATIVE PERCENT: 0.3 % (ref 0–6)
GFR AFRICAN AMERICAN: >60
GFR AFRICAN AMERICAN: >60
GFR NON-AFRICAN AMERICAN: 59 ML/MIN/1.73
GFR NON-AFRICAN AMERICAN: >60 ML/MIN/1.73
GLUCOSE BLD-MCNC: 315 MG/DL
GLUCOSE BLD-MCNC: 336 MG/DL (ref 74–99)
GLUCOSE BLD-MCNC: 369 MG/DL (ref 74–99)
GLUCOSE URINE: >=1000 MG/DL
HCT VFR BLD CALC: 42.8 % (ref 37–54)
HEMOGLOBIN: 13.7 G/DL (ref 12.5–16.5)
IMMATURE GRANULOCYTES #: 0.02 E9/L
IMMATURE GRANULOCYTES %: 0.3 % (ref 0–5)
INFLUENZA A BY PCR: NOT DETECTED
INFLUENZA B BY PCR: DETECTED
KETONES, URINE: 40 MG/DL
LACTIC ACID: 0.9 MMOL/L (ref 0.5–2.2)
LACTIC ACID: 1.1 MMOL/L (ref 0.5–2.2)
LEUKOCYTE ESTERASE, URINE: NEGATIVE
LIPASE: 23 U/L (ref 13–60)
LYMPHOCYTES ABSOLUTE: 1.1 E9/L (ref 1.5–4)
LYMPHOCYTES RELATIVE PERCENT: 17.7 % (ref 20–42)
MCH RBC QN AUTO: 30.5 PG (ref 26–35)
MCHC RBC AUTO-ENTMCNC: 32 % (ref 32–34.5)
MCV RBC AUTO: 95.3 FL (ref 80–99.9)
METER GLUCOSE: 315 MG/DL (ref 74–99)
MONOCYTES ABSOLUTE: 0.73 E9/L (ref 0.1–0.95)
MONOCYTES RELATIVE PERCENT: 11.7 % (ref 2–12)
NEUTROPHILS ABSOLUTE: 4.32 E9/L (ref 1.8–7.3)
NEUTROPHILS RELATIVE PERCENT: 69.5 % (ref 43–80)
NITRITE, URINE: NEGATIVE
PDW BLD-RTO: 11.9 FL (ref 11.5–15)
PH UA: 5.5 (ref 5–9)
PLATELET # BLD: 244 E9/L (ref 130–450)
PMV BLD AUTO: 10.6 FL (ref 7–12)
POTASSIUM SERPL-SCNC: 4.7 MMOL/L (ref 3.5–5)
POTASSIUM SERPL-SCNC: 5 MMOL/L (ref 3.5–5)
PROTEIN UA: 100 MG/DL
RBC # BLD: 4.49 E12/L (ref 3.8–5.8)
RBC UA: NORMAL /HPF (ref 0–2)
SODIUM BLD-SCNC: 128 MMOL/L (ref 132–146)
SODIUM BLD-SCNC: 133 MMOL/L (ref 132–146)
SPECIFIC GRAVITY UA: 1.02 (ref 1–1.03)
TOTAL PROTEIN: 6.9 G/DL (ref 6.4–8.3)
UROBILINOGEN, URINE: 0.2 E.U./DL
WBC # BLD: 6.2 E9/L (ref 4.5–11.5)
WBC UA: NORMAL /HPF (ref 0–5)

## 2020-01-23 PROCEDURE — 81001 URINALYSIS AUTO W/SCOPE: CPT

## 2020-01-23 PROCEDURE — 83690 ASSAY OF LIPASE: CPT

## 2020-01-23 PROCEDURE — 82962 GLUCOSE BLOOD TEST: CPT

## 2020-01-23 PROCEDURE — 6370000000 HC RX 637 (ALT 250 FOR IP): Performed by: PHYSICIAN ASSISTANT

## 2020-01-23 PROCEDURE — 96374 THER/PROPH/DIAG INJ IV PUSH: CPT

## 2020-01-23 PROCEDURE — 83605 ASSAY OF LACTIC ACID: CPT

## 2020-01-23 PROCEDURE — 99284 EMERGENCY DEPT VISIT MOD MDM: CPT

## 2020-01-23 PROCEDURE — 85025 COMPLETE CBC W/AUTO DIFF WBC: CPT

## 2020-01-23 PROCEDURE — 2580000003 HC RX 258: Performed by: PHYSICIAN ASSISTANT

## 2020-01-23 PROCEDURE — 93005 ELECTROCARDIOGRAM TRACING: CPT | Performed by: PHYSICIAN ASSISTANT

## 2020-01-23 PROCEDURE — 6360000002 HC RX W HCPCS: Performed by: PHYSICIAN ASSISTANT

## 2020-01-23 PROCEDURE — 80048 BASIC METABOLIC PNL TOTAL CA: CPT

## 2020-01-23 PROCEDURE — 71046 X-RAY EXAM CHEST 2 VIEWS: CPT

## 2020-01-23 PROCEDURE — 36415 COLL VENOUS BLD VENIPUNCTURE: CPT

## 2020-01-23 PROCEDURE — 87502 INFLUENZA DNA AMP PROBE: CPT

## 2020-01-23 PROCEDURE — 96375 TX/PRO/DX INJ NEW DRUG ADDON: CPT

## 2020-01-23 PROCEDURE — 80053 COMPREHEN METABOLIC PANEL: CPT

## 2020-01-23 RX ORDER — 0.9 % SODIUM CHLORIDE 0.9 %
1000 INTRAVENOUS SOLUTION INTRAVENOUS ONCE
Status: COMPLETED | OUTPATIENT
Start: 2020-01-23 | End: 2020-01-23

## 2020-01-23 RX ORDER — ONDANSETRON 2 MG/ML
4 INJECTION INTRAMUSCULAR; INTRAVENOUS ONCE
Status: COMPLETED | OUTPATIENT
Start: 2020-01-23 | End: 2020-01-23

## 2020-01-23 RX ORDER — KETOROLAC TROMETHAMINE 30 MG/ML
15 INJECTION, SOLUTION INTRAMUSCULAR; INTRAVENOUS ONCE
Status: COMPLETED | OUTPATIENT
Start: 2020-01-23 | End: 2020-01-23

## 2020-01-23 RX ORDER — ACETAMINOPHEN 500 MG
1000 TABLET ORAL 3 TIMES DAILY
Qty: 42 TABLET | Refills: 0 | Status: SHIPPED | OUTPATIENT
Start: 2020-01-23 | End: 2020-04-09

## 2020-01-23 RX ORDER — ACETAMINOPHEN 500 MG
1000 TABLET ORAL ONCE
Status: COMPLETED | OUTPATIENT
Start: 2020-01-23 | End: 2020-01-23

## 2020-01-23 RX ORDER — OSELTAMIVIR PHOSPHATE 75 MG/1
75 CAPSULE ORAL ONCE
Status: COMPLETED | OUTPATIENT
Start: 2020-01-23 | End: 2020-01-23

## 2020-01-23 RX ORDER — IBUPROFEN 600 MG/1
600 TABLET ORAL 3 TIMES DAILY PRN
Qty: 21 TABLET | Refills: 0 | Status: SHIPPED | OUTPATIENT
Start: 2020-01-23 | End: 2020-01-30

## 2020-01-23 RX ORDER — OSELTAMIVIR PHOSPHATE 75 MG/1
75 CAPSULE ORAL 2 TIMES DAILY
Qty: 20 CAPSULE | Refills: 0 | Status: SHIPPED | OUTPATIENT
Start: 2020-01-23 | End: 2020-02-02

## 2020-01-23 RX ADMIN — ACETAMINOPHEN 1000 MG: 500 TABLET ORAL at 15:15

## 2020-01-23 RX ADMIN — OSELTAMIVIR PHOSPHATE 75 MG: 75 CAPSULE ORAL at 16:41

## 2020-01-23 RX ADMIN — SODIUM CHLORIDE 1000 ML: 9 INJECTION, SOLUTION INTRAVENOUS at 15:49

## 2020-01-23 RX ADMIN — INSULIN HUMAN 10 UNITS: 100 INJECTION, SOLUTION PARENTERAL at 19:09

## 2020-01-23 RX ADMIN — SODIUM CHLORIDE 1000 ML: 9 INJECTION, SOLUTION INTRAVENOUS at 16:41

## 2020-01-23 RX ADMIN — KETOROLAC TROMETHAMINE 15 MG: 30 INJECTION, SOLUTION INTRAMUSCULAR at 15:15

## 2020-01-23 RX ADMIN — ONDANSETRON 4 MG: 2 INJECTION INTRAMUSCULAR; INTRAVENOUS at 15:15

## 2020-01-23 ASSESSMENT — PAIN SCALES - GENERAL
PAINLEVEL_OUTOF10: 7
PAINLEVEL_OUTOF10: 6

## 2020-01-23 NOTE — ED PROVIDER NOTES
does not use drugs. Family History: family history includes Diabetes in his mother. Allergies: Pineapple    Physical Exam         ED Triage Vitals   BP Temp Temp src Pulse Resp SpO2 Height Weight   01/23/20 1416 01/23/20 1400 -- 01/23/20 1400 01/23/20 1416 01/23/20 1400 -- --   136/77 98.8 °F (37.1 °C)  114 20 97 %       Oxygen Saturation Interpretation: Normal.    Constitutional:  Alert, development consistent with age. HEENT:  NC/NT. Airway patent. Neck:  Normal ROM. Supple. Respiratory:  Clear to auscultation and breath sounds equal.  CV:  Tachycardic at 108 bmp and regular, normal heart sounds, without pathological murmurs, ectopy, gallops, or rubs. GI: Abdomen Soft, mildly tender throughout without rebound/guarding/or rigidity, good bowel sounds. No firm or pulsatile mass. Integument:  Normal turgor. Warm, dry, without visible rash, unless noted elsewhere. Lymphatic: no lymphadenopathy noted  Neurological:  Oriented. Motor functions intact.     Lab / Imaging Results   (All laboratory and radiology results have been personally reviewed by myself)  Labs:  Results for orders placed or performed during the hospital encounter of 01/23/20   RAPID INFLUENZA A/B ANTIGENS   Result Value Ref Range    Influenza A by PCR Not Detected Not Detected    Influenza B by PCR DETECTED (A) Not Detected   Comprehensive Metabolic Panel   Result Value Ref Range    Sodium 128 (L) 132 - 146 mmol/L    Potassium 4.7 3.5 - 5.0 mmol/L    Chloride 91 (L) 98 - 107 mmol/L    CO2 17 (L) 22 - 29 mmol/L    Anion Gap 20 (H) 7 - 16 mmol/L    Glucose 369 (H) 74 - 99 mg/dL    BUN 19 6 - 20 mg/dL    CREATININE 1.2 0.7 - 1.2 mg/dL    GFR Non-African American >60 >=60 mL/min/1.73    GFR African American >60     Calcium 9.2 8.6 - 10.2 mg/dL    Total Protein 6.9 6.4 - 8.3 g/dL    Alb 3.9 3.5 - 5.2 g/dL    Total Bilirubin 0.2 0.0 - 1.2 mg/dL    Alkaline Phosphatase 97 40 - 129 U/L    ALT 17 0 - 40 U/L    AST 24 0 - 39 U/L   CBC Auto mg/dL    GFR Non-African American 59 >=60 mL/min/1.73    GFR African American >60     Calcium 8.5 (L) 8.6 - 10.2 mg/dL   POCT Glucose   Result Value Ref Range    Glucose 315 mg/dL    QC OK? Yes    POCT Glucose   Result Value Ref Range    Meter Glucose 315 (H) 74 - 99 mg/dL     Imaging: All Radiology results interpreted by Radiologist unless otherwise noted. XR CHEST STANDARD (2 VW)   Final Result   Normal chest                 ED Course / Medical Decision Making     Medications   0.9 % sodium chloride bolus (0 mLs Intravenous Stopped 1/23/20 1908)   ketorolac (TORADOL) injection 15 mg (15 mg Intravenous Given 1/23/20 1515)   ondansetron (ZOFRAN) injection 4 mg (4 mg Intravenous Given 1/23/20 1515)   acetaminophen (TYLENOL) tablet 1,000 mg (1,000 mg Oral Given 1/23/20 1515)   oseltamivir (TAMIFLU) capsule 75 mg (75 mg Oral Given 1/23/20 1641)   0.9 % sodium chloride bolus (1,000 mLs Intravenous New Bag 1/23/20 1641)   insulin regular (HUMULIN R;NOVOLIN R) injection 10 Units (10 Units Intravenous Given 1/23/20 1909)      Re-Evaluations:  1/23/20      Time: 5768    Patients symptoms show no change. He remains tachycardic 104-114 bmp on the monitor, labs will be ordered. Time: 2030  Results discussed with patient and parents with Dr. Bree Soler. Patient states that he would like to go home. He understands to increase fluid intake and will be given the prescriptions listed below. Patient and family are agreeable with the plan. Consultations:             None    Procedures:   none    MDM: Patient presents to the emergency department for flulike symptoms. Patient remained tachycardic despite oral fluids and supra track and was moved to main ER for blood work and IV fluids. Patient had stabilization of his vital signs and improvement of his labs with IV fluids. Patient had significant improvement of his symptoms with medications as above. On final reexamination, the patient was requesting to go home.   Specific

## 2020-01-24 LAB
EKG ATRIAL RATE: 89 BPM
EKG P AXIS: 42 DEGREES
EKG P-R INTERVAL: 168 MS
EKG Q-T INTERVAL: 348 MS
EKG QRS DURATION: 98 MS
EKG QTC CALCULATION (BAZETT): 423 MS
EKG R AXIS: -2 DEGREES
EKG T AXIS: 54 DEGREES
EKG VENTRICULAR RATE: 89 BPM

## 2020-01-24 PROCEDURE — 93010 ELECTROCARDIOGRAM REPORT: CPT | Performed by: INTERNAL MEDICINE

## 2020-03-10 NOTE — TELEPHONE ENCOUNTER
Pt last seen in July 2019. Pt sent my chart message asking for refills on his insulin syringes. Can we send in a refill to the pharmacy?

## 2020-03-13 NOTE — TELEPHONE ENCOUNTER
Last Appointment:  5/21/2018  No future appointments. Pharmacy called asking for pen needle refills. States script is good until 3/25/20. Notified pt last seen in July 2019 & has missed a couple of appts. Notified that pt needs to be seen in office.

## 2020-04-09 ENCOUNTER — APPOINTMENT (OUTPATIENT)
Dept: GENERAL RADIOLOGY | Age: 30
DRG: 638 | End: 2020-04-09

## 2020-04-09 ENCOUNTER — HOSPITAL ENCOUNTER (INPATIENT)
Age: 30
LOS: 2 days | Discharge: HOME | DRG: 638 | End: 2020-04-11
Attending: EMERGENCY MEDICINE | Admitting: INTERNAL MEDICINE

## 2020-04-09 PROBLEM — I16.0 HYPERTENSIVE URGENCY: Status: ACTIVE | Noted: 2020-04-09

## 2020-04-09 PROBLEM — E13.10 DIABETIC KETOSIS WITHOUT COMA (HCC): Status: ACTIVE | Noted: 2020-04-09

## 2020-04-09 PROBLEM — E11.10 DIABETIC KETOSIS WITHOUT COMA (HCC): Status: ACTIVE | Noted: 2020-04-09

## 2020-04-09 LAB
ALBUMIN SERPL-MCNC: 4.1 G/DL (ref 3.5–5.2)
ALBUMIN SERPL-MCNC: 4.5 G/DL (ref 3.5–5.2)
ALP BLD-CCNC: 101 U/L (ref 40–129)
ALP BLD-CCNC: 113 U/L (ref 40–129)
ALT SERPL-CCNC: 15 U/L (ref 0–40)
ALT SERPL-CCNC: 19 U/L (ref 0–40)
AMPHETAMINE SCREEN, URINE: NOT DETECTED
ANION GAP SERPL CALCULATED.3IONS-SCNC: 14 MMOL/L (ref 7–16)
ANION GAP SERPL CALCULATED.3IONS-SCNC: 21 MMOL/L (ref 7–16)
ANION GAP SERPL CALCULATED.3IONS-SCNC: 29 MMOL/L (ref 7–16)
ANION GAP SERPL CALCULATED.3IONS-SCNC: 31 MMOL/L (ref 7–16)
ANION GAP SERPL CALCULATED.3IONS-SCNC: 33 MMOL/L (ref 7–16)
AST SERPL-CCNC: 21 U/L (ref 0–39)
AST SERPL-CCNC: 22 U/L (ref 0–39)
BACTERIA: NORMAL /HPF
BARBITURATE SCREEN URINE: NOT DETECTED
BASOPHILS ABSOLUTE: 0.11 E9/L (ref 0–0.2)
BASOPHILS RELATIVE PERCENT: 1.1 % (ref 0–2)
BENZODIAZEPINE SCREEN, URINE: NOT DETECTED
BETA-HYDROXYBUTYRATE: >4.5 MMOL/L (ref 0.02–0.27)
BETA-HYDROXYBUTYRATE: >4.5 MMOL/L (ref 0.02–0.27)
BILIRUB SERPL-MCNC: 0.4 MG/DL (ref 0–1.2)
BILIRUB SERPL-MCNC: 0.5 MG/DL (ref 0–1.2)
BILIRUBIN URINE: NEGATIVE
BLOOD, URINE: ABNORMAL
BUN BLDV-MCNC: 20 MG/DL (ref 6–20)
BUN BLDV-MCNC: 20 MG/DL (ref 6–20)
BUN BLDV-MCNC: 21 MG/DL (ref 6–20)
BUN BLDV-MCNC: 22 MG/DL (ref 6–20)
BUN BLDV-MCNC: 23 MG/DL (ref 6–20)
CALCIUM SERPL-MCNC: 10.2 MG/DL (ref 8.6–10.2)
CALCIUM SERPL-MCNC: 8.4 MG/DL (ref 8.6–10.2)
CALCIUM SERPL-MCNC: 8.6 MG/DL (ref 8.6–10.2)
CALCIUM SERPL-MCNC: 9.1 MG/DL (ref 8.6–10.2)
CALCIUM SERPL-MCNC: 9.1 MG/DL (ref 8.6–10.2)
CANNABINOID SCREEN URINE: NOT DETECTED
CHLORIDE BLD-SCNC: 103 MMOL/L (ref 98–107)
CHLORIDE BLD-SCNC: 107 MMOL/L (ref 98–107)
CHLORIDE BLD-SCNC: 108 MMOL/L (ref 98–107)
CHLORIDE BLD-SCNC: 93 MMOL/L (ref 98–107)
CHLORIDE BLD-SCNC: 93 MMOL/L (ref 98–107)
CHP ED QC CHECK: YES
CLARITY: CLEAR
CO2: 10 MMOL/L (ref 22–29)
CO2: 12 MMOL/L (ref 22–29)
CO2: 14 MMOL/L (ref 22–29)
CO2: 20 MMOL/L (ref 22–29)
CO2: 9 MMOL/L (ref 22–29)
COCAINE METABOLITE SCREEN URINE: NOT DETECTED
COLOR: YELLOW
CREAT SERPL-MCNC: 1.3 MG/DL (ref 0.7–1.2)
CREAT SERPL-MCNC: 1.4 MG/DL (ref 0.7–1.2)
CREAT SERPL-MCNC: 1.4 MG/DL (ref 0.7–1.2)
EOSINOPHILS ABSOLUTE: 0.2 E9/L (ref 0.05–0.5)
EOSINOPHILS RELATIVE PERCENT: 2 % (ref 0–6)
FENTANYL SCREEN, URINE: NOT DETECTED
GFR AFRICAN AMERICAN: >60
GFR NON-AFRICAN AMERICAN: 59 ML/MIN/1.73
GFR NON-AFRICAN AMERICAN: 59 ML/MIN/1.73
GFR NON-AFRICAN AMERICAN: >60 ML/MIN/1.73
GLUCOSE BLD-MCNC: 179 MG/DL (ref 74–99)
GLUCOSE BLD-MCNC: 277 MG/DL (ref 74–99)
GLUCOSE BLD-MCNC: 455 MG/DL (ref 74–99)
GLUCOSE BLD-MCNC: 556 MG/DL (ref 74–99)
GLUCOSE BLD-MCNC: 681 MG/DL (ref 74–99)
GLUCOSE BLD-MCNC: 704 MG/DL (ref 74–99)
GLUCOSE BLD-MCNC: NORMAL MG/DL
GLUCOSE URINE: >=1000 MG/DL
HCT VFR BLD CALC: 43.3 % (ref 37–54)
HEMOGLOBIN: 13.9 G/DL (ref 12.5–16.5)
IMMATURE GRANULOCYTES #: 0.05 E9/L
IMMATURE GRANULOCYTES %: 0.5 % (ref 0–5)
KETONES, URINE: >=80 MG/DL
LACTIC ACID: 1.7 MMOL/L (ref 0.5–2.2)
LACTIC ACID: 2.1 MMOL/L (ref 0.5–2.2)
LACTIC ACID: 2.5 MMOL/L (ref 0.5–2.2)
LEUKOCYTE ESTERASE, URINE: NEGATIVE
LIPASE: 22 U/L (ref 13–60)
LIPASE: 29 U/L (ref 13–60)
LYMPHOCYTES ABSOLUTE: 2.69 E9/L (ref 1.5–4)
LYMPHOCYTES RELATIVE PERCENT: 27.5 % (ref 20–42)
Lab: NORMAL
MAGNESIUM: 2.2 MG/DL (ref 1.6–2.6)
MCH RBC QN AUTO: 30.3 PG (ref 26–35)
MCHC RBC AUTO-ENTMCNC: 32.1 % (ref 32–34.5)
MCV RBC AUTO: 94.5 FL (ref 80–99.9)
METER GLUCOSE: 120 MG/DL (ref 74–99)
METER GLUCOSE: 131 MG/DL (ref 74–99)
METER GLUCOSE: 148 MG/DL (ref 74–99)
METER GLUCOSE: 163 MG/DL (ref 74–99)
METER GLUCOSE: 167 MG/DL (ref 74–99)
METER GLUCOSE: 205 MG/DL (ref 74–99)
METER GLUCOSE: 233 MG/DL (ref 74–99)
METER GLUCOSE: 256 MG/DL (ref 74–99)
METER GLUCOSE: 273 MG/DL (ref 74–99)
METER GLUCOSE: 276 MG/DL (ref 74–99)
METER GLUCOSE: 330 MG/DL (ref 74–99)
METER GLUCOSE: 372 MG/DL (ref 74–99)
METER GLUCOSE: 489 MG/DL (ref 74–99)
METER GLUCOSE: >500 MG/DL (ref 74–99)
METHADONE SCREEN, URINE: NOT DETECTED
MONOCYTES ABSOLUTE: 0.43 E9/L (ref 0.1–0.95)
MONOCYTES RELATIVE PERCENT: 4.4 % (ref 2–12)
NEUTROPHILS ABSOLUTE: 6.3 E9/L (ref 1.8–7.3)
NEUTROPHILS RELATIVE PERCENT: 64.5 % (ref 43–80)
NITRITE, URINE: NEGATIVE
OPIATE SCREEN URINE: NOT DETECTED
OSMOLALITY: 328 MOSM/KG (ref 285–310)
OXYCODONE URINE: NOT DETECTED
PDW BLD-RTO: 12.2 FL (ref 11.5–15)
PH UA: 5.5 (ref 5–9)
PH VENOUS: 7.19 (ref 7.35–7.45)
PHENCYCLIDINE SCREEN URINE: NOT DETECTED
PHOSPHORUS: 2 MG/DL (ref 2.5–4.5)
PHOSPHORUS: 3.3 MG/DL (ref 2.5–4.5)
PHOSPHORUS: 3.6 MG/DL (ref 2.5–4.5)
PLATELET # BLD: 272 E9/L (ref 130–450)
PMV BLD AUTO: 10.2 FL (ref 7–12)
POTASSIUM REFLEX MAGNESIUM: 5.1 MMOL/L (ref 3.5–5)
POTASSIUM SERPL-SCNC: 4.2 MMOL/L (ref 3.5–5)
POTASSIUM SERPL-SCNC: 4.4 MMOL/L (ref 3.5–5)
POTASSIUM SERPL-SCNC: 4.7 MMOL/L (ref 3.5–5)
POTASSIUM SERPL-SCNC: 6.3 MMOL/L (ref 3.5–5)
PROTEIN UA: 30 MG/DL
RBC # BLD: 4.58 E12/L (ref 3.8–5.8)
RBC UA: NORMAL /HPF (ref 0–2)
SODIUM BLD-SCNC: 133 MMOL/L (ref 132–146)
SODIUM BLD-SCNC: 138 MMOL/L (ref 132–146)
SODIUM BLD-SCNC: 142 MMOL/L (ref 132–146)
SPECIFIC GRAVITY UA: 1.02 (ref 1–1.03)
TOTAL PROTEIN: 6.7 G/DL (ref 6.4–8.3)
TOTAL PROTEIN: 7.6 G/DL (ref 6.4–8.3)
TSH SERPL DL<=0.05 MIU/L-ACNC: 14.59 UIU/ML (ref 0.27–4.2)
UROBILINOGEN, URINE: 0.2 E.U./DL
WBC # BLD: 9.8 E9/L (ref 4.5–11.5)
WBC UA: NORMAL /HPF (ref 0–5)

## 2020-04-09 PROCEDURE — 83605 ASSAY OF LACTIC ACID: CPT

## 2020-04-09 PROCEDURE — 02HV33Z INSERTION OF INFUSION DEVICE INTO SUPERIOR VENA CAVA, PERCUTANEOUS APPROACH: ICD-10-PCS | Performed by: STUDENT IN AN ORGANIZED HEALTH CARE EDUCATION/TRAINING PROGRAM

## 2020-04-09 PROCEDURE — 6370000000 HC RX 637 (ALT 250 FOR IP): Performed by: EMERGENCY MEDICINE

## 2020-04-09 PROCEDURE — 80048 BASIC METABOLIC PNL TOTAL CA: CPT

## 2020-04-09 PROCEDURE — 6360000002 HC RX W HCPCS

## 2020-04-09 PROCEDURE — 36556 INSERT NON-TUNNEL CV CATH: CPT | Performed by: SURGERY

## 2020-04-09 PROCEDURE — 87081 CULTURE SCREEN ONLY: CPT

## 2020-04-09 PROCEDURE — 96375 TX/PRO/DX INJ NEW DRUG ADDON: CPT

## 2020-04-09 PROCEDURE — 99291 CRITICAL CARE FIRST HOUR: CPT | Performed by: SURGERY

## 2020-04-09 PROCEDURE — 6360000002 HC RX W HCPCS: Performed by: INTERNAL MEDICINE

## 2020-04-09 PROCEDURE — 2580000003 HC RX 258: Performed by: EMERGENCY MEDICINE

## 2020-04-09 PROCEDURE — 82800 BLOOD PH: CPT

## 2020-04-09 PROCEDURE — 99291 CRITICAL CARE FIRST HOUR: CPT

## 2020-04-09 PROCEDURE — 82947 ASSAY GLUCOSE BLOOD QUANT: CPT

## 2020-04-09 PROCEDURE — 71045 X-RAY EXAM CHEST 1 VIEW: CPT

## 2020-04-09 PROCEDURE — 6360000002 HC RX W HCPCS: Performed by: EMERGENCY MEDICINE

## 2020-04-09 PROCEDURE — 81001 URINALYSIS AUTO W/SCOPE: CPT

## 2020-04-09 PROCEDURE — 83690 ASSAY OF LIPASE: CPT

## 2020-04-09 PROCEDURE — 84100 ASSAY OF PHOSPHORUS: CPT

## 2020-04-09 PROCEDURE — 94760 N-INVAS EAR/PLS OXIMETRY 1: CPT

## 2020-04-09 PROCEDURE — 96374 THER/PROPH/DIAG INJ IV PUSH: CPT

## 2020-04-09 PROCEDURE — C9113 INJ PANTOPRAZOLE SODIUM, VIA: HCPCS | Performed by: INTERNAL MEDICINE

## 2020-04-09 PROCEDURE — 85025 COMPLETE CBC W/AUTO DIFF WBC: CPT

## 2020-04-09 PROCEDURE — 83930 ASSAY OF BLOOD OSMOLALITY: CPT

## 2020-04-09 PROCEDURE — 82010 KETONE BODYS QUAN: CPT

## 2020-04-09 PROCEDURE — 84443 ASSAY THYROID STIM HORMONE: CPT

## 2020-04-09 PROCEDURE — 2500000003 HC RX 250 WO HCPCS: Performed by: EMERGENCY MEDICINE

## 2020-04-09 PROCEDURE — 2000000000 HC ICU R&B

## 2020-04-09 PROCEDURE — 87040 BLOOD CULTURE FOR BACTERIA: CPT

## 2020-04-09 PROCEDURE — 93005 ELECTROCARDIOGRAM TRACING: CPT | Performed by: INTERNAL MEDICINE

## 2020-04-09 PROCEDURE — 80053 COMPREHEN METABOLIC PANEL: CPT

## 2020-04-09 PROCEDURE — 80307 DRUG TEST PRSMV CHEM ANLYZR: CPT

## 2020-04-09 PROCEDURE — 36415 COLL VENOUS BLD VENIPUNCTURE: CPT

## 2020-04-09 PROCEDURE — 6360000002 HC RX W HCPCS: Performed by: STUDENT IN AN ORGANIZED HEALTH CARE EDUCATION/TRAINING PROGRAM

## 2020-04-09 PROCEDURE — 36556 INSERT NON-TUNNEL CV CATH: CPT

## 2020-04-09 PROCEDURE — 83735 ASSAY OF MAGNESIUM: CPT

## 2020-04-09 PROCEDURE — 82962 GLUCOSE BLOOD TEST: CPT

## 2020-04-09 RX ORDER — AMLODIPINE BESYLATE 10 MG/1
10 TABLET ORAL DAILY
COMMUNITY
End: 2020-04-13 | Stop reason: SDUPTHER

## 2020-04-09 RX ORDER — POTASSIUM CHLORIDE 29.8 MG/ML
20 INJECTION INTRAVENOUS PRN
Status: DISCONTINUED | OUTPATIENT
Start: 2020-04-09 | End: 2020-04-10

## 2020-04-09 RX ORDER — POTASSIUM CHLORIDE 7.45 MG/ML
10 INJECTION INTRAVENOUS PRN
Status: DISCONTINUED | OUTPATIENT
Start: 2020-04-09 | End: 2020-04-09

## 2020-04-09 RX ORDER — ONDANSETRON 2 MG/ML
4 INJECTION INTRAMUSCULAR; INTRAVENOUS ONCE
Status: COMPLETED | OUTPATIENT
Start: 2020-04-09 | End: 2020-04-09

## 2020-04-09 RX ORDER — SODIUM CHLORIDE 9 MG/ML
INJECTION, SOLUTION INTRAVENOUS CONTINUOUS
Status: ACTIVE | OUTPATIENT
Start: 2020-04-09 | End: 2020-04-09

## 2020-04-09 RX ORDER — SODIUM CHLORIDE 0.9 % (FLUSH) 0.9 %
SYRINGE (ML) INJECTION
Status: DISCONTINUED
Start: 2020-04-09 | End: 2020-04-10

## 2020-04-09 RX ORDER — LEVOTHYROXINE SODIUM 0.05 MG/1
50 TABLET ORAL DAILY
Status: ON HOLD | COMMUNITY
End: 2020-04-11 | Stop reason: SDUPTHER

## 2020-04-09 RX ORDER — DEXTROSE, SODIUM CHLORIDE, AND POTASSIUM CHLORIDE 5; .45; .15 G/100ML; G/100ML; G/100ML
INJECTION INTRAVENOUS CONTINUOUS PRN
Status: DISCONTINUED | OUTPATIENT
Start: 2020-04-09 | End: 2020-04-09

## 2020-04-09 RX ORDER — INSULIN GLARGINE 100 [IU]/ML
45 INJECTION, SOLUTION SUBCUTANEOUS NIGHTLY
Status: ON HOLD | COMMUNITY
End: 2020-04-11 | Stop reason: SDUPTHER

## 2020-04-09 RX ORDER — DEXTROSE, SODIUM CHLORIDE, AND POTASSIUM CHLORIDE 5; .45; .15 G/100ML; G/100ML; G/100ML
INJECTION INTRAVENOUS CONTINUOUS PRN
Status: DISCONTINUED | OUTPATIENT
Start: 2020-04-09 | End: 2020-04-10

## 2020-04-09 RX ORDER — MAGNESIUM SULFATE 1 G/100ML
1 INJECTION INTRAVENOUS PRN
Status: DISCONTINUED | OUTPATIENT
Start: 2020-04-09 | End: 2020-04-10

## 2020-04-09 RX ORDER — PROMETHAZINE HYDROCHLORIDE 25 MG/ML
12.5 INJECTION, SOLUTION INTRAMUSCULAR; INTRAVENOUS ONCE
Status: COMPLETED | OUTPATIENT
Start: 2020-04-09 | End: 2020-04-09

## 2020-04-09 RX ORDER — PANTOPRAZOLE SODIUM 40 MG/1
40 TABLET, DELAYED RELEASE ORAL DAILY
COMMUNITY
End: 2020-04-13

## 2020-04-09 RX ORDER — 0.9 % SODIUM CHLORIDE 0.9 %
1000 INTRAVENOUS SOLUTION INTRAVENOUS ONCE
Status: COMPLETED | OUTPATIENT
Start: 2020-04-09 | End: 2020-04-09

## 2020-04-09 RX ORDER — SODIUM CHLORIDE 9 MG/ML
10 INJECTION INTRAVENOUS DAILY
Status: DISCONTINUED | OUTPATIENT
Start: 2020-04-09 | End: 2020-04-10

## 2020-04-09 RX ORDER — PANTOPRAZOLE SODIUM 40 MG/10ML
40 INJECTION, POWDER, LYOPHILIZED, FOR SOLUTION INTRAVENOUS DAILY
Status: DISCONTINUED | OUTPATIENT
Start: 2020-04-09 | End: 2020-04-10

## 2020-04-09 RX ORDER — DEXTROSE MONOHYDRATE 25 G/50ML
12.5 INJECTION, SOLUTION INTRAVENOUS PRN
Status: DISCONTINUED | OUTPATIENT
Start: 2020-04-09 | End: 2020-04-10 | Stop reason: SDUPTHER

## 2020-04-09 RX ORDER — SODIUM CHLORIDE 9 MG/ML
INJECTION, SOLUTION INTRAVENOUS CONTINUOUS
Status: DISCONTINUED | OUTPATIENT
Start: 2020-04-09 | End: 2020-04-09

## 2020-04-09 RX ADMIN — POTASSIUM CHLORIDE 20 MEQ: 400 INJECTION, SOLUTION INTRAVENOUS at 13:56

## 2020-04-09 RX ADMIN — SODIUM CHLORIDE 1000 ML: 9 INJECTION, SOLUTION INTRAVENOUS at 07:16

## 2020-04-09 RX ADMIN — ONDANSETRON 4 MG: 2 INJECTION INTRAMUSCULAR; INTRAVENOUS at 08:53

## 2020-04-09 RX ADMIN — ENOXAPARIN SODIUM 40 MG: 40 INJECTION SUBCUTANEOUS at 11:28

## 2020-04-09 RX ADMIN — ONDANSETRON 4 MG: 2 INJECTION INTRAMUSCULAR; INTRAVENOUS at 07:16

## 2020-04-09 RX ADMIN — SODIUM CHLORIDE: 9 INJECTION, SOLUTION INTRAVENOUS at 10:30

## 2020-04-09 RX ADMIN — TRIMETHOBENZAMIDE HYDROCHLORIDE 200 MG: 100 INJECTION INTRAMUSCULAR at 09:31

## 2020-04-09 RX ADMIN — SODIUM CHLORIDE 0.1 UNITS/KG/HR: 9 INJECTION, SOLUTION INTRAVENOUS at 08:45

## 2020-04-09 RX ADMIN — PROMETHAZINE HYDROCHLORIDE 12.5 MG: 25 INJECTION INTRAMUSCULAR; INTRAVENOUS at 07:52

## 2020-04-09 RX ADMIN — SODIUM PHOSPHATE, MONOBASIC, MONOHYDRATE 15 MMOL: 276; 142 INJECTION, SOLUTION INTRAVENOUS at 15:47

## 2020-04-09 RX ADMIN — SODIUM CHLORIDE 1000 ML: 9 INJECTION, SOLUTION INTRAVENOUS at 09:29

## 2020-04-09 RX ADMIN — PANTOPRAZOLE SODIUM 40 MG: 40 INJECTION, POWDER, FOR SOLUTION INTRAVENOUS at 11:28

## 2020-04-09 RX ADMIN — POTASSIUM CHLORIDE, DEXTROSE MONOHYDRATE AND SODIUM CHLORIDE: 150; 5; 450 INJECTION, SOLUTION INTRAVENOUS at 15:57

## 2020-04-09 RX ADMIN — POTASSIUM CHLORIDE, DEXTROSE MONOHYDRATE AND SODIUM CHLORIDE: 150; 5; 450 INJECTION, SOLUTION INTRAVENOUS at 22:26

## 2020-04-09 RX ADMIN — SODIUM CHLORIDE 1000 ML: 9 INJECTION, SOLUTION INTRAVENOUS at 07:12

## 2020-04-09 RX ADMIN — POTASSIUM CHLORIDE 20 MEQ: 400 INJECTION, SOLUTION INTRAVENOUS at 18:21

## 2020-04-09 ASSESSMENT — PAIN DESCRIPTION - PAIN TYPE: TYPE: ACUTE PAIN

## 2020-04-09 ASSESSMENT — PAIN SCALES - GENERAL
PAINLEVEL_OUTOF10: 0
PAINLEVEL_OUTOF10: 8
PAINLEVEL_OUTOF10: 0
PAINLEVEL_OUTOF10: 0

## 2020-04-09 ASSESSMENT — PAIN DESCRIPTION - LOCATION: LOCATION: GENERALIZED

## 2020-04-09 ASSESSMENT — PAIN DESCRIPTION - DESCRIPTORS: DESCRIPTORS: ACHING

## 2020-04-09 NOTE — PROGRESS NOTES
[address] [facility]  Internal Medicine Residency / [address]    Attending Physician Statement  I have discussed the case, including pertinent history and exam findings with the resident and the team.  I have seen and examined the patient and the key elements of the encounter have been performed by me. I agree with the assessment, plan and orders as documented by the resident. Admitted in DKA  Mild tachypnea  VS stable  AG 31  Feet appear normal  Ran out of meds  Plan: Continue ICU care for DKA    Remainder of medical problems as per resident note.       Carolee Oropeza  Internal Medicine Residency Faculty

## 2020-04-09 NOTE — CONSULTS
Surgical Intensive Care Unit  Daily Progress Note  Date of admission:  4/9/2020  Reason for ICU transfer:  DKA    Subjective: Shayan Patel is a 26year old male who presents with nausea and vomiting and elevated glucose. He is a type 1 diabetic who lost his insurance and couldn't pay for his insulin and ran out of his supply. He presented with a glucose in the 800s with a B hydroxybuterate over 4.5. He was started on an insulin gtt in the ER and given NS bolus x2 and DKA protocol started. HE feels nauseated but otherwise is doing well. He hasnt taken his HTN meds or his synthroid in over a year he states.  His pH is 7.19    Physical Exam:  BP (!) 148/77   Pulse 121   Temp 96.6 °F (35.9 °C) (Axillary)   Resp (!) 40   Ht 5' 9\" (1.753 m)   Wt 196 lb 3.4 oz (89 kg)   SpO2 100%   BMI 28.98 kg/m²   CONSTITUTIONAL:  fatigued  EYES:  Lids and lashes normal, pupils equal, round and reactive to light, extra ocular muscles intact, sclera clear, conjunctiva normal  NECK:  supple, symmetrical, trachea midline  LUNGS:  No increased work of breathing, good air exchange, clear to auscultation bilaterally, no crackles or wheezing  CARDIOVASCULAR:  regular rate and rhythm  ABDOMEN:  No scars, normal bowel sounds, soft, non-distended, non-tender, no masses palpated, no hepatosplenomegally  MUSCULOSKELETAL:  there is no redness, warmth, or swelling of the joints  NEUROLOGIC:  Awake, alert, oriented to name, place and time    ASSESSMENT / PLAN:  · Neuro:  Pain - tylenol for fever if needed, Hx depression- not on any medications  · CV: Hx HTN- doesn't take his amlodipine- monitor for now- non invasive   · Pulm: No acute issues, monitor SpO2  · GI:  DKA- NPO with ice chips, monitor BG  · Renal: ASHLEY- continue with NS- monitor labs q4 hours, hyperkalemic- monitor for now no EKG changes, electrolyte replacement as needed  · ID:  Blood cultures pending  · Endo: DKA- insulin gtt   · MSK: PT OT when able    Total fluid rate: NS @ 250  Bowel

## 2020-04-09 NOTE — H&P
[facility]  Internal Medicine Residency Program  History and Physical    Patient:  Westley Parsons 26 y.o. male MRN: [id number]     Date of Service: 4/9/2020    Hospital Day: 1      Chief complaint: had concerns including Hyperglycemia (states he has been out of his lantus for 2 weeks, been taking more of his Humalog to try and counteract sugars ) and Emesis (4 episodes of emesis since 0600). History of Present Illness     Westley Parsons, a 27 yo M, with PMH of type I DM, resistant HTN, HLD is admitted due to CC of hyperglycemia. Per patient he has not seen his PCP in a while and ran out of his insulin 2 weeks ago. His home doses Lantus 45 units daily with lispro 10 units pre-meal 3 times daily which she has not been taking for the past 2 weeks. He has had multiple previous episodes of DKA and hospitalizations. He came to the ED due to general malaise and was found to be hyperglycemic with BG of 681. There hydroxybutyrate was 4.5. Anion gap 33. Bicarb of 12. Osmolar gap 16. He is on amlodipine, Coreg, lisinopril, hydrochlorothiazide for his HTN and reports that he has not been taking his medications. He is on rosuvastatin for HLD. Reports to chewing tobacco.  Denies alcohol or any other ingestion. Denies illicit drug use. ED Course: Started on DKA management per protocol. ED Meds: Patient was given insulin drip 0.1 units/kg/h  ED Fluids: Patient was given 1L NS bolus followed by NS infusion to 250 cc/h    Past Medical History:      Diagnosis Date    Abscess of back 8/2011    [facility]     Diabetes mellitus type 1 ([facility] Diagnosed at 6years of age/ in 1998    HTN (hypertension)     Hyperlipidemia        Past Surgical History:    History reviewed. No pertinent surgical history. Medications Prior to Admission:    Prior to Admission medications    Medication Sig Start Date End Date Taking?  Authorizing Provider   amLODIPine (NORVASC) 10 MG tablet Take 10 mg by mouth daily   Yes Historical Provider, MD   levothyroxine (SYNTHROID) 50 MCG tablet Take 50 mcg by mouth Daily   Yes Historical Provider, MD   insulin glargine (BASAGLAR KWIKPEN) 100 UNIT/ML injection pen Inject 45 Units into the skin nightly   Yes Historical Provider, MD   pantoprazole (PROTONIX) 40 MG tablet Take 40 mg by mouth daily   Yes Historical Provider, MD   insulin lispro (ADMELOG) 100 UNIT/ML injection vial Inject 10 Units into the skin 3 times daily (before meals) 8/12/19  Yes Shena Quintero MD       Allergies:  Pineapple    Social History:   TOBACCO:   reports that he has never smoked. His smokeless tobacco use includes chew. ETOH:   reports previous alcohol use. Family History:       Problem Relation Age of Onset    Diabetes Mother        REVIEW OF SYSTEMS:    · Constitutional: No fever, no chills, no change in weight;   · HEENT: No blurred vision, no ear problems, no sore throat, no rhinorrhea. Dry mouth  · Respiratory: No cough, no sputum production, no pleuritic chest pain, no shortness of breath  · Cardiology: No angina, no dyspnea on exertion, no paroxysmal nocturnal dyspnea, no orthopnea, no palpitation, no leg swelling. · Gastroenterology: reports abdominal pain, nausea or vomiting; No dysphagia, no reflux;  no constipation or diarrhea.  No hematochezia   · Genitourinary: No dysuria, no frequency, hesitancy; no hematuria  · Musculoskeletal: no joint pain, no myalgia, no change in range of movement  · Neurology: no focal weakness in extremities, no slurred speech, no double vision, no tingling or numbness sensation  · Endocrinology: no temperature intolerance, no polyphagia, polydipsia or polyuria  · Hematology: no increased bleeding, no bruising, no lymphadenopathy  · Skin: no skin changes noticed by patient  · Psychology: no depressed mood, no suicidal ideation    Physical Exam   · Vitals: BP (!) 153/86   Pulse 120   Temp 97.3 °F (36.3 °C) (Temporal)   Resp 29   Ht 5' 9\" (1.753 m) Wt 196 lb 3.4 oz (89 kg)   SpO2 98%   BMI 28.98 kg/m²     · General Appearance: alert and oriented to person, place and time, well developed and well- nourished, in no acute distress  · Skin: warm and dry, no rash or erythema  · Head: normocephalic and atraumatic  · Eyes: pupils equal, round, and reactive to light, extraocular eye movements intact, conjunctivae normal  · ENT: tympanic membrane, external ear and ear canal normal bilaterally, nose without deformity, nasal mucosa and turbinates normal without polyps  · Neck: supple and non-tender without mass, no thyromegaly or thyroid nodules, no cervical lymphadenopathy  · Pulmonary/Chest: clear to auscultation bilaterally- no wheezes, rales or rhonchi, normal air movement, no respiratory distress  · Cardiovascular: normal rate, regular rhythm, normal S1 and S2, no murmurs, rubs, clicks, or gallops, distal pulses intact, no carotid bruits  · Abdomen: soft, non-tender, non-distended, normal bowel sounds, no masses or organomegaly  · Extremities: no cyanosis, clubbing or edema; no ulcers  · Musculoskeletal: normal range of motion, no joint swelling, deformity or tenderness  · Neurologic: reflexes normal and symmetric, no cranial nerve deficit, gait, coordination and speech normal   Labs and Imaging Studies   Basic Labs  Recent Labs     04/09/20  0714 04/09/20  0838 04/09/20  1025    133  --    K 5.1* 6.3*  --    CL 93* 93*  --    CO2 12* 9*  --    BUN 21* 23*  --    CREATININE 1.4* 1.3*  --    GLUCOSE 681* 704* 556*   CALCIUM 10.2 9.1  --        Recent Labs     04/09/20  0714   WBC 9.8   RBC 4.58   HGB 13.9   HCT 43.3   MCV 94.5   MCH 30.3   MCHC 32.1   RDW 12.2      MPV 10.2       CBC:   Lab Results   Component Value Date    WBC 9.8 04/09/2020    RBC 4.58 04/09/2020    HGB 13.9 04/09/2020    HCT 43.3 04/09/2020    MCV 94.5 04/09/2020    RDW 12.2 04/09/2020     04/09/2020     CMP:  Lab Results   Component Value Date     04/09/2020    K

## 2020-04-10 LAB
ALBUMIN SERPL-MCNC: 3.3 G/DL (ref 3.5–5.2)
ALP BLD-CCNC: 79 U/L (ref 40–129)
ALT SERPL-CCNC: 12 U/L (ref 0–40)
ANION GAP SERPL CALCULATED.3IONS-SCNC: 11 MMOL/L (ref 7–16)
ANION GAP SERPL CALCULATED.3IONS-SCNC: 12 MMOL/L (ref 7–16)
ANION GAP SERPL CALCULATED.3IONS-SCNC: 12 MMOL/L (ref 7–16)
AST SERPL-CCNC: 15 U/L (ref 0–39)
BASOPHILS ABSOLUTE: 0.06 E9/L (ref 0–0.2)
BASOPHILS RELATIVE PERCENT: 0.7 % (ref 0–2)
BILIRUB SERPL-MCNC: 0.3 MG/DL (ref 0–1.2)
BUN BLDV-MCNC: 15 MG/DL (ref 6–20)
BUN BLDV-MCNC: 19 MG/DL (ref 6–20)
BUN BLDV-MCNC: 19 MG/DL (ref 6–20)
CALCIUM IONIZED: 1.26 MMOL/L (ref 1.15–1.33)
CALCIUM SERPL-MCNC: 8.3 MG/DL (ref 8.6–10.2)
CALCIUM SERPL-MCNC: 8.4 MG/DL (ref 8.6–10.2)
CALCIUM SERPL-MCNC: 8.4 MG/DL (ref 8.6–10.2)
CHLORIDE BLD-SCNC: 102 MMOL/L (ref 98–107)
CHLORIDE BLD-SCNC: 107 MMOL/L (ref 98–107)
CHLORIDE BLD-SCNC: 108 MMOL/L (ref 98–107)
CO2: 20 MMOL/L (ref 22–29)
CO2: 20 MMOL/L (ref 22–29)
CO2: 22 MMOL/L (ref 22–29)
CREAT SERPL-MCNC: 1.1 MG/DL (ref 0.7–1.2)
CREAT SERPL-MCNC: 1.2 MG/DL (ref 0.7–1.2)
CREAT SERPL-MCNC: 1.3 MG/DL (ref 0.7–1.2)
EKG ATRIAL RATE: 121 BPM
EKG P AXIS: 44 DEGREES
EKG P-R INTERVAL: 156 MS
EKG Q-T INTERVAL: 324 MS
EKG QRS DURATION: 92 MS
EKG QTC CALCULATION (BAZETT): 460 MS
EKG R AXIS: 22 DEGREES
EKG T AXIS: 29 DEGREES
EKG VENTRICULAR RATE: 121 BPM
EOSINOPHILS ABSOLUTE: 0.17 E9/L (ref 0.05–0.5)
EOSINOPHILS RELATIVE PERCENT: 1.9 % (ref 0–6)
GFR AFRICAN AMERICAN: >60
GFR NON-AFRICAN AMERICAN: >60 ML/MIN/1.73
GLUCOSE BLD-MCNC: 145 MG/DL (ref 74–99)
GLUCOSE BLD-MCNC: 218 MG/DL (ref 74–99)
GLUCOSE BLD-MCNC: 258 MG/DL (ref 74–99)
HCT VFR BLD CALC: 34.7 % (ref 37–54)
HEMOGLOBIN: 11.3 G/DL (ref 12.5–16.5)
IMMATURE GRANULOCYTES #: 0.02 E9/L
IMMATURE GRANULOCYTES %: 0.2 % (ref 0–5)
LYMPHOCYTES ABSOLUTE: 2.77 E9/L (ref 1.5–4)
LYMPHOCYTES RELATIVE PERCENT: 31.2 % (ref 20–42)
MAGNESIUM: 2.1 MG/DL (ref 1.6–2.6)
MAGNESIUM: 2.2 MG/DL (ref 1.6–2.6)
MCH RBC QN AUTO: 31 PG (ref 26–35)
MCHC RBC AUTO-ENTMCNC: 32.6 % (ref 32–34.5)
MCV RBC AUTO: 95.3 FL (ref 80–99.9)
METER GLUCOSE: 134 MG/DL (ref 74–99)
METER GLUCOSE: 141 MG/DL (ref 74–99)
METER GLUCOSE: 145 MG/DL (ref 74–99)
METER GLUCOSE: 180 MG/DL (ref 74–99)
METER GLUCOSE: 194 MG/DL (ref 74–99)
METER GLUCOSE: 198 MG/DL (ref 74–99)
METER GLUCOSE: 218 MG/DL (ref 74–99)
METER GLUCOSE: 232 MG/DL (ref 74–99)
METER GLUCOSE: 355 MG/DL (ref 74–99)
MONOCYTES ABSOLUTE: 0.51 E9/L (ref 0.1–0.95)
MONOCYTES RELATIVE PERCENT: 5.7 % (ref 2–12)
MRSA CULTURE ONLY: NORMAL
NEUTROPHILS ABSOLUTE: 5.34 E9/L (ref 1.8–7.3)
NEUTROPHILS RELATIVE PERCENT: 60.3 % (ref 43–80)
PDW BLD-RTO: 12.8 FL (ref 11.5–15)
PHOSPHORUS: 3.8 MG/DL (ref 2.5–4.5)
PHOSPHORUS: 4 MG/DL (ref 2.5–4.5)
PLATELET # BLD: 204 E9/L (ref 130–450)
PMV BLD AUTO: 9.5 FL (ref 7–12)
POTASSIUM SERPL-SCNC: 3.9 MMOL/L (ref 3.5–5)
POTASSIUM SERPL-SCNC: 4.1 MMOL/L (ref 3.5–5)
POTASSIUM SERPL-SCNC: 4.2 MMOL/L (ref 3.5–5)
RBC # BLD: 3.64 E12/L (ref 3.8–5.8)
SODIUM BLD-SCNC: 135 MMOL/L (ref 132–146)
SODIUM BLD-SCNC: 139 MMOL/L (ref 132–146)
SODIUM BLD-SCNC: 140 MMOL/L (ref 132–146)
TOTAL PROTEIN: 5.7 G/DL (ref 6.4–8.3)
WBC # BLD: 8.9 E9/L (ref 4.5–11.5)

## 2020-04-10 PROCEDURE — 6360000002 HC RX W HCPCS: Performed by: STUDENT IN AN ORGANIZED HEALTH CARE EDUCATION/TRAINING PROGRAM

## 2020-04-10 PROCEDURE — 93010 ELECTROCARDIOGRAM REPORT: CPT | Performed by: INTERNAL MEDICINE

## 2020-04-10 PROCEDURE — 1200000000 HC SEMI PRIVATE

## 2020-04-10 PROCEDURE — 6370000000 HC RX 637 (ALT 250 FOR IP): Performed by: STUDENT IN AN ORGANIZED HEALTH CARE EDUCATION/TRAINING PROGRAM

## 2020-04-10 PROCEDURE — 2500000003 HC RX 250 WO HCPCS

## 2020-04-10 PROCEDURE — 82330 ASSAY OF CALCIUM: CPT

## 2020-04-10 PROCEDURE — 85025 COMPLETE CBC W/AUTO DIFF WBC: CPT

## 2020-04-10 PROCEDURE — 2500000003 HC RX 250 WO HCPCS: Performed by: EMERGENCY MEDICINE

## 2020-04-10 PROCEDURE — 99231 SBSQ HOSP IP/OBS SF/LOW 25: CPT | Performed by: INTERNAL MEDICINE

## 2020-04-10 PROCEDURE — 80053 COMPREHEN METABOLIC PANEL: CPT

## 2020-04-10 PROCEDURE — 6370000000 HC RX 637 (ALT 250 FOR IP): Performed by: INTERNAL MEDICINE

## 2020-04-10 PROCEDURE — 36415 COLL VENOUS BLD VENIPUNCTURE: CPT

## 2020-04-10 PROCEDURE — 80048 BASIC METABOLIC PNL TOTAL CA: CPT

## 2020-04-10 PROCEDURE — 82962 GLUCOSE BLOOD TEST: CPT

## 2020-04-10 PROCEDURE — 99233 SBSQ HOSP IP/OBS HIGH 50: CPT | Performed by: SURGERY

## 2020-04-10 PROCEDURE — 83735 ASSAY OF MAGNESIUM: CPT

## 2020-04-10 PROCEDURE — 84100 ASSAY OF PHOSPHORUS: CPT

## 2020-04-10 PROCEDURE — 6370000000 HC RX 637 (ALT 250 FOR IP): Performed by: SURGERY

## 2020-04-10 RX ORDER — LABETALOL HYDROCHLORIDE 5 MG/ML
INJECTION, SOLUTION INTRAVENOUS
Status: COMPLETED
Start: 2020-04-10 | End: 2020-04-10

## 2020-04-10 RX ORDER — PANTOPRAZOLE SODIUM 40 MG/1
40 TABLET, DELAYED RELEASE ORAL
Status: DISCONTINUED | OUTPATIENT
Start: 2020-04-10 | End: 2020-04-11 | Stop reason: HOSPADM

## 2020-04-10 RX ORDER — LEVOTHYROXINE SODIUM 0.05 MG/1
50 TABLET ORAL DAILY
Status: DISCONTINUED | OUTPATIENT
Start: 2020-04-10 | End: 2020-04-10

## 2020-04-10 RX ORDER — NICOTINE POLACRILEX 4 MG
15 LOZENGE BUCCAL PRN
Status: DISCONTINUED | OUTPATIENT
Start: 2020-04-10 | End: 2020-04-11 | Stop reason: HOSPADM

## 2020-04-10 RX ORDER — AMLODIPINE BESYLATE 10 MG/1
10 TABLET ORAL DAILY
Status: DISCONTINUED | OUTPATIENT
Start: 2020-04-10 | End: 2020-04-11 | Stop reason: HOSPADM

## 2020-04-10 RX ORDER — INSULIN GLARGINE 100 [IU]/ML
35 INJECTION, SOLUTION SUBCUTANEOUS NIGHTLY
Status: DISCONTINUED | OUTPATIENT
Start: 2020-04-10 | End: 2020-04-11 | Stop reason: HOSPADM

## 2020-04-10 RX ORDER — DEXTROSE MONOHYDRATE 50 MG/ML
100 INJECTION, SOLUTION INTRAVENOUS PRN
Status: DISCONTINUED | OUTPATIENT
Start: 2020-04-10 | End: 2020-04-11 | Stop reason: HOSPADM

## 2020-04-10 RX ORDER — DEXTROSE MONOHYDRATE 25 G/50ML
12.5 INJECTION, SOLUTION INTRAVENOUS PRN
Status: DISCONTINUED | OUTPATIENT
Start: 2020-04-10 | End: 2020-04-11 | Stop reason: HOSPADM

## 2020-04-10 RX ORDER — INSULIN GLARGINE 100 [IU]/ML
25 INJECTION, SOLUTION SUBCUTANEOUS 2 TIMES DAILY
Status: DISCONTINUED | OUTPATIENT
Start: 2020-04-10 | End: 2020-04-10

## 2020-04-10 RX ORDER — LABETALOL HYDROCHLORIDE 5 MG/ML
10 INJECTION, SOLUTION INTRAVENOUS ONCE
Status: COMPLETED | OUTPATIENT
Start: 2020-04-10 | End: 2020-04-10

## 2020-04-10 RX ORDER — LEVOTHYROXINE SODIUM 0.12 MG/1
125 TABLET ORAL DAILY
Status: DISCONTINUED | OUTPATIENT
Start: 2020-04-11 | End: 2020-04-11 | Stop reason: HOSPADM

## 2020-04-10 RX ORDER — INSULIN GLARGINE 100 [IU]/ML
45 INJECTION, SOLUTION SUBCUTANEOUS NIGHTLY
Status: DISCONTINUED | OUTPATIENT
Start: 2020-04-10 | End: 2020-04-10

## 2020-04-10 RX ADMIN — LABETALOL HYDROCHLORIDE 10 MG: 5 INJECTION INTRAVENOUS at 09:05

## 2020-04-10 RX ADMIN — INSULIN GLARGINE 25 UNITS: 100 INJECTION, SOLUTION SUBCUTANEOUS at 09:06

## 2020-04-10 RX ADMIN — LEVOTHYROXINE SODIUM 50 MCG: 0.05 TABLET ORAL at 07:53

## 2020-04-10 RX ADMIN — INSULIN LISPRO 10 UNITS: 100 INJECTION, SOLUTION INTRAVENOUS; SUBCUTANEOUS at 12:04

## 2020-04-10 RX ADMIN — LABETALOL HYDROCHLORIDE 10 MG: 5 INJECTION, SOLUTION INTRAVENOUS at 09:05

## 2020-04-10 RX ADMIN — INSULIN LISPRO 10 UNITS: 100 INJECTION, SOLUTION INTRAVENOUS; SUBCUTANEOUS at 07:54

## 2020-04-10 RX ADMIN — AMLODIPINE BESYLATE 10 MG: 10 TABLET ORAL at 08:18

## 2020-04-10 RX ADMIN — POTASSIUM CHLORIDE, DEXTROSE MONOHYDRATE AND SODIUM CHLORIDE: 150; 5; 450 INJECTION, SOLUTION INTRAVENOUS at 04:12

## 2020-04-10 RX ADMIN — INSULIN GLARGINE 35 UNITS: 100 INJECTION, SOLUTION SUBCUTANEOUS at 21:14

## 2020-04-10 RX ADMIN — PANTOPRAZOLE SODIUM 40 MG: 40 TABLET, DELAYED RELEASE ORAL at 07:53

## 2020-04-10 RX ADMIN — ENOXAPARIN SODIUM 40 MG: 40 INJECTION SUBCUTANEOUS at 09:05

## 2020-04-10 RX ADMIN — INSULIN LISPRO 5 UNITS: 100 INJECTION, SOLUTION INTRAVENOUS; SUBCUTANEOUS at 08:26

## 2020-04-10 RX ADMIN — INSULIN LISPRO 4 UNITS: 100 INJECTION, SOLUTION INTRAVENOUS; SUBCUTANEOUS at 17:16

## 2020-04-10 RX ADMIN — INSULIN LISPRO 10 UNITS: 100 INJECTION, SOLUTION INTRAVENOUS; SUBCUTANEOUS at 17:15

## 2020-04-10 ASSESSMENT — PAIN SCALES - GENERAL
PAINLEVEL_OUTOF10: 0

## 2020-04-10 NOTE — PLAN OF CARE
Problem: Falls - Risk of:  Goal: Will remain free from falls  Description: Will remain free from falls  Outcome: Met This Shift     Problem: Anxiety/Stress:  Goal: Level of anxiety will decrease  Description: Level of anxiety will decrease  Outcome: Met This Shift     Problem: Serum Glucose Level - Abnormal:  Goal: Ability to maintain appropriate glucose levels will improve to within specified parameters  Description: Ability to maintain appropriate glucose levels will improve to within specified parameters  Outcome: Met This Shift     Problem: Pain:  Goal: Pain level will decrease  Description: Pain level will decrease  Outcome: Met This Shift

## 2020-04-10 NOTE — PROGRESS NOTES
GFRAA >60 04/10/2020    LABGLOM >60 04/10/2020    GLUCOSE 218 04/10/2020    GLUCOSE 57 05/18/2012    PROT 5.7 04/10/2020    LABALBU 3.3 04/10/2020    LABALBU 5.3 05/18/2012    CALCIUM 8.4 04/10/2020    BILITOT 0.3 04/10/2020    ALKPHOS 79 04/10/2020    AST 15 04/10/2020    ALT 12 04/10/2020       Resident's Assessment and Plan     July Del Valle, a 25 yo M, with PMH of type I DM, resistant HTN, HLD is admitted due to CC of hyperglycemia.     1. DKA              - 2/2 non compliance vs  Less likely infection vs MI               - , BHB 4.5 AG 33, pH 7.19 on presentation              - T1Dm on insulin; non complant for the past 2 weeks              - Startedon DKA management per protocol              - Follow EKG - sinus tachy, UDS - negative, SDS, Blood cultures   - AG closed x3 overnight 4/9; insulin bridged and patient transferred out of SICU 4/10 am              - On IVF  cc/h - dced              - On insulin drip 0.1 units/kg/hr - dced              - Monitor K, bicarb   - On Carb control diet              - Follow POCT BG ACHS              - Follow BMP q4h, CBC daily     2. HAGMA - resolved              - 2/2 DKA and lactic acidosis vs ?ingestion (less likely)              - AG 33 on admission              - Osmolar gap 16              - IVF  cc/h              - Monitor for now              - Follow UDS, SDS              - Follow BMP     3. Lactic acidosis - resolved              - 2/2 DKA vs dehydration              - IVF  cc/h     4. Hypertensive urgency - resolved              - 2/2 Ho Resistant HTN and medication non compliance              - /108 on presentation              - Home meds amlodipine 10 daily, Coreg 3.125twice daily, HCTZ 12.5bid, lisinopril 20 daily -on hold              - Currently BP 150s/80s; not on any antihypertensives; management per SICU   - 4/10 started amlodipine 10mg daily              - Monitor vitals     5. Ho HLD              -Home med rosuvastatin -on

## 2020-04-10 NOTE — PROGRESS NOTES
ALert and oriented  VS and BS are normal  DKA resolved  Mobile   HTN and Diabetes  meds reinstated   Discharge today

## 2020-04-11 VITALS
BODY MASS INDEX: 30.81 KG/M2 | OXYGEN SATURATION: 98 % | TEMPERATURE: 98.2 F | HEIGHT: 69 IN | SYSTOLIC BLOOD PRESSURE: 138 MMHG | RESPIRATION RATE: 18 BRPM | HEART RATE: 84 BPM | WEIGHT: 208 LBS | DIASTOLIC BLOOD PRESSURE: 76 MMHG

## 2020-04-11 LAB
ALBUMIN SERPL-MCNC: 3.5 G/DL (ref 3.5–5.2)
ALP BLD-CCNC: 79 U/L (ref 40–129)
ALT SERPL-CCNC: 18 U/L (ref 0–40)
ANION GAP SERPL CALCULATED.3IONS-SCNC: 11 MMOL/L (ref 7–16)
AST SERPL-CCNC: 34 U/L (ref 0–39)
BASOPHILS ABSOLUTE: 0.05 E9/L (ref 0–0.2)
BASOPHILS RELATIVE PERCENT: 0.7 % (ref 0–2)
BILIRUB SERPL-MCNC: <0.2 MG/DL (ref 0–1.2)
BUN BLDV-MCNC: 10 MG/DL (ref 6–20)
CALCIUM SERPL-MCNC: 8.8 MG/DL (ref 8.6–10.2)
CHLORIDE BLD-SCNC: 106 MMOL/L (ref 98–107)
CHOLESTEROL, TOTAL: 161 MG/DL (ref 0–199)
CO2: 24 MMOL/L (ref 22–29)
CREAT SERPL-MCNC: 1 MG/DL (ref 0.7–1.2)
EOSINOPHILS ABSOLUTE: 0.19 E9/L (ref 0.05–0.5)
EOSINOPHILS RELATIVE PERCENT: 2.7 % (ref 0–6)
GFR AFRICAN AMERICAN: >60
GFR NON-AFRICAN AMERICAN: >60 ML/MIN/1.73
GLUCOSE BLD-MCNC: 82 MG/DL (ref 74–99)
HBA1C MFR BLD: 13.7 % (ref 4–5.6)
HCT VFR BLD CALC: 35.2 % (ref 37–54)
HDLC SERPL-MCNC: 44 MG/DL
HEMOGLOBIN: 11.4 G/DL (ref 12.5–16.5)
IMMATURE GRANULOCYTES #: 0.02 E9/L
IMMATURE GRANULOCYTES %: 0.3 % (ref 0–5)
LDL CHOLESTEROL CALCULATED: 93 MG/DL (ref 0–99)
LYMPHOCYTES ABSOLUTE: 2.94 E9/L (ref 1.5–4)
LYMPHOCYTES RELATIVE PERCENT: 41.9 % (ref 20–42)
MCH RBC QN AUTO: 30.4 PG (ref 26–35)
MCHC RBC AUTO-ENTMCNC: 32.4 % (ref 32–34.5)
MCV RBC AUTO: 93.9 FL (ref 80–99.9)
METER GLUCOSE: 147 MG/DL (ref 74–99)
METER GLUCOSE: 59 MG/DL (ref 74–99)
MONOCYTES ABSOLUTE: 0.43 E9/L (ref 0.1–0.95)
MONOCYTES RELATIVE PERCENT: 6.1 % (ref 2–12)
NEUTROPHILS ABSOLUTE: 3.38 E9/L (ref 1.8–7.3)
NEUTROPHILS RELATIVE PERCENT: 48.3 % (ref 43–80)
PDW BLD-RTO: 12.4 FL (ref 11.5–15)
PLATELET # BLD: 209 E9/L (ref 130–450)
PMV BLD AUTO: 9.9 FL (ref 7–12)
POTASSIUM SERPL-SCNC: 3.2 MMOL/L (ref 3.5–5)
RBC # BLD: 3.75 E12/L (ref 3.8–5.8)
SODIUM BLD-SCNC: 141 MMOL/L (ref 132–146)
T4 FREE: 0.61 NG/DL (ref 0.93–1.7)
TOTAL PROTEIN: 5.8 G/DL (ref 6.4–8.3)
TRIGL SERPL-MCNC: 120 MG/DL (ref 0–149)
VLDLC SERPL CALC-MCNC: 24 MG/DL
WBC # BLD: 7 E9/L (ref 4.5–11.5)

## 2020-04-11 PROCEDURE — 83036 HEMOGLOBIN GLYCOSYLATED A1C: CPT

## 2020-04-11 PROCEDURE — 80061 LIPID PANEL: CPT

## 2020-04-11 PROCEDURE — 86376 MICROSOMAL ANTIBODY EACH: CPT

## 2020-04-11 PROCEDURE — 36415 COLL VENOUS BLD VENIPUNCTURE: CPT

## 2020-04-11 PROCEDURE — 6370000000 HC RX 637 (ALT 250 FOR IP): Performed by: STUDENT IN AN ORGANIZED HEALTH CARE EDUCATION/TRAINING PROGRAM

## 2020-04-11 PROCEDURE — 6370000000 HC RX 637 (ALT 250 FOR IP): Performed by: INTERNAL MEDICINE

## 2020-04-11 PROCEDURE — 85025 COMPLETE CBC W/AUTO DIFF WBC: CPT

## 2020-04-11 PROCEDURE — 6360000002 HC RX W HCPCS: Performed by: STUDENT IN AN ORGANIZED HEALTH CARE EDUCATION/TRAINING PROGRAM

## 2020-04-11 PROCEDURE — 99231 SBSQ HOSP IP/OBS SF/LOW 25: CPT | Performed by: INTERNAL MEDICINE

## 2020-04-11 PROCEDURE — 80053 COMPREHEN METABOLIC PANEL: CPT

## 2020-04-11 PROCEDURE — 84439 ASSAY OF FREE THYROXINE: CPT

## 2020-04-11 PROCEDURE — 2580000003 HC RX 258: Performed by: STUDENT IN AN ORGANIZED HEALTH CARE EDUCATION/TRAINING PROGRAM

## 2020-04-11 PROCEDURE — 82962 GLUCOSE BLOOD TEST: CPT

## 2020-04-11 RX ORDER — LEVOTHYROXINE SODIUM 0.12 MG/1
125 TABLET ORAL DAILY
Qty: 30 TABLET | Refills: 2 | Status: SHIPPED | OUTPATIENT
Start: 2020-04-11 | End: 2020-04-13 | Stop reason: SDUPTHER

## 2020-04-11 RX ORDER — POTASSIUM CHLORIDE 20 MEQ/1
40 TABLET, EXTENDED RELEASE ORAL 2 TIMES DAILY WITH MEALS
Status: DISCONTINUED | OUTPATIENT
Start: 2020-04-11 | End: 2020-04-11 | Stop reason: HOSPADM

## 2020-04-11 RX ORDER — POTASSIUM CHLORIDE 20 MEQ/1
20 TABLET, EXTENDED RELEASE ORAL DAILY
Status: DISCONTINUED | OUTPATIENT
Start: 2020-04-12 | End: 2020-04-11 | Stop reason: HOSPADM

## 2020-04-11 RX ORDER — INSULIN GLARGINE 100 [IU]/ML
45 INJECTION, SOLUTION SUBCUTANEOUS NIGHTLY
Qty: 1 VIAL | Refills: 0 | Status: SHIPPED | OUTPATIENT
Start: 2020-04-11 | End: 2020-04-13 | Stop reason: ALTCHOICE

## 2020-04-11 RX ORDER — INSULIN GLARGINE 100 [IU]/ML
45 INJECTION, SOLUTION SUBCUTANEOUS NIGHTLY
Qty: 5 PEN | Refills: 3 | Status: SHIPPED | OUTPATIENT
Start: 2020-04-11 | End: 2020-04-13 | Stop reason: SDUPTHER

## 2020-04-11 RX ORDER — POTASSIUM CHLORIDE 20 MEQ/1
20 TABLET, EXTENDED RELEASE ORAL
Status: DISCONTINUED | OUTPATIENT
Start: 2020-04-11 | End: 2020-04-11

## 2020-04-11 RX ADMIN — ENOXAPARIN SODIUM 40 MG: 40 INJECTION SUBCUTANEOUS at 08:44

## 2020-04-11 RX ADMIN — INSULIN LISPRO 1 UNITS: 100 INJECTION, SOLUTION INTRAVENOUS; SUBCUTANEOUS at 08:44

## 2020-04-11 RX ADMIN — PANTOPRAZOLE SODIUM 40 MG: 40 TABLET, DELAYED RELEASE ORAL at 06:15

## 2020-04-11 RX ADMIN — INSULIN LISPRO 6 UNITS: 100 INJECTION, SOLUTION INTRAVENOUS; SUBCUTANEOUS at 08:45

## 2020-04-11 RX ADMIN — POTASSIUM CHLORIDE 40 MEQ: 1500 TABLET, EXTENDED RELEASE ORAL at 08:43

## 2020-04-11 RX ADMIN — LEVOTHYROXINE SODIUM 125 MCG: 0.12 TABLET ORAL at 06:15

## 2020-04-11 RX ADMIN — AMLODIPINE BESYLATE 10 MG: 10 TABLET ORAL at 08:43

## 2020-04-11 RX ADMIN — DEXTROSE MONOHYDRATE 12.5 G: 25 INJECTION, SOLUTION INTRAVENOUS at 06:30

## 2020-04-11 ASSESSMENT — PAIN SCALES - GENERAL: PAINLEVEL_OUTOF10: 0

## 2020-04-11 NOTE — PROGRESS NOTES
GFRAA >60 04/11/2020    LABGLOM >60 04/11/2020    GLUCOSE 82 04/11/2020    GLUCOSE 57 05/18/2012    PROT 5.8 04/11/2020    LABALBU 3.5 04/11/2020    LABALBU 5.3 05/18/2012    CALCIUM 8.8 04/11/2020    BILITOT <0.2 04/11/2020    ALKPHOS 79 04/11/2020    AST 34 04/11/2020    ALT 18 04/11/2020       Resident's Assessment and Plan     Juanjo Coffman, a 27 yo M, with PMH of type I DM, resistant HTN, HLD is admitted due to CC of hyperglycemia.     1. DKA              - 2/2 non compliance vs  Less likely infection vs MI               - , BHB 4.5 AG 33, pH 7.19 on presentation              - T1Dm on insulin; non complant for the past 2 weeks              - Startedon DKA management per protocol              - Follow EKG - sinus tachy, UDS - negative, SDS, Blood cultures   - AG closed x3 overnight 4/9; insulin bridged and patient transferred out of SICU 4/10 am              - On IVF  cc/h - dced              - On insulin drip 0.1 units/kg/hr - dced              - Monitor K, bicarb   - On Carb control diet              - Follow POCT BG ACHS; hypoglycemia protocol              - Follow BMP q4h, CBC daily   - 4/10 Lantus 25 am; lispro 10 tid premeal +MDSS; latus 35 pm; carb control diet; hypoglycemic overngiht   - 4/11 Lantus 35 nightly; 6 units tid pre meal + LDSS   - Patient informs that he has no insurance and is not eligible for medicaid. Cannot afford insulin and is out of insulin at home. Case management is helping with getting him insulin but not much can be done over the weekend. He resists following up with clinic PCP as that will take him away from work for a few hours and he will lose pay. For discharge but insulin issue holding up.     2. HAGMA - resolved              - 2/2 DKA and lactic acidosis vs ?ingestion (less likely)              - AG 33 on admission              - Osmolar gap 16              - IVF  cc/h              - Monitor for now              - Follow UDS, SDS              - Follow BMP     3. Lactic acidosis - resolved              - 2/2 DKA vs dehydration              - IVF  cc/h     4. Ho HTN     Hypertensive urgency - resolved              - 2/2 Ho Resistant HTN and medication non compliance              - /108 on presentation              - Home meds amlodipine 10 daily, Coreg 3.125twice daily, HCTZ 12.5bid, lisinopril 20 daily -on hold              - Currently BP 150s/80s; not on any antihypertensives; management per SICU   - 4/10 started amlodipine 10mg daily              - Monitor vitals     5. Ho HLD              -Home med rosuvastatin -on hold     6. Tachycardia - resolved              - Likely 2/2 hypovolemia              - EKG Sinus Tachycardia              - On IVF NS to 50 cc/h              - Follow EKG     7. ASHLEY stage 1 - resolved              - 2/2 dehydration, and uncontrolled HTN and DM              - Cr 1.4 trending down to 1.2; Baseline Cr 0.9              - On IVF  cc/h              - Ho ASHLEY during previous admissions for DKA              - Follow BMP and UO     8. Non Compliance              - Ho medication noncompliance to insulin as well as antihypertensives              - Multiple previous admissions for DKA management              - Hypertensive urgency on presentation   - Patient reports he has lost Medicaid and cannot afford insulin and other meds   - Working with  to get him medications     9. Hypothyroidism              - TSH 14.59              - Started Levothroxine 125 mcg daily     2.  Hypokalemia              - likely 2/2 insulin gtt DKA management   - replaced       PT/OT evaluation: Future  DVT prophylaxis/ GI prophylaxis: Lovenox / PPI  Disposition: Continue current care    [address], DO, PGY-1  Attending physician: Dr. Lakisha Huynh

## 2020-04-11 NOTE — DISCHARGE SUMMARY
[address], [address] Internal Medicine Clinic) if there are any appointment changes or other issues. It is very important that you keep this appointment.     Pre-visit planning call to discuss patient care during COVID-19 pandemic.     Discussed with the patient  / left message.  Offered video and e-visits through [employer] to facilitate patient care continuity.     Discussed that if the patient develops viral symptoms (fever, chills, body aches, soar throat, abdominal pain or diarrhea) OR has exposure to a sick contact they are to call the Flu Clinic at [phone] for evaluation at one of three locations:     [facility] at [address]: [place], [place], [address] Primary Care: [address], [address] [facility] [address] [place], [address]      Gama Cervantes DO  PGY 1   4:31 PM 4/11/2020

## 2020-04-11 NOTE — PROGRESS NOTES
Case management called to assist patient with prescription coverage. Ema Pan is on call today and will reach out to the patient to see what can be done to help him afford his insulin on discharge.

## 2020-04-13 ENCOUNTER — OFFICE VISIT (OUTPATIENT)
Dept: INTERNAL MEDICINE | Age: 30
End: 2020-04-13

## 2020-04-13 VITALS
BODY MASS INDEX: 30.66 KG/M2 | HEART RATE: 99 BPM | TEMPERATURE: 97.9 F | SYSTOLIC BLOOD PRESSURE: 170 MMHG | RESPIRATION RATE: 16 BRPM | DIASTOLIC BLOOD PRESSURE: 100 MMHG | HEIGHT: 69 IN | WEIGHT: 207 LBS

## 2020-04-13 PROCEDURE — 99212 OFFICE O/P EST SF 10 MIN: CPT | Performed by: INTERNAL MEDICINE

## 2020-04-13 PROCEDURE — 99214 OFFICE O/P EST MOD 30 MIN: CPT | Performed by: INTERNAL MEDICINE

## 2020-04-13 RX ORDER — AMLODIPINE BESYLATE 10 MG/1
10 TABLET ORAL DAILY
Qty: 90 TABLET | Refills: 2 | Status: SHIPPED | OUTPATIENT
Start: 2020-04-13 | End: 2020-10-19 | Stop reason: SDUPTHER

## 2020-04-13 RX ORDER — LEVOTHYROXINE SODIUM 0.12 MG/1
125 TABLET ORAL DAILY
Qty: 30 TABLET | Refills: 3 | COMMUNITY
Start: 2020-04-13 | End: 2021-07-15 | Stop reason: SDUPTHER

## 2020-04-13 RX ORDER — INSULIN GLARGINE 100 [IU]/ML
40 INJECTION, SOLUTION SUBCUTANEOUS NIGHTLY
Qty: 5 PEN | Refills: 3
Start: 2020-04-13 | End: 2020-10-19 | Stop reason: SDUPTHER

## 2020-04-13 RX ORDER — INSULIN LISPRO 100 [IU]/ML
INJECTION, SOLUTION INTRAVENOUS; SUBCUTANEOUS
Qty: 5 PEN | Refills: 0 | COMMUNITY
Start: 2020-04-13 | End: 2020-04-13 | Stop reason: SDUPTHER

## 2020-04-13 RX ORDER — PEN NEEDLE, DIABETIC 31 GX5/16"
1 NEEDLE, DISPOSABLE MISCELLANEOUS DAILY
Qty: 100 EACH | Refills: 3 | Status: SHIPPED | OUTPATIENT
Start: 2020-04-13 | End: 2021-12-01 | Stop reason: SDUPTHER

## 2020-04-13 RX ORDER — LEVOTHYROXINE SODIUM 0.12 MG/1
125 TABLET ORAL DAILY
Qty: 30 TABLET | Refills: 2 | Status: SHIPPED | OUTPATIENT
Start: 2020-04-13 | End: 2020-10-19 | Stop reason: SDUPTHER

## 2020-04-13 RX ORDER — INSULIN GLARGINE 100 [IU]/ML
40 INJECTION, SOLUTION SUBCUTANEOUS NIGHTLY
Qty: 5 PEN | Refills: 0 | COMMUNITY
Start: 2020-04-13 | End: 2021-06-24

## 2020-04-13 RX ORDER — INSULIN LISPRO 100 [IU]/ML
INJECTION, SOLUTION INTRAVENOUS; SUBCUTANEOUS
Qty: 5 PEN | Refills: 3
Start: 2020-04-13 | End: 2020-10-19 | Stop reason: SDUPTHER

## 2020-04-13 SDOH — ECONOMIC STABILITY: FOOD INSECURITY: WITHIN THE PAST 12 MONTHS, YOU WORRIED THAT YOUR FOOD WOULD RUN OUT BEFORE YOU GOT MONEY TO BUY MORE.: NEVER TRUE

## 2020-04-13 SDOH — ECONOMIC STABILITY: FOOD INSECURITY: WITHIN THE PAST 12 MONTHS, THE FOOD YOU BOUGHT JUST DIDN'T LAST AND YOU DIDN'T HAVE MONEY TO GET MORE.: NEVER TRUE

## 2020-04-13 SDOH — ECONOMIC STABILITY: INCOME INSECURITY: HOW HARD IS IT FOR YOU TO PAY FOR THE VERY BASICS LIKE FOOD, HOUSING, MEDICAL CARE, AND HEATING?: SOMEWHAT HARD

## 2020-04-13 ASSESSMENT — PATIENT HEALTH QUESTIONNAIRE - PHQ9
2. FEELING DOWN, DEPRESSED OR HOPELESS: 0
SUM OF ALL RESPONSES TO PHQ9 QUESTIONS 1 & 2: 0
SUM OF ALL RESPONSES TO PHQ QUESTIONS 1-9: 0
SUM OF ALL RESPONSES TO PHQ QUESTIONS 1-9: 0
1. LITTLE INTEREST OR PLEASURE IN DOING THINGS: 0

## 2020-04-13 NOTE — PROGRESS NOTES
hematochezia or melena  Genitourinary: no urinary urgency, frequency, dysuria, nocturia, hesitancy, or incontinence  Musculoskeletal: no arthritis, arthralgia, myalgia, weakness, or morning stiffness  Skin: no abnormal pigmentation, rash, itching, masses, hair or nail changes    Current Outpatient Medications on File Prior to Visit   Medication Sig Dispense Refill    Insulin Syringes, Disposable, U-100 1 ML MISC 1 each by Does not apply route daily 50 each 0     No current facility-administered medications on file prior to visit. OBJECTIVE:    VS: BP (!) 170/100   Pulse 99   Temp 97.9 °F (36.6 °C) (Oral)   Resp 16   Ht 5' 9\" (1.753 m)   Wt 207 lb (93.9 kg)   BMI 30.57 kg/m²   Physical Exam  Vitals signs and nursing note reviewed. Constitutional:       Appearance: Normal appearance. HENT:      Head: Normocephalic and atraumatic. Mouth/Throat:      Mouth: Mucous membranes are moist.   Cardiovascular:      Rate and Rhythm: Normal rate and regular rhythm. Pulses: Normal pulses. Heart sounds: Normal heart sounds. Pulmonary:      Effort: Pulmonary effort is normal.      Breath sounds: Normal breath sounds. Abdominal:      General: Bowel sounds are normal.      Palpations: Abdomen is soft. Skin:     General: Skin is warm and dry. Capillary Refill: Capillary refill takes less than 2 seconds. Neurological:      General: No focal deficit present. Mental Status: He is alert and oriented to person, place, and time. Psychiatric:         Mood and Affect: Mood normal.         ASSESSMENT/PLAN:  May Veliz was seen today for ed follow-up. Diagnoses and all orders for this visit:    Type 1 diabetes mellitus without complication ([facility])  -     [facility] Glucose Flowsheet  -     insulin glargine (BASAGLAR KWIKPEN) 100 UNIT/ML injection pen;  Inject 40 Units into the skin nightly PAP medication  -     Discontinue: insulin lispro (HUMALOG) 100 UNIT/ML injection vial; 15 units premeal with sliding scale. 70 units max dose per day. PAP medication.  -     Insulin Pen Needle ([employer] PEN NEEDLES 31G) 31G X 8 MM MISC; 1 each by Does not apply route daily  -     Discontinue: insulin lispro (HUMALOG) 100 UNIT/ML injection vial; 15 units premeal with sliding scale (Check Blood Sugar 4 times daily and Cover as follows:  =No Insulin, 151-200=2 units, 201-250=4 units, 251-300=6 units, 301-350=8 units, 351-400=10 units, Over 400 give 10 units and call Physician) . 70 units max dose per day. PAP medication.  -     Discontinue: insulin lispro, 1 Unit Dial, (HUMALOG KWIKPEN) 100 UNIT/ML SOPN; 15 units premeal with sliding scale (Check Blood Sugar 4 times daily and Cover as follows:  =No Insulin, 151-200=2 units, 201-250=4 units, 251-300=6 units, 301-350=8 units, 351-400=10 units, Over 400 give 10 units and call Physician) . 70 units max dose per day. PAP medication. LOT# [id number],  -     insulin glargine (BASAGLAR KWIKPEN) 100 UNIT/ML injection pen; Inject 40 Units into the skin nightly LOT # [id number], EXP 10/2020  -     insulin lispro, 1 Unit Dial, (HUMALOG KWIKPEN) 100 UNIT/ML SOPN; 10-15 units premeal with sliding scale (Check Blood Sugar 4 times daily and Cover as follows:  =No Insulin, 151-200=2 units, 201-250=4 units, 251-300=6 units, 301-350=8 units, 351-400=10 units, Over 400 give 10 units and call Physician) . 70 units max dose per day. PAP medication. Essential hypertension  -     MyChart Blood Pressure Flowsheet  -     amLODIPine (NORVASC) 10 MG tablet; Take 1 tablet by mouth daily    Acquired hypothyroidism  -     levothyroxine (SYNTHROID) 125 MCG tablet; Take 1 tablet by mouth Daily  -     levothyroxine (SYNTHROID) 125 MCG tablet;  Take 1 tablet by mouth Daily LOT #[id number], EXP 12/23/20    Encounter for screening involving social determinants of health (SDoH)  -     Dania Moss (), [facility] Clinics ONLY      I have reviewed all pertient PMHx, PSHx,

## 2020-04-13 NOTE — PATIENT INSTRUCTIONS
1. Please take 40 units of Basaglar nightly   2. Please take 10 units premeal insulin for a regular meal and 15 units if you're eating a larger meal. Please continue using sliding scale as before. 3. Please start using Amlodipine for blood pressure management. Check blood pressure at home 2 times daily and log it in [facility]. 4. Please start using Synthroid as prescribed. Patient Education        Diabetes Blood Sugar Emergencies: Your Action Plan  How can you prevent a blood sugar emergency? An important part of living with diabetes is keeping your blood sugar in your target range. You'll need to know what to do if it's too high or too low. Managing your blood sugar levels helps you avoid emergencies. This care sheet will teach you about the signs of high and low blood sugar. It will help you make an action plan with your doctor for when these signs occur. Low blood sugar is more likely to happen if you take certain medicines for diabetes. It can also happen if you skip a meal, drink alcohol, or exercise more than usual.  You may get high blood sugar if you eat differently than you normally do. One example is eating more carbohydrate than usual. Having a cold, the flu, or other sudden illness can also cause high blood sugar levels. Levels can also rise if you miss a dose of medicine. Any change in how you take your medicine may affect your blood sugar level. So it's important to work with your doctor before you make any changes. Check your blood sugar  Work with your doctor to fill in the blank spaces below that apply to you. Track your levels, know your target range, and write down ways you can get your blood sugar back in your target range. A log book can help you track your levels. Take the book to all of your medical appointments. · Check your blood sugar _____ times a day, at these times:________________________________________________.   (For example: Before meals, at bedtime, before exercise, back in your target range and is stable. If you must stay active, eat or drink 30 grams of carbohydrate. Then check your blood sugar again in 15 minutes. If it's not in your target range, take another 30 grams of carbohydrates. Check your blood sugar again in 15 minutes. Keep doing this until you reach your target. You can then go back to your regular testing schedule. If your symptoms or blood sugar levels are getting worse or have not improved after 15 minutes, seek medical care right away. Here are some examples of quick-sugar foods with 15 grams of carbohydrate:  · 3 or 4 glucose tablets  · 1 tablespoon (3 teaspoons) table sugar  · ½ cup to ¾ cup (4 to 6 ounces) of fruit juice or regular (not diet) soda  · Hard candy (such as 6 Life Savers)  High blood sugar  If you have symptoms of high blood sugar, check your blood sugar. Your goal is to get your level back to your target range. If it's above ______ ( for example, above 250), follow these steps:  · If you missed a dose of your diabetes medicine, take it now. Take only the amount of medicine that you have been prescribed. Do not take more or less medicine. · Give yourself insulin if your doctor has prescribed it for high blood sugar. · Test for ketones, if the doctor told you to do so. If the results of the ketone test show a moderate-to-large amount of ketones, call the doctor for advice. · Wait 30 minutes after you take the extra insulin or the missed medicine. Check your blood sugar again. If your symptoms or blood sugar levels are getting worse or have not improved after taking these steps, seek medical care right away. Follow-up care is a key part of your treatment and safety. Be sure to make and go to all appointments, and call your doctor if you are having problems. It's also a good idea to know your test results and keep a list of the medicines you take. Where can you learn more? Go to [email or web address]://renan[facility]. org and sign in to your [employer] account. Enter U843 in the [facility] box to learn more about \"Diabetes Blood Sugar Emergencies: Your Action Plan. \"     If you do not have an account, please click on the \"Sign Up Now\" link. Current as of: December 19, 2019Content Version: 12.4  © 0749-1319 Healthwise, Incorporated. Care instructions adapted under license by [facility] ([facility]). If you have questions about a medical condition or this instruction, always ask your healthcare professional. [address] any warranty or liability for your use of this information.

## 2020-04-13 NOTE — PROGRESS NOTES
Discharge instructions reviewed with patient. Patient verbalizes understanding. Pt notified of My Chart flowsheets for both B/P & BS readings. AVS given to patient.

## 2020-04-14 LAB
BLOOD CULTURE, ROUTINE: NORMAL
CULTURE, BLOOD 2: NORMAL
THYROID PEROXIDASE (TPO) ABS: 163.8 IU/ML (ref 0–9)

## 2020-05-11 ENCOUNTER — TELEPHONE (OUTPATIENT)
Dept: INTERNAL MEDICINE | Age: 30
End: 2020-05-11

## 2020-05-22 ENCOUNTER — SOCIAL WORK (OUTPATIENT)
Dept: FAMILY MEDICINE CLINIC | Age: 30
End: 2020-05-22

## 2020-10-19 ENCOUNTER — OFFICE VISIT (OUTPATIENT)
Dept: INTERNAL MEDICINE | Age: 30
End: 2020-10-19

## 2020-10-19 ENCOUNTER — CARE COORDINATION (OUTPATIENT)
Dept: INTERNAL MEDICINE | Age: 30
End: 2020-10-19

## 2020-10-19 VITALS
HEART RATE: 99 BPM | HEIGHT: 69 IN | SYSTOLIC BLOOD PRESSURE: 158 MMHG | RESPIRATION RATE: 16 BRPM | DIASTOLIC BLOOD PRESSURE: 88 MMHG | WEIGHT: 208.9 LBS | TEMPERATURE: 97.5 F | BODY MASS INDEX: 30.94 KG/M2

## 2020-10-19 LAB — HBA1C MFR BLD: 14.9 %

## 2020-10-19 PROCEDURE — 99214 OFFICE O/P EST MOD 30 MIN: CPT | Performed by: INTERNAL MEDICINE

## 2020-10-19 PROCEDURE — 83036 HEMOGLOBIN GLYCOSYLATED A1C: CPT | Performed by: INTERNAL MEDICINE

## 2020-10-19 RX ORDER — INSULIN GLARGINE 100 [IU]/ML
40 INJECTION, SOLUTION SUBCUTANEOUS NIGHTLY
Qty: 5 PEN | Refills: 3 | Status: SHIPPED
Start: 2020-10-19 | End: 2020-10-19

## 2020-10-19 RX ORDER — AMLODIPINE BESYLATE 10 MG/1
10 TABLET ORAL DAILY
Qty: 90 TABLET | Refills: 1 | Status: SHIPPED | OUTPATIENT
Start: 2020-10-19 | End: 2021-05-27 | Stop reason: SDUPTHER

## 2020-10-19 RX ORDER — LEVOTHYROXINE SODIUM 0.12 MG/1
125 TABLET ORAL DAILY
Qty: 30 TABLET | Refills: 2 | Status: SHIPPED
Start: 2020-10-19 | End: 2020-10-19

## 2020-10-19 RX ORDER — INSULIN LISPRO 100 [IU]/ML
INJECTION, SOLUTION INTRAVENOUS; SUBCUTANEOUS
Qty: 5 PEN | Refills: 3 | Status: SHIPPED
Start: 2020-10-19 | End: 2020-10-19

## 2020-10-19 ASSESSMENT — ENCOUNTER SYMPTOMS
DIARRHEA: 0
CHEST TIGHTNESS: 0
CONSTIPATION: 0
ABDOMINAL PAIN: 0
COUGH: 0
NAUSEA: 0
SHORTNESS OF BREATH: 0

## 2020-10-19 NOTE — PROGRESS NOTES
[facility]  InternalMedicine Residency Program  ACC Note      SUBJECTIVE:  CC: had concerns including Diabetes Mellitus; Hypertension; and Medication Refill. HPI:Duane Brady presented to the [facility] for a routine visit. Mr. Kusum Scott is a 26year old male with PMHx of hypothyroidism, HTN, and T1DM. He reports noncompliance to all his maintenance medications after undergoing a tough divorce over the past year. He is more motivated now to take care of his health as today's visit. T1DM - last A1c in April 2020 was 13.7 and is 14.9 today; BG ranges from 150-3 100s at home; patient supposed to be on insulin glargine 40 units daily lispro sliding scale    HTN - amlodipine 10 mg daily    Hypothyroidism - Synthroid 125 mcg daily, TSH 14.59 and FT4 0.61 4/2020  Lipid panel - normal 4/2020      Review of Systems   Constitutional: Negative for appetite change, chills and fever. Respiratory: Negative for cough, chest tightness and shortness of breath. Cardiovascular: Negative for chest pain, palpitations and leg swelling. Gastrointestinal: Negative for abdominal pain, constipation, diarrhea and nausea. Genitourinary: Negative. Musculoskeletal: Negative. Neurological: Negative for headaches. Outpatient Medications Marked as Taking for the 10/19/20 encounter (Office Visit) with Elan Limon MD   Medication Sig Dispense Refill    amLODIPine (NORVASC) 10 MG tablet Take 1 tablet by mouth daily 90 tablet 1    Insulin Pen Needle ([employer] PEN NEEDLES 31G) 31G X 8 MM MISC 1 each by Does not apply route daily 100 each 3       I have reviewed all pertinent PMHx, PSHx, FamHx, SocialHx, medications, and allergiesand updated history as appropriate.     OBJECTIVE:    VS: BP (!) 158/88 (Site: Right Upper Arm, Position: Sitting, Cuff Size: Medium Adult)   Pulse 99   Temp 97.5 °F (36.4 °C) (Oral)   Resp 16   Ht 5' 9\" (1.753 m)   Wt 208 lb 14.4 oz (94.8 kg)   BMI 30.85 kg/m²
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
## 2020-10-19 NOTE — CARE COORDINATION
glucose  -Describe methods for preparing and planning healthy meals 10/19/2020 SM    Reviewed the benefits of exercise               Supporting Education Materials/Equipment Provided:     A1c Tracking  Lab Results   Component Value Date    LABA1C 14.9 10/19/2020    LABA1C 13.7 (H) 04/11/2020    LABA1C 11.6 (H) 06/16/2019         Date Initial A1c 6 month f/u  1 year post ed.  Difference  Comments   10/19/2020 14.9                []Patient lost to follow-up : date-      [] Attended DSMES on:  [] Attended yearly DSMES follow-up on:   [] Attended MNTon :

## 2020-10-19 NOTE — PROGRESS NOTES
Printed lab scripts given to pt with instructions  Patient given instructions. Understanding verbalized.  Fu appointment scheduled and reviewed with pt

## 2020-10-19 NOTE — PATIENT INSTRUCTIONS
Have blood work done prior to next visit. Nothing to eat or drink for 8 hrs prior to visit.  (Barbie Valdez for water and medications)  Continue medications as ordered  Your follow-up appointment has been scheduled for you   Call sooner if needed [phone]

## 2020-11-16 ENCOUNTER — CARE COORDINATION (OUTPATIENT)
Dept: INTERNAL MEDICINE | Age: 30
End: 2020-11-16

## 2021-01-25 ENCOUNTER — TELEPHONE (OUTPATIENT)
Dept: INTERNAL MEDICINE | Age: 31
End: 2021-01-25

## 2021-01-25 DIAGNOSIS — E10.9 TYPE 1 DIABETES MELLITUS WITHOUT COMPLICATION (HCC): ICD-10-CM

## 2021-01-25 RX ORDER — INSULIN LISPRO 100 [IU]/ML
INJECTION, SOLUTION INTRAVENOUS; SUBCUTANEOUS
Qty: 5 PEN | Refills: 3
Start: 2021-01-25 | End: 2021-06-24

## 2021-01-25 NOTE — TELEPHONE ENCOUNTER
Pt's PAP Basaglar and Humalog insulin here. Per MAR, Humalog no longer active med. Please see last office note. Voicemail message left for patient to call [phone] to reschedule last canceled appt. Pt needs seen in office to see if Humalog needs added back to pt's MAR.

## 2021-02-10 ENCOUNTER — CARE COORDINATION (OUTPATIENT)
Dept: INTERNAL MEDICINE | Age: 31
End: 2021-02-10

## 2021-02-10 NOTE — CARE COORDINATION
Called left a message for Leola January to call me at [phone] and remind him to call and make am appointment for an office visit and sent letter.

## 2021-05-27 ENCOUNTER — SOCIAL WORK (OUTPATIENT)
Dept: INTERNAL MEDICINE | Age: 31
End: 2021-05-27

## 2021-05-27 ENCOUNTER — CARE COORDINATION (OUTPATIENT)
Dept: INTERNAL MEDICINE | Age: 31
End: 2021-05-27

## 2021-05-27 ENCOUNTER — OFFICE VISIT (OUTPATIENT)
Dept: INTERNAL MEDICINE | Age: 31
End: 2021-05-27

## 2021-05-27 VITALS
RESPIRATION RATE: 16 BRPM | TEMPERATURE: 98.2 F | WEIGHT: 212 LBS | DIASTOLIC BLOOD PRESSURE: 110 MMHG | SYSTOLIC BLOOD PRESSURE: 170 MMHG | HEIGHT: 69 IN | BODY MASS INDEX: 31.4 KG/M2 | OXYGEN SATURATION: 98 % | HEART RATE: 107 BPM

## 2021-05-27 DIAGNOSIS — E10.9 TYPE 1 DIABETES MELLITUS WITHOUT COMPLICATION (HCC): Primary | ICD-10-CM

## 2021-05-27 DIAGNOSIS — E10.9 TYPE 1 DIABETES MELLITUS WITHOUT COMPLICATION (HCC): ICD-10-CM

## 2021-05-27 DIAGNOSIS — Z11.59 NEED FOR HEPATITIS C SCREENING TEST: ICD-10-CM

## 2021-05-27 DIAGNOSIS — I10 ESSENTIAL HYPERTENSION: ICD-10-CM

## 2021-05-27 DIAGNOSIS — I16.0 HYPERTENSIVE URGENCY: ICD-10-CM

## 2021-05-27 DIAGNOSIS — Z11.4 ENCOUNTER FOR SCREENING FOR HIV: ICD-10-CM

## 2021-05-27 DIAGNOSIS — E06.3 AUTOIMMUNE HYPOTHYROIDISM: ICD-10-CM

## 2021-05-27 LAB
ANION GAP SERPL CALCULATED.3IONS-SCNC: 14 MMOL/L (ref 7–16)
BASOPHILS ABSOLUTE: 0.08 E9/L (ref 0–0.2)
BASOPHILS RELATIVE PERCENT: 1.1 % (ref 0–2)
BUN BLDV-MCNC: 22 MG/DL (ref 6–20)
CALCIUM SERPL-MCNC: 9.5 MG/DL (ref 8.6–10.2)
CHLORIDE BLD-SCNC: 94 MMOL/L (ref 98–107)
CHP ED QC CHECK: ABNORMAL
CO2: 25 MMOL/L (ref 22–29)
CREAT SERPL-MCNC: 1.2 MG/DL (ref 0.7–1.2)
CREATININE URINE: 97 MG/DL (ref 40–278)
EOSINOPHILS ABSOLUTE: 0.16 E9/L (ref 0.05–0.5)
EOSINOPHILS RELATIVE PERCENT: 2.3 % (ref 0–6)
GFR AFRICAN AMERICAN: >60
GFR NON-AFRICAN AMERICAN: >60 ML/MIN/1.73
GLUCOSE BLD-MCNC: 306 MG/DL (ref 74–99)
GLUCOSE BLD-MCNC: 346 MG/DL
HBA1C MFR BLD: 12.8 %
HCT VFR BLD CALC: 44 % (ref 37–54)
HEMOGLOBIN: 14.8 G/DL (ref 12.5–16.5)
IMMATURE GRANULOCYTES #: 0.02 E9/L
IMMATURE GRANULOCYTES %: 0.3 % (ref 0–5)
LYMPHOCYTES ABSOLUTE: 1.56 E9/L (ref 1.5–4)
LYMPHOCYTES RELATIVE PERCENT: 22.1 % (ref 20–42)
MAGNESIUM: 2.4 MG/DL (ref 1.6–2.6)
MCH RBC QN AUTO: 31.8 PG (ref 26–35)
MCHC RBC AUTO-ENTMCNC: 33.6 % (ref 32–34.5)
MCV RBC AUTO: 94.4 FL (ref 80–99.9)
MICROALBUMIN UR-MCNC: 2108 MG/L
MICROALBUMIN/CREAT UR-RTO: 2173.2 (ref 0–30)
MONOCYTES ABSOLUTE: 0.61 E9/L (ref 0.1–0.95)
MONOCYTES RELATIVE PERCENT: 8.7 % (ref 2–12)
NEUTROPHILS ABSOLUTE: 4.62 E9/L (ref 1.8–7.3)
NEUTROPHILS RELATIVE PERCENT: 65.5 % (ref 43–80)
PDW BLD-RTO: 11.9 FL (ref 11.5–15)
PHOSPHORUS: 3.4 MG/DL (ref 2.5–4.5)
PLATELET # BLD: 326 E9/L (ref 130–450)
PMV BLD AUTO: 9.9 FL (ref 7–12)
POTASSIUM SERPL-SCNC: 4.2 MMOL/L (ref 3.5–5)
RBC # BLD: 4.66 E12/L (ref 3.8–5.8)
SODIUM BLD-SCNC: 133 MMOL/L (ref 132–146)
T3 FREE: 2.7 PG/ML (ref 2–4.4)
T4 FREE: 0.77 NG/DL (ref 0.93–1.7)
TSH SERPL DL<=0.05 MIU/L-ACNC: 13.42 UIU/ML (ref 0.27–4.2)
WBC # BLD: 7.1 E9/L (ref 4.5–11.5)

## 2021-05-27 PROCEDURE — 99213 OFFICE O/P EST LOW 20 MIN: CPT | Performed by: INTERNAL MEDICINE

## 2021-05-27 PROCEDURE — 6370000000 HC RX 637 (ALT 250 FOR IP)

## 2021-05-27 PROCEDURE — 82962 GLUCOSE BLOOD TEST: CPT | Performed by: INTERNAL MEDICINE

## 2021-05-27 PROCEDURE — 99214 OFFICE O/P EST MOD 30 MIN: CPT | Performed by: INTERNAL MEDICINE

## 2021-05-27 PROCEDURE — 36415 COLL VENOUS BLD VENIPUNCTURE: CPT | Performed by: INTERNAL MEDICINE

## 2021-05-27 PROCEDURE — 83036 HEMOGLOBIN GLYCOSYLATED A1C: CPT | Performed by: INTERNAL MEDICINE

## 2021-05-27 PROCEDURE — 93010 ELECTROCARDIOGRAM REPORT: CPT | Performed by: INTERNAL MEDICINE

## 2021-05-27 PROCEDURE — 93005 ELECTROCARDIOGRAM TRACING: CPT | Performed by: INTERNAL MEDICINE

## 2021-05-27 RX ORDER — LISINOPRIL 10 MG/1
10 TABLET ORAL DAILY
Qty: 90 TABLET | Refills: 1 | Status: SHIPPED | OUTPATIENT
Start: 2021-05-27 | End: 2021-06-24

## 2021-05-27 RX ORDER — AMLODIPINE BESYLATE 10 MG/1
10 TABLET ORAL DAILY
Qty: 90 TABLET | Refills: 1 | Status: SHIPPED
Start: 2021-05-27 | End: 2021-05-27

## 2021-05-27 RX ORDER — CLONIDINE HYDROCHLORIDE 0.1 MG/1
0.1 TABLET ORAL ONCE
Status: COMPLETED | OUTPATIENT
Start: 2021-05-27 | End: 2021-05-27

## 2021-05-27 RX ORDER — AMLODIPINE BESYLATE 10 MG/1
10 TABLET ORAL DAILY
Qty: 90 TABLET | Refills: 1 | Status: SHIPPED | OUTPATIENT
Start: 2021-05-27 | End: 2021-12-01 | Stop reason: SDUPTHER

## 2021-05-27 RX ORDER — CLONIDINE HYDROCHLORIDE 0.1 MG/1
0.1 TABLET ORAL ONCE
Status: DISCONTINUED | OUTPATIENT
Start: 2021-05-27 | End: 2021-05-27

## 2021-05-27 RX ORDER — LISINOPRIL 10 MG/1
10 TABLET ORAL DAILY
Qty: 90 TABLET | Refills: 1 | Status: SHIPPED | OUTPATIENT
Start: 2021-05-27 | End: 2021-05-27

## 2021-05-27 RX ADMIN — CLONIDINE HYDROCHLORIDE 0.1 MG: 0.1 TABLET ORAL at 09:04

## 2021-05-27 SDOH — ECONOMIC STABILITY: FOOD INSECURITY: WITHIN THE PAST 12 MONTHS, THE FOOD YOU BOUGHT JUST DIDN'T LAST AND YOU DIDN'T HAVE MONEY TO GET MORE.: NEVER TRUE

## 2021-05-27 ASSESSMENT — ENCOUNTER SYMPTOMS
ABDOMINAL DISTENTION: 0
EYE DISCHARGE: 0
APNEA: 0

## 2021-05-27 ASSESSMENT — PATIENT HEALTH QUESTIONNAIRE - PHQ9
DEPRESSION UNABLE TO ASSESS: YES
SUM OF ALL RESPONSES TO PHQ QUESTIONS 1-9: 0
SUM OF ALL RESPONSES TO PHQ9 QUESTIONS 1 & 2: 0
2. FEELING DOWN, DEPRESSED OR HOPELESS: 0
1. LITTLE INTEREST OR PLEASURE IN DOING THINGS: NOT AT ALL

## 2021-05-27 ASSESSMENT — SOCIAL DETERMINANTS OF HEALTH (SDOH): HOW HARD IS IT FOR YOU TO PAY FOR THE VERY BASICS LIKE FOOD, HOUSING, MEDICAL CARE, AND HEATING?: HARD

## 2021-05-27 NOTE — PATIENT INSTRUCTIONS
Follow up 4 weeks 6/24/21 at 8 am  You need to check check your blood sugars at least 4-5 times a day. If you cannot eat a whole meal please make sure to eat a snack. We are starting you on a new blood pressure medication called lisinopril   10 mg and we are continuing your old blood pressure medication amlodipine 10 mg. Blood pressure goal for you is 120/60. It is acceptable at this time if your blood pressures as high as 140/80. If it is higher than that and you have any symptoms including headaches, vision changes, chest pain, weakness, passing out please go to the emergency department. Please check your blood pressure at rest.     Please start taking your Synthroid which is your thyroid medication. Call if any questions or concerns. [phone].     You were given your PAP medications today  Synthroid 90 tablets  Basaglar Insulin 15 pens  Humalog Insulin 30 pens
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
## 2021-05-27 NOTE — PROGRESS NOTES
[facility]  [facility]    Attending Physician Statement  I have discussed the case, including pertinent history and exam findings with the resident physician. I have seen and examined the patient and the key elements of the encounter have been performed by me. I agree with the assessment, plan and orders as documented by the resident. I have reviewed all pertinent PMHx, PSHx, FamHx, SocialHx, medications, and allergies and updated history as appropriate. Patient here for routine follow up of medical problems. Patient has been out of synthroid, amlodipine for about 2 months. He has been buying insulin off the streets, using lantus 43 units at night and short acting 10 - 15 units before meals. He only eats about 1 - 2 meals a day, unable to check blood sugars or blood pressure regularly. He denies headaches, nausea, vomiting, abdominal pain, chest pain, shortness of breath, HARDIN, vision changes. 1. Accelerated HTN 2/2 to non-compliance - EKG here in clinic without acute changes, clonidine 0.1 mg given here in clinic. Rpt /110. Will resume amlodipine. Has microalb in the past - will add lisinopril Monitor BP, renal function. Advised to call clinic if BP still persistently above >130/90.  2. DM, Type 1. a1c 12.7 today, poct glucose >300. No clinical signs of DKA. Rpt BMP, lipid panel, assess UA. Need to control BP. Monitor BS. Need close follow up. 3. Hypothryoidism. Will resume same dose for now. Clinically stable. Will re-assess TSH    Discussed that patient has PAP available here in clinic for insulin and synthroid, so he does not need to buy off the streets. Long discussion regarding CV effects of uncontrolled HTN and DM. Will need close follow up. Okay for VV on next appointment if he can monitor BS and BP. If he needs letter from work to have time for clinic appointments, we can also provide that.      Remainder of medical problems as per resident note.    Tony Donato.  Cleo Henry MD  5/27/2021 9:12 AM

## 2021-05-27 NOTE — PROGRESS NOTES
Negative for agitation. All other systems reviewed and are negative. Current Outpatient Medications on File Prior to Visit   Medication Sig Dispense Refill    insulin lispro, 1 Unit Dial, (HUMALOG KWIKPEN) 100 UNIT/ML SOPN 10-15 units premeal with sliding scale (Check Blood Sugar 4 times daily and Cover as follows:  =No Insulin, 151-200=2 units, 201-250=4 units, 251-300=6 units, 301-350=8 units, 351-400=10 units, Over 400 give 10 units and call Physician) . 70 units max dose per day. PAP medication. 5 pen 3    Insulin Pen Needle ([employer] PEN NEEDLES 31G) 31G X 8 MM MISC 1 each by Does not apply route daily 100 each 3    levothyroxine (SYNTHROID) 125 MCG tablet Take 1 tablet by mouth Daily LOT #[id number], EXP 12/23/20 30 tablet 3    insulin glargine (BASAGLAR KWIKPEN) 100 UNIT/ML injection pen Inject 40 Units into the skin nightly LOT # [id number], EXP 10/2020 5 pen 0     No current facility-administered medications on file prior to visit. OBJECTIVE:    VS:   Vitals:    05/27/21 0801 05/27/21 0806 05/27/21 0815 05/27/21 0945   BP: (!) 209/116 (!) 205/114 (!) 212/120 (!) 170/110   Site:   Left Upper Arm Right Upper Arm   Position:   Sitting Sitting   Cuff Size:   Medium Adult Medium Adult   Pulse: 107      Resp: 16      Temp: 98.2 °F (36.8 °C)      TempSrc: Oral      SpO2: 98%      Weight: 212 lb (96.2 kg)      Height: 5' 9\" (1.753 m)        Physical Exam  Vitals and nursing note reviewed. Constitutional:       Appearance: Normal appearance. HENT:      Head: Normocephalic and atraumatic. Mouth/Throat:      Mouth: Mucous membranes are moist.   Cardiovascular:      Rate and Rhythm: Normal rate and regular rhythm. Pulses: Normal pulses. Heart sounds: Normal heart sounds. Pulmonary:      Effort: Pulmonary effort is normal.      Breath sounds: Normal breath sounds. Abdominal:      General: Bowel sounds are normal.      Palpations: Abdomen is soft.    Musculoskeletal:         General: No swelling or tenderness. Right lower leg: No edema. Left lower leg: No edema. Skin:     General: Skin is warm and dry. Capillary Refill: Capillary refill takes less than 2 seconds. Neurological:      General: No focal deficit present. Mental Status: He is alert and oriented to person, place, and time. Psychiatric:         Mood and Affect: Mood normal.         Behavior: Behavior normal.            ASSESSMENT/PLAN:  1. Type 1 diabetes mellitus without complication (HCC)  -     POCT glycosylated hemoglobin (Hb A1C)  -     POCT Glucose  -     Microalbumin / Creatinine Urine Ratio; Future  -     Lucien White MD, Endocrinology, [place]  2. Essential hypertension  -     EKG 12 lead  -     amLODIPine (NORVASC) 10 MG tablet; Take 1 tablet by mouth daily, Disp-90 tablet, R-1Print  -     lisinopril (PRINIVIL;ZESTRIL) 10 MG tablet; Take 1 tablet by mouth daily, Disp-90 tablet, R-1Print  3. Hypertensive urgency  -     cloNIDine (CATAPRES) tablet 0.1 mg; 0.1 mg, Oral, ONCE, On u 5/27/21 at 0915, For 1 dose  4. Autoimmune hypothyroidism  -     Lucien White MD, Endocrinology, [place]  5. Need for hepatitis C screening test  6. Encounter for screening for HIV  -     HIV-1 AND HIV-2 ANTIBODIES; Future       RTC:  Return in 4 weeks (on 6/24/2021) for at 0800 for BP check and DM check. I have reviewed my findings and recommendations with Jens Llanos and Dr. Eric Thomson.     Hermelinda Campos MD   5/27/2021 4:35 PM

## 2021-05-30 LAB — THYROID PEROXIDASE (TPO) ABS: 160 IU/ML (ref 0–25)

## 2021-06-24 ENCOUNTER — OFFICE VISIT (OUTPATIENT)
Dept: INTERNAL MEDICINE | Age: 31
End: 2021-06-24

## 2021-06-24 ENCOUNTER — CARE COORDINATION (OUTPATIENT)
Dept: INTERNAL MEDICINE | Age: 31
End: 2021-06-24

## 2021-06-24 VITALS
WEIGHT: 226 LBS | BODY MASS INDEX: 33.47 KG/M2 | HEART RATE: 94 BPM | TEMPERATURE: 97.9 F | RESPIRATION RATE: 16 BRPM | HEIGHT: 69 IN | DIASTOLIC BLOOD PRESSURE: 101 MMHG | SYSTOLIC BLOOD PRESSURE: 176 MMHG | OXYGEN SATURATION: 98 %

## 2021-06-24 DIAGNOSIS — E03.9 ACQUIRED HYPOTHYROIDISM: ICD-10-CM

## 2021-06-24 DIAGNOSIS — I10 ESSENTIAL HYPERTENSION: ICD-10-CM

## 2021-06-24 DIAGNOSIS — E10.9 TYPE 1 DIABETES MELLITUS WITHOUT COMPLICATION (HCC): Primary | ICD-10-CM

## 2021-06-24 PROCEDURE — 99212 OFFICE O/P EST SF 10 MIN: CPT | Performed by: INTERNAL MEDICINE

## 2021-06-24 PROCEDURE — 99214 OFFICE O/P EST MOD 30 MIN: CPT | Performed by: INTERNAL MEDICINE

## 2021-06-24 RX ORDER — INSULIN LISPRO 100 [IU]/ML
INJECTION, SOLUTION INTRAVENOUS; SUBCUTANEOUS
Qty: 5 PEN | Refills: 3 | Status: SHIPPED | OUTPATIENT
Start: 2021-06-24 | End: 2021-08-18 | Stop reason: ALTCHOICE

## 2021-06-24 RX ORDER — INSULIN GLARGINE 100 [IU]/ML
43 INJECTION, SOLUTION SUBCUTANEOUS
Qty: 5 PEN | Refills: 0 | Status: SHIPPED | OUTPATIENT
Start: 2021-06-24 | End: 2021-07-15

## 2021-06-24 RX ORDER — LISINOPRIL 20 MG/1
20 TABLET ORAL DAILY
Qty: 90 TABLET | Refills: 1 | Status: SHIPPED
Start: 2021-06-24 | End: 2021-07-15 | Stop reason: SDUPTHER

## 2021-06-24 RX ORDER — INSULIN LISPRO 100 [IU]/ML
7 INJECTION, SOLUTION INTRAVENOUS; SUBCUTANEOUS
Qty: 5 PEN | Refills: 0 | Status: SHIPPED | OUTPATIENT
Start: 2021-06-24 | End: 2021-07-15

## 2021-06-24 NOTE — PROGRESS NOTES
[facility]  [facility]    Attending Physician Statement  I have discussed the case, including pertinent history and exam findings with the resident physician. I agree with the assessment, plan and orders as documented by the resident. I have reviewed all pertinent PMHx, PSHx, FamHx, SocialHx, medications, and allergies and updated history as appropriate. Patient here for 1 month follow up of BP and BS. 1. HTN. BP still uncontrolled, remains asymptomatic - Increase lisinopril and continue amlodipine. If still uncontrolled, consider adding diuretic. Would recommend the combo pill so he does not take more pills. 2. DM, insulin requiring. Compliance continues to be a problem - falls asleep at night before taking nightly dose. When he takes insulin, FBS controlled. Has BS in 62s 2H post prandial -  Will try to do insulin 43 units in the morning to improve compliance, decrease premeal insulin, continue sliding scale. Continue to monitor BS. Return week of 7/12 with PCP - monitor BP, BS, and rpt TSH. Remainder of medical problems as per resident note. Dionna Bhatti MD  6/24/2021 8:34 AM

## 2021-06-24 NOTE — PATIENT INSTRUCTIONS
1. Insulin regimen:  43 units Basaglar in the morning  Premeal lispro decreased to 7 units before meals  Keep same sliding scale:   =No Insulin, 151-200=2 units, 201-250=4 units, 251-300=6 units, 301-350=8 units, 351-400=10 units, Over 400 give 10 units and call Physician  2. Increase lisinopril to 20 mg daily. You can take 2 tablets of 10 mg lisinopril until you have 10 mg pills. Your refill medication will have 20 mg pills. 3. Keep taking same dose of amlodipine 10 mg daily. 4. Keep taking same dose of levothyroxine 125 MCG. 5. Check TSH next week around 7/8/2021  6.  Follow up in office on 7/15/2021 at 8am

## 2021-06-24 NOTE — CARE COORDINATION
Outpatient education tracking log    Participant Name: Eliza Cedeño  Referring Provider: Derek Vega MD    Topics/Learning Objectives Initial visit   Follow-up  Follow-up Follow-up Comments   Diabetes disease process & Treatment process:   -Define diabetes & prediabetes and ABCs of     diabetes   -Identify own type of diabetes  -Identify lifestyle changes/treatment options 10/19/2020 Sm 5/27/2021 SM      Lab Results   Component Value Date    LABA1C 12.8 05/27/2021    LABA1C 14.9 10/19/2020    LABA1C 13.7 (H) 04/11/2020 6/24/2021 Samantha Weiss has type one diabetes poorly controlled, He is not regularly checking his blood sugars but tries to check 2 times a day. He has had hypoglycemia in the last monthly. When taking his medication insulin  FBS ranging 170-180 He has been back on his insulin for the past month and has been working with PAP and LISW. He has no insurance he works as a  and states he can not afford [employer] they have. He is  and lives with his parents. He works 6 days a week often 9 hours a day. He skips meal.  He has been a diabetic since the age of 6. He reports has gained about 10 lb. He is drinking fruit smoothies every morning with fruit juice in them. Reviewed the importance of eating 3 meals a day at consistent times. Breakfast lunch and dinner, checking blood sugars and taking his medications as prescribed He has been missing his evening dose of Basaglar insulin 40 units at times because he falls asleep and the doctor told him not take it tonight and switch to am tomorrow morning. His meal time insulin is braydon reduced. Prim Macadamia will be 43 units in the am and Lispro 7 units before meals with sliding scale. 5/27/2021 Met with Duane, Diabetes type one, HTN, bloop pressure is elevated today and EKG done. He has been out of his BP  Medication and never picked up his insulin PAP program. Matt Pemberton he was getting his insulin off the streets.  When asked why he did not  his PAP -State effect of exercise on blood glucose levels;   -State benefits of regular exercise;   -Define safety considerations;  -Describe contraindications/maintenance of activity. 10/19/2020 SM 5/27/2021 SM   Reviewed meal planning, healthy eating   Incorporating nutritional management into lifestyle:   -Describe effect of type, amount & timing of food on blood glucose  -Describe methods for preparing and planning healthy meals 10/19/2020 SM 5/27/2021 SM   Reviewed the benefits of exercise               Supporting Education Materials/Equipment Provided: Diabetic Survival Packet, Diabetic Targets, Blood sugar logs ,  The Plate Method, Meal Planning Food list, planning heathy meals. A1c Tracking  Lab Results   Component Value Date    LABA1C 12.8 05/27/2021    LABA1C 14.9 10/19/2020    LABA1C 13.7 (H) 04/11/2020         Date Initial A1c 6 month f/u  1 year post ed.  Difference  Comments   10/19/2020 14.9                []Patient lost to follow-up : date-      [] Attended DSMES on:  [] Attended yearly DSMES follow-up on:   [] Attended MNTon :

## 2021-06-24 NOTE — PROGRESS NOTES
[facility]  Internal Medicine Residency Program  ACC Note      SUBJECTIVE:  PMH:  Type 1 diabetes mellitus, A1c 12.8 on 5/27/2021, microalbumin/creatinine ratio 2 g/MG, Basaglar 40 units nightly, sliding scale Premeal.  Essential hypertension, amlodipine 10 mg, lisinopril 10 mg  Autoimmune hypothyroidism, TSH 13.4, TPO+ 160 IUs/mL, levothyroxine 125 MCG    CC: had concerns including 1 Month Follow-Up (DM). HPI:Duane Brady presented to the [facility] for a 4 weeks follow-up visit for diabetes mellitus and blood pressure    Patient was last seen on 5/27/2021 to resume medical care. He had been out of his medication for the past few months. He reportedly was buying insulin off the street. Hypertensive urgency with 2 readings at 220/120 mmHg. A1c at 12.8. POC glucose 400. EKG without any changes. Clonidine tablet 0.1 mg x 2 was given. Blood work drawn and sent to the lab. Endocrinology consultation placed. Type 1 diabetes mellitus, A1c 12.8 on 5/27/2021, microalbumin/creatinine ratio 2 g/MG, Basaglar 40 units nightly, sliding scale Premeal.  -using 43 units basaglar and pre-meals 10units + medium dose sliding scale  -not consistent in measuring and using pre-meals  -when missing long actiong insulin FBS ranging from 300-400  -on taking FBS ranging from 170-180s  -on using 10units pre-meal and MDSS - he has low numbers around 60-70  -to decrease pre-meals to 7 units and keep MDSS  -change to morning basaglar    Essential hypertension, amlodipine 10 mg, lisinopril 10 mg  -increase lisinopril to 20    Autoimmune hypothyroidism, TSH 13.4, TPO+ 160 IUs/mL, levothyroxine 125 MCG  - keep same    Review Of Systems:  General: no fevers, chills, weight loss or gain.    Ears/Nose/Throat: no hearing loss, tinnitus, vertigo, nosebleed, nasal congestion, rhinorrhea, sore throat  Respiratory: no cough, pleuritic chest pain, dyspnea, or wheezing  Cardiovascular: no chest pain, angina, dyspnea on exertion, orthopnea, PND, palpitations, or claudication  Gastrointestinal: no nausea, vomiting, heartburn, diarrhea, constipation, abdominal pain, hematochezia or melena  Genitourinary: no urinary urgency, frequency, dysuria, nocturia, hesitancy, or incontinence  Musculoskeletal: no arthritis, arthralgia, myalgia, weakness, or morning stiffness  Skin: no abnormal pigmentation, rash, itching, masses, hair or nail changes    Current Outpatient Medications on File Prior to Visit   Medication Sig Dispense Refill    amLODIPine (NORVASC) 10 MG tablet Take 1 tablet by mouth daily 90 tablet 1    Insulin Pen Needle ([employer] PEN NEEDLES 31G) 31G X 8 MM MISC 1 each by Does not apply route daily 100 each 3    levothyroxine (SYNTHROID) 125 MCG tablet Take 1 tablet by mouth Daily LOT #[id number], EXP 12/23/20 30 tablet 3     No current facility-administered medications on file prior to visit. OBJECTIVE:    VS: BP (!) 176/101   Pulse 94   Temp 97.9 °F (36.6 °C) (Oral)   Resp 16   Ht 5' 9\" (1.753 m)   Wt 226 lb (102.5 kg)   SpO2 98%   BMI 33.37 kg/m²   General appearance: Alert, Awake, Oriented times 3, no distress  Head: atraumatic, normocephalic  Neck: no JVD, trachea midline, no adenopathy  Ear: bilateral grossly equal hearing, external ear normal  Eyes: bilateral pupils are equal, round and reactive, no icterus, no pallor, EOM intact, no nystagmus  Lungs: Lungs clear to auscultation bilaterally. No rhonchi, crackles or wheezes  Heart: S1 S2  Regular rate and rhythm. No rub, murmur or gallop  Abdomen: Abdomen soft, non-tender. non-distended BS normal. No masses, organomegaly, no guarding rebound or rigidity.   Extremities: No edema, Peripheral pulses palpable 2/4  Hematology: no petechia, purpura or ecchymoses, no adenopathy  Neurology: CN grossly intact, power/reflex/bulk bilateral equal in all extremities  Psych: normal behaviour, thought process intact, sleep normal, anhedonia absent, normal energy, normal concentration, no suicidal ideation    ASSESSMENT/PLAN:  Loco Bill was seen today for 1 month follow-up. Diagnoses and all orders for this visit:    Type 1 diabetes mellitus without complication (HCC)  -     insulin lispro, 1 Unit Dial, (HUMALOG KWIKPEN) 100 UNIT/ML SOPN; Sliding scale 0-10 units (Check Blood Sugar 4 times daily and Cover as follows:  =No Insulin, 151-200=2 units, 201-250=4 units, 251-300=6 units, 301-350=8 units, 351-400=10 units, Over 400 give 10 units and call Physician) . 70 units max dose per day. PAP medication. -     insulin lispro, 1 Unit Dial, (HUMALOG KWIKPEN) 100 UNIT/ML SOPN; Inject 7 Units into the skin 3 times daily (with meals)  -     insulin glargine (BASAGLAR KWIKPEN) 100 UNIT/ML injection pen; Inject 43 Units into the skin every morning (before breakfast) LOT # [id number], EXP 10/2020    Acquired hypothyroidism  -     TSH; Future    Essential hypertension  -     lisinopril (PRINIVIL;ZESTRIL) 20 MG tablet; Take 1 tablet by mouth daily        I have reviewed all pertient PMHx, PSHx, FamHx, Social Hx, medications, and allergies and updated history as appropriate.     RTC:    I have reviewed my findings and recommendations with Geraldine Orta and Dr. Jake Padilla MD, MD PGY-2   6/24/2021 11:55 AM

## 2021-06-24 NOTE — PROGRESS NOTES
Discharge instructions reviewed and AVS given to patient per Dr. Crabtree Slider.  Follow up appointment scheduled per Medical Assistant

## 2021-07-07 ENCOUNTER — HOSPITAL ENCOUNTER (EMERGENCY)
Age: 31
Discharge: HOME | End: 2021-07-07
Attending: EMERGENCY MEDICINE

## 2021-07-07 VITALS
BODY MASS INDEX: 32.58 KG/M2 | HEIGHT: 69 IN | HEART RATE: 89 BPM | DIASTOLIC BLOOD PRESSURE: 85 MMHG | RESPIRATION RATE: 16 BRPM | TEMPERATURE: 98.9 F | SYSTOLIC BLOOD PRESSURE: 166 MMHG | WEIGHT: 220 LBS | OXYGEN SATURATION: 99 %

## 2021-07-07 DIAGNOSIS — H33.21 RIGHT RETINAL DETACHMENT: Primary | ICD-10-CM

## 2021-07-07 PROCEDURE — 99284 EMERGENCY DEPT VISIT MOD MDM: CPT

## 2021-07-07 NOTE — ED PROVIDER NOTES
HPI:  7/7/21, Time: 12:48 PM EDT         Blanche Waters is a 33 y.o. male presenting to the ED for eye problem with some headache and some vision abnormalities, beginning 3 hours ago. The complaint has been persistent, moderate in severity, and worsened by nothing. Patient states he had in his right vision field an area that was dark in color flop around when he moved to different shapes and sizes however is always in the lower field on the right. He also admits to having history of type 1 diabetes poorly controlled. . Patient denies fever/chills, sore throat, cough, congestion, chest pain, shortness of breath, edema, headache,focal paresthesias, focal weakness, abdominal pain, nausea, vomiting, diarrhea, constipation, dysuria, hematuria, trauma, neck or back pain, rash or other complaints. ROS:   A complete review of systems was performed and all pertinent positives and negatives are stated within HPI, all other systems reviewed and are negative.      --------------------------------------------- PAST HISTORY ---------------------------------------------  Past Medical History:  has a past medical history of Abscess of back, Bowel obstruction ([facility] [address] Diabetes mellitus type 1 ([facility] HTN (hypertension), and Hyperlipidemia. Past Surgical History:  has no past surgical history on file. Social History:  reports that he has never smoked. His smokeless tobacco use includes chew. He reports previous alcohol use. He reports that he does not use drugs. Family History: family history includes Diabetes in his mother. The patients home medications have been reviewed.     Allergies: Pineapple        ----------------------------------------PHYSICAL EXAM--------------------------------------  Constitutional:  Well developed, well nourished, no acute distress, non-toxic appearance   Eyes:  PERRL, conjunctiva normal, EOMI , ultrasound exam found a flap in the posterior retinal region with signs of retinal detachment in the right  HENT:  Atraumatic, external ears normal, nose normal, oropharynx moist, no pharyngeal exudates. Neck- normal range of motion, no nuchal rigidity   Respiratory:  No respiratory distress, normal breath sounds, no rales, no wheezing   Cardiovascular:  Normal rate, normal rhythm, no murmurs, no gallops, no rubs. Radial and DP pulses 2+ bilaterally. GI:  Soft, nondistended, normal bowel sounds, nontender, no organomegaly, no mass, no rebound, no guarding   :  No costovertebral angle tenderness   Musculoskeletal:  No edema, no tenderness, no deformities. Back- no tenderness  Integument:  Well hydrated, no rash. Adequate perfusion. Lymphatic:  No cervical lymphadenopathy noted   Neurologic:  Alert & oriented x 3, CN 2-12 normal, normal motor function, normal sensory function, no focal deficits noted. Normal gait. Psychiatric:  Speech and behavior appropriate       -------------------------------------------------- RESULTS -------------------------------------------------  I have personally reviewed all laboratory and imaging results for this patient. Results are listed below. LABS:  No results found for this visit on 07/07/21. RADIOLOGY:  Interpreted by Radiologist.  No orders to display           ------------------------- NURSING NOTES AND VITALS REVIEWED ---------------------------  The nursing notes within the ED encounter and vital signs as below have been reviewed by myself. BP (!) 170/102   Pulse 99   Temp 98.9 °F (37.2 °C) (Tympanic)   Resp 16   Ht 5' 9\" (1.753 m)   Wt 220 lb (99.8 kg)   SpO2 99%   BMI 32.49 kg/m²   Oxygen Saturation Interpretation: Normal      The patients available past medical records and past encounters were reviewed.         ------------------------------ ED COURSE/MEDICAL DECISION MAKING----------------------  Medications - No data to display        Procedures:     PROCEDURE NOTE   7/8/21   Time: 1055  Right optic BEDSIDE ULTRASOUND   Risks, benefits and alternatives (for applicable procedures below) described. Performed By: Jeremiah Rajan DO. Indication:  Pain. Informed consent: The patient provided verbal consent for this procedure. .  Procedural Quadrants:     Right eye: Area in the posterior aspect of the right eye that was suspicious for retinal detachment see image below. .                   Medical Decision Making:    Ultrasound in the emergency department found signs of retinal detachment. Consulted Dr. Madan Bartlett ophthalmology regarding the patient. He stated the patient should be discharged and with instructions to come directly to his office to be evaluated in office. On his recommendation the patient will be discharged with directions to go directly to his office. Patient states he safe driving since drove his vision intact declined any assistance in getting to the office. Denied any help in transportation. This patient's ED course included: a personal history and physicial examination, re-evaluation prior to disposition and a personal history and physicial eaxmination    This patient has remained hemodynamically stable and been closely monitored during their ED course. Re-Evaluations:  Time: 1120   Re-evaluation. Patients symptoms show no change  Repeat physical examination is not changed      Consultations:   Spoke with Dr. Madan Bartlett Ophthalmology- They will see the patient in the office. Counseling: The emergency provider has spoken with the patient and discussed todays results, in addition to providing specific details for the plan of care and counseling regarding the diagnosis and prognosis. Questions are answered at this time and they are agreeable with the plan.    --------------------------- IMPRESSION AND DISPOSITION ---------------------------------    IMPRESSION  1.  Right retinal detachment        DISPOSITION  Disposition: Discharge to Ophthalmologist office  Patient condition is stable              Ally Sheets Sorin Guidry, DO  Resident  07/08/21 0438

## 2021-07-07 NOTE — ED NOTES
FIRST PROVIDER CONTACT ASSESSMENT NOTE      Department of Emergency Medicine   7/7/21  11:29 AM EDT    Chief Complaint: Eye Problem (seeing black spot in right eye, has blurred vision (denies any injury), describes as a curtain like object coming over eye) and Headache      History of Present Illness:   Hieu Saba is a 33 y.o. male who presents to the ED for blurry vision that started this morning. He felt like at first it was a black spot over his eye. He states now it feel that a curtain is coming over his right eye    Medical History:  has a past medical history of Abscess of back, Bowel obstruction ([address] Diabetes mellitus type 1 ([address] HTN (hypertension), and Hyperlipidemia. Surgical History:  has no past surgical history on file. Social History:  reports that he has never smoked. His smokeless tobacco use includes chew. He reports previous alcohol use. He reports that he does not use drugs. Family History: family history includes Diabetes in his mother.     *ALLERGIES*     Pineapple     Physical Exam:      VS:  BP (!) 170/102   Pulse 99   Temp 98.9 °F (37.2 °C) (Tympanic)   Resp 16   Ht 5' 9\" (1.753 m)   Wt 220 lb (99.8 kg)   SpO2 99%   BMI 32.49 kg/m²      Initial Plan of Care:  Initiate Treatment-patient taken back to be seen for further evaluation    -----------------END OF FIRST PROVIDER CONTACT ASSESSMENT NOTE--------------  Electronically signed by JUANA Marie CNP   DD: 7/7/21             JUANA Marie CNP  07/07/21 1129

## 2021-07-07 NOTE — ED NOTES
Discharge instructions reviewed, no concerns. Dr Tammie Crowder informed pt to go directly to ophthalmologist listed in d/c paperwork.        Lisa Morgan, RN  07/07/21 1134

## 2021-07-08 ENCOUNTER — TELEPHONE (OUTPATIENT)
Dept: INTERNAL MEDICINE | Age: 31
End: 2021-07-08

## 2021-07-08 NOTE — TELEPHONE ENCOUNTER
[facility] ([facility]) ED Follow up Call    Reason for ED visit:  Eye problem          Rey Zepeda , this is Evan Medal from Dr. Johana Mayen office, just calling to see how you are doing after your recent ED visit. Did you receive discharge instructions? Yes  Do you understand the discharge instructions? Yes  Did the ED give you any new prescriptions? Yes  Were you able to fill your prescriptions? Yes      Do you have one of our red, yellow and green  Zone sheets that help you to determine when you should go to the ED? No      Do you need or want to make a follow up appt with your PCP? Not Applicable    Do you have any further needs in the home i.e. Equipment?   No        FU appts/Provider:    Future Appointments   Date Time Provider Gianna Das   7/15/2021  8:00 AM Opal Collins MD [facility]

## 2021-07-13 ASSESSMENT — ENCOUNTER SYMPTOMS
CONSTIPATION: 0
NAUSEA: 0
DIARRHEA: 0
COUGH: 0
CHEST TIGHTNESS: 0
SHORTNESS OF BREATH: 0
ABDOMINAL PAIN: 0

## 2021-07-13 NOTE — PROGRESS NOTES
[facility] Internal Medicine      SUBJECTIVE:  Tom Polanco (:  1990) is a 33 y.o. male here for evaluation of the following chief complaint(s):  Diabetes and ED Follow-up (Blood vessels burst in r. eye)    Last seen in our clinic on 2021    Patient was seen in the ED at 2021 for headache and right vision changes. He was diagnosed with right retinal detachment and advised to follow with ophthalmology as outpatient. Advised to follow-up with Dr. Beau Mccormick. Right vision changes possible 2/2 macular degeneration? · Every 4-5 weeks eye injections with retinal specialist  · Last seen 21  · Next visit around 2021    Type 1 diabetes mellitus  · HbA1c 12.8 on 2021  · Hyperglycemic during the mornings; takes midnight snacks  · Hypoglycemic during the day 40-50  · Compliance issues. Noted to miss pre meal insulin during last visit. · On glargine 43 units daily, Humalog 7 units at breakfast and 10 at lunch/dinner, with MDSS  · DM ed for nutrition  · 2-3 unit premeal before midnight snack  · Social work for endo referral    Hypertension  · BP today? · On amlodipine 10 mg daily and lisinopril 20 mg daily    Hypothyroidism  · TSH 13.42 on 2021  · TPO antibody positive at 160 IU/mL  · On Synthroid 125 mcg daily    Healthcare maintenance  · Needs varicella, pneumococcal, Tdap, hepatitis B vaccine  · Needs diabetic foot/eye exams  · Needs hepatitis C, HIV, lipid screens    Review of Systems   Constitutional: Negative for appetite change, chills and fever. Eyes: Positive for visual disturbance (right central vision). Respiratory: Negative for cough, chest tightness and shortness of breath. Cardiovascular: Negative for chest pain, palpitations and leg swelling. Gastrointestinal: Negative for abdominal pain, constipation, diarrhea and nausea. Genitourinary: Negative. Musculoskeletal: Negative. Neurological: Negative for headaches. Current Outpatient Medications on File Prior to Visit   Medication Sig Dispense Refill    insulin lispro, 1 Unit Dial, (HUMALOG KWIKPEN) 100 UNIT/ML SOPN Sliding scale 0-10 units (Check Blood Sugar 4 times daily and Cover as follows:  =No Insulin, 151-200=2 units, 201-250=4 units, 251-300=6 units, 301-350=8 units, 351-400=10 units, Over 400 give 10 units and call Physician) . 70 units max dose per day. PAP medication. 5 pen 3    insulin lispro, 1 Unit Dial, (HUMALOG KWIKPEN) 100 UNIT/ML SOPN Inject 7 Units into the skin 3 times daily (with meals) 5 pen 0    insulin glargine (BASAGLAR KWIKPEN) 100 UNIT/ML injection pen Inject 43 Units into the skin every morning (before breakfast) LOT # [id number], EXP 10/2020 5 pen 0    lisinopril (PRINIVIL;ZESTRIL) 20 MG tablet Take 1 tablet by mouth daily 90 tablet 1    amLODIPine (NORVASC) 10 MG tablet Take 1 tablet by mouth daily 90 tablet 1    Insulin Pen Needle ([employer] PEN NEEDLES 31G) 31G X 8 MM MISC 1 each by Does not apply route daily 100 each 3    levothyroxine (SYNTHROID) 125 MCG tablet Take 1 tablet by mouth Daily LOT #[id number], EXP 12/23/20 30 tablet 3     No current facility-administered medications on file prior to visit. OBJECTIVE:    VS:   Vitals:    07/15/21 0802 07/15/21 0808   BP: (!) 174/103 (!) 180/103   Pulse: 93    Resp: 16    Temp: 98.6 °F (37 °C)    TempSrc: Oral    SpO2: 98%    Weight: 259 lb (117.5 kg)    Height: 5' 9\" (1.753 m)      Physical Exam  Constitutional:       Appearance: Normal appearance. He is not toxic-appearing. HENT:      Head: Normocephalic and atraumatic. Eyes:      Pupils: Pupils are equal, round, and reactive to light. Comments: Visual fields intact bilaterally   Cardiovascular:      Rate and Rhythm: Normal rate and regular rhythm. Pulses: Normal pulses. Heart sounds: Normal heart sounds. Pulmonary:      Effort: Pulmonary effort is normal.      Breath sounds: Normal breath sounds.  No wheezing or rhonchi. Abdominal:      General: Abdomen is flat. Bowel sounds are normal.      Palpations: Abdomen is soft. Tenderness: There is no abdominal tenderness. Musculoskeletal:      Cervical back: Neck supple. Skin:     General: Skin is warm and dry. Neurological:      General: No focal deficit present. Mental Status: He is alert and oriented to person, place, and time. Psychiatric:         Mood and Affect: Mood normal.         Behavior: Behavior normal.         Thought Content: Thought content normal.         Judgment: Judgment normal.        ASSESSMENT/PLAN:  1. Type 1 diabetes mellitus with retinopathy, macular edema presence unspecified, unspecified laterality, unspecified retinopathy severity ([facility] [address]  2. Acquired hypothyroidism  -     levothyroxine (SYNTHROID) 125 MCG tablet; Take 1 tablet by mouth Daily, Disp-30 tablet, R-3Print  3. Essential hypertension  -     lisinopril (PRINIVIL;ZESTRIL) 20 MG tablet; Take 1 tablet by mouth daily, Disp-90 tablet, R-1Print       RTC:  No follow-ups on file. I have reviewed my findings and recommendations with Gwendel Kussmaul and Dr. Leeann Phillips Attendings:[id number]}.     J Carlos Riley MD   7/15/2021 8:50 AM

## 2021-07-15 ENCOUNTER — TELEPHONE (OUTPATIENT)
Dept: INTERNAL MEDICINE | Age: 31
End: 2021-07-15

## 2021-07-15 ENCOUNTER — CARE COORDINATION (OUTPATIENT)
Dept: INTERNAL MEDICINE | Age: 31
End: 2021-07-15

## 2021-07-15 ENCOUNTER — OFFICE VISIT (OUTPATIENT)
Dept: INTERNAL MEDICINE | Age: 31
End: 2021-07-15

## 2021-07-15 VITALS
TEMPERATURE: 98.6 F | WEIGHT: 259 LBS | HEART RATE: 93 BPM | OXYGEN SATURATION: 98 % | DIASTOLIC BLOOD PRESSURE: 103 MMHG | SYSTOLIC BLOOD PRESSURE: 180 MMHG | BODY MASS INDEX: 38.36 KG/M2 | RESPIRATION RATE: 16 BRPM | HEIGHT: 69 IN

## 2021-07-15 DIAGNOSIS — E03.9 ACQUIRED HYPOTHYROIDISM: ICD-10-CM

## 2021-07-15 DIAGNOSIS — I10 ESSENTIAL HYPERTENSION: ICD-10-CM

## 2021-07-15 DIAGNOSIS — E10.319 TYPE 1 DIABETES MELLITUS WITH RETINOPATHY, MACULAR EDEMA PRESENCE UNSPECIFIED, UNSPECIFIED LATERALITY, UNSPECIFIED RETINOPATHY SEVERITY (HCC): Primary | ICD-10-CM

## 2021-07-15 DIAGNOSIS — E10.9 TYPE 1 DIABETES MELLITUS WITHOUT COMPLICATION (HCC): ICD-10-CM

## 2021-07-15 PROBLEM — E10.10 TYPE 1 DIABETES MELLITUS WITH KETOACIDOSIS (HCC): Status: RESOLVED | Noted: 2018-06-10 | Resolved: 2021-07-15

## 2021-07-15 PROBLEM — E13.10 DIABETIC KETOSIS WITHOUT COMA (HCC): Status: RESOLVED | Noted: 2020-04-09 | Resolved: 2021-07-15

## 2021-07-15 PROBLEM — R09.02 HYPOXEMIA: Status: RESOLVED | Noted: 2018-06-10 | Resolved: 2021-07-15

## 2021-07-15 PROBLEM — I16.0 HYPERTENSIVE URGENCY: Status: RESOLVED | Noted: 2020-04-09 | Resolved: 2021-07-15

## 2021-07-15 PROBLEM — E11.10 DIABETIC KETOSIS WITHOUT COMA (HCC): Status: RESOLVED | Noted: 2020-04-09 | Resolved: 2021-07-15

## 2021-07-15 PROCEDURE — 99213 OFFICE O/P EST LOW 20 MIN: CPT | Performed by: INTERNAL MEDICINE

## 2021-07-15 PROCEDURE — 1111F DSCHRG MED/CURRENT MED MERGE: CPT | Performed by: INTERNAL MEDICINE

## 2021-07-15 RX ORDER — INSULIN GLARGINE 100 [IU]/ML
35 INJECTION, SOLUTION SUBCUTANEOUS
Qty: 5 PEN | Refills: 0 | Status: SHIPPED
Start: 2021-07-15 | End: 2021-12-01 | Stop reason: SDUPTHER

## 2021-07-15 RX ORDER — LISINOPRIL 20 MG/1
40 TABLET ORAL DAILY
Qty: 60 TABLET | Refills: 1 | Status: SHIPPED | OUTPATIENT
Start: 2021-07-15 | End: 2021-12-01 | Stop reason: SDUPTHER

## 2021-07-15 RX ORDER — LEVOTHYROXINE SODIUM 0.12 MG/1
125 TABLET ORAL DAILY
Qty: 30 TABLET | Refills: 3 | Status: SHIPPED | OUTPATIENT
Start: 2021-07-15 | End: 2021-12-01 | Stop reason: SDUPTHER

## 2021-07-15 RX ORDER — INSULIN LISPRO 100 [IU]/ML
5 INJECTION, SOLUTION INTRAVENOUS; SUBCUTANEOUS
Qty: 5 PEN | Refills: 0 | Status: SHIPPED
Start: 2021-07-15 | End: 2021-08-18 | Stop reason: SDUPTHER

## 2021-07-15 ASSESSMENT — ENCOUNTER SYMPTOMS
CHEST TIGHTNESS: 0
COUGH: 0
DIARRHEA: 0
ABDOMINAL PAIN: 0
NAUSEA: 0
SHORTNESS OF BREATH: 0
CONSTIPATION: 0

## 2021-07-15 NOTE — LETTER
[facility] Internal Medicine  [address] [place] [address]  Phone: [phone]  Fax: [phone]    Karishma Hong MD        July 15, 2021     Patient: Freddy Bean   YOB: 1990   Date of Visit: 7/15/2021       To Whom It May Concern:     Freddy Bean was seen in our clinic on 7/15/21 at 8 am.    If you have any questions or concerns, please don't hesitate to call.     Sincerely,        Karishma Hong MD

## 2021-07-15 NOTE — PATIENT INSTRUCTIONS
· Diabetes education follow-up for dietary modification ordered  · Referral to endocrinology placed  · Decrease Lantus to 35 units in the morning for 1 week  · Decrease Humalog to 5 units pre meals  · Check blood glucose 4x / day  · Advised to take 2 units Premeal additional prior to midnight snack  · Advised to stop skipping meals and try not to take midnight snacks for better blood sugar control  · Increase lisinopril to 40 mg daily

## 2021-07-15 NOTE — CARE COORDINATION
Outpatient education tracking log    Participant Name: Rose Marie Peck  Referring Provider: Ama Ames MD    Topics/Learning Objectives Initial visit   Follow-up  Follow-up Follow-up Comments   Diabetes disease process & Treatment process:   -Define diabetes & prediabetes and ABCs of     diabetes   -Identify own type of diabetes  -Identify lifestyle changes/treatment options 10/19/2020  5/27/2021  7/15/2021     Lab Results   Component Value Date    LABA1C 12.8 05/27/2021    LABA1C 14.9 10/19/2020    LABA1C 13.7 (H) 04/11/2020   7/15/2021 Met with Chandler Hammonds, he brought his blood glucose reading and blood sugars are up and down. Has hypoglycemia and high blood sugars. He reports he is inconsistant on taking his medications and checking his blood sugar. He skips meals at times. /Says he can not go to endocrinologist because he can not afford it. Wm Parry DivvyDown is coming in to see him. He get PAP. We reviewed the importance of taking his medications as prescribed checking his blood sugars and keeping in close contact with the doctor about hypoglycemia and hyperglycemic episodes. He states he was found to have diabetic retinopathy and is receiving treatments. He was scheduled his next VV on 7/26/2021. Consulted with  Dr Heather Cano.    6/24/2021 Chandler Hammonds has type one diabetes poorly controlled, He is not regularly checking his blood sugars but tries to check 2 times a day. He has had hypoglycemia in the last monthly. When taking his medication insulin  FBS ranging 170-180 He has been back on his insulin for the past month and has been working with PAP and LISW. He has no insurance he works as a  and states he can not afford Pull they have. He is  and lives with his parents. He works 6 days a week often 9 hours a day. He skips meal.  He has been a diabetic since the age of 6. He reports has gained about 10 lb. He is drinking fruit smoothies every morning with fruit juice in them. Reviewed the importance of eating 3 meals a day at consistent times. Breakfast lunch and dinner, checking blood sugars and taking his medications as prescribed He has been missing his evening dose of Basaglar insulin 40 units at times because he falls asleep and the doctor told him not take it tonight and switch to am tomorrow morning. His meal time insulin is braydon reduced. [place] Lacy will be 43 units in the am and Lispro 7 units before meals with sliding scale. 5/27/2021 Met with Duane, Diabetes type one, HTN, bloop pressure is elevated today and EKG done. He has been out of his BP  Medication and never picked up his insulin PAP program. Jessica Zamora he was getting his insulin off the streets. When asked why he did not  his PAP insulin he had no clear answer. He was signed up again for PAP program and SW brought his insulin over for him to take home. He is not consistently checking his blood sugars but has agreed to start checking and given blood sugar logs. He is a  with 3 children he is . Usually eat 1 to 2 meals a day, His Lantus 43 units at hs and short acting 10-15 units before meals with SS. We reviewed his insulin and how to take it. Encouraged to eat 3 meals a day. Reviewed diabetic portion plate healthy snacking, carbohydrates and portion control. Reviewed what uncontrolled BS can do in the body over time and complications that can develop. Reviewed above with doctor. . He has sent messages in my chart and will send message with Bps and BS. Referred to Dr Sparkle Castillo, referred to Diabetic education today    10/19/2021  Seen by Dr Zia Stallings and Dr Demetra Underwood  He  Is a type one diabetic has been off his medications. Has seen PAP for medications in the past.   Lab Results   Component Value Date    LABA1C 12.8 05/27/2021    LABA1C 14.9 10/19/2020    LABA1C 13.7 (H) 04/11/2020   Has HTN Dr ordered my chart so he can put in his BP and BS weekly. He was given my contact information.  PAP-Prescription Assistance involved. Developing strategies for Healthy coping/psychosocial issues:    -Describe feelings about living with diabetes  -Identify coping strategies;   -Identify support needed & support network available 10/19/2020 SM 5/27/2021 SM 7/15/2021   States went through a difficult divorce over the past year and he stopped taking his medications but is now ready to take care of his health. Prevention, detection & treatment of Chronic complications:    -Identify the prevention, detection and treatment for complications including immunizations, preventive eye, foot, dental and renal exams as indicated per the participant's duration of diabetes and health status.  -Define the natural course of diabetes and the relationship of blood glucose levels to long term complications of diabetes. 10/19/2020  5/27/2021 SM 7/15/2021                 Revewed: Definition of diabetes, Diabetic Targets, A1c and what it means. Self-monitoring of blood glucose and the importance of checking blood sugars Hypo and Hyperglycemia signs and symptoms, possible causes and treatment. The importance of Medication adherence. The plate method and meal planning guidelines, what carbohydrates are and how they effect your blood sugar. The benefits of exercise. What uncontrolled diabetes does to your body and reducing the risk of chronic complications. Aware that diabetic education classes and dietitian available.               Prevention, detection & treatment of acute complications:    -List symptoms of hyper & hypoglycemia, and prevention & treatment strategies.   -Describe sick day guidelines  DKA /indications for ketone testing &  when to call physician  10/19/2020 SM 5/27/2021  7/15/2021      Identify severe weather/situation crisis  & diabetes supplies management  5/27/2021       Using medications safely:   -State effects of diabetes medicines on blood glucose levels;  -List diabetes medication taken, action & side effects 10/19/2020  5/27/2021 SM 7/15/2021 AM  7/15/2021  Glargine 35 u  to am decreased   Lispro Humalog 5 units units before meals & ss decreased  Advised to take 2 units premeal additional prior to midnight snack  inreased lisinopril  Working with PAP has no insurance   Insulin/Injectables  -Name appropriate injection sites; proper storage; supplies needed;  10/19/2020 [facility] 5/27/2021 SM       Demonstrate proper technique        Monitoring blood glucose, interpreting and using results:   -Identify recommended & personal blood glucose targets & HgbA1C target levels  -State the Importance of logging blood glucose levels for pattern recognition;   -State benefits of reading/using pt generated health data  -Verbalize safe lancet disposal 10/19/2020  5/27/2021 SM   He was instructed by the doctor to put his blood pressures and blood sugars into my chart weekly. To check blood sugars 4 times a day   -Demonstrate proper testing technique        Incorporating physical activity into lifestyle:   -State effect of exercise on blood glucose levels;   -State benefits of regular exercise;   -Define safety considerations;  -Describe contraindications/maintenance of activity. 10/19/2020  5/27/2021 SM   Reviewed meal planning, healthy eating  advaised to stop skipping meals and referred to diabetic education    Incorporating nutritional management into lifestyle:   -Describe effect of type, amount & timing of food on blood glucose  -Describe methods for preparing and planning healthy meals 10/19/2020  5/27/2021 SM   Reviewed the benefits of exercise               Supporting Education Materials/Equipment Provided: Diabetic Survival Packet, Diabetic Targets, Blood sugar logs ,  The Plate Method, Meal Planning Food list, planning heathy meals. A1c Tracking  Lab Results   Component Value Date    LABA1C 12.8 05/27/2021    LABA1C 14.9 10/19/2020    LABA1C 13.7 (H) 04/11/2020         Date Initial A1c 6 month f/u  1 year post ed. Difference  Comments   10/19/2020 14.9                []Patient lost to follow-up : date-      [] Attended DSMES on:  [] Attended yearly DSMES follow-up on:   [] Attended MNTon :

## 2021-07-15 NOTE — PROGRESS NOTES
Discharge instructions reviewed and AVS given to patient per Medical Assistant. Follow up appointment scheduled per Medical Assistant. Pt given printed scripts to take to pharmacy. Pt is to follow up in 1 week per phone/ virtual visit. Patient advised that next appointment will be phone/virtual visit. Patient advised, verbalized understanding.

## 2021-07-15 NOTE — PROGRESS NOTES
Post-Discharge Transitional Care Management Services or Hospital Follow Up      Dairl Minus   YOB: 1990    Date of Office Visit:  7/15/2021  Date of Hospital Admission: 7/7/21  Date of Hospital Discharge: 7/7/21  Readmission Risk Score(high >=14%. Medium >=10%):No data recorded    Care management risk score Rising risk (score 2-5) and Complex Care (Scores >=6): 2     Non face to face  following discharge, date last encounter closed (first attempt may have been earlier): *No documented post hospital discharge outreach found in the last 14 days *No documented post hospital discharge outreach found in the last 14 days    Call initiated 2 business days of discharge: *No response recorded in the last 14 days     Patient Active Problem List   Diagnosis    [facility]    Other specified acquired hypothyroidism    Pneumonia    HTN (hypertension)    Hyperlipidemia    C/f of JOSÉ MIGUEL (obstructive sleep apnea)    Obesity    Type 1 diabetes mellitus ([facility]    Acute kidney injury ([facility] [facility]       Allergies   Allergen Reactions    Pineapple        Medications listed as ordered at the time of discharge from hospitals Medication Instructions [id number]    Printed on:07/15/21 0936   Medication Information                      amLODIPine (NORVASC) 10 MG tablet  Take 1 tablet by mouth daily             insulin glargine (BASAGLAR KWIKPEN) 100 UNIT/ML injection pen  Inject 35 Units into the skin every morning (before breakfast) LOT # [id number], EXP 10/2020             insulin lispro, 1 Unit Dial, (HUMALOG KWIKPEN) 100 UNIT/ML SOPN  Sliding scale 0-10 units (Check Blood Sugar 4 times daily and Cover as follows:  =No Insulin, 151-200=2 units, 201-250=4 units, 251-300=6 units, 301-350=8 units, 351-400=10 units, Over 400 give 10 units and call Physician) . 70 units max dose per day. PAP medication.              insulin lispro, 1 Unit Dial, (HUMALOG KWIKPEN) 100 UNIT/ML SOPN  Inject 5 Units into the skin 3 times daily (with meals)             Insulin Pen Needle ([employer] PEN NEEDLES 31G) 31G X 8 MM MISC  1 each by Does not apply route daily             levothyroxine (SYNTHROID) 125 MCG tablet  Take 1 tablet by mouth Daily             lisinopril (PRINIVIL;ZESTRIL) 20 MG tablet  Take 2 tablets by mouth daily                   Medications marked \"taking\" at this time  Outpatient Medications Marked as Taking for the 7/15/21 encounter (Office Visit) with Alejandra Daly MD   Medication Sig Dispense Refill    levothyroxine (SYNTHROID) 125 MCG tablet Take 1 tablet by mouth Daily 30 tablet 3    lisinopril (PRINIVIL;ZESTRIL) 20 MG tablet Take 2 tablets by mouth daily 60 tablet 1    insulin glargine (BASAGLAR KWIKPEN) 100 UNIT/ML injection pen Inject 35 Units into the skin every morning (before breakfast) LOT # [id number], EXP 10/2020 5 pen 0    insulin lispro, 1 Unit Dial, (HUMALOG KWIKPEN) 100 UNIT/ML SOPN Inject 5 Units into the skin 3 times daily (with meals) 5 pen 0    insulin lispro, 1 Unit Dial, (HUMALOG KWIKPEN) 100 UNIT/ML SOPN Sliding scale 0-10 units (Check Blood Sugar 4 times daily and Cover as follows:  =No Insulin, 151-200=2 units, 201-250=4 units, 251-300=6 units, 301-350=8 units, 351-400=10 units, Over 400 give 10 units and call Physician) . 70 units max dose per day. PAP medication. 5 pen 3    amLODIPine (NORVASC) 10 MG tablet Take 1 tablet by mouth daily 90 tablet 1    Insulin Pen Needle ([employer] PEN NEEDLES 31G) 31G X 8 MM MISC 1 each by Does not apply route daily 100 each 3        Medications patient taking as of now reconciled against medications ordered at time of hospital discharge: Yes    Chief Complaint   Patient presents with    Diabetes    ED Follow-up     Blood vessels burst in r. eye       HPI    Inpatient course: Discharge summary reviewed- see chart.     Interval history/Current status:    Last seen in our clinic on 6/24/2021     Patient was seen in the ED at 7/7/2021 for headache and right vision changes. He was diagnosed with right retinal detachment and advised to follow with ophthalmology as outpatient. Advised to follow-up with Dr. Mayo Hernandez.     Right vision changes possible 2/2 macular degeneration? · Every 4-5 weeks eye injections with retinal specialist  · Last seen 7/8/21  · Next visit around August 13, 2021     Type 1 diabetes mellitus  · HbA1c 12.8 on 5/27/2021  · Hyperglycemic during the mornings; takes midnight snacks  · Hypoglycemic during lunch and dinner 40-50  · Has compliance issues especially with diet. States he skips meals but still takes his insulin which could be leading to hypoglycemic episodes. · On glargine 43 units daily, Humalog 7 units at breakfast and 10 at lunch/dinner, with MDSS     Hypertension  · BP today 180/103  · On amlodipine 10 mg daily and lisinopril 20 mg daily     Hypothyroidism  · TSH 13.42 on 5/27/2021  · TPO antibody positive at 160 IU/mL  · On Synthroid 125 mcg daily       Review of Systems   Constitutional: Negative for appetite change, chills and fever. Eyes: Positive for visual disturbance (right central vision). Respiratory: Negative for cough, chest tightness and shortness of breath. Cardiovascular: Negative for chest pain, palpitations and leg swelling. Gastrointestinal: Negative for abdominal pain, constipation, diarrhea and nausea. Genitourinary: Negative. Musculoskeletal: Negative. Neurological: Negative for headaches. Vitals:    07/15/21 0802 07/15/21 0808   BP: (!) 174/103 (!) 180/103   Pulse: 93    Resp: 16    Temp: 98.6 °F (37 °C)    TempSrc: Oral    SpO2: 98%    Weight: 259 lb (117.5 kg)    Height: 5' 9\" (1.753 m)      Body mass index is 38.25 kg/m².    Wt Readings from Last 3 Encounters:   07/15/21 259 lb (117.5 kg)   07/07/21 220 lb (99.8 kg)   06/24/21 226 lb (102.5 kg)     BP Readings from Last 3 Encounters:   07/15/21 (!) 180/103   07/07/21 (!) 166/85   06/24/21 (!) 176/101 Physical Exam  Constitutional:       Appearance: Normal appearance. He is not toxic-appearing. HENT:      Head: Normocephalic and atraumatic. Eyes:      Pupils: Pupils are equal, round, and reactive to light. Comments: Visual fields intact bilaterally   Cardiovascular:      Rate and Rhythm: Normal rate and regular rhythm. Pulses: Normal pulses. Heart sounds: Normal heart sounds. Pulmonary:      Effort: Pulmonary effort is normal.      Breath sounds: Normal breath sounds. No wheezing or rhonchi. Abdominal:      General: Abdomen is flat. Bowel sounds are normal.      Palpations: Abdomen is soft. Tenderness: There is no abdominal tenderness. Musculoskeletal:      Cervical back: Neck supple. Skin:     General: Skin is warm and dry. Neurological:      General: No focal deficit present. Mental Status: He is alert and oriented to person, place, and time. Psychiatric:         Mood and Affect: Mood normal.         Behavior: Behavior normal.         Thought Content: Thought content normal.         Judgment: Judgment normal.       Assessment/Plan:  Right vision changes possible 2/2 macular degeneration?   · Every 4-5 weeks eye injections with retinal specialist  · Next visit around August 13, 2021     Type 1 diabetes mellitus, uncontrolled  · Decrease Lantus to 35 units daily and Humalog to 5 units premeals  · Endocrinology referral ordered  · Diabetic education ordered for nutrition education  · Advised patient to take additional 2 units premeal insulin prior to midnight snack  · Advised to take blood sugars 4 times per day     Hypertension, uncontrolled  · Continue amlodipine 10 mg daily  · Increase lisinopril to 40 mg daily     Hypothyroidism  · Continue Synthroid 125 mcg daily     Healthcare maintenance  · Needs varicella, pneumococcal, Tdap, hepatitis B vaccine  · Needs diabetic foot/eye exams  · Needs hepatitis C, HIV, lipid screens  · To be addressed at next visit with me    Medical Decision Making: moderate complexity    Electronically signed by Sana Gray MD on 7/15/2021 at 9:37 AM

## 2021-07-16 NOTE — TELEPHONE ENCOUNTER
Met with pt during his office visit. Pt concerned about his bills with [employer]. Pt is uninsured. Pt is paying $145.00 of child support. Pt reports he has Decree of  Shared Parenting. Pt stated to have visitation rights with his children every weekend and he is allow to claim them as dependents on his taxes every other year starting 2022. CHW contacted [facility] [phone]. Per representative the year where pt does claim his children in his taxes, he can add them to the financial form. Per [employer] [facility]  representative pt can include his children in the financial assistance application with court documentation to be evaluated by [facility] [place] service.

## 2021-07-26 ENCOUNTER — TELEPHONE (OUTPATIENT)
Dept: INTERNAL MEDICINE | Age: 31
End: 2021-07-26

## 2021-08-16 ENCOUNTER — TELEPHONE (OUTPATIENT)
Dept: INTERNAL MEDICINE | Age: 31
End: 2021-08-16

## 2021-08-18 ENCOUNTER — TELEPHONE (OUTPATIENT)
Dept: INTERNAL MEDICINE | Age: 31
End: 2021-08-18

## 2021-08-18 DIAGNOSIS — E10.9 TYPE 1 DIABETES MELLITUS WITHOUT COMPLICATION (HCC): ICD-10-CM

## 2021-08-18 RX ORDER — INSULIN LISPRO 100 [IU]/ML
INJECTION, SOLUTION INTRAVENOUS; SUBCUTANEOUS
Qty: 5 PEN | Refills: 0 | Status: SHIPPED
Start: 2021-08-18 | End: 2021-12-01 | Stop reason: SDUPTHER

## 2021-08-18 NOTE — TELEPHONE ENCOUNTER
Pt's PAP insulin here in office. Pt with 2 Humalog orders. Need to verify current Humalog dose. Pt currently has 2 humalog orders on MAR. Need to confirm doses with last office note. If possible, can both orders be placed on only one Humalog order.

## 2021-11-30 NOTE — PROGRESS NOTES
[facility] Internal Medicine      SUBJECTIVE:  Gisell Washington (:  1990) is a 33 y.o. male here for evaluation of the following chief complaint(s):  Diabetes and Hypertension    Last seen on 7/15/2021    Type 1 diabetes mellitus, uncontrolled  · HbA1c 12.8 on 2021 > 11.5 today  · Lost 30 lbs intentionally through exercise and diet  · BG at home:  · 8 am fasting - 350-500; initially was taking 7 units Humalog with hypoglycemia in the 40s that was corrected with decreasing Humalog to 5 units  · 1 pm before meal - 180-240; then take 7 units Humalog due to large lunch  · 6-8 pm before meal - does not check dinner sugars and skips Humalog at night due to skipped meal  · On Lantus to 35 units daily but has been taking 37 units daily  · On Humalog to 5 units premeals but has been taking it as above  · Endocrinology referral ordered during last visit but cannot afford the CSF pain and he may accelerate above limit for financial assistance  · Works as a   7:30-6pm and attributes poor compliance to insulin due to this     Hypertension, uncontrolled  · BP today 204/124; ran out of medications for the past 2 months  · Ports intermittent headaches, with no focal weakness/deficits  · On amlodipine 10 mg and lisinopril 40 mg daily    Right vision changes possible 2/2 macular degeneration? · Every 4-5 weeks eye injections with retinal specialist  · Last visit 1-2 weeks ago     Hypothyroidism  · TSH 13.42 on 2021  · On Synthroid 125 mcg daily    Healthcare maintenance  · Needs varicella, pneumococcal, Tdap, hepatitis B vaccine - refused  · Needs diabetic foot/eye exams  · Needs hepatitis C, HIV, lipid screens - can these be done outside [facility]  · To be addressed at next visit with me    Review of Systems   Constitutional: Negative for appetite change, chills and fever. Respiratory: Negative for cough, chest tightness and shortness of breath.     Cardiovascular: Negative for chest pain, palpitations and leg swelling. Gastrointestinal: Negative for abdominal pain, constipation, diarrhea and nausea. Genitourinary: Negative. Musculoskeletal: Negative. Neurological: Positive for headaches. No current outpatient medications on file prior to visit. No current facility-administered medications on file prior to visit. OBJECTIVE:    VS:   Vitals:    12/01/21 0828 12/01/21 0914   BP: (!) 206/124 (!) 189/104   Site: Right Upper Arm Right Upper Arm   Position: Sitting Sitting   Cuff Size: Medium Adult Large Adult   Pulse: 105    Resp: 20    Temp: 97.5 °F (36.4 °C)    TempSrc: Temporal    SpO2: 97%    Weight: 229 lb (103.9 kg)    Height: 5' 9\" (1.753 m)      Physical Exam  Constitutional:       Appearance: Normal appearance. He is not toxic-appearing. HENT:      Head: Normocephalic and atraumatic. Eyes:      Pupils: Pupils are equal, round, and reactive to light. Cardiovascular:      Rate and Rhythm: Normal rate and regular rhythm. Pulses: Normal pulses. Heart sounds: Normal heart sounds. Pulmonary:      Effort: Pulmonary effort is normal.      Breath sounds: Normal breath sounds. No wheezing or rhonchi. Abdominal:      General: Abdomen is flat. Bowel sounds are normal.      Palpations: Abdomen is soft. Tenderness: There is no abdominal tenderness. Musculoskeletal:      Cervical back: Neck supple. Feet:      Right foot:      Protective Sensation: 5 sites tested. 5 sites sensed. Left foot:      Protective Sensation: 5 sites tested. 5 sites sensed. Skin:     General: Skin is warm and dry. Neurological:      General: No focal deficit present. Mental Status: He is alert and oriented to person, place, and time. Psychiatric:         Mood and Affect: Mood normal.         Behavior: Behavior normal.         Thought Content:  Thought content normal.         Judgment: Judgment normal.            ASSESSMENT/PLAN:    Type 1 diabetes mellitus, uncontrolled  · Increase Lantus to 45 units daily  · Take Humalog 5 units during breakfast, 10 units during lunch (due to large lunch meal), and Humalog 5 units at dinnertime  · Advised importance of not skipping meals and insulin for better blood glucose control  · Advised to watch out for hypoglycemic episodes given changes in regimen  · Continue diet and exercise modification     Hypertension, uncontrolled  · Amlodipine 10 mg and lisinopril 40 mg daily refilled today    Right vision changes possible 2/2 macular degeneration  · Continue follow-up with retinal specialist     Hypothyroidism  · Continue Synthroid 125 mcg daily  · Repeat TSH ordered    Hyperlipidemia  · Start Lipitor 40 mg daily today given poorly controlled T1DM and hypertension    Healthcare maintenance  · Needs varicella, pneumococcal, Tdap, hepatitis B vaccine - refused all during this visit  · Diabetic foot exam done today  · Needs hepatitis C and HIV screens -we will hold off on this for now as he is self-pay  · Prioritize TSH and lipid panel for labs      RTC:  Return in about 2 weeks (around 12/15/2021). For blood sugar/pressure check      I have reviewed my findings and recommendations with Giovanna Almonte and Dr. Telma Turner.     Marsha Torres MD   12/1/2021 10:19 AM

## 2021-12-01 ENCOUNTER — CARE COORDINATION (OUTPATIENT)
Dept: INTERNAL MEDICINE | Age: 31
End: 2021-12-01

## 2021-12-01 ENCOUNTER — OFFICE VISIT (OUTPATIENT)
Dept: INTERNAL MEDICINE | Age: 31
End: 2021-12-01

## 2021-12-01 VITALS
BODY MASS INDEX: 33.92 KG/M2 | RESPIRATION RATE: 20 BRPM | HEIGHT: 69 IN | TEMPERATURE: 97.5 F | WEIGHT: 229 LBS | SYSTOLIC BLOOD PRESSURE: 189 MMHG | OXYGEN SATURATION: 97 % | DIASTOLIC BLOOD PRESSURE: 104 MMHG | HEART RATE: 105 BPM

## 2021-12-01 DIAGNOSIS — E06.3 HYPOTHYROIDISM DUE TO HASHIMOTO'S THYROIDITIS: ICD-10-CM

## 2021-12-01 DIAGNOSIS — E10.9 TYPE 1 DIABETES MELLITUS WITHOUT COMPLICATION (HCC): Primary | ICD-10-CM

## 2021-12-01 DIAGNOSIS — I10 PRIMARY HYPERTENSION: Chronic | ICD-10-CM

## 2021-12-01 DIAGNOSIS — E03.9 ACQUIRED HYPOTHYROIDISM: ICD-10-CM

## 2021-12-01 DIAGNOSIS — E03.8 HYPOTHYROIDISM DUE TO HASHIMOTO'S THYROIDITIS: ICD-10-CM

## 2021-12-01 DIAGNOSIS — I10 ESSENTIAL HYPERTENSION: ICD-10-CM

## 2021-12-01 LAB — HBA1C MFR BLD: 11.5 %

## 2021-12-01 PROCEDURE — 99213 OFFICE O/P EST LOW 20 MIN: CPT | Performed by: INTERNAL MEDICINE

## 2021-12-01 PROCEDURE — 83036 HEMOGLOBIN GLYCOSYLATED A1C: CPT | Performed by: INTERNAL MEDICINE

## 2021-12-01 PROCEDURE — 99212 OFFICE O/P EST SF 10 MIN: CPT | Performed by: INTERNAL MEDICINE

## 2021-12-01 RX ORDER — PEN NEEDLE, DIABETIC 31 GX5/16"
1 NEEDLE, DISPOSABLE MISCELLANEOUS DAILY
Qty: 100 EACH | Refills: 11 | Status: SHIPPED | OUTPATIENT
Start: 2021-12-01 | End: 2022-09-16 | Stop reason: SDUPTHER

## 2021-12-01 RX ORDER — AMLODIPINE BESYLATE 10 MG/1
10 TABLET ORAL DAILY
Qty: 90 TABLET | Refills: 1 | Status: SHIPPED | OUTPATIENT
Start: 2021-12-01 | End: 2022-09-16 | Stop reason: SDUPTHER

## 2021-12-01 RX ORDER — INSULIN GLARGINE 100 [IU]/ML
45 INJECTION, SOLUTION SUBCUTANEOUS
Qty: 40.5 ML | Refills: 1 | Status: SHIPPED
Start: 2021-12-01 | End: 2022-09-16 | Stop reason: SDUPTHER

## 2021-12-01 RX ORDER — INSULIN LISPRO 100 [IU]/ML
70 INJECTION, SOLUTION INTRAVENOUS; SUBCUTANEOUS
Qty: 252 ML | Refills: 1 | Status: SHIPPED | OUTPATIENT
Start: 2021-12-01 | End: 2022-01-06

## 2021-12-01 RX ORDER — LISINOPRIL 20 MG/1
40 TABLET ORAL DAILY
Qty: 180 TABLET | Refills: 1 | Status: SHIPPED | OUTPATIENT
Start: 2021-12-01 | End: 2022-09-16 | Stop reason: SDUPTHER

## 2021-12-01 RX ORDER — LEVOTHYROXINE SODIUM 0.12 MG/1
125 TABLET ORAL DAILY
Qty: 90 TABLET | Refills: 1 | Status: SHIPPED
Start: 2021-12-01 | End: 2022-09-16 | Stop reason: SDUPTHER

## 2021-12-01 RX ORDER — ATORVASTATIN CALCIUM 40 MG/1
40 TABLET, FILM COATED ORAL DAILY
Qty: 90 TABLET | Refills: 1 | Status: SHIPPED
Start: 2021-12-01 | End: 2022-09-16 | Stop reason: SDUPTHER

## 2021-12-01 ASSESSMENT — ENCOUNTER SYMPTOMS
ABDOMINAL PAIN: 0
CHEST TIGHTNESS: 0
DIARRHEA: 0
CONSTIPATION: 0
COUGH: 0
SHORTNESS OF BREATH: 0
NAUSEA: 0

## 2021-12-01 NOTE — PATIENT INSTRUCTIONS
Increase Lantus to 45 units daily  Take Humalog 5 units before breakfast, 10 units before lunch, 5 units at dinner  Sliding scale: Inject 5 U of Lispro with breakfast, 10 units of Lispro with lunch, and 5 units of Lispro with dinner; 2 U of Lispro with midnight snack; Sliding Scale 0-10 units based on blood glucose measurement (  =No Insulin, 151-200=2 U, 201-250=4 U, 251-300=6 U, 301-350=8 U, 351-400=10 U, Over 400 give 10 Uand call Physician) . 70 U max dose per day. PAP medication.     Please do not skip meals take regular size meals daily  Please check blood sugar at home 2-3 times daily  Please take the remainder of your medications as prescribed (amlodipine, lisinopril, Synthroid daily)  Start Lipitor 40 mg daily

## 2021-12-01 NOTE — CARE COORDINATION
Outpatient education tracking log    Participant Name: Pili Padilla  Referring Provider: Bear Zapata MD    Topics/Learning Objectives Initial visit   Follow-up  Follow-up Follow-up Comments   Diabetes disease process & Treatment process:   -Define diabetes & prediabetes and ABCs of     diabetes   -Identify own type of diabetes  -Identify lifestyle changes/treatment options 10/19/2020 Sm 5/27/2021  7/15/2021 12/1/2021     Lab Results   Component Value Date    LABA1C 11.5 12/01/2021    LABA1C 12.8 05/27/2021    LABA1C 14.9 10/19/2020     12/1/2021- Met with Duane type one diabetic. HTN. He is only eating breakfast and lunch so he does not take his evening dose of insulin. Insulin Humalog sliding scale being adjusted. . Lantus being increased from 35 to 45 units hs. 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 States he is seeing his eye doctor once a month and has been getting injection into his eye. Discussed the importance of taking his medications as ordered and eating 3 meals a day. Portion control and the carbohydrates. BG -500 with episodes oh hypoglycemia when he took 7 units so he decreased to 5 units . Lunch is his biggest meal     7/15/2021 Met with Bisi Armenta, he brought his blood glucose reading and blood sugars are up and down. Has hypoglycemia and high blood sugars. He reports he is inconsistant on taking his medications and checking his blood sugar. He skips meals at times. /Says he can not go to endocrinologist because he can not afford it. [employer] is coming in to see him. He get PAP. We reviewed the importance of taking his medications as prescribed checking his blood sugars and keeping in close contact with the doctor about hypoglycemia and hyperglycemic episodes. He states he was found to have diabetic retinopathy and is receiving treatments. He was scheduled his next VV on 7/26/2021.  Consulted with  Dr Jailene Paz.    6/24/2021 Bisi Armenta has type one diabetes poorly controlled, He is not regularly checking his blood sugars but tries to check 2 times a day. He has had hypoglycemia in the last monthly. When taking his medication insulin  FBS ranging 170-180 He has been back on his insulin for the past month and has been working with PAP and LISW. He has no insurance he works as a  and states he can not afford [employer] they have. He is  and lives with his parents. He works 6 days a week often 9 hours a day. He skips meal.  He has been a diabetic since the age of 6. He reports has gained about 10 lb. He is drinking fruit smoothies every morning with fruit juice in them. Reviewed the importance of eating 3 meals a day at consistent times. Breakfast lunch and dinner, checking blood sugars and taking his medications as prescribed He has been missing his evening dose of Basaglar insulin 40 units at times because he falls asleep and the doctor told him not take it tonight and switch to am tomorrow morning. His meal time insulin is braydon reduced. [employer] will be 43 units in the am and Lispro 7 units before meals with sliding scale. 5/27/2021 Met with Duane, Diabetes type one, HTN, bloop pressure is elevated today and EKG done. He has been out of his BP  Medication and never picked up his insulin PAP program. Ngozi Ryan he was getting his insulin off the streets. When asked why he did not  his PAP insulin he had no clear answer. He was signed up again for PAP program and SW brought his insulin over for him to take home. He is not consistently checking his blood sugars but has agreed to start checking and given blood sugar logs. He is a  with 3 children he is . Usually eat 1 to 2 meals a day, His Lantus 43 units at hs and short acting 10-15 units before meals with SS. We reviewed his insulin and how to take it. Encouraged to eat 3 meals a day. Reviewed diabetic portion plate healthy snacking, carbohydrates and portion control.  Reviewed what uncontrolled BS can do in the body over time and complications that can develop. Reviewed above with doctor. . He has sent messages in my chart and will send message with Bps and BS. Referred to Dr Becki Salguero, referred to Diabetic education today    10/19/2021  Seen by Dr Anjelica Welch and Dr Octavio Hurtado  He  Is a type one diabetic has been off his medications. Has seen PAP for medications in the past.   Lab Results   Component Value Date    LABA1C 11.5 12/01/2021    LABA1C 12.8 05/27/2021    LABA1C 14.9 10/19/2020   Has HTN Dr ordered my chart so he can put in his BP and BS weekly. He was given my contact information. PAP-Prescription Assistance involved. Developing strategies for Healthy coping/psychosocial issues:    -Describe feelings about living with diabetes  -Identify coping strategies;   -Identify support needed & support network available 10/19/2020  5/27/2021  7/15/2021   States went through a difficult divorce over the past year and he stopped taking his medications but is now ready to take care of his health. Prevention, detection & treatment of Chronic complications:    -Identify the prevention, detection and treatment for complications including immunizations, preventive eye, foot, dental and renal exams as indicated per the participant's duration of diabetes and health status.  -Define the natural course of diabetes and the relationship of blood glucose levels to long term complications of diabetes. 10/19/2020  5/27/2021  7/15/2021 SM                Revewed: Definition of diabetes, Diabetic Targets, A1c and what it means. Self-monitoring of blood glucose and the importance of checking blood sugars Hypo and Hyperglycemia signs and symptoms, possible causes and treatment. The importance of Medication adherence. The plate method and meal planning guidelines, what carbohydrates are and how they effect your blood sugar. The benefits of exercise.  What uncontrolled diabetes does to your body and reducing the risk of chronic management into lifestyle:   -Describe effect of type, amount & timing of food on blood glucose  -Describe methods for preparing and planning healthy meals 10/19/2020 SM 5/27/2021 SM   Reviewed the benefits of exercise               Supporting Education Materials/Equipment Provided: Diabetic Survival Packet, Diabetic Targets, Blood sugar logs ,  The Plate Method, Meal Planning Food list, planning heathy meals. A1c Tracking  Lab Results   Component Value Date    LABA1C 11.5 12/01/2021    LABA1C 12.8 05/27/2021    LABA1C 14.9 10/19/2020         Date Initial A1c 6 month f/u  1 year post ed.  Difference  Comments   10/19/2020 14.9                []Patient lost to follow-up : date-      [] Attended DSMES on:  [] Attended yearly DSMES follow-up on:   [] Attended MNTon :

## 2021-12-01 NOTE — PROGRESS NOTES
Patient has not knowingly had unprotected exposire to anyone positive for COVID-10 within the last 14 days DENIES    AND does not have the following signs or symptoms:    A. One of the followin. Fever greater than 100.0 F NEGATIVE       2. Cough POSITIVE *20 years has dry cough in the mornings*       3. New onset shortness of breath NEGATIVE       4. New onset difficulty breathing NEGATIVE    AND/OR    B. Two or more of the following criteria:        1. Muscle aches NEGATIVE       2. Headache NEGATIVE       3. Sore Throat NEGATIVE       4. New onset loss of smell or taste NEGATIVE       5. New onset diarrhea NEGATIVE       6. Chills NEGATIVE       7. Runny nose NEGATIVE       8.  Sneezing NEGATIVE

## 2021-12-01 NOTE — PROGRESS NOTES
[facility]    Attending Physician Statement  I have discussed the case, including pertinent history and exam findings with the resident physician. I agree with the assessment, plan and orders as documented by the resident. I have reviewed all pertinent PMHx, PSHx, FamHx, SocialHx, medications, and allergies and updated history as appropriate. Patient presents for routine follow up of medical problems. Patient with HTN, uncontrolled, due to medication non-compliance. Has DM on insulin, also uncontrolled (POCT HBA1c 11.5%), once again doubt total compliance and agree with need to increase lantus and humalog (need to increase humalog with lunch since that is his biggest meal and he typically skips dinner). Has hypothyroid on synthroid, repeat TSH pending. Remainder of medical problems as per resident note.     Bob Contreras DO  12/1/2021 8:57 AM DISPLAY PLAN FREE TEXT

## 2021-12-13 ENCOUNTER — TELEPHONE (OUTPATIENT)
Dept: INTERNAL MEDICINE | Age: 31
End: 2021-12-13

## 2021-12-13 NOTE — TELEPHONE ENCOUNTER
VM message left for patient to call [phone] if he wishes to reschedule his missed appt from today.

## 2022-01-06 DIAGNOSIS — E10.9 TYPE 1 DIABETES MELLITUS WITHOUT COMPLICATION (HCC): ICD-10-CM

## 2022-01-06 RX ORDER — INSULIN LISPRO 100 [IU]/ML
INJECTION, SOLUTION INTRAVENOUS; SUBCUTANEOUS
Qty: 3 PEN | Refills: 2
Start: 2022-01-06 | End: 2022-05-16

## 2022-04-18 PROBLEM — N17.9 ACUTE KIDNEY INJURY (HCC): Status: RESOLVED | Noted: 2019-06-17 | Resolved: 2022-04-18

## 2022-04-18 PROBLEM — J18.9 PNEUMONIA: Status: RESOLVED | Noted: 2018-06-10 | Resolved: 2022-04-18

## 2022-04-19 PROBLEM — H35.30 MACULAR DEGENERATION OF BOTH EYES: Status: ACTIVE | Noted: 2022-04-19

## 2022-05-16 DIAGNOSIS — E10.9 TYPE 1 DIABETES MELLITUS WITHOUT COMPLICATION (HCC): ICD-10-CM

## 2022-05-16 RX ORDER — INSULIN LISPRO 100 [IU]/ML
INJECTION, SOLUTION INTRAVENOUS; SUBCUTANEOUS
Qty: 3 PEN | Refills: 2
Start: 2022-05-16 | End: 2022-09-16 | Stop reason: SDUPTHER

## 2022-09-16 ENCOUNTER — TELEPHONE (OUTPATIENT)
Dept: INTERNAL MEDICINE | Age: 32
End: 2022-09-16

## 2022-09-16 ENCOUNTER — OFFICE VISIT (OUTPATIENT)
Dept: INTERNAL MEDICINE | Age: 32
End: 2022-09-16

## 2022-09-16 VITALS
HEIGHT: 69 IN | DIASTOLIC BLOOD PRESSURE: 110 MMHG | HEART RATE: 96 BPM | BODY MASS INDEX: 36.57 KG/M2 | TEMPERATURE: 97.2 F | WEIGHT: 246.9 LBS | OXYGEN SATURATION: 98 % | RESPIRATION RATE: 18 BRPM | SYSTOLIC BLOOD PRESSURE: 193 MMHG

## 2022-09-16 DIAGNOSIS — I10 PRIMARY HYPERTENSION: Chronic | ICD-10-CM

## 2022-09-16 DIAGNOSIS — E03.8 HYPOTHYROIDISM DUE TO HASHIMOTO'S THYROIDITIS: ICD-10-CM

## 2022-09-16 DIAGNOSIS — E10.319 TYPE 1 DIABETES MELLITUS WITH RETINOPATHY, MACULAR EDEMA PRESENCE UNSPECIFIED, UNSPECIFIED LATERALITY, UNSPECIFIED RETINOPATHY SEVERITY (HCC): ICD-10-CM

## 2022-09-16 DIAGNOSIS — E10.9 TYPE 1 DIABETES MELLITUS WITHOUT COMPLICATION (HCC): ICD-10-CM

## 2022-09-16 DIAGNOSIS — E06.3 HYPOTHYROIDISM DUE TO HASHIMOTO'S THYROIDITIS: ICD-10-CM

## 2022-09-16 DIAGNOSIS — E03.9 ACQUIRED HYPOTHYROIDISM: ICD-10-CM

## 2022-09-16 DIAGNOSIS — E78.5 HYPERLIPIDEMIA, UNSPECIFIED HYPERLIPIDEMIA TYPE: Chronic | ICD-10-CM

## 2022-09-16 DIAGNOSIS — E10.21 DIABETIC NEPHROPATHY ASSOCIATED WITH TYPE 1 DIABETES MELLITUS (HCC): Primary | Chronic | ICD-10-CM

## 2022-09-16 DIAGNOSIS — I10 ESSENTIAL HYPERTENSION: ICD-10-CM

## 2022-09-16 LAB — HBA1C MFR BLD: 11.9 %

## 2022-09-16 PROCEDURE — 99212 OFFICE O/P EST SF 10 MIN: CPT | Performed by: INTERNAL MEDICINE

## 2022-09-16 PROCEDURE — 99214 OFFICE O/P EST MOD 30 MIN: CPT | Performed by: INTERNAL MEDICINE

## 2022-09-16 PROCEDURE — 83036 HEMOGLOBIN GLYCOSYLATED A1C: CPT | Performed by: INTERNAL MEDICINE

## 2022-09-16 PROCEDURE — 3046F HEMOGLOBIN A1C LEVEL >9.0%: CPT | Performed by: INTERNAL MEDICINE

## 2022-09-16 RX ORDER — LEVOTHYROXINE SODIUM 0.12 MG/1
125 TABLET ORAL DAILY
Qty: 90 TABLET | Refills: 0 | Status: SHIPPED | OUTPATIENT
Start: 2022-09-16 | End: 2022-12-15

## 2022-09-16 RX ORDER — LISINOPRIL 40 MG/1
40 TABLET ORAL DAILY
Qty: 30 TABLET | Refills: 0 | Status: SHIPPED
Start: 2022-09-16 | End: 2022-09-16

## 2022-09-16 RX ORDER — AMLODIPINE BESYLATE 10 MG/1
10 TABLET ORAL DAILY
Qty: 30 TABLET | Refills: 0 | Status: SHIPPED | OUTPATIENT
Start: 2022-09-16

## 2022-09-16 RX ORDER — ATORVASTATIN CALCIUM 40 MG/1
40 TABLET, FILM COATED ORAL DAILY
Qty: 90 TABLET | Refills: 0 | Status: SHIPPED | OUTPATIENT
Start: 2022-09-16

## 2022-09-16 RX ORDER — INSULIN LISPRO 100 [IU]/ML
INJECTION, SOLUTION INTRAVENOUS; SUBCUTANEOUS
Qty: 3 ADJUSTABLE DOSE PRE-FILLED PEN SYRINGE | Refills: 0 | Status: SHIPPED | OUTPATIENT
Start: 2022-09-16

## 2022-09-16 RX ORDER — LANCETS 30 GAUGE
EACH MISCELLANEOUS
Qty: 100 EACH | Refills: 2 | Status: SHIPPED | OUTPATIENT
Start: 2022-09-16

## 2022-09-16 RX ORDER — INSULIN GLARGINE 100 [IU]/ML
43 INJECTION, SOLUTION SUBCUTANEOUS
Qty: 6 ADJUSTABLE DOSE PRE-FILLED PEN SYRINGE | Refills: 0 | Status: SHIPPED | OUTPATIENT
Start: 2022-09-16 | End: 2023-03-15

## 2022-09-16 RX ORDER — AMLODIPINE BESYLATE 10 MG/1
10 TABLET ORAL DAILY
Qty: 30 TABLET | Refills: 0 | Status: SHIPPED
Start: 2022-09-16 | End: 2022-09-16

## 2022-09-16 RX ORDER — LISINOPRIL 40 MG/1
40 TABLET ORAL DAILY
Qty: 30 TABLET | Refills: 0 | Status: SHIPPED | OUTPATIENT
Start: 2022-09-16

## 2022-09-16 RX ORDER — PEN NEEDLE, DIABETIC 31 GX5/16"
1 NEEDLE, DISPOSABLE MISCELLANEOUS DAILY
Qty: 100 EACH | Refills: 11 | Status: SHIPPED | OUTPATIENT
Start: 2022-09-16

## 2022-09-16 SDOH — ECONOMIC STABILITY: INCOME INSECURITY: IN THE LAST 12 MONTHS, WAS THERE A TIME WHEN YOU WERE NOT ABLE TO PAY THE MORTGAGE OR RENT ON TIME?: NO

## 2022-09-16 SDOH — ECONOMIC STABILITY: FOOD INSECURITY: WITHIN THE PAST 12 MONTHS, THE FOOD YOU BOUGHT JUST DIDN'T LAST AND YOU DIDN'T HAVE MONEY TO GET MORE.: NEVER TRUE

## 2022-09-16 SDOH — ECONOMIC STABILITY: FOOD INSECURITY: WITHIN THE PAST 12 MONTHS, YOU WORRIED THAT YOUR FOOD WOULD RUN OUT BEFORE YOU GOT MONEY TO BUY MORE.: NEVER TRUE

## 2022-09-16 SDOH — ECONOMIC STABILITY: TRANSPORTATION INSECURITY
IN THE PAST 12 MONTHS, HAS LACK OF TRANSPORTATION KEPT YOU FROM MEETINGS, WORK, OR FROM GETTING THINGS NEEDED FOR DAILY LIVING?: NO

## 2022-09-16 SDOH — ECONOMIC STABILITY: TRANSPORTATION INSECURITY
IN THE PAST 12 MONTHS, HAS THE LACK OF TRANSPORTATION KEPT YOU FROM MEDICAL APPOINTMENTS OR FROM GETTING MEDICATIONS?: NO

## 2022-09-16 SDOH — ECONOMIC STABILITY: HOUSING INSECURITY
IN THE LAST 12 MONTHS, WAS THERE A TIME WHEN YOU DID NOT HAVE A STEADY PLACE TO SLEEP OR SLEPT IN A SHELTER (INCLUDING NOW)?: NO

## 2022-09-16 SDOH — ECONOMIC STABILITY: HOUSING INSECURITY: IN THE LAST 12 MONTHS, HOW MANY PLACES HAVE YOU LIVED?: 1

## 2022-09-16 ASSESSMENT — PATIENT HEALTH QUESTIONNAIRE - PHQ9
SUM OF ALL RESPONSES TO PHQ QUESTIONS 1-9: 0
SUM OF ALL RESPONSES TO PHQ QUESTIONS 1-9: 0
SUM OF ALL RESPONSES TO PHQ9 QUESTIONS 1 & 2: 0
2. FEELING DOWN, DEPRESSED OR HOPELESS: 0
SUM OF ALL RESPONSES TO PHQ QUESTIONS 1-9: 0
1. LITTLE INTEREST OR PLEASURE IN DOING THINGS: 0
SUM OF ALL RESPONSES TO PHQ QUESTIONS 1-9: 0

## 2022-09-16 ASSESSMENT — ENCOUNTER SYMPTOMS
NAUSEA: 0
SINUS PAIN: 0
WHEEZING: 0
ABDOMINAL PAIN: 0
CONSTIPATION: 0
VOMITING: 0
DIARRHEA: 0
STRIDOR: 0
COUGH: 0
SHORTNESS OF BREATH: 0
APNEA: 0

## 2022-09-16 ASSESSMENT — LIFESTYLE VARIABLES: HOW OFTEN DO YOU HAVE A DRINK CONTAINING ALCOHOL: 2-4 TIMES A MONTH

## 2022-09-16 ASSESSMENT — SOCIAL DETERMINANTS OF HEALTH (SDOH): HOW HARD IS IT FOR YOU TO PAY FOR THE VERY BASICS LIKE FOOD, HOUSING, MEDICAL CARE, AND HEATING?: SOMEWHAT HARD

## 2022-09-16 NOTE — PROGRESS NOTES
[facility] Internal Medicine      SUBJECTIVE:  Melissa Estrella (:  1990) is a 27 y.o. male here for evaluation of the following chief complaint(s):  Follow-up and Diabetes (Has been out of insulin for 2 weeks )    Patient has not been seen in six months and has not taken any medications in the last few weeks. Patient currently denies any symptoms. Discussed with patient need for increased followup and titration of medications. Patient agreeable to more frequent visits. Patient denies any chest pain, SOB, headache, back pain, or blurry vision concerning for Hypertensive Emergency    Review of Systems   Constitutional:  Negative for chills and fever. HENT:  Negative for congestion and sinus pain. Respiratory:  Negative for apnea, cough, shortness of breath, wheezing and stridor. Cardiovascular:  Negative for chest pain, palpitations and leg swelling. Gastrointestinal:  Negative for abdominal pain, constipation, diarrhea, nausea and vomiting. Genitourinary:  Negative for dysuria and frequency. Musculoskeletal:  Negative for myalgias. Skin:  Negative for rash. Allergic/Immunologic: Negative for environmental allergies. Neurological:  Negative for headaches. Hematological:  Does not bruise/bleed easily. Psychiatric/Behavioral:  Negative for suicidal ideas. No current outpatient medications on file prior to visit. No current facility-administered medications on file prior to visit. OBJECTIVE:    VS:   Vitals:    22 0829 22 0846   BP: (!) 207/114 (!) 193/110   Site: Left Upper Arm Left Upper Arm   Position: Sitting Sitting   Cuff Size: Large Adult Large Adult   Pulse: 96    Resp: 18    Temp: 97.2 °F (36.2 °C)    TempSrc: Temporal    SpO2: 98%    Weight: 246 lb 14.4 oz (112 kg)    Height: 5' 9\" (1.753 m)      Physical Exam  Vitals and nursing note reviewed. Constitutional:       Appearance: He is well-developed.    HENT: Head: Normocephalic and atraumatic. Eyes:      General: No scleral icterus. Conjunctiva/sclera: Conjunctivae normal.      Pupils: Pupils are equal, round, and reactive to light. Neck:      Vascular: No carotid bruit. Cardiovascular:      Rate and Rhythm: Normal rate and regular rhythm. Pulses:           Posterior tibial pulses are 2+ on the right side and 2+ on the left side. Heart sounds: Normal heart sounds, S1 normal and S2 normal. No murmur heard. Comments: No Edema in BL LE  Pulmonary:      Effort: Pulmonary effort is normal. No respiratory distress. Breath sounds: No decreased breath sounds, wheezing, rhonchi or rales. Abdominal:      General: Bowel sounds are normal.      Palpations: Abdomen is soft. There is no mass. Tenderness: There is no abdominal tenderness. There is no guarding. Neurological:      Mental Status: He is alert. ASSESSMENT/PLAN:    IDDM2  -patient ran out of Lantus for 2 weeks; joseph has only been taking his lispro and using it every few hours   -A1c 11.9; Patient needs to measure BG 3 times/day  -Screening labs ordered   -Plan to continue Lantus 43 U in the AM and Lispro 5 U with breakfast, 10 U with Lunch, and 5 U with Dinner   -Virtual Visit in 2 weeks for titration of medications  -macroalbuminuria concerning; consider Renal evaluation     Hyperlipidemia, unspecified hyperlipidemia type  -cotninue statin   The ASCVD Risk score (Corrine Honeycutt, et al., 2013) failed to calculate for the following reasons:     The 2013 ASCVD risk score is only valid for ages 36 to 78    Hypothyroidism due to Hashimoto's thyroiditis  -repeat TSH and free T4   -restarting synthroid since being out of it for 2 weeks     Primary hypertension   -grossly elevated; no acute symptosm   -starting alodipine today and tomorrow; then starting lisinopril on Sunday  -patient to take home blood pressure cuff readings;  -blood work ordered       RTC:  Return in about 2

## 2022-09-16 NOTE — PATIENT INSTRUCTIONS
Lab Tests to get prior to next Visit  1. Blood work and urine testing     Changes in Medications  Continue Lispro 5 U with Breakfast and Dinner and 10 U with lunch  Continue Glargine 43 U in the morning  Start Amlodipine 10 mg daily today and tomorrow   Start Lisinopril 40 mg on Sunday in addition to amlodipine  Start Synthroid today   Continue statin     Referrals   1. None at this time     Please follow up 2 weeks with our clinic. Please call if your symptoms worsen or fail to improve.

## 2022-09-16 NOTE — TELEPHONE ENCOUNTER
Spoke with [facility] and informed that patient was given PAP insulin. Spoke with Dr. Jazmin Roberson and Atorvastatin is only medication that needs filled and also if patient wants the syringes and pens needles.   Arturo verbalized understanding,

## 2022-09-16 NOTE — TELEPHONE ENCOUNTER
Please call pharmacy meds were sent to them and they question he uses insulin pens or vials also they got pap prescriptions also.  States pt has no insurance they did not want to fill meds if they were supposed to stay in house for pap program . Please clarify

## 2022-09-29 NOTE — PROGRESS NOTES
Abbey Munson is a 27 y.o. male evaluated via telephone on 2022 for Diabetes  . Documentation:  I communicated with the patient and/or health care decision maker about the following:     DM 2  -Seen in the clinic on 22 for follow-up of DM. He ran out of Lantus for 2 weeks. Lantus 43 units in AM and Lispro 5 units breakfast, 10 units lunch and 5 units at dinner continued  -HBA1C 11.9  -BG at home: fasting under 250 mg/dL, one time 340 mg/dL, has not been eating dinner for the past week  -post-prandial 62-66 mg/dl today 2 hours after breakfast;   -pre-meal B mg/dL average  -with s/sx of hypoglycemia: has sugar tablets and candy bars at hand  Plan  -continue with Lantus to 43 units in AM   -hold Lispro pre-meal if blood sugar is less than 100 mg/dL  -follow-up with PCP Dr. Rao Mcbride   -continue to monitor your blood sugar before breakfast, before meals and 2 hours after meals and log  -bring log next visit  -advised to complete labs prior to next visit    Details of this discussion including any medical advice provided: as above    Total Time: minutes: 11-20 minutes    Abbey Munson was evaluated through a synchronous (real-time) audio encounter. Patient identification was verified at the start of the visit. He (or guardian if applicable) is aware that this is a billable service, which includes applicable co-pays. This visit was conducted with the patient's (and/or legal guardian's) verbal consent. He has not had a related appointment within my department in the past 7 days or scheduled within the next 24 hours. The patient was located at Other: car on the side of the [facility] [place] . The provider was located at [facility] (Appt Dept): One [address] Johanne Abigail  [facility],  [address] [place] [address].     Note: not billable if this call serves to triage the patient into an appointment for the relevant concern    Baldev Rivera MD

## 2022-09-30 ENCOUNTER — SCHEDULED TELEPHONE ENCOUNTER (OUTPATIENT)
Dept: INTERNAL MEDICINE | Age: 32
End: 2022-09-30

## 2022-09-30 DIAGNOSIS — E10.319 TYPE 1 DIABETES MELLITUS WITH RETINOPATHY, MACULAR EDEMA PRESENCE UNSPECIFIED, UNSPECIFIED LATERALITY, UNSPECIFIED RETINOPATHY SEVERITY (HCC): Primary | ICD-10-CM

## 2022-09-30 PROCEDURE — 99442 PR PHYS/QHP TELEPHONE EVALUATION 11-20 MIN: CPT | Performed by: INTERNAL MEDICINE

## 2022-09-30 NOTE — PATIENT INSTRUCTIONS
Continue Lantus 43 units at night. Hold pre-meal insulin (Lispro) if your pre-meal blood sugar is less than 100 mg/dL. Please monitor your blood sugar in the morning before breakfast, then before each meal and 2 hours after meals. You can do this in a staggered manner. Regular follow-up with your PCP (Dr. Mykel Zuñiga) on November 29, 2022. Please get your labs done prior to next visit.

## 2022-09-30 NOTE — PROGRESS NOTES
[facility]    Attending Physician Statement  I have discussed the case, including pertinent history and exam findings with the resident physician. I agree with the assessment, plan and orders as documented by the resident. I have reviewed all pertinent PMHx, PSHx, FamHx, SocialHx, medications, and allergies and updated history as appropriate. TELEPHONE CALL for diabetic follow up. DM 2 uncontrolled with A1c 11.9%  Resumed insulin at last visit, reports fasting < 250, highest 350, occ lows in 60s. Hold pre-meal if glucose < 100 -- his lows are post-prandial during smaller/missed meals. Needs routine follow up with PCP (last comprehensive visit was 2021). Remainder of medical problems as per resident note.     Ivana Davenport DO  9/30/2022 10:56 AM

## 2022-10-28 ENCOUNTER — TELEPHONE (OUTPATIENT)
Dept: INTERNAL MEDICINE | Age: 32
End: 2022-10-28

## 2022-10-28 NOTE — TELEPHONE ENCOUNTER
Pt initiated call as he is running low on insulin and next appt is not until 11.29.22; reviewed with Buck Mcmahon LPN and 1 box of 5 pens to be given to get to 11.29.22 appt; spoke with ajay of PAP and pt needs to reenroll for Basaglar and can get 1 refill of humalog.   Messaged pt to gather financial paperwork and respond to PAP mail and confirmed address; pt states he will be at 11.29.22 appt' pt's mother signed  of 1 box of Basaglar

## 2022-11-28 ASSESSMENT — ENCOUNTER SYMPTOMS
CHEST TIGHTNESS: 0
DIARRHEA: 0
SHORTNESS OF BREATH: 0
NAUSEA: 0
ABDOMINAL PAIN: 0
COUGH: 0
CONSTIPATION: 0

## 2022-11-28 NOTE — PROGRESS NOTES
[facility] Internal Medicine      SUBJECTIVE:  Peña Sanchez (:  1990) is a 27 y.o. male here for evaluation of the following chief complaint(s):  Diabetes and Other (Per patient he lost his voice about two weeks ago , feels tired, no other symptoms, voice is now like raspy)    Last seen on 2022    Type 1 diabetes mellitus, uncontrolled  HbA1c 12.8 on 2021 > 11.5 2021 > 11.9 on 2022  Patient is extremely noncompliant with his insulin regimen and attributes that to his job as a . He states he works 2 jobs and it is very difficult for him to have 3 scheduled meals daily. This causes him to miss his evening insulin every day of the week and misses his breakfast/lunch insulin at least 3-4 times a week. The only insulin he is able to take daily is his Lantus. BG at home very labile ranging from 50s to 500s  His regimen is supposed to be: On Lantus to 43 units daily  On lispro 5 units with breakfast, 10 units with lunch, 5 units with dinner     Hypertension, uncontrolled  BP today 233/133  On amlodipine 10 mg and lisinopril 40 mg daily but patient reports he has not been taking his medications and ran out for the past 6 weeks  He reports having headaches in the first week being off all his blood pressure medications, but denies any headaches or vision changes today. He notes that his body is used to having severely elevated blood pressure. Patient sporadically takes his BP at home and his log shows it ranges from 289-805 systolic    Right vision changes possible 2/2 macular degeneration  Every 8 weeks eye injections with retinal specialist     Hypothyroidism  TSH 13.42 on 2021  On Synthroid 125 mcg daily, noncompliant      Review of Systems   Constitutional:  Negative for appetite change, chills and fever. HENT:  Positive for sore throat. Respiratory:  Negative for cough, chest tightness and shortness of breath.     Cardiovascular: Negative for chest pain, palpitations and leg swelling. Gastrointestinal:  Negative for abdominal pain, constipation, diarrhea and nausea. Genitourinary: Negative. Musculoskeletal: Negative. Neurological:  Negative for headaches. Current Outpatient Medications on File Prior to Visit   Medication Sig Dispense Refill    insulin lispro, 1 Unit Dial, (HUMALOG KWIKPEN) 100 UNIT/ML SOPN Inject 5 U of Lispro with breakfast, 10 units of Lispro with lunch, and 5 units of Lispro with dinner 3 Adjustable Dose Pre-filled Pen Syringe 0    Insulin Pen Needle ([employer] PEN NEEDLES 31G) 31G X 8 MM MISC 1 each by Does not apply route daily 100 each 11    levothyroxine (SYNTHROID) 125 MCG tablet Take 1 tablet by mouth Daily 90 tablet 0    insulin glargine (BASAGLAR KWIKPEN) 100 UNIT/ML injection pen Inject 43 Units into the skin every morning (before breakfast) LOT # [id number], EXP 10/2020 6 Adjustable Dose Pre-filled Pen Syringe 0    blood glucose test strips (ASCENSIA AUTODISC VI;ONE TOUCH ULTRA TEST VI) strip Test 3 times/day 100 strip 5    Lancets MISC Test 3 times/day 100 each 2    Insulin Syringe-Needle U-100 25G X 1\" 1 ML MISC Test 3 times/day 100 each 2    atorvastatin (LIPITOR) 40 MG tablet Take 1 tablet by mouth daily (Patient not taking: Reported on 11/29/2022) 90 tablet 0     No current facility-administered medications on file prior to visit. OBJECTIVE:    VS:   Vitals:    11/29/22 1505 11/29/22 1600 11/29/22 1619   BP: (!) 233/133 (!) 208/121 (!) 217/122   Site: Left Upper Arm Left Upper Arm Left Upper Arm   Position: Sitting Sitting Sitting   Cuff Size: Large Adult Large Adult Large Adult   Pulse: (!) 104     Resp: 18     Temp: 97.2 °F (36.2 °C)     TempSrc: Temporal     SpO2: 99%     Weight: 244 lb (110.7 kg)     Height: 5' 9\" (1.753 m)       Physical Exam  Constitutional:       Appearance: Normal appearance. He is not toxic-appearing. HENT:      Head: Normocephalic and atraumatic. Mouth/Throat:      Mouth: Mucous membranes are moist.      Pharynx: Oropharynx is clear. No posterior oropharyngeal erythema. Eyes:      Pupils: Pupils are equal, round, and reactive to light. Cardiovascular:      Rate and Rhythm: Normal rate and regular rhythm. Pulses: Normal pulses. Heart sounds: Normal heart sounds. Pulmonary:      Effort: Pulmonary effort is normal.      Breath sounds: Normal breath sounds. No wheezing or rhonchi. Abdominal:      General: Abdomen is flat. Bowel sounds are normal.      Palpations: Abdomen is soft. Tenderness: There is no abdominal tenderness. Musculoskeletal:      Cervical back: Neck supple. Skin:     General: Skin is warm and dry. Neurological:      General: No focal deficit present. Mental Status: He is alert and oriented to person, place, and time. Psychiatric:         Mood and Affect: Mood normal.         Behavior: Behavior normal.         Thought Content: Thought content normal.         Judgment: Judgment normal.          ASSESSMENT/PLAN:    Given patient's severely elevated BP and uncontrolled blood glucose, we recommended that he present to the ER for evaluation and likely admission. Patient adamantly refused stating Isaak Morgan will have to carry me to go to the ER\". Discussed with him the possible effects of uncontrolled BP and glucose including MI and stroke. The patient understands the risks and states he cannot go to the ER because he needs to work. Emphasized to the patient that if he does not go to the ED, he will have to get his BP meds today to allow for better blood pressure control. He states he will do his best to find the funds to pay for his blood pressure medication today, but if he is not able to he will not be able to buy his medications until Friday (pay day). Advised the patient to watch out for signs of stroke or severe headache and to present to the ED immediately if these occur.     Also had extensive discussion with the patient regarding strict compliance with his diabetes regimen. Offered medical letter to his work so he can be excused to have meals to allow for scheduled Humalog regimen, however patient says he is given adequate time to have meals and that he is the one who is skipping meals and not following his regimen. I discussed with him the long-term effects of severely uncontrolled diabetes including cardiac and renal disease. Patient understands and states he will do his best to adhere to his regimen. Advised to watch out for symptoms of DKA which the patient knows about since he was admitted for DKA in the past.      Type 1 diabetes mellitus, uncontrolled  Continue Lantus to 45 units daily  Continue  Humalog 5 units during breakfast, 10 units during lunch (due to large lunch meal), and Humalog 5 units at dinnertime  Advised importance of not skipping meals and insulin for better blood glucose control  Continue diet and exercise modification     Hypertensive urgency  We highly recommended the patient present to the ED for BP treatment but he is adamantly refusing  BP decreased to 208/122 with clonidine 0.1 mg > additional clonidine 0.1 mg given BP down to 217/122  Repeatedly discussed with patient that despite 2 doses of clonidine his blood pressure remains severely elevated. Stressed again that he needs to go to the ER for further management but he is adamantly refusing and prefers to leave [address] due to work reasons. Scripts for amlodipine 10 mg and lisinopril 40 mg daily given today and advised the patient that he has to get his medications today and that he should not wait until Friday to start his medications.     Right vision changes possible 2/2 macular degeneration  Continue follow-up with retinal specialist     Hypothyroidism  Patient supposed to be on Synthroid 125 mcg daily; we will plan to refill this at his next follow-up given his financial constraints    Hyperlipidemia  Patient supposed to be on Lipitor 40 mg daily; we will plan to refill this at his next follow-up given his financial constraints     Healthcare maintenance  Will discuss after acute issues resolve      RTC:  Return in about 2 weeks (around 12/13/2022) for BP check, Blood sugar check. I have reviewed my findings and recommendations with Adam Lubin and Dr. Bela hampton.       Annie Mg MD   11/29/2022 4:24 PM

## 2022-11-29 ENCOUNTER — OFFICE VISIT (OUTPATIENT)
Dept: INTERNAL MEDICINE | Age: 32
End: 2022-11-29

## 2022-11-29 ENCOUNTER — CARE COORDINATION (OUTPATIENT)
Dept: INTERNAL MEDICINE | Age: 32
End: 2022-11-29

## 2022-11-29 VITALS
DIASTOLIC BLOOD PRESSURE: 122 MMHG | TEMPERATURE: 97.2 F | SYSTOLIC BLOOD PRESSURE: 217 MMHG | RESPIRATION RATE: 18 BRPM | HEART RATE: 104 BPM | HEIGHT: 69 IN | BODY MASS INDEX: 36.14 KG/M2 | WEIGHT: 244 LBS | OXYGEN SATURATION: 99 %

## 2022-11-29 DIAGNOSIS — I10 ESSENTIAL HYPERTENSION: ICD-10-CM

## 2022-11-29 PROCEDURE — 3074F SYST BP LT 130 MM HG: CPT | Performed by: INTERNAL MEDICINE

## 2022-11-29 PROCEDURE — 99214 OFFICE O/P EST MOD 30 MIN: CPT | Performed by: INTERNAL MEDICINE

## 2022-11-29 PROCEDURE — 3078F DIAST BP <80 MM HG: CPT | Performed by: INTERNAL MEDICINE

## 2022-11-29 RX ORDER — AMLODIPINE BESYLATE 10 MG/1
10 TABLET ORAL DAILY
Qty: 90 TABLET | Refills: 0 | Status: SHIPPED | OUTPATIENT
Start: 2022-11-29

## 2022-11-29 RX ORDER — LISINOPRIL 40 MG/1
40 TABLET ORAL DAILY
Qty: 90 TABLET | Refills: 0 | Status: SHIPPED | OUTPATIENT
Start: 2022-11-29

## 2022-11-29 RX ORDER — CLONIDINE HYDROCHLORIDE 0.1 MG/1
0.1 TABLET ORAL ONCE
Status: COMPLETED | OUTPATIENT
Start: 2022-11-29 | End: 2022-11-29

## 2022-11-29 RX ADMIN — CLONIDINE HYDROCHLORIDE 0.1 MG: 0.1 TABLET ORAL at 16:03

## 2022-11-29 RX ADMIN — CLONIDINE HYDROCHLORIDE 0.1 MG: 0.1 TABLET ORAL at 15:47

## 2022-11-29 ASSESSMENT — ENCOUNTER SYMPTOMS: SORE THROAT: 1

## 2022-11-29 NOTE — CARE COORDINATION
Lab Results   Component Value Date    LABA1C 11.9 09/16/2022    LABA1C 11.5 12/01/2021    LABA1C 12.8 05/27/2021       Met with Ivelisse Huynh, he had received samples for insulin. Called PAP Alejandro Bernheim states that he did send in his paperwork and he has samples here for his insulin that he will receive today. We reviewed the importance of taking his medications and insulin as ordered he is a type 1 diabetic. Reviewed his A1c with him and what it mean for his average blood sugar, and what uncontrolled blood sugars can do overtime in the body. Went over diabetic portion control and carbohydrates. He verbalized understanding of the importance of taking his medications as ordered and carb control, checking his blood sugars, reviewed BS and A1c Targets.

## 2022-11-29 NOTE — PROGRESS NOTES
[facility]    Attending Physician Statement  I have discussed the case, including pertinent history and exam findings with the resident physician. I agree with the assessment, plan and orders as documented by the resident. I have reviewed all pertinent PMHx, PSHx, FamHx, SocialHx, medications, and allergies and updated history as appropriate. Patient presents for routine follow up of medical problems. Pt presented for the follow up  Pt has not been taken any medications including BP med and insulin for 6 weeks, BP today is 230/130 x 3, also A1C has not been controlled due to erratic eating that causes BG uncontrolled including hypoglycemic episodes. Recommended to be seen at ED and patient refused, explained pt may develop hypertensive emergency with this level of uncontrolled BP. Pt expressed he fully understood the risk that may cause cardiac or neuro complication but insisted he would not be seen in ED. Pt must fill the BP med today, and pt agreed. Follow up BP 1-2 weeks in [facility]. Pt also must resume synthroid and PAP for insulin given to patient in this visit. One dose of clonidine given to patient in this visit. 3: 35 PM. Repeat /121  Second dose of clonidine 0.1 mg given to patient 4:05 PM. Repeat /122. Again recommended ED visit, and patient declined and left the clinic. Total time spent including BP intervention and face to face encounter 50 minutes in this visit. Remainder of medical problems as per resident note.     Dante He MD  11/29/2022 3:32 PM

## 2022-11-29 NOTE — PROGRESS NOTES
Patient given 4 pens of PAP humalog and 8 pens of basaglar PAP, will return in 2 weeks can give him the others at that time.

## 2022-11-29 NOTE — PATIENT INSTRUCTIONS
Please take insulin as prescribed and have structured meals to allow for strict compliance to insulin regimen especially with Premeal insulin. Advised patient that he cannot rely on sliding scale as this is not enough to control his diabetes. Lantus 43 units daily  Humalog 5 units with breakfast, 10 units with lunch, 5 units with dinner  Please comply religiously to your blood pressure medications (amlodipine and lisinopril daily). Severely uncontrolled blood pressure such as today puts you at a great risk for MI and stroke. We strongly advise that you be evaluated at the ED as soon as possible.

## 2022-12-15 NOTE — PROGRESS NOTES
[facility] Internal Medicine      SUBJECTIVE:  Pete Maldonado (:  1990) is a 27 y.o. male here for evaluation of the following chief complaint(s):  Follow-up, Diabetes, Hoarse (Patient states hoarseness started 3-4 days ago. Pt states he does have a dry cough.  ), and Hypertension    Mr. Teodoro Murphy is a 27year old male with PMHx of HTN, JOSÉ MIGUEL, DM type 1 with nephropathy, Hypothyroidism, HLD, Obesity     She was last evaluated in the clinic on 2022 At the time, his DM type 1 was uncontroolled. He is non-compliant with his medications, and his BG is very labile. He is on Lantus, 43 units daily and lisprol 5 units breakfast, 10 units lunch and 5 units with dinner. He brought his logs today. He is still labile, ranging from 280-300s in the AM and then he will take his Lantus 43 units and then premeals and he drops to 50-70. Lunch time sugars are 180-200 and then drops again to 80s before dinner. BP uncontrolled on last visit. She is on amlodipine 10 mg and lisinopril 40, also non-compliant. He was asked to resume taking his BP pills on his last visit. He was advised to go to the ED but was admaantly refusing, he was givien clonidine 0.1 mg x2 and BP was 217/122 at the time, was advised ED but preferred to leave AMA. He was restarted on Amlodipine and Lisinopril. He was previously on HCTZ 12.5 mg. He has not had his BMP done. BP today 198/107 -> 187/107, asymptomatic     Review of Systems   Constitutional:  Negative for activity change. HENT:  Negative for congestion. Respiratory:  Negative for cough, chest tightness and shortness of breath. Cardiovascular:  Negative for chest pain, palpitations and leg swelling. Gastrointestinal:  Negative for abdominal pain. Genitourinary:  Negative for difficulty urinating. Musculoskeletal:  Negative for arthralgias. Neurological:  Negative for light-headedness and headaches.    Psychiatric/Behavioral:  Negative for dysphoric mood. Current Outpatient Medications on File Prior to Visit   Medication Sig Dispense Refill    lisinopril (PRINIVIL;ZESTRIL) 40 MG tablet Take 1 tablet by mouth daily 90 tablet 0    amLODIPine (NORVASC) 10 MG tablet Take 1 tablet by mouth daily 90 tablet 0    Insulin Pen Needle ([employer] PEN NEEDLES 31G) 31G X 8 MM MISC 1 each by Does not apply route daily 100 each 11    levothyroxine (SYNTHROID) 125 MCG tablet Take 1 tablet by mouth Daily 90 tablet 0    blood glucose test strips (ASCENSIA AUTODISC VI;ONE TOUCH ULTRA TEST VI) strip Test 3 times/day 100 strip 5    Lancets MISC Test 3 times/day 100 each 2    Insulin Syringe-Needle U-100 25G X 1\" 1 ML MISC Test 3 times/day 100 each 2    atorvastatin (LIPITOR) 40 MG tablet Take 1 tablet by mouth daily (Patient not taking: No sig reported) 90 tablet 0     No current facility-administered medications on file prior to visit. OBJECTIVE:    VS:   Vitals:    12/16/22 1437 12/16/22 1457   BP: (!) 198/107 (!) 187/107   Site: Right Upper Arm Right Upper Arm   Position: Sitting Sitting   Cuff Size: Large Adult Large Adult   Pulse: 98    Resp: 18    Temp: 98.4 °F (36.9 °C)    TempSrc: Temporal    SpO2: 95%    Weight: 253 lb 6.4 oz (114.9 kg)    Height: 5' 9\" (1.753 m)      Physical Exam  HENT:      Head: Normocephalic and atraumatic. Nose: Nose normal.   Eyes:      Pupils: Pupils are equal, round, and reactive to light. Cardiovascular:      Rate and Rhythm: Normal rate. Pulmonary:      Effort: Pulmonary effort is normal.   Abdominal:      General: Abdomen is flat. Musculoskeletal:         General: Normal range of motion. Cervical back: Normal range of motion. Skin:     General: Skin is warm. Capillary Refill: Capillary refill takes less than 2 seconds. Neurological:      General: No focal deficit present. Mental Status: He is alert and oriented to person, place, and time. Cranial Nerves: No cranial nerve deficit.       Sensory: No sensory deficit. Motor: No weakness. Psychiatric:         Mood and Affect: Mood normal.          ASSESSMENT/PLAN:  DM type 1   - His sugars are very labile   - Hba1c is 11.1% today, he just started to become compliant again in the last 2 weeks   - Lantus 43 units at night instead of in the morning, 5 units of Lispro before meals  - Endo Referral placed   - Asked patient to continue blood sugar checks   - BMP, Lipid panel, Microalbumin reprinted and given to patient   - Would like to see patient in 2 weeks but due to financial and scheduling issues he requests to do it 4 weeks from now     HTN   - Lisinopril 40 mg daily, Amlodipine 10 mg daily, Restart HCTZ today to 12.5 mg daily   - BMP printed for patient   - BP log given. RTC:  Return in about 4 weeks (around 1/13/2023) for PCP follow-up sugar and hypertension. Time spent:  15 Minutes  I have reviewed my findings and recommendations with Humble Carbajal and Dr. Tariq Mora.     Fadia Patterson MD   12/16/2022 4:00 PM

## 2022-12-16 ENCOUNTER — OFFICE VISIT (OUTPATIENT)
Dept: INTERNAL MEDICINE | Age: 32
End: 2022-12-16

## 2022-12-16 VITALS
HEART RATE: 98 BPM | BODY MASS INDEX: 37.53 KG/M2 | DIASTOLIC BLOOD PRESSURE: 107 MMHG | OXYGEN SATURATION: 95 % | SYSTOLIC BLOOD PRESSURE: 187 MMHG | RESPIRATION RATE: 18 BRPM | TEMPERATURE: 98.4 F | WEIGHT: 253.4 LBS | HEIGHT: 69 IN

## 2022-12-16 DIAGNOSIS — E10.9 TYPE 1 DIABETES MELLITUS WITHOUT COMPLICATION (HCC): ICD-10-CM

## 2022-12-16 DIAGNOSIS — E10.319 TYPE 1 DIABETES MELLITUS WITH RETINOPATHY, MACULAR EDEMA PRESENCE UNSPECIFIED, UNSPECIFIED LATERALITY, UNSPECIFIED RETINOPATHY SEVERITY (HCC): Primary | ICD-10-CM

## 2022-12-16 DIAGNOSIS — I10 PRIMARY HYPERTENSION: ICD-10-CM

## 2022-12-16 LAB — HBA1C MFR BLD: 11.1 %

## 2022-12-16 PROCEDURE — 3046F HEMOGLOBIN A1C LEVEL >9.0%: CPT | Performed by: INTERNAL MEDICINE

## 2022-12-16 PROCEDURE — 3074F SYST BP LT 130 MM HG: CPT | Performed by: INTERNAL MEDICINE

## 2022-12-16 PROCEDURE — 3078F DIAST BP <80 MM HG: CPT | Performed by: INTERNAL MEDICINE

## 2022-12-16 PROCEDURE — 83036 HEMOGLOBIN GLYCOSYLATED A1C: CPT | Performed by: INTERNAL MEDICINE

## 2022-12-16 PROCEDURE — 99213 OFFICE O/P EST LOW 20 MIN: CPT | Performed by: INTERNAL MEDICINE

## 2022-12-16 PROCEDURE — 99214 OFFICE O/P EST MOD 30 MIN: CPT | Performed by: INTERNAL MEDICINE

## 2022-12-16 RX ORDER — INSULIN GLARGINE 100 [IU]/ML
43 INJECTION, SOLUTION SUBCUTANEOUS NIGHTLY
Qty: 6 ADJUSTABLE DOSE PRE-FILLED PEN SYRINGE | Refills: 0 | Status: SHIPPED | OUTPATIENT
Start: 2022-12-16 | End: 2023-06-14

## 2022-12-16 RX ORDER — HYDROCHLOROTHIAZIDE 12.5 MG/1
12.5 TABLET ORAL DAILY
Qty: 30 TABLET | Refills: 3 | Status: SHIPPED | OUTPATIENT
Start: 2022-12-16

## 2022-12-16 RX ORDER — INSULIN LISPRO 100 [IU]/ML
INJECTION, SOLUTION INTRAVENOUS; SUBCUTANEOUS
Qty: 3 ADJUSTABLE DOSE PRE-FILLED PEN SYRINGE | Refills: 0 | Status: SHIPPED | OUTPATIENT
Start: 2022-12-16

## 2022-12-16 ASSESSMENT — ENCOUNTER SYMPTOMS
SHORTNESS OF BREATH: 0
CHEST TIGHTNESS: 0
ABDOMINAL PAIN: 0
COUGH: 0

## 2022-12-16 NOTE — PROGRESS NOTES
[facility]  [facility]    Attending Physician Statement  I have discussed the case, including pertinent history and exam findings with the resident physician. I agree with the assessment, plan and orders as documented by the resident. I have reviewed all pertinent PMHx, PSHx, FamHx, SocialHx, medications, and allergies and updated history as appropriate. Patient here for routine follow up of medical problems. IDDM2  -severely uncontrolled; A1c 11.1 today; noncompliant with medications  -Lantus 43 U, Lispro 5/10/5 with meals;   -labile BG with multiple episodes of hypoglycemia 2 hours post prandial with symptoms  -plan to have patient take Lantus 43 U at night and then decrease to Lispro 3 U with each meal 2/2 high insulin sensitivity   -refer to blood glucose log     HTN  -uncontrolled on amlodipine 10 mg and lisinopril 40 mg daily   -plan to start taking HCTZ and will likely need further evaluation for resistant HTN     Remainder of medical problems as per resident note.     [address] DO  12/16/2022 3:30 PM    Encounter time including independent chart review, discussion with patient, interpreting test results and/or external communications: 30'

## 2022-12-16 NOTE — PATIENT INSTRUCTIONS
Please take 43 of Lantus AT NIGHT  Decrease Lispro to 5 units thrice/day   Please have lab work done   Continue to log your sugars

## 2023-01-15 ASSESSMENT — ENCOUNTER SYMPTOMS
SHORTNESS OF BREATH: 0
ABDOMINAL PAIN: 0
COUGH: 0
CHEST TIGHTNESS: 0

## 2023-01-16 ENCOUNTER — OFFICE VISIT (OUTPATIENT)
Dept: INTERNAL MEDICINE | Age: 33
End: 2023-01-16

## 2023-01-16 VITALS
RESPIRATION RATE: 18 BRPM | WEIGHT: 255 LBS | SYSTOLIC BLOOD PRESSURE: 136 MMHG | TEMPERATURE: 97 F | DIASTOLIC BLOOD PRESSURE: 87 MMHG | HEART RATE: 100 BPM | HEIGHT: 69 IN | OXYGEN SATURATION: 97 % | BODY MASS INDEX: 37.77 KG/M2

## 2023-01-16 DIAGNOSIS — E03.9 ACQUIRED HYPOTHYROIDISM: ICD-10-CM

## 2023-01-16 DIAGNOSIS — E10.9 TYPE 1 DIABETES MELLITUS WITHOUT COMPLICATION (HCC): ICD-10-CM

## 2023-01-16 DIAGNOSIS — I10 ESSENTIAL HYPERTENSION: ICD-10-CM

## 2023-01-16 DIAGNOSIS — I10 PRIMARY HYPERTENSION: ICD-10-CM

## 2023-01-16 PROCEDURE — 99212 OFFICE O/P EST SF 10 MIN: CPT | Performed by: INTERNAL MEDICINE

## 2023-01-16 RX ORDER — INSULIN LISPRO 100 [IU]/ML
INJECTION, SOLUTION INTRAVENOUS; SUBCUTANEOUS
Qty: 3 ADJUSTABLE DOSE PRE-FILLED PEN SYRINGE | Refills: 0 | Status: SHIPPED | OUTPATIENT
Start: 2023-01-16

## 2023-01-16 RX ORDER — HYDROCHLOROTHIAZIDE 12.5 MG/1
12.5 TABLET ORAL DAILY
Qty: 30 TABLET | Refills: 3 | Status: SHIPPED | OUTPATIENT
Start: 2023-01-16

## 2023-01-16 RX ORDER — LEVOTHYROXINE SODIUM 0.12 MG/1
125 TABLET ORAL DAILY
Qty: 90 TABLET | Refills: 0 | Status: SHIPPED | OUTPATIENT
Start: 2023-01-16 | End: 2023-04-16

## 2023-01-16 RX ORDER — INSULIN GLARGINE 100 [IU]/ML
35 INJECTION, SOLUTION SUBCUTANEOUS NIGHTLY
Qty: 6 ADJUSTABLE DOSE PRE-FILLED PEN SYRINGE | Refills: 0 | Status: SHIPPED | OUTPATIENT
Start: 2023-01-16 | End: 2023-07-15

## 2023-01-16 RX ORDER — ATORVASTATIN CALCIUM 40 MG/1
40 TABLET, FILM COATED ORAL DAILY
Qty: 90 TABLET | Refills: 0 | Status: SHIPPED | OUTPATIENT
Start: 2023-01-16

## 2023-01-16 RX ORDER — AMLODIPINE BESYLATE 10 MG/1
10 TABLET ORAL DAILY
Qty: 90 TABLET | Refills: 0 | Status: SHIPPED | OUTPATIENT
Start: 2023-01-16

## 2023-01-16 RX ORDER — LISINOPRIL 40 MG/1
40 TABLET ORAL DAILY
Qty: 90 TABLET | Refills: 0 | Status: SHIPPED | OUTPATIENT
Start: 2023-01-16

## 2023-01-16 ASSESSMENT — PATIENT HEALTH QUESTIONNAIRE - PHQ9
SUM OF ALL RESPONSES TO PHQ QUESTIONS 1-9: 0
1. LITTLE INTEREST OR PLEASURE IN DOING THINGS: 0
SUM OF ALL RESPONSES TO PHQ9 QUESTIONS 1 & 2: 0
SUM OF ALL RESPONSES TO PHQ QUESTIONS 1-9: 0
SUM OF ALL RESPONSES TO PHQ QUESTIONS 1-9: 0
2. FEELING DOWN, DEPRESSED OR HOPELESS: 0
SUM OF ALL RESPONSES TO PHQ QUESTIONS 1-9: 0

## 2023-01-16 NOTE — PROGRESS NOTES
[facility]  [facility]  Attending Physician Statement:  Forrest Velazuqez M.D., F.A.C.P. I have discussed the case, including pertinent history and exam findings with the resident. I agree with the assessment, plan and orders as documented by the resident. Patient is seen for fu visit today. -- acute and chronic problems addressed  Remainder of medical problems as per resident note. Last office notes reviewed, relative labs and imaging.   Health maintenance issues of vaccinations, depression screening, tobacco cessation   Billiing assessed by time spent with review of medical records, time spent coordinating care with residents, nurses and patient  +medical complexity of case    Compllaince issues, self pay issues  PAP meds  Wasn't taking insulin regularly  Seems to be more complaint with taking lantus 43- AT NIGHTTIME  5 +10+5 +SS short acting    Plan to dec long acting  [address] short acting with meals 10 +8 +8-     /87 (Site: Right Upper Arm, Position: Sitting, Cuff Size: Large Adult)   Pulse 100   Temp 97 °F (36.1 °C) (Temporal)   Resp 18   Ht 5' 9\" (1.753 m)   Wt 255 lb (115.7 kg)   SpO2 97%   BMI 37.66 kg/m²   BP adjusting   Restarting amlodpine and hctz

## 2023-01-16 NOTE — PROGRESS NOTES
[facility] Internal Medicine      SUBJECTIVE:  Shubham Hernandez (:  1990) is a 27 y.o. male here for evaluation of the following chief complaint(s):  Diabetes and Follow-up (Pt given Bs log )    Mr. Quynh Gary is a 27year old male with PMHx of HTN, JOSÉ MIGUEL, DM type 1 with nephropathy, Hypothyroidism, HLD, Obesity     She was last evaluated in the clinic on 2022 At the time, his DM type 1 was uncontroolled. He is non-compliant with his medications, and his BG is very labile. He is on Lantus, 43 units daily and lisprol 5 units breakfast, 10 units lunch and 5 units with dinner. We changed his regimen to Lantus 43 at night instead of morning and continue lispro 5 units - 10 at lunch and 5 units at dinner. His compliance has improved. Still continues to have hypoglycemic episodes, says around 8 episodes, remains symptomatic during these episodes. BP at home, HCTZ was restarted during his last visit in addition to lisnopril 40 mg daily, amlodipine. BP improved today 136/87     Review of Systems   Constitutional:  Negative for activity change. HENT:  Negative for congestion. Respiratory:  Negative for cough, chest tightness and shortness of breath. Cardiovascular:  Negative for chest pain, palpitations and leg swelling. Gastrointestinal:  Negative for abdominal pain. Genitourinary:  Negative for difficulty urinating. Musculoskeletal:  Negative for arthralgias. Neurological:  Negative for light-headedness and headaches. Psychiatric/Behavioral:  Negative for dysphoric mood.       Current Outpatient Medications on File Prior to Visit   Medication Sig Dispense Refill    insulin glargine (BASAGLAR KWIKPEN) 100 UNIT/ML injection pen Inject 43 Units into the skin nightly LOT # [id number], EXP 10/2020 6 Adjustable Dose Pre-filled Pen Syringe 0    insulin lispro, 1 Unit Dial, (HUMALOG KWIKPEN) 100 UNIT/ML SOPN 3 units 3x/day 3 Adjustable Dose Pre-filled Pen Syringe 0 hydroCHLOROthiazide (HYDRODIURIL) 12.5 MG tablet Take 1 tablet by mouth daily 30 tablet 3    lisinopril (PRINIVIL;ZESTRIL) 40 MG tablet Take 1 tablet by mouth daily 90 tablet 0    amLODIPine (NORVASC) 10 MG tablet Take 1 tablet by mouth daily 90 tablet 0    Insulin Pen Needle ([employer] PEN NEEDLES 31G) 31G X 8 MM MISC 1 each by Does not apply route daily 100 each 11    levothyroxine (SYNTHROID) 125 MCG tablet Take 1 tablet by mouth Daily 90 tablet 0    blood glucose test strips (ASCENSIA AUTODISC VI;ONE TOUCH ULTRA TEST VI) strip Test 3 times/day 100 strip 5    Lancets MISC Test 3 times/day 100 each 2    Insulin Syringe-Needle U-100 25G X 1\" 1 ML MISC Test 3 times/day 100 each 2    atorvastatin (LIPITOR) 40 MG tablet Take 1 tablet by mouth daily (Patient not taking: No sig reported) 90 tablet 0     No current facility-administered medications on file prior to visit. OBJECTIVE:    VS:   Vitals:    01/16/23 1446   BP: 136/87   Site: Right Upper Arm   Position: Sitting   Cuff Size: Large Adult   Pulse: 100   Resp: 18   Temp: 97 °F (36.1 °C)   TempSrc: Temporal   SpO2: 97%   Weight: 255 lb (115.7 kg)   Height: 5' 9\" (1.753 m)     Physical Exam  HENT:      Head: Normocephalic and atraumatic. Nose: Nose normal.   Eyes:      Pupils: Pupils are equal, round, and reactive to light. Cardiovascular:      Rate and Rhythm: Normal rate. Pulmonary:      Effort: Pulmonary effort is normal.   Abdominal:      General: Abdomen is flat. Musculoskeletal:         General: Normal range of motion. Cervical back: Normal range of motion. Skin:     General: Skin is warm. Capillary Refill: Capillary refill takes less than 2 seconds. Neurological:      General: No focal deficit present. Mental Status: He is alert and oriented to person, place, and time. Cranial Nerves: No cranial nerve deficit. Sensory: No sensory deficit. Motor: No weakness.    Psychiatric:         Mood and Affect: Mood normal. ASSESSMENT/PLAN:  DM type 1   - His sugars are very labile   - Hba1c is 11.1% 12/19  - Lantus 43 units at night instead of in the morning, 5 units of Lispro before meals  - Log scanned to chart, he continues to have hypoglycemic episodes and his premeal sugars remain high. He is requiring SS each time on the current regimen   - Will decrease Lantus to 35 units nightly and increase short acting insulin to 10 units in the morning, 8 units at lunch and 8 units at night. - Asked patient to continue blood sugar checks   - BMP, Lipid panel, Microalbumin reordered on this visit   - He has an eye doctor he sees regularly for his macular degeneration   - Lipitor 40 mg reordered. - He has some financial issues and is currently under the PAP program   - Discussed with patient need for follow-up in 2 weeks but due to financial constraints he is requesting 3-4 week follow-up instead. Did discuss benefits of closer monitoring with the patient and he understands. - Also discussed endocrinology referral however again due to financial constraints he defers the referral at this time. HTN   - Lisinopril 40 mg daily, Amlodipine 10 mg daily, HCTZ 12.5 mg daily   - BMP printed for patient   - BP log given. Hypothyroidism   - TSH : 13.420, T4 0.77 5/27/2021   - currently on 125 mcg of Synthroid   - Recheck TSH and T4     RTC:  Return in about 3 weeks (around 2/6/2023) for Blood sugar check, regular follow-up .       Time spent:  15 Minutes  I have reviewed my findings and recommendations with Sudhir Neri and Kilo Edmonds MD   1/16/2023 3:29 PM

## 2023-01-16 NOTE — PROGRESS NOTES
Pt given 11 pens both of humalog and basaglar PAP meds today, also given 90 day supply of synthroid via PAP.

## 2023-01-16 NOTE — PATIENT INSTRUCTIONS
Adjust Lantus to 35 units at night, then Lispro 10 units breakfast, 8 units lunch and 8 units at night with sliding scale  We have sent prescription for lipitor 40 mg daily   Please have TSH, T4, BMP lipid panel and microalbumin done.

## 2023-02-06 ENCOUNTER — TELEPHONE (OUTPATIENT)
Dept: INTERNAL MEDICINE | Age: 33
End: 2023-02-06

## 2023-02-06 ENCOUNTER — OFFICE VISIT (OUTPATIENT)
Dept: INTERNAL MEDICINE | Age: 33
End: 2023-02-06

## 2023-02-06 VITALS
HEIGHT: 69 IN | BODY MASS INDEX: 37.77 KG/M2 | WEIGHT: 255 LBS | TEMPERATURE: 97.6 F | HEART RATE: 82 BPM | OXYGEN SATURATION: 99 % | RESPIRATION RATE: 18 BRPM | SYSTOLIC BLOOD PRESSURE: 179 MMHG | DIASTOLIC BLOOD PRESSURE: 101 MMHG

## 2023-02-06 DIAGNOSIS — E03.9 ACQUIRED HYPOTHYROIDISM: ICD-10-CM

## 2023-02-06 DIAGNOSIS — E10.9 TYPE 1 DIABETES MELLITUS WITHOUT COMPLICATION (HCC): Primary | ICD-10-CM

## 2023-02-06 DIAGNOSIS — I10 ESSENTIAL HYPERTENSION: ICD-10-CM

## 2023-02-06 DIAGNOSIS — I10 PRIMARY HYPERTENSION: ICD-10-CM

## 2023-02-06 PROCEDURE — 3074F SYST BP LT 130 MM HG: CPT | Performed by: INTERNAL MEDICINE

## 2023-02-06 PROCEDURE — 99212 OFFICE O/P EST SF 10 MIN: CPT | Performed by: INTERNAL MEDICINE

## 2023-02-06 PROCEDURE — 3078F DIAST BP <80 MM HG: CPT | Performed by: INTERNAL MEDICINE

## 2023-02-06 PROCEDURE — 99213 OFFICE O/P EST LOW 20 MIN: CPT | Performed by: INTERNAL MEDICINE

## 2023-02-06 RX ORDER — LISINOPRIL 40 MG/1
40 TABLET ORAL DAILY
Qty: 90 TABLET | Refills: 0 | Status: SHIPPED | OUTPATIENT
Start: 2023-02-06

## 2023-02-06 RX ORDER — AMLODIPINE BESYLATE 10 MG/1
10 TABLET ORAL DAILY
Qty: 90 TABLET | Refills: 0 | Status: SHIPPED | OUTPATIENT
Start: 2023-02-06

## 2023-02-06 RX ORDER — LEVOTHYROXINE SODIUM 0.12 MG/1
125 TABLET ORAL DAILY
Qty: 90 TABLET | Refills: 0 | Status: CANCELLED | OUTPATIENT
Start: 2023-02-06 | End: 2023-05-07

## 2023-02-06 RX ORDER — HYDROCHLOROTHIAZIDE 12.5 MG/1
12.5 TABLET ORAL DAILY
Qty: 90 TABLET | Refills: 0 | Status: SHIPPED | OUTPATIENT
Start: 2023-02-06

## 2023-02-06 RX ORDER — INSULIN LISPRO 100 [IU]/ML
INJECTION, SOLUTION INTRAVENOUS; SUBCUTANEOUS
Qty: 3 ADJUSTABLE DOSE PRE-FILLED PEN SYRINGE | Refills: 0 | Status: SHIPPED | OUTPATIENT
Start: 2023-02-06

## 2023-02-06 SDOH — ECONOMIC STABILITY: HOUSING INSECURITY: IN THE LAST 12 MONTHS, HOW MANY PLACES HAVE YOU LIVED?: 1

## 2023-02-06 SDOH — ECONOMIC STABILITY: FOOD INSECURITY: WITHIN THE PAST 12 MONTHS, THE FOOD YOU BOUGHT JUST DIDN'T LAST AND YOU DIDN'T HAVE MONEY TO GET MORE.: NEVER TRUE

## 2023-02-06 SDOH — ECONOMIC STABILITY: FOOD INSECURITY: WITHIN THE PAST 12 MONTHS, YOU WORRIED THAT YOUR FOOD WOULD RUN OUT BEFORE YOU GOT MONEY TO BUY MORE.: NEVER TRUE

## 2023-02-06 SDOH — ECONOMIC STABILITY: INCOME INSECURITY: IN THE LAST 12 MONTHS, WAS THERE A TIME WHEN YOU WERE NOT ABLE TO PAY THE MORTGAGE OR RENT ON TIME?: NO

## 2023-02-06 ASSESSMENT — ENCOUNTER SYMPTOMS
NAUSEA: 0
DIARRHEA: 0
SHORTNESS OF BREATH: 0
CHEST TIGHTNESS: 0
COUGH: 0
SORE THROAT: 1
ABDOMINAL PAIN: 0
CONSTIPATION: 0

## 2023-02-06 ASSESSMENT — PATIENT HEALTH QUESTIONNAIRE - PHQ9
1. LITTLE INTEREST OR PLEASURE IN DOING THINGS: 0
SUM OF ALL RESPONSES TO PHQ QUESTIONS 1-9: 0
SUM OF ALL RESPONSES TO PHQ QUESTIONS 1-9: 0
SUM OF ALL RESPONSES TO PHQ9 QUESTIONS 1 & 2: 0
SUM OF ALL RESPONSES TO PHQ QUESTIONS 1-9: 0
SUM OF ALL RESPONSES TO PHQ QUESTIONS 1-9: 0
2. FEELING DOWN, DEPRESSED OR HOPELESS: 0

## 2023-02-06 ASSESSMENT — SOCIAL DETERMINANTS OF HEALTH (SDOH): HOW HARD IS IT FOR YOU TO PAY FOR THE VERY BASICS LIKE FOOD, HOUSING, MEDICAL CARE, AND HEATING?: NOT HARD AT ALL

## 2023-02-06 ASSESSMENT — LIFESTYLE VARIABLES
HOW MANY STANDARD DRINKS CONTAINING ALCOHOL DO YOU HAVE ON A TYPICAL DAY: 1 OR 2
HOW OFTEN DO YOU HAVE A DRINK CONTAINING ALCOHOL: 2-3 TIMES A WEEK

## 2023-02-06 NOTE — PROGRESS NOTES
[facility] Internal Medicine      SUBJECTIVE:  Lakisha Leos (:  1990) is a 27 y.o. male here for evaluation of the following chief complaint(s):  Diabetes and Health Maintenance (Declines all vaccines)    Last seen on 2023    Type 1 diabetes mellitus, uncontrolled  HbA1c 11.5 2021 > 11.9 on 2022 > 11.1 on 2022  Patient is extremely noncompliant with his insulin regimen and attributes that to his job as a . He states he works 2 jobs and it is very difficult for him to have 3 scheduled meals daily. This causes him to miss his evening insulin every day of the week and misses his breakfast/lunch insulin at least 3-4 times a week. The only insulin he is able to take daily is his Lantus. BG log scanned in chart  Patient has had many hypoglycemic episodes after breakfast particularly after takes scheduled Humalog plus sliding scale as below  His regimen is supposed to be: On Lantus to 35 units nightly  On lispro 10 units with breakfast (only takes 5 units), 8 units with lunch (only takes 5 units), 8 units with dinner (does not eat dinner and skips this dose)  Sliding scale insulin:  < 150 - none  150-200 - 2 units  200-250 - 4 units  250-300 - 6 units  300-350 - 8 units  350-400 - 10 units  BMP, lipid panel, microalbumin/creatinine ratio ordered previously not done     Hypertension, uncontrolled  BP today 179/101   On amlodipine 10 mg, HCTZ 12.5 mg daily, and lisinopril 40 mg daily; compliant    Right vision changes possible 2/2 macular degeneration  Every 8 weeks eye injections with retinal specialist     Hypothyroidism  TSH 13.42 on 2021  On Synthroid 125 mcg daily, noncompliant  Repeat TSH and FT4 ordered but not done      Review of Systems   Constitutional:  Negative for appetite change, chills and fever. HENT:  Positive for sore throat. Respiratory:  Negative for cough, chest tightness and shortness of breath. Cardiovascular:  Negative for chest pain, palpitations and leg swelling. Gastrointestinal:  Negative for abdominal pain, constipation, diarrhea and nausea. Genitourinary: Negative. Musculoskeletal: Negative. Neurological:  Negative for headaches. Current Outpatient Medications on File Prior to Visit   Medication Sig Dispense Refill    levothyroxine (SYNTHROID) 125 MCG tablet Take 1 tablet by mouth Daily 90 tablet 0    insulin glargine (BASAGLAR KWIKPEN) 100 UNIT/ML injection pen Inject 35 Units into the skin nightly LOT # [id number], EXP 10/2020 6 Adjustable Dose Pre-filled Pen Syringe 0    Insulin Pen Needle ([place] [employer] PEN NEEDLES 31G) 31G X 8 MM MISC 1 each by Does not apply route daily 100 each 11    blood glucose test strips (ASCENSIA AUTODISC VI;ONE TOUCH ULTRA TEST VI) strip Test 3 times/day 100 strip 5    Lancets MISC Test 3 times/day 100 each 2    atorvastatin (LIPITOR) 40 MG tablet Take 1 tablet by mouth daily (Patient not taking: Reported on 2/6/2023) 90 tablet 0    Insulin Syringe-Needle U-100 25G X 1\" 1 ML MISC Test 3 times/day (Patient not taking: Reported on 2/6/2023) 100 each 2     No current facility-administered medications on file prior to visit. OBJECTIVE:    VS:   Vitals:    02/06/23 1355   BP: (!) 179/101   Site: Left Upper Arm   Position: Sitting   Cuff Size: Medium Adult   Pulse: 82   Resp: 18   Temp: 97.6 °F (36.4 °C)   TempSrc: Temporal   SpO2: 99%   Weight: 255 lb (115.7 kg)   Height: 5' 9\" (1.753 m)     Physical Exam  Constitutional:       Appearance: Normal appearance. He is not toxic-appearing. HENT:      Head: Normocephalic and atraumatic. Mouth/Throat:      Mouth: Mucous membranes are moist.      Pharynx: Oropharynx is clear. No posterior oropharyngeal erythema. Eyes:      Pupils: Pupils are equal, round, and reactive to light. Cardiovascular:      Rate and Rhythm: Normal rate and regular rhythm. Pulses: Normal pulses.       Heart sounds: Normal heart sounds. Pulmonary:      Effort: Pulmonary effort is normal.      Breath sounds: Normal breath sounds. No wheezing or rhonchi. Abdominal:      General: Abdomen is flat. Bowel sounds are normal.      Palpations: Abdomen is soft. Tenderness: There is no abdominal tenderness. Musculoskeletal:      Cervical back: Neck supple. Skin:     General: Skin is warm and dry. Neurological:      General: No focal deficit present. Mental Status: He is alert and oriented to person, place, and time. Psychiatric:         Mood and Affect: Mood normal.         Behavior: Behavior normal.         Thought Content: Thought content normal.         Judgment: Judgment normal.          ASSESSMENT/PLAN:      Type 1 diabetes mellitus, uncontrolled  Continue Lantus to 35 units daily  Please take Humalog 8 units during breakfast and 8 units at lunch  Stop sliding scale altogether  Stop evening Humalog if patient always skips dinner  Advised importance of not skipping meals and insulin for better blood glucose control  Continue diet and exercise modification     Hypertensive urgency  Continue amlodipine, HCTZ, lisinopril     Hypothyroidism  Continue Synthroid      RTC:  Return in about 1 week (around 2/13/2023) for virtual visit for blood sugar check. I have reviewed my findings and recommendations with Laura Lawrence and Dr. Maria Del Carmen Valdez.       Timothy Beverly MD   2/6/2023 3:10 PM

## 2023-02-06 NOTE — PROGRESS NOTES
[facility]  Internal [facility] Residency [facility]    Attending Physician Statement  I have discussed the case, including pertinent history and exam findings with the resident physician. I saw the patient to discuss his DM control. I agree with the assessment, plan and orders as documented by the resident. I have reviewed all pertinent PMHx, PSHx, FamHx, SocialHx, medications, and allergies and updated history as appropriate. Patient presents for routine follow up of medical problems. Here to evaluate uncontrolled DM - patient continues to be non-adherent to his regimen of Lantus, 35 Units nightly as well as TID humalog (10/8/8). Patient is taking Lantus. Morning lispro is only being dosed twice daily at 5 Units. Very difficult DM management. Rayna Francis states that he works and he is somewhat consistent with 2 meals each day. His post-meal blood sugars are very low after breakfast. At this time, given the symptomatic hypoglycemia post-prandially, need to decrease mealtime humalog. Additionally, the patient needs to be consistent in having his meals. Continue Lantus 35 Units daily   Start 8 Units pre-meal for the 2 meals each day   Patient to call or communicate with us via [employer] his blood sugars. We can then adjust his insulin based on these values. If not able to make progress, may need to consider a referral to endocrinology for further treatment recommendations. Remainder of medical problems as per resident note.     Caity Garcia MD  2/6/2023 2:27 PM

## 2023-02-06 NOTE — PATIENT INSTRUCTIONS
Continue Lantus 35 units nightly  Please take Humalog 8 units before breakfast and lunch  Stop sliding scale insulin altogether  Avoid evening insulin dose as patient skips dinner  Please adhere to breakfast and lunch every day  Measure blood glucose and blood glucose log  Call clinic ASAP if you notice abnormalities and your sugar readings

## 2023-02-13 ENCOUNTER — SCHEDULED TELEPHONE ENCOUNTER (OUTPATIENT)
Dept: INTERNAL MEDICINE | Age: 33
End: 2023-02-13

## 2023-02-13 DIAGNOSIS — I10 ESSENTIAL HYPERTENSION: ICD-10-CM

## 2023-02-13 DIAGNOSIS — E10.319 TYPE 1 DIABETES MELLITUS WITH RETINOPATHY, MACULAR EDEMA PRESENCE UNSPECIFIED, UNSPECIFIED LATERALITY, UNSPECIFIED RETINOPATHY SEVERITY (HCC): Primary | ICD-10-CM

## 2023-02-13 PROCEDURE — 99423 OL DIG E/M SVC 21+ MIN: CPT | Performed by: STUDENT IN AN ORGANIZED HEALTH CARE EDUCATION/TRAINING PROGRAM

## 2023-02-13 ASSESSMENT — ENCOUNTER SYMPTOMS
SHORTNESS OF BREATH: 0
ABDOMINAL PAIN: 0
DIARRHEA: 0
COUGH: 0
BACK PAIN: 0
RHINORRHEA: 0
CONSTIPATION: 0
NAUSEA: 0
VOMITING: 0

## 2023-02-13 NOTE — PROGRESS NOTES
Pt requests all medications placed on scripts  so he can shop around      Special Virtual Visit done per Dr. Marisol Garcia  Patients questions were addressed and answered Printed AVS  was mailed to pt

## 2023-02-13 NOTE — PATIENT INSTRUCTIONS
[facility] Internal Medicine Resident Service    Activity as tolerated  Diet: diabetic  Be compliant with your medications and take them as prescribed. Special Instructions:   Please start taking Humalog 4 units before breakfast and Humalog 4 units before lunch. Please continue to take your Lantus 35 units at night. Please check your blood sugar before breakfast, 2 hours after breakfast and 2 hours after lunch. If you can, also check at bedtime. Please try to check your blood pressure at home and note if your blood pressure is above 140/90. Please have your blood work done. Please follow up in 1 week for diabetes management. Hospital Follow up: [id number] [address] with House Team   Call to confirm appointment Tel: [phone] ([address]  Internal Medicine Clinic)     Other Follow-Ups:    Future Appointments   Date Time Provider Gianna Das   2/22/2023  1:30 PM Paola Guerra DO [facility]       Other than any new prescriptions given to you today, the list of home going meds on this After Visit Summary are based on information provided to us from you. This information, including the list, dose, and frequency of medications is only as accurate as the information you provided. If you have any questions or concerns about your home medications, please contact your Primary Care Physician for further clarification.       Adolfo Martinez MD PGY-2  2/13/2023  4:03 PM

## 2023-02-13 NOTE — PROGRESS NOTES
[facility]  Internal Medicine Clinic    Attending Physician's Statement      TeleMedicine Patient Consent    This visit was performed as phone visit. Patient identification was verified at the start of the visit, including the patient's telephone number and physical location. I discussed with the patient the nature of our telehealth visits, that:     I would evaluate the patient and recommend diagnostics and treatments based on my assessment. If it is felt that the patient should be evaluated in the clinic or an emergency room setting, then they would be directed there. Our sessions are not being recorded and that personal health information is protected. Our team would provide follow up care in person if/when the patient needs it. Patient does agree to proceed with telemedicine consultation. Patient's location: [place]    I have discussed the case, including pertinent history with the medical resident. I agree with the assessment, plan and orders as documented by the resident. I have reviewed all the pertinent PMHx, PSHx, Family Hx, Social Hx, medications and allergies, and updated history as appropriate. Patient presents for telephone visit. Last visit notes - Lantus 35 nightly and premeal 8 units BID. He has improved compliance to basal insulin but unable to keep up with premeals during the weekends. Still inconsistent meal intake. Still has BS in 600s after eating, then develops hypolgycemia after giving sliding scale (note that he was advised to STOP sliding scale last visit). A1c been consistently at 11 - possible ongoing compliance issue. Continue to decrease premeal insulin, no sliding scale, keep basal insulin. Ff up next week. Previously referred to Endo but patient unable to go because of financial difficulties (has no insurance coverage).  Refer to Keesha Bundy for financial assistance regarding endo referral.     Pertinent lab results and imaging studies have been reviewed. Medical problems, assessment and plan per medical resident's note. Time spent: 20 - 30 mins      Marie Rodriguez. Melchor Lin MD  Internal Medicine Residency Faculty  2/13/2023      The patient is being evaluated by a telephone encounter to address concerns as mentioned above. A caregiver was present when appropriate. Due to this being a TeleHealth encounter (During [address] emergency), evaluation of the following organ systems was limited: Vitals/Constitutional/EENT/Resp/CV/GI//MS/Neuro/Skin/Heme-Lymph-Imm. Pursuant to the emergency declaration under the [address] [place] [address], [address] [place] [address] authority and the [employer] and [employer] Act, this Virtual Visit was conducted with patient's (and/or legal guardian's) consent, to reduce the patient's risk of exposure to COVID-19 and provide necessary medical care. The patient (and/or legal guardian) has also been advised to contact this office for worsening conditions or problems, and seek emergency medical treatment and/or call 911 if deemed necessary. Services were provided through a video synchronous discussion virtually to substitute for in-person clinic visit. Patient and provider were located at their individual homes.

## 2023-02-13 NOTE — PROGRESS NOTES
Buck Petty is a 26 y.o. male evaluated via telephone on 2/13/2023 for Diabetes (BS this a.m. over 600 states took Insulin according to his old SS states he was taken off of  ,then BS was 64 @/9:30 this a.m. )  . Assessment & Plan   1. Type 1 diabetes mellitus with retinopathy, macular edema presence unspecified, unspecified laterality, unspecified retinopathy severity (HCC)  A1C 11.9 on 9/16/2022   A1C 11.1 on 12/16/2022  Continue Lantus 35 u nightly  Decreased Humalog from 8 u BID to humalog 4 u BID before breakfast and lunch  No sliding scale  Instructed to call clinic if he has hyperglycemia in 600s so he can be guided as to how much humalog to take  Referred to endo for DM management  Referred to social work for assistance with copay with endo as he doesn't have insurance that will cover for [address] Referral to Social Work (Primary Care Only)  -     Dana Gómez MD, Endocrinology, [place]  2. Essential hypertension  BP uncontrolled  179/101 on last visit, only 1 reading of 139/87  Continue amlodipine 10 mg, HCTZ 12.5 mg   Instructed to have his blood work done    Return in about 1 week (around 2/20/2023) for labile IDDM2. Subjective   Prior to Visit Medications    Medication Sig Taking?  Authorizing Provider   hydroCHLOROthiazide (HYDRODIURIL) 12.5 MG tablet Take 1 tablet by mouth daily Yes Rosario King MD   lisinopril (PRINIVIL;ZESTRIL) 40 MG tablet Take 1 tablet by mouth daily Yes Rosario King MD   amLODIPine (NORVASC) 10 MG tablet Take 1 tablet by mouth daily Yes Rosario King MD   insulin lispro, 1 Unit Dial, (HUMALOG KWIKPEN) 100 UNIT/ML SOPN 8 units in the morning,  8 units at lunch, no evening dose and no sliding scale  Patient taking differently: Inject 4 Units into the skin 4 units in the morning,  4 units at lunch, no evening dose and no sliding scale Yes Rosario King MD   levothyroxine (SYNTHROID) 125 MCG tablet Take 1 tablet by mouth Daily Yes Bal Blackman MD   atorvastatin (LIPITOR) 40 MG tablet Take 1 tablet by mouth daily Yes Bal Blackman MD   insulin glargine [facility] 100 UNIT/ML injection pen Inject 35 Units into the skin nightly LOT # [id number], EXP 10/2020 Yes Bal Blackman MD   Insulin Pen Needle ([employer] PEN NEEDLES 31G) 31G X 8 MM MISC 1 each by Does not apply route daily Yes Yulia Acosta., DO   Lancets MISC Test 3 times/day Yes Yulia Acosta., DO   blood glucose test strips (ASCENSIA AUTODISC VI;ONE TOUCH ULTRA TEST VI) strip Test 3 times/day  Yulia Kimball DO     Patient had a telephone visit today for follow up for IDDM. During his last visit 2/6/23, insulin regimen was reviewed. He was to remain on lantus 35 u nightly. He was to take humalog 8 u before breakfast and lunch. He was not continued on sliding scale as he reported he was not doing it. Upon speaking with him today, he said his BG this morning was 600 and he had nausea. He gave himself a total of humalog 14 u. Upon rechecking his BG it was 64. He currently has no dizziness, confusion or shaking. He was instructed to check his BG again. He said his FBS ranges from 280-600. He checks his BG after breakfast and it usually drops to 50-60s. At lunch -300s. He doesn't check BG at night. He claims good compliance with lantus. He is compliant with breakfast and lunch humalog during the week. But on weekends he doesn't take humalog when he doesn't eat because he busy with work. Review of Systems   Constitutional:  Negative for chills and fever. HENT:  Negative for rhinorrhea. Respiratory:  Negative for cough and shortness of breath. Cardiovascular:  Negative for chest pain. Gastrointestinal:  Negative for abdominal pain, constipation, diarrhea, nausea and vomiting. Endocrine: Positive for polydipsia. Negative for polyphagia and polyuria.    Genitourinary: Negative for dysuria and frequency. Musculoskeletal:  Negative for back pain. Neurological:  Negative for dizziness, syncope, light-headedness, numbness and headaches. Objective   Patient-Reported Vitals  No data recorded       Total Time: minutes: 5-10 minutes     Ana Reynoso was evaluated through a synchronous (real-time) audio only encounter. Patient identification was verified at the start of the visit. He (or guardian if applicable) is aware that this is a billable service, which includes applicable co-pays. This visit was conducted with patient's (and/or legal guardian's) verbal consent. He has not had a related appointment within my department in the past 7 days or scheduled within the next 24 hours. The patient was located at Other: [place], [place] . The provider was located at [facility] (Appt Dept[facility],  [address] MD

## 2023-02-16 ENCOUNTER — TELEPHONE (OUTPATIENT)
Dept: INTERNAL MEDICINE | Age: 33
End: 2023-02-16

## 2023-02-16 NOTE — TELEPHONE ENCOUNTER
Consult from IM to discuss options regarding patient's financial concern regarding referral to endocrinology. Reviewed chart and note last [facility] assistance application was in [id number]. Call initiated to pt and voicemail not set up and unable to leave a message.   Texted pt to call LSW when available

## 2023-02-23 ENCOUNTER — TELEPHONE (OUTPATIENT)
Dept: INTERNAL MEDICINE | Age: 33
End: 2023-02-23

## 2023-02-23 NOTE — TELEPHONE ENCOUNTER
Pt contacted LSW on 2.22.23 stating he was cancelling 2.22 IM appt as has decided to make endocrinology appt; pt states he declined when contacted to schedule but now desires to complete. Phone number provided . Discussed financial concerns regarding medical care and reviewed need to complete applications with detailed documents which pt needs to have. Offered to complete with pt and accommodate his work schedule. Pt aware of Specialty copay when attends endo appt. Requested for pt to call LSW when appt secured.   Discussed with Dr Tarik Alejo

## 2023-03-22 ENCOUNTER — TELEPHONE (OUTPATIENT)
Dept: INTERNAL MEDICINE | Age: 33
End: 2023-03-22

## 2023-03-22 DIAGNOSIS — E10.9 TYPE 1 DIABETES MELLITUS WITHOUT COMPLICATION (HCC): ICD-10-CM

## 2023-03-22 RX ORDER — INSULIN LISPRO 100 [IU]/ML
4 INJECTION, SOLUTION INTRAVENOUS; SUBCUTANEOUS 2 TIMES DAILY WITH MEALS
Qty: 20 ADJUSTABLE DOSE PRE-FILLED PEN SYRINGE | Refills: 3 | Status: SHIPPED
Start: 2023-03-22

## 2023-03-22 NOTE — TELEPHONE ENCOUNTER
Reviewed Humalog order with Dr. Lavern Mccabe. Order to read Humalog 4 units sub q with Breakfast and Lunch.

## 2023-07-02 ENCOUNTER — HOSPITAL ENCOUNTER (EMERGENCY)
Age: 33
Discharge: HOME | End: 2023-07-02
Attending: EMERGENCY MEDICINE

## 2023-07-02 ENCOUNTER — APPOINTMENT (OUTPATIENT)
Dept: CT IMAGING | Age: 33
End: 2023-07-02
Attending: EMERGENCY MEDICINE

## 2023-07-02 ENCOUNTER — APPOINTMENT (OUTPATIENT)
Dept: GENERAL RADIOLOGY | Age: 33
End: 2023-07-02

## 2023-07-02 VITALS
HEART RATE: 98 BPM | OXYGEN SATURATION: 96 % | TEMPERATURE: 98.2 F | SYSTOLIC BLOOD PRESSURE: 144 MMHG | RESPIRATION RATE: 25 BRPM | BODY MASS INDEX: 38.4 KG/M2 | DIASTOLIC BLOOD PRESSURE: 92 MMHG | WEIGHT: 260 LBS

## 2023-07-02 DIAGNOSIS — I16.0 HYPERTENSIVE URGENCY: ICD-10-CM

## 2023-07-02 DIAGNOSIS — R07.9 CHEST PAIN, UNSPECIFIED TYPE: Primary | ICD-10-CM

## 2023-07-02 DIAGNOSIS — R73.9 HYPERGLYCEMIA: ICD-10-CM

## 2023-07-02 DIAGNOSIS — N17.9 AKI (ACUTE KIDNEY INJURY) (HCC): ICD-10-CM

## 2023-07-02 DIAGNOSIS — R10.10 PAIN OF UPPER ABDOMEN: ICD-10-CM

## 2023-07-02 LAB
ALBUMIN SERPL-MCNC: 3.9 G/DL (ref 3.5–5.2)
ALP SERPL-CCNC: 150 U/L (ref 40–129)
ALT SERPL-CCNC: 16 U/L (ref 0–40)
ANION GAP SERPL CALCULATED.3IONS-SCNC: 10 MMOL/L (ref 7–16)
AST SERPL-CCNC: 18 U/L (ref 0–39)
BASOPHILS # BLD: 0.1 E9/L (ref 0–0.2)
BASOPHILS NFR BLD: 0.6 % (ref 0–2)
BILIRUB SERPL-MCNC: 0.3 MG/DL (ref 0–1.2)
BNP BLD-MCNC: 399 PG/ML (ref 0–125)
BUN SERPL-MCNC: 35 MG/DL (ref 6–20)
CALCIUM SERPL-MCNC: 9.9 MG/DL (ref 8.6–10.2)
CHLORIDE SERPL-SCNC: 96 MMOL/L (ref 98–107)
CO2 SERPL-SCNC: 24 MMOL/L (ref 22–29)
CREAT SERPL-MCNC: 2.2 MG/DL (ref 0.7–1.2)
D DIMER: <200 NG/ML DDU
EOSINOPHIL # BLD: 0.16 E9/L (ref 0.05–0.5)
EOSINOPHIL NFR BLD: 0.9 % (ref 0–6)
ERYTHROCYTE [DISTWIDTH] IN BLOOD BY AUTOMATED COUNT: 11.8 FL (ref 11.5–15)
GLUCOSE SERPL-MCNC: 239 MG/DL (ref 74–99)
HCT VFR BLD AUTO: 38.8 % (ref 37–54)
HGB BLD-MCNC: 13 G/DL (ref 12.5–16.5)
IMM GRANULOCYTES # BLD: 0.11 E9/L
IMM GRANULOCYTES NFR BLD: 0.6 % (ref 0–5)
LIPASE: 36 U/L (ref 13–60)
LYMPHOCYTES # BLD: 1.42 E9/L (ref 1.5–4)
LYMPHOCYTES NFR BLD: 8.3 % (ref 20–42)
MAGNESIUM SERPL-MCNC: 2.1 MG/DL (ref 1.6–2.6)
MCH RBC QN AUTO: 31.9 PG (ref 26–35)
MCHC RBC AUTO-ENTMCNC: 33.5 % (ref 32–34.5)
MCV RBC AUTO: 95.1 FL (ref 80–99.9)
MONOCYTES # BLD: 1.07 E9/L (ref 0.1–0.95)
MONOCYTES NFR BLD: 6.3 % (ref 2–12)
NEUTROPHILS # BLD: 14.15 E9/L (ref 1.8–7.3)
NEUTS SEG NFR BLD: 83.3 % (ref 43–80)
PLATELET # BLD AUTO: 347 E9/L (ref 130–450)
PMV BLD AUTO: 9.7 FL (ref 7–12)
POTASSIUM SERPL-SCNC: 4.6 MMOL/L (ref 3.5–5)
PROT SERPL-MCNC: 7.4 G/DL (ref 6.4–8.3)
RBC # BLD AUTO: 4.08 E12/L (ref 3.8–5.8)
SODIUM SERPL-SCNC: 130 MMOL/L (ref 132–146)
TROPONIN, HIGH SENSITIVITY: 23 NG/L (ref 0–11)
TROPONIN, HIGH SENSITIVITY: 23 NG/L (ref 0–11)
WBC # BLD: 17 E9/L (ref 4.5–11.5)

## 2023-07-02 PROCEDURE — 83735 ASSAY OF MAGNESIUM: CPT

## 2023-07-02 PROCEDURE — 6370000000 HC RX 637 (ALT 250 FOR IP): Performed by: EMERGENCY MEDICINE

## 2023-07-02 PROCEDURE — 99285 EMERGENCY DEPT VISIT HI MDM: CPT

## 2023-07-02 PROCEDURE — 85378 FIBRIN DEGRADE SEMIQUANT: CPT

## 2023-07-02 PROCEDURE — 83880 ASSAY OF NATRIURETIC PEPTIDE: CPT

## 2023-07-02 PROCEDURE — 6360000002 HC RX W HCPCS: Performed by: EMERGENCY MEDICINE

## 2023-07-02 PROCEDURE — 74177 CT ABD & PELVIS W/CONTRAST: CPT

## 2023-07-02 PROCEDURE — 83690 ASSAY OF LIPASE: CPT

## 2023-07-02 PROCEDURE — 71045 X-RAY EXAM CHEST 1 VIEW: CPT

## 2023-07-02 PROCEDURE — C9113 INJ PANTOPRAZOLE SODIUM, VIA: HCPCS | Performed by: EMERGENCY MEDICINE

## 2023-07-02 PROCEDURE — 2580000003 HC RX 258: Performed by: EMERGENCY MEDICINE

## 2023-07-02 PROCEDURE — 93005 ELECTROCARDIOGRAM TRACING: CPT | Performed by: EMERGENCY MEDICINE

## 2023-07-02 PROCEDURE — 84484 ASSAY OF TROPONIN QUANT: CPT

## 2023-07-02 PROCEDURE — 96361 HYDRATE IV INFUSION ADD-ON: CPT

## 2023-07-02 PROCEDURE — 96374 THER/PROPH/DIAG INJ IV PUSH: CPT

## 2023-07-02 PROCEDURE — 85025 COMPLETE CBC W/AUTO DIFF WBC: CPT

## 2023-07-02 PROCEDURE — 6360000004 HC RX CONTRAST MEDICATION: Performed by: RADIOLOGY

## 2023-07-02 PROCEDURE — 96375 TX/PRO/DX INJ NEW DRUG ADDON: CPT

## 2023-07-02 PROCEDURE — 80053 COMPREHEN METABOLIC PANEL: CPT

## 2023-07-02 PROCEDURE — 6360000002 HC RX W HCPCS: Performed by: STUDENT IN AN ORGANIZED HEALTH CARE EDUCATION/TRAINING PROGRAM

## 2023-07-02 PROCEDURE — 2500000003 HC RX 250 WO HCPCS: Performed by: EMERGENCY MEDICINE

## 2023-07-02 RX ORDER — MORPHINE SULFATE 4 MG/ML
4 INJECTION, SOLUTION INTRAMUSCULAR; INTRAVENOUS
Status: COMPLETED | OUTPATIENT
Start: 2023-07-02 | End: 2023-07-02

## 2023-07-02 RX ORDER — PANTOPRAZOLE SODIUM 40 MG/10ML
40 INJECTION, POWDER, LYOPHILIZED, FOR SOLUTION INTRAVENOUS ONCE
Status: COMPLETED | OUTPATIENT
Start: 2023-07-02 | End: 2023-07-02

## 2023-07-02 RX ORDER — SODIUM CHLORIDE 9 MG/ML
INJECTION, SOLUTION INTRAVENOUS CONTINUOUS
Status: DISCONTINUED | OUTPATIENT
Start: 2023-07-02 | End: 2023-07-03 | Stop reason: HOSPADM

## 2023-07-02 RX ORDER — HYDROMORPHONE HYDROCHLORIDE 1 MG/ML
1 INJECTION, SOLUTION INTRAMUSCULAR; INTRAVENOUS; SUBCUTANEOUS
Status: COMPLETED | OUTPATIENT
Start: 2023-07-02 | End: 2023-07-02

## 2023-07-02 RX ORDER — SODIUM CHLORIDE 9 MG/ML
INJECTION INTRAVENOUS
Status: DISCONTINUED
Start: 2023-07-02 | End: 2023-07-03 | Stop reason: HOSPADM

## 2023-07-02 RX ORDER — 0.9 % SODIUM CHLORIDE 0.9 %
1000 INTRAVENOUS SOLUTION INTRAVENOUS ONCE
Status: COMPLETED | OUTPATIENT
Start: 2023-07-02 | End: 2023-07-02

## 2023-07-02 RX ORDER — HYDRALAZINE HYDROCHLORIDE 20 MG/ML
10 INJECTION INTRAMUSCULAR; INTRAVENOUS ONCE
Status: COMPLETED | OUTPATIENT
Start: 2023-07-02 | End: 2023-07-02

## 2023-07-02 RX ADMIN — SODIUM CHLORIDE: 9 INJECTION, SOLUTION INTRAVENOUS at 22:37

## 2023-07-02 RX ADMIN — Medication: at 19:49

## 2023-07-02 RX ADMIN — HYDRALAZINE HYDROCHLORIDE 10 MG: 20 INJECTION INTRAMUSCULAR; INTRAVENOUS at 22:55

## 2023-07-02 RX ADMIN — HYDROMORPHONE HYDROCHLORIDE 1 MG: 1 INJECTION, SOLUTION INTRAMUSCULAR; INTRAVENOUS; SUBCUTANEOUS at 22:33

## 2023-07-02 RX ADMIN — SODIUM CHLORIDE 1000 ML: 9 INJECTION, SOLUTION INTRAVENOUS at 19:52

## 2023-07-02 RX ADMIN — SODIUM CHLORIDE: 9 INJECTION, SOLUTION INTRAVENOUS at 20:51

## 2023-07-02 RX ADMIN — IOPAMIDOL 90 ML: 755 INJECTION, SOLUTION INTRAVENOUS at 21:56

## 2023-07-02 RX ADMIN — MORPHINE SULFATE 4 MG: 4 INJECTION, SOLUTION INTRAMUSCULAR; INTRAVENOUS at 20:30

## 2023-07-02 RX ADMIN — PANTOPRAZOLE SODIUM 40 MG: 40 INJECTION, POWDER, FOR SOLUTION INTRAVENOUS at 19:50

## 2023-07-02 ASSESSMENT — PAIN DESCRIPTION - LOCATION: LOCATION: CHEST;ABDOMEN

## 2023-07-02 ASSESSMENT — PAIN - FUNCTIONAL ASSESSMENT
PAIN_FUNCTIONAL_ASSESSMENT: 0-10
PAIN_FUNCTIONAL_ASSESSMENT: 0-10

## 2023-07-02 ASSESSMENT — PAIN DESCRIPTION - ORIENTATION: ORIENTATION: RIGHT

## 2023-07-02 ASSESSMENT — PAIN DESCRIPTION - DESCRIPTORS: DESCRIPTORS: SQUEEZING

## 2023-07-02 ASSESSMENT — PAIN SCALES - GENERAL
PAINLEVEL_OUTOF10: 8
PAINLEVEL_OUTOF10: 5
PAINLEVEL_OUTOF10: 4
PAINLEVEL_OUTOF10: 6

## 2023-07-02 ASSESSMENT — LIFESTYLE VARIABLES
HOW OFTEN DO YOU HAVE A DRINK CONTAINING ALCOHOL: 4 OR MORE TIMES A WEEK
HOW MANY STANDARD DRINKS CONTAINING ALCOHOL DO YOU HAVE ON A TYPICAL DAY: 5 OR 6

## 2023-07-03 LAB
EKG ATRIAL RATE: 102 BPM
EKG P AXIS: 18 DEGREES
EKG P-R INTERVAL: 166 MS
EKG Q-T INTERVAL: 332 MS
EKG QRS DURATION: 96 MS
EKG QTC CALCULATION (BAZETT): 432 MS
EKG R AXIS: 1 DEGREES
EKG T AXIS: 54 DEGREES
EKG VENTRICULAR RATE: 102 BPM

## 2023-07-03 PROCEDURE — 93010 ELECTROCARDIOGRAM REPORT: CPT | Performed by: INTERNAL MEDICINE

## 2023-07-03 NOTE — ED PROVIDER NOTES
10:48 PM EDT  I received this patient at sign out from Dr. Elin Erickson. I have discussed the patient's initial exam, treatment and plan of care with the out going physician. I have introduced my self to the patient / family and have answered their questions to this point. I have examined the patient myself and reviewed ordered tests / medications and  reviewed any available results to this point. If a resident is involved in the Emergency Department care, I have discussed my findings and plan with them as well. Patient was waiting emergency department for CT scan results. Patient was hypertensive and found to have an ASHLEY patient was experiencing epigastric pain patient's type I diabetic and has been diabetic for 25 years patient was offered admission for possible hypertensive emergency. Patient declined admission and would like to leave AMA patient was agreeable to receiving 1 dose of antihypertensive medication patient is continued to have active epigastric pain patient states that he did significantly improved with the Dilaudid given. Patient was advised of the unremarkable CT scan results patient has capacity make his own decisions at this time patient is awake alert and oriented patient's family members at bedside and is agreeable to patient's plan to leave patient was encouraged return to emergency room if he changes his mind or would like to be admitted or continue evaluation he is agreeable to plan patient will be given referral to nephrology and cardiology    ED Course as of 07/02/23 7922   Sun Jul 02, 2023 2050 Patient is still uncomfortable and tender in the right upper quadrant. Awaiting CT imaging, patient given a dose of Dilaudid 1 mg IV. He is currently getting IV fluids for acute kidney injury.  [PB]      ED Course User Index  [PB] Yoan Liang DO       --------------------------------------------- PAST HISTORY ---------------------------------------------  Past Medical History:  has a

## 2023-07-03 NOTE — ED NOTES
Radiology Procedure Waiver   Name: Nhan Fuchs  : 1990  MRN: [id number]    Date:  23    Time: 8:45 PM EDT    Benefits of immediately proceeding with Radiology exam(s) without pre-testing outweigh the risks or are not indicated as specified below and therefore the following is/are being waived:    [] Pregnancy test   [] Patients LMP on-time and regular.   [] Patient had Tubal Ligation or has other Contraception Device. [] Patient  is Menopausal or Premenarcheal.    [] Patient had Full or Partial Hysterectomy. [] Protocol for Iodine allergy    [] MRI Questionnaire     [x] BUN/Creatinine   [x] Patient age w/no hx of renal dysfunction. [] Patient on Dialysis. [] Recent Normal Labs.   Electronically signed by Libby Birch DO on 23 at 8:45 PM EDT               Libby Birch DO  [id number]

## 2023-10-02 ENCOUNTER — APPOINTMENT (OUTPATIENT)
Dept: GENERAL RADIOLOGY | Age: 33
End: 2023-10-02
Payer: MEDICAID

## 2023-10-02 ENCOUNTER — APPOINTMENT (OUTPATIENT)
Dept: CT IMAGING | Age: 33
End: 2023-10-02
Attending: EMERGENCY MEDICINE
Payer: MEDICAID

## 2023-10-02 ENCOUNTER — HOSPITAL ENCOUNTER (INPATIENT)
Age: 33
LOS: 10 days | Discharge: HOME | End: 2023-10-12
Attending: EMERGENCY MEDICINE | Admitting: INTERNAL MEDICINE
Payer: MEDICAID

## 2023-10-02 ENCOUNTER — APPOINTMENT (OUTPATIENT)
Dept: ULTRASOUND IMAGING | Age: 33
End: 2023-10-02
Payer: MEDICAID

## 2023-10-02 DIAGNOSIS — E86.0 DEHYDRATION: ICD-10-CM

## 2023-10-02 DIAGNOSIS — A41.9 SEPTICEMIA (HCC): Primary | ICD-10-CM

## 2023-10-02 DIAGNOSIS — E10.9 TYPE 1 DIABETES MELLITUS WITHOUT COMPLICATION (HCC): ICD-10-CM

## 2023-10-02 DIAGNOSIS — E11.610 TYPE 2 DIABETES MELLITUS WITH CHARCOT'S JOINT OF RIGHT FOOT (HCC): ICD-10-CM

## 2023-10-02 DIAGNOSIS — M86.9 OSTEOMYELITIS OF RIGHT FOOT, UNSPECIFIED TYPE (HCC): ICD-10-CM

## 2023-10-02 DIAGNOSIS — I10 ESSENTIAL HYPERTENSION: ICD-10-CM

## 2023-10-02 DIAGNOSIS — N17.9 AKI (ACUTE KIDNEY INJURY) (HCC): ICD-10-CM

## 2023-10-02 PROBLEM — L03.115 CELLULITIS OF RIGHT LOWER EXTREMITY: Status: ACTIVE | Noted: 2023-10-02

## 2023-10-02 PROBLEM — N18.9 ACUTE KIDNEY INJURY SUPERIMPOSED ON CKD (HCC): Status: ACTIVE | Noted: 2023-10-02

## 2023-10-02 LAB
ALBUMIN SERPL-MCNC: 3.7 G/DL (ref 3.5–5.2)
ALP SERPL-CCNC: 194 U/L (ref 40–129)
ALT SERPL-CCNC: 9 U/L (ref 0–40)
AMPHET UR QL SCN: NEGATIVE
ANION GAP SERPL CALCULATED.3IONS-SCNC: 18 MMOL/L (ref 7–16)
APAP SERPL-MCNC: <5 UG/ML (ref 10–30)
AST SERPL-CCNC: 14 U/L (ref 0–39)
B PARAP IS1001 DNA NPH QL NAA+NON-PROBE: NOT DETECTED
B PERT DNA SPEC QL NAA+PROBE: NOT DETECTED
B-OH-BUTYR SERPL-MCNC: 0.69 MMOL/L (ref 0.02–0.27)
B.E.: -4 MMOL/L (ref -3–3)
BACTERIA URNS QL MICRO: ABNORMAL
BARBITURATES UR QL SCN: NEGATIVE
BASOPHILS # BLD: 0.07 K/UL (ref 0–0.2)
BASOPHILS NFR BLD: 0 % (ref 0–2)
BENZODIAZ UR QL: NEGATIVE
BILIRUB SERPL-MCNC: 0.5 MG/DL (ref 0–1.2)
BILIRUB UR QL STRIP: NEGATIVE
BUN BLD-MCNC: 30 MG/DL (ref 6–20)
BUN SERPL-MCNC: 31 MG/DL (ref 6–20)
BUPRENORPHINE UR QL: NEGATIVE
C PNEUM DNA NPH QL NAA+NON-PROBE: NOT DETECTED
CALCIUM SERPL-MCNC: 9.9 MG/DL (ref 8.6–10.2)
CANNABINOIDS UR QL SCN: NEGATIVE
CASTS #/AREA URNS LPF: ABNORMAL /LPF
CHLORIDE BLD-SCNC: 101 MMOL/L (ref 100–108)
CHLORIDE SERPL-SCNC: 96 MMOL/L (ref 98–107)
CHP ED QC CHECK: NORMAL
CLARITY UR: ABNORMAL
CO2 BLD CALC-SCNC: 24 MMOL/L (ref 22–29)
CO2 SERPL-SCNC: 21 MMOL/L (ref 22–29)
COCAINE UR QL SCN: NEGATIVE
COHB: 1.6 % (ref 0–1.5)
COLOR UR: YELLOW
CREAT BLD-MCNC: 3.2 MG/DL (ref 0.7–1.2)
CREAT SERPL-MCNC: 3 MG/DL (ref 0.7–1.2)
CREAT UR-MCNC: 144.1 MG/DL (ref 40–278)
CRITICAL ACTION: NORMAL
CRITICAL NOTIFICATION DATE/TIME: NORMAL
CRITICAL NOTIFICATION: NORMAL
CRITICAL: ABNORMAL
DATE ANALYZED: ABNORMAL
DATE OF COLLECTION: ABNORMAL
EGFR, POC: 25 ML/MIN/1.73M2
EKG ATRIAL RATE: 122 BPM
EKG P AXIS: 37 DEGREES
EKG P-R INTERVAL: 148 MS
EKG Q-T INTERVAL: 296 MS
EKG QRS DURATION: 92 MS
EKG QTC CALCULATION (BAZETT): 421 MS
EKG R AXIS: 1 DEGREES
EKG T AXIS: 64 DEGREES
EKG VENTRICULAR RATE: 122 BPM
EOSINOPHIL # BLD: 0 K/UL (ref 0.05–0.5)
EOSINOPHILS RELATIVE PERCENT: 0 % (ref 0–6)
ERYTHROCYTE [DISTWIDTH] IN BLOOD BY AUTOMATED COUNT: 12.2 % (ref 11.5–15)
ETHANOLAMINE SERPL-MCNC: <10 MG/DL
FENTANYL UR QL: NEGATIVE
FLUAV RNA NPH QL NAA+NON-PROBE: NOT DETECTED
FLUBV RNA NPH QL NAA+NON-PROBE: NOT DETECTED
GFR SERPL CREATININE-BSD FRML MDRD: 27 ML/MIN/1.73M2
GLUCOSE BLD-MCNC: 162 MG/DL
GLUCOSE BLD-MCNC: 162 MG/DL (ref 74–99)
GLUCOSE BLD-MCNC: 200 MG/DL (ref 74–99)
GLUCOSE BLD-MCNC: 257 MG/DL (ref 74–99)
GLUCOSE SERPL-MCNC: 158 MG/DL (ref 74–99)
GLUCOSE UR STRIP-MCNC: 500 MG/DL
HADV DNA NPH QL NAA+NON-PROBE: NOT DETECTED
HCO3: 20 MMOL/L (ref 22–26)
HCOV 229E RNA NPH QL NAA+NON-PROBE: NOT DETECTED
HCOV HKU1 RNA NPH QL NAA+NON-PROBE: NOT DETECTED
HCOV NL63 RNA NPH QL NAA+NON-PROBE: NOT DETECTED
HCOV OC43 RNA NPH QL NAA+NON-PROBE: NOT DETECTED
HCT VFR BLD AUTO: 40.6 % (ref 37–54)
HGB BLD-MCNC: 12.7 G/DL (ref 12.5–16.5)
HGB UR QL STRIP.AUTO: ABNORMAL
HHB: 16.1 % (ref 0–5)
HMPV RNA NPH QL NAA+NON-PROBE: NOT DETECTED
HPIV1 RNA NPH QL NAA+NON-PROBE: NOT DETECTED
HPIV2 RNA NPH QL NAA+NON-PROBE: NOT DETECTED
HPIV3 RNA NPH QL NAA+NON-PROBE: NOT DETECTED
HPIV4 RNA NPH QL NAA+NON-PROBE: NOT DETECTED
IMM GRANULOCYTES # BLD AUTO: 0.23 K/UL (ref 0–0.58)
IMM GRANULOCYTES NFR BLD: 1 % (ref 0–5)
KETONES UR STRIP-MCNC: ABNORMAL MG/DL
LAB: ABNORMAL
LACTATE BLDV-SCNC: 1 MMOL/L (ref 0.5–1.9)
LACTATE BLDV-SCNC: 1.1 MMOL/L (ref 0.5–1.9)
LACTATE BLDV-SCNC: 1.4 MMOL/L (ref 0.5–2.2)
LEUKOCYTE ESTERASE UR QL STRIP: NEGATIVE
LIPASE SERPL-CCNC: 15 U/L (ref 13–60)
LYMPHOCYTES NFR BLD: 1.55 K/UL (ref 1.5–4)
LYMPHOCYTES RELATIVE PERCENT: 7 % (ref 20–42)
Lab: 915
M PNEUMO DNA NPH QL NAA+NON-PROBE: NOT DETECTED
MAGNESIUM SERPL-MCNC: 2.2 MG/DL (ref 1.6–2.6)
MCH RBC QN AUTO: 30 PG (ref 26–35)
MCHC RBC AUTO-ENTMCNC: 31.3 G/DL (ref 32–34.5)
MCV RBC AUTO: 95.8 FL (ref 80–99.9)
METHADONE UR QL: NEGATIVE
METHB: 0.2 % (ref 0–1.5)
MODE: ABNORMAL
MONOCYTES NFR BLD: 11 % (ref 2–12)
MONOCYTES NFR BLD: 2.4 K/UL (ref 0.1–0.95)
NEUTROPHILS NFR BLD: 81 % (ref 43–80)
NEUTS SEG NFR BLD: 18.12 K/UL (ref 1.8–7.3)
NITRITE UR QL STRIP: NEGATIVE
O2 CONTENT: 15.7 ML/DL
O2 SATURATION: 83.6 % (ref 92–98.5)
O2HB: 82.1 % (ref 94–97)
OPERATOR ID: 1741
OPIATES UR QL SCN: NEGATIVE
OSMOLALITY UR: 450 MOSM/KG (ref 300–900)
OXYCODONE UR QL SCN: NEGATIVE
PATIENT TEMP: 37 C
PCO2: 33.1 MMHG (ref 35–45)
PCP UR QL SCN: NEGATIVE
PH BLOOD GAS: 7.4 (ref 7.35–7.45)
PH UR STRIP: 6 [PH] (ref 5–9)
PLATELET # BLD AUTO: 499 K/UL (ref 130–450)
PMV BLD AUTO: 9.7 FL (ref 7–12)
PO2: 45.4 MMHG (ref 75–100)
POC ANION GAP: 10 MMOL/L (ref 7–16)
POTASSIUM BLD-SCNC: 4.8 MMOL/L (ref 3.5–5)
POTASSIUM SERPL-SCNC: 4.9 MMOL/L (ref 3.5–5)
PROT SERPL-MCNC: 8.3 G/DL (ref 6.4–8.3)
PROT UR STRIP-MCNC: >=300 MG/DL
RBC # BLD AUTO: 4.24 M/UL (ref 3.8–5.8)
RBC # BLD: NORMAL 10*6/UL
RBC #/AREA URNS HPF: ABNORMAL /HPF
RSV RNA NPH QL NAA+NON-PROBE: NOT DETECTED
RV+EV RNA NPH QL NAA+NON-PROBE: NOT DETECTED
SALICYLATES SERPL-MCNC: <0.3 MG/DL (ref 0–30)
SARS-COV-2 RNA NPH QL NAA+NON-PROBE: NOT DETECTED
SODIUM BLD-SCNC: 135 MMOL/L (ref 132–146)
SODIUM SERPL-SCNC: 135 MMOL/L (ref 132–146)
SODIUM UR-SCNC: 31 MMOL/L
SOURCE, BLOOD GAS: ABNORMAL
SP GR UR STRIP: 1.02 (ref 1–1.03)
SPECIMEN DESCRIPTION: NORMAL
T4 FREE SERPL-MCNC: 1 NG/DL (ref 0.9–1.7)
TEST INFORMATION: NORMAL
THB: 13.6 G/DL (ref 11.5–16.5)
TIME ANALYZED: 920
TOXIC TRICYCLIC SC,BLOOD: NEGATIVE
TROPONIN I SERPL HS-MCNC: 35 NG/L (ref 0–11)
TSH SERPL DL<=0.05 MIU/L-ACNC: 14.15 UIU/ML (ref 0.27–4.2)
UROBILINOGEN UR STRIP-ACNC: 0.2 EU/DL (ref 0–1)
UUN UR-MCNC: 631 MG/DL (ref 800–1666)
WBC #/AREA URNS HPF: ABNORMAL /HPF
WBC OTHER # BLD: 22.4 K/UL (ref 4.5–11.5)

## 2023-10-02 PROCEDURE — 83935 ASSAY OF URINE OSMOLALITY: CPT

## 2023-10-02 PROCEDURE — 93010 ELECTROCARDIOGRAM REPORT: CPT | Performed by: INTERNAL MEDICINE

## 2023-10-02 PROCEDURE — 80179 DRUG ASSAY SALICYLATE: CPT

## 2023-10-02 PROCEDURE — 84300 ASSAY OF URINE SODIUM: CPT

## 2023-10-02 PROCEDURE — 84484 ASSAY OF TROPONIN QUANT: CPT

## 2023-10-02 PROCEDURE — 83690 ASSAY OF LIPASE: CPT

## 2023-10-02 PROCEDURE — 6360000002 HC RX W HCPCS: Performed by: EMERGENCY MEDICINE

## 2023-10-02 PROCEDURE — 6370000000 HC RX 637 (ALT 250 FOR IP)

## 2023-10-02 PROCEDURE — 6360000002 HC RX W HCPCS

## 2023-10-02 PROCEDURE — 36415 COLL VENOUS BLD VENIPUNCTURE: CPT

## 2023-10-02 PROCEDURE — 82947 ASSAY GLUCOSE BLOOD QUANT: CPT

## 2023-10-02 PROCEDURE — 73630 X-RAY EXAM OF FOOT: CPT

## 2023-10-02 PROCEDURE — 82010 KETONE BODYS QUAN: CPT

## 2023-10-02 PROCEDURE — 73610 X-RAY EXAM OF ANKLE: CPT

## 2023-10-02 PROCEDURE — 82570 ASSAY OF URINE CREATININE: CPT

## 2023-10-02 PROCEDURE — 2580000003 HC RX 258

## 2023-10-02 PROCEDURE — 84439 ASSAY OF FREE THYROXINE: CPT

## 2023-10-02 PROCEDURE — 83735 ASSAY OF MAGNESIUM: CPT

## 2023-10-02 PROCEDURE — 84540 ASSAY OF URINE/UREA-N: CPT

## 2023-10-02 PROCEDURE — 84443 ASSAY THYROID STIM HORMONE: CPT

## 2023-10-02 PROCEDURE — 99222 1ST HOSP IP/OBS MODERATE 55: CPT | Performed by: INTERNAL MEDICINE

## 2023-10-02 PROCEDURE — G0480 DRUG TEST DEF 1-7 CLASSES: HCPCS

## 2023-10-02 PROCEDURE — 80053 COMPREHEN METABOLIC PANEL: CPT

## 2023-10-02 PROCEDURE — 93971 EXTREMITY STUDY: CPT

## 2023-10-02 PROCEDURE — 80143 DRUG ASSAY ACETAMINOPHEN: CPT

## 2023-10-02 PROCEDURE — 6370000000 HC RX 637 (ALT 250 FOR IP): Performed by: EMERGENCY MEDICINE

## 2023-10-02 PROCEDURE — 74176 CT ABD & PELVIS W/O CONTRAST: CPT

## 2023-10-02 PROCEDURE — 82962 GLUCOSE BLOOD TEST: CPT

## 2023-10-02 PROCEDURE — 82805 BLOOD GASES W/O2 SATURATION: CPT

## 2023-10-02 PROCEDURE — 96374 THER/PROPH/DIAG INJ IV PUSH: CPT

## 2023-10-02 PROCEDURE — 0202U NFCT DS 22 TRGT SARS-COV-2: CPT

## 2023-10-02 PROCEDURE — 2580000003 HC RX 258: Performed by: EMERGENCY MEDICINE

## 2023-10-02 PROCEDURE — 71045 X-RAY EXAM CHEST 1 VIEW: CPT

## 2023-10-02 PROCEDURE — 82565 ASSAY OF CREATININE: CPT

## 2023-10-02 PROCEDURE — 84520 ASSAY OF UREA NITROGEN: CPT

## 2023-10-02 PROCEDURE — 80307 DRUG TEST PRSMV CHEM ANLYZR: CPT

## 2023-10-02 PROCEDURE — 80051 ELECTROLYTE PANEL: CPT

## 2023-10-02 PROCEDURE — 93005 ELECTROCARDIOGRAM TRACING: CPT | Performed by: EMERGENCY MEDICINE

## 2023-10-02 PROCEDURE — 2060000000 HC ICU INTERMEDIATE R&B

## 2023-10-02 PROCEDURE — 83605 ASSAY OF LACTIC ACID: CPT

## 2023-10-02 PROCEDURE — 81001 URINALYSIS AUTO W/SCOPE: CPT

## 2023-10-02 PROCEDURE — 96361 HYDRATE IV INFUSION ADD-ON: CPT

## 2023-10-02 PROCEDURE — 87040 BLOOD CULTURE FOR BACTERIA: CPT

## 2023-10-02 PROCEDURE — 99285 EMERGENCY DEPT VISIT HI MDM: CPT

## 2023-10-02 PROCEDURE — 85025 COMPLETE CBC W/AUTO DIFF WBC: CPT

## 2023-10-02 RX ORDER — ACETAMINOPHEN 650 MG/1
650 SUPPOSITORY RECTAL EVERY 6 HOURS PRN
Status: DISCONTINUED | OUTPATIENT
Start: 2023-10-02 | End: 2023-10-12 | Stop reason: HOSPADM

## 2023-10-02 RX ORDER — AMLODIPINE BESYLATE 10 MG/1
10 TABLET ORAL DAILY
Status: DISCONTINUED | OUTPATIENT
Start: 2023-10-02 | End: 2023-10-12 | Stop reason: HOSPADM

## 2023-10-02 RX ORDER — ONDANSETRON 2 MG/ML
4 INJECTION INTRAMUSCULAR; INTRAVENOUS EVERY 6 HOURS PRN
Status: DISCONTINUED | OUTPATIENT
Start: 2023-10-02 | End: 2023-10-12 | Stop reason: HOSPADM

## 2023-10-02 RX ORDER — SODIUM CHLORIDE 0.9 % (FLUSH) 0.9 %
5-40 SYRINGE (ML) INJECTION PRN
Status: DISCONTINUED | OUTPATIENT
Start: 2023-10-02 | End: 2023-10-12 | Stop reason: HOSPADM

## 2023-10-02 RX ORDER — INSULIN LISPRO 100 [IU]/ML
0-4 INJECTION, SOLUTION INTRAVENOUS; SUBCUTANEOUS NIGHTLY
Status: DISCONTINUED | OUTPATIENT
Start: 2023-10-02 | End: 2023-10-03

## 2023-10-02 RX ORDER — INSULIN GLARGINE 100 [IU]/ML
43 INJECTION, SOLUTION SUBCUTANEOUS NIGHTLY
Status: ON HOLD | COMMUNITY
End: 2023-10-11 | Stop reason: SDUPTHER

## 2023-10-02 RX ORDER — ATORVASTATIN CALCIUM 40 MG/1
40 TABLET, FILM COATED ORAL DAILY
Status: DISCONTINUED | OUTPATIENT
Start: 2023-10-02 | End: 2023-10-12 | Stop reason: HOSPADM

## 2023-10-02 RX ORDER — POLYETHYLENE GLYCOL 3350 17 G/17G
17 POWDER, FOR SOLUTION ORAL DAILY PRN
Status: DISCONTINUED | OUTPATIENT
Start: 2023-10-02 | End: 2023-10-12 | Stop reason: HOSPADM

## 2023-10-02 RX ORDER — LEVOTHYROXINE SODIUM 0.12 MG/1
125 TABLET ORAL DAILY
Status: ON HOLD | COMMUNITY
End: 2023-10-11 | Stop reason: SDUPTHER

## 2023-10-02 RX ORDER — SODIUM CHLORIDE 0.9 % (FLUSH) 0.9 %
5-40 SYRINGE (ML) INJECTION EVERY 12 HOURS SCHEDULED
Status: DISCONTINUED | OUTPATIENT
Start: 2023-10-02 | End: 2023-10-12 | Stop reason: HOSPADM

## 2023-10-02 RX ORDER — INSULIN LISPRO 100 [IU]/ML
0-4 INJECTION, SOLUTION INTRAVENOUS; SUBCUTANEOUS
Status: DISCONTINUED | OUTPATIENT
Start: 2023-10-02 | End: 2023-10-03

## 2023-10-02 RX ORDER — ENOXAPARIN SODIUM 100 MG/ML
30 INJECTION SUBCUTANEOUS 2 TIMES DAILY
Status: DISCONTINUED | OUTPATIENT
Start: 2023-10-02 | End: 2023-10-06

## 2023-10-02 RX ORDER — ACETAMINOPHEN 325 MG/1
650 TABLET ORAL EVERY 6 HOURS PRN
Status: DISCONTINUED | OUTPATIENT
Start: 2023-10-02 | End: 2023-10-12 | Stop reason: HOSPADM

## 2023-10-02 RX ORDER — ACETAMINOPHEN 325 MG/1
650 TABLET ORAL ONCE
Status: COMPLETED | OUTPATIENT
Start: 2023-10-02 | End: 2023-10-02

## 2023-10-02 RX ORDER — SODIUM CHLORIDE 9 MG/ML
INJECTION, SOLUTION INTRAVENOUS PRN
Status: DISCONTINUED | OUTPATIENT
Start: 2023-10-02 | End: 2023-10-12 | Stop reason: HOSPADM

## 2023-10-02 RX ORDER — INSULIN LISPRO 100 [IU]/ML
10 INJECTION, SOLUTION INTRAVENOUS; SUBCUTANEOUS 2 TIMES DAILY
Status: ON HOLD | COMMUNITY
End: 2023-10-11 | Stop reason: HOSPADM

## 2023-10-02 RX ORDER — SODIUM CHLORIDE 9 MG/ML
INJECTION, SOLUTION INTRAVENOUS CONTINUOUS
Status: DISCONTINUED | OUTPATIENT
Start: 2023-10-02 | End: 2023-10-03

## 2023-10-02 RX ORDER — ONDANSETRON 4 MG/1
4 TABLET, ORALLY DISINTEGRATING ORAL EVERY 8 HOURS PRN
Status: DISCONTINUED | OUTPATIENT
Start: 2023-10-02 | End: 2023-10-12 | Stop reason: HOSPADM

## 2023-10-02 RX ORDER — HYDRALAZINE HYDROCHLORIDE 20 MG/ML
10 INJECTION INTRAMUSCULAR; INTRAVENOUS EVERY 4 HOURS PRN
Status: DISCONTINUED | OUTPATIENT
Start: 2023-10-02 | End: 2023-10-12 | Stop reason: HOSPADM

## 2023-10-02 RX ORDER — INSULIN GLARGINE 100 [IU]/ML
20 INJECTION, SOLUTION SUBCUTANEOUS NIGHTLY
Status: DISCONTINUED | OUTPATIENT
Start: 2023-10-02 | End: 2023-10-03

## 2023-10-02 RX ORDER — 0.9 % SODIUM CHLORIDE 0.9 %
2000 INTRAVENOUS SOLUTION INTRAVENOUS ONCE
Status: COMPLETED | OUTPATIENT
Start: 2023-10-02 | End: 2023-10-02

## 2023-10-02 RX ORDER — DEXTROSE MONOHYDRATE 100 MG/ML
INJECTION, SOLUTION INTRAVENOUS CONTINUOUS PRN
Status: DISCONTINUED | OUTPATIENT
Start: 2023-10-02 | End: 2023-10-12 | Stop reason: HOSPADM

## 2023-10-02 RX ORDER — LABETALOL HYDROCHLORIDE 5 MG/ML
10 INJECTION, SOLUTION INTRAVENOUS EVERY 4 HOURS PRN
Status: DISCONTINUED | OUTPATIENT
Start: 2023-10-02 | End: 2023-10-12 | Stop reason: HOSPADM

## 2023-10-02 RX ORDER — ONDANSETRON 2 MG/ML
4 INJECTION INTRAMUSCULAR; INTRAVENOUS ONCE
Status: COMPLETED | OUTPATIENT
Start: 2023-10-02 | End: 2023-10-02

## 2023-10-02 RX ORDER — INSULIN LISPRO 100 [IU]/ML
5 INJECTION, SOLUTION INTRAVENOUS; SUBCUTANEOUS
Status: ON HOLD | COMMUNITY
End: 2023-10-11 | Stop reason: SDUPTHER

## 2023-10-02 RX ADMIN — ATORVASTATIN CALCIUM 40 MG: 40 TABLET, FILM COATED ORAL at 16:25

## 2023-10-02 RX ADMIN — ENOXAPARIN SODIUM 30 MG: 100 INJECTION SUBCUTANEOUS at 20:27

## 2023-10-02 RX ADMIN — AMLODIPINE BESYLATE 10 MG: 10 TABLET ORAL at 16:24

## 2023-10-02 RX ADMIN — PIPERACILLIN AND TAZOBACTAM 4500 MG: 4; .5 INJECTION, POWDER, LYOPHILIZED, FOR SOLUTION INTRAVENOUS at 12:04

## 2023-10-02 RX ADMIN — LABETALOL HYDROCHLORIDE 10 MG: 5 INJECTION INTRAVENOUS at 17:55

## 2023-10-02 RX ADMIN — ONDANSETRON 4 MG: 2 INJECTION INTRAMUSCULAR; INTRAVENOUS at 09:00

## 2023-10-02 RX ADMIN — SODIUM CHLORIDE: 9 INJECTION, SOLUTION INTRAVENOUS at 10:00

## 2023-10-02 RX ADMIN — INSULIN GLARGINE 20 UNITS: 100 INJECTION, SOLUTION SUBCUTANEOUS at 20:27

## 2023-10-02 RX ADMIN — ACETAMINOPHEN 650 MG: 325 TABLET ORAL at 12:07

## 2023-10-02 RX ADMIN — SODIUM CHLORIDE 2000 ML: 9 INJECTION, SOLUTION INTRAVENOUS at 08:56

## 2023-10-02 RX ADMIN — VANCOMYCIN HYDROCHLORIDE 2000 MG: 10 INJECTION, POWDER, LYOPHILIZED, FOR SOLUTION INTRAVENOUS at 14:06

## 2023-10-02 RX ADMIN — ACETAMINOPHEN 650 MG: 325 TABLET ORAL at 19:00

## 2023-10-02 RX ADMIN — ONDANSETRON 4 MG: 4 TABLET, ORALLY DISINTEGRATING ORAL at 19:00

## 2023-10-02 RX ADMIN — SODIUM CHLORIDE: 9 INJECTION, SOLUTION INTRAVENOUS at 19:05

## 2023-10-02 RX ADMIN — PIPERACILLIN AND TAZOBACTAM 3375 MG: 3; .375 INJECTION, POWDER, LYOPHILIZED, FOR SOLUTION INTRAVENOUS at 16:34

## 2023-10-02 ASSESSMENT — PAIN - FUNCTIONAL ASSESSMENT
PAIN_FUNCTIONAL_ASSESSMENT: NONE - DENIES PAIN
PAIN_FUNCTIONAL_ASSESSMENT: 0-10

## 2023-10-02 ASSESSMENT — PAIN SCALES - GENERAL
PAINLEVEL_OUTOF10: 2

## 2023-10-02 ASSESSMENT — LIFESTYLE VARIABLES
HOW MANY STANDARD DRINKS CONTAINING ALCOHOL DO YOU HAVE ON A TYPICAL DAY: 1 OR 2
HOW OFTEN DO YOU HAVE A DRINK CONTAINING ALCOHOL: MONTHLY OR LESS

## 2023-10-02 NOTE — PROGRESS NOTES
4 Eyes Skin Assessment     NAME:  Francis Martini  YOB: 1990  MEDICAL RECORD NUMBER:  [id number]    The patient is being assessed for  Admission    I agree that at least one RN has performed a thorough Head to Toe Skin Assessment on the patient. ALL assessment sites listed below have been assessed. Areas assessed by both nurses:    Head, Face, Ears, Shoulders, Back, Chest, Arms, Elbows, Hands, Sacrum. Buttock, Coccyx, Ischium, Legs. Feet and Heels, and Under Medical Devices         Does the Patient have a Wound? Yes wound(s) were present on assessment.  LDA wound assessment was Initiated and completed by RN       Lev Prevention initiated by RN: Yes  Wound Care Orders initiated by RN: Yes    Pressure Injury (Stage 3,4, Unstageable, DTI, NWPT, and Complex wounds) if present, place Wound referral order by RN under : Yes    New Ostomies, if present place, Ostomy referral order under : No     Nurse 1 eSignature: Electronically signed by Shayy Mock RN on 10/2/23 at 7:01 PM EDT    **SHARE this note so that the co-signing nurse can place an eSignature**    Nurse 2 eSignature: Electronically signed by Fernanda Caceres RN on 10/2/23 at 7:04 PM EDT

## 2023-10-02 NOTE — ED NOTES
2nd set of Blood Cultures taken using aseptic technique of the right ac by this rn. Tops of blood culture bottles cleansed with alcohol prep pad for 10 seconds and let air dry. Skin cleansed with alcohol pad and then cleansed with chlorhexidine for 30 seconds and let air dry. Sites remained sterile throughout process.        Samira Obregon RN  10/02/23 3465

## 2023-10-02 NOTE — H&P
[address] [place] [address]  Internal Medicine Residency Program  History and Physical    Patient:  Bonnie Mcleod 26 y.o. male   MRN: [id number]       Date of Service: 10/2/2023          Chief complaint: had concerns including Nausea (Nausea and vomiting since Saturday. Unable to keep anything down. ). History of Present Illness   The patient is a 26 y.o. male , with past medical history of  DM typetype 1, HTN. HLP, presented with for nausea for the past 4 days and subjective fever and chills, since 12 weeks ago he hears a pop in right foot  while he was walking and start swelling and got much worse during past 3 days with a wound at the outside part  of the foot and erythema in medial part of the foot. He was compliant with lantus but not his other medications. ED Course: foot x ray showed Multifocal irregularities throughout the right foot, Cortical thickening of the 2nd metatarsal concerning for periostitis vs stress injury labs showed cr 3 hepatic panel unremarkable serum drug screen negative cbc showed wbc 22.4, U/A pending , EKG interpreted by me shows sinus rhythm, rate 122, no STEMI, no ischemic change. ED Meds: Patient was given vancomycin, zosyn, ondansetron   ED Fluids: Patient was given sodium chloride 0.9 % bolus 2,000 mL         Past Medical History:      Diagnosis Date    Abscess of back 8/2011    Bowel obstruction ([facility])     Diabetes mellitus type 1 ([facility]) Diagnosed at 6years of age/ in 1998    HTN (hypertension)     Hyperlipidemia        Past Surgical History:    History reviewed. No pertinent surgical history. Medications Prior to Admission:    Prior to Admission medications    Medication Sig Start Date End Date Taking?  Authorizing Provider   insulin glargine (BASAGLAR KWIKPEN) 100 UNIT/ML injection pen Inject 43 Units into the skin nightly   Yes Historical Provider, MD   insulin lispro, 1 Unit Dial, (HUMALOG KWIKPEN) 100 UNIT/ML SOPN Inject 5 Units into the skin daily (with PROT 8.3 10/02/2023 08:35 AM    BILITOT 0.5 10/02/2023 08:35 AM    BILIDIR <0.2 05/21/2017 04:17 AM    IBILI see below 05/21/2017 04:17 AM    LABALBU 3.7 10/02/2023 08:35 AM    LABALBU 5.3 05/18/2012 01:50 AM     LDH:  No results found for: \"LDH\"  Uric Acid:  No results found for: \"LABURIC\", \"URICACID\"  PT/INR:    Lab Results   Component Value Date/Time    PROTIME 11.8 06/22/2019 02:00 PM    PROTIME 10.4 05/18/2012 01:50 AM    INR 1.0 06/22/2019 02:00 PM     U/A:    Lab Results   Component Value Date/Time    COLORU Yellow 04/09/2020 11:45 AM    PROTEINU 30 04/09/2020 11:45 AM    PHUR 5.5 04/09/2020 11:45 AM    LABCAST RARE 05/20/2017 08:22 PM    WBCUA 0-1 04/09/2020 11:45 AM    WBCUA NONE 05/18/2012 03:00 AM    RBCUA NONE 04/09/2020 11:45 AM    RBCUA NONE 07/08/2013 01:00 AM    BACTERIA NONE SEEN 04/09/2020 11:45 AM    CLARITYU Clear 04/09/2020 11:45 AM    SPECGRAV 1.025 04/09/2020 11:45 AM    LEUKOCYTESUR Negative 04/09/2020 11:45 AM    UROBILINOGEN 0.2 04/09/2020 11:45 AM    BILIRUBINUR Negative 04/09/2020 11:45 AM    BILIRUBINUR NEGATIVE 05/18/2012 03:00 AM    BLOODU TRACE-INTACT 04/09/2020 11:45 AM    GLUCOSEU >=1000 04/09/2020 11:45 AM    GLUCOSEU >=1000 05/18/2012 03:00 AM    AMORPHOUS FEW 01/23/2013 03:50 PM     ABG:    Lab Results   Component Value Date/Time    PH 7.398 10/02/2023 09:15 AM    PH 7.17 08/10/2012 10:23 PM    PCO2 33.1 10/02/2023 09:15 AM    PO2 45.4 10/02/2023 09:15 AM    HCO3 20.0 10/02/2023 09:15 AM    BE -4.0 10/02/2023 09:15 AM    O2SAT 83.6 10/02/2023 09:15 AM       Imaging Studies:    CT ABDOMEN PELVIS WO CONTRAST Additional Contrast? None   Final Result   Appendix normal.  No mechanical obstructive process of bowel. No obstructing   uropathy      Wall thickening urinary bladder could represent long-standing or chronic   outlet obstructive findings less likely cystitis however correlation with   urinary studies. Mild prostatomegaly.          XR ANKLE RIGHT (MIN 3 VIEWS)   Final

## 2023-10-02 NOTE — ED NOTES
[place] [facility] [place]       Christophe tremayne, [address]  10/02/23 1211

## 2023-10-03 LAB
ANION GAP SERPL CALCULATED.3IONS-SCNC: 12 MMOL/L (ref 7–16)
ANION GAP SERPL CALCULATED.3IONS-SCNC: 14 MMOL/L (ref 7–16)
ANION GAP SERPL CALCULATED.3IONS-SCNC: 17 MMOL/L (ref 7–16)
ANION GAP SERPL CALCULATED.3IONS-SCNC: 20 MMOL/L (ref 7–16)
ANION GAP SERPL CALCULATED.3IONS-SCNC: 20 MMOL/L (ref 7–16)
B-OH-BUTYR SERPL-MCNC: 4.5 MMOL/L (ref 0.02–0.27)
B.E.: -8 MMOL/L (ref -3–3)
BACTERIA URNS QL MICRO: ABNORMAL
BASOPHILS # BLD: 0 K/UL (ref 0–0.2)
BASOPHILS NFR BLD: 0 % (ref 0–2)
BILIRUB UR QL STRIP: NEGATIVE
BUN SERPL-MCNC: 38 MG/DL (ref 6–20)
BUN SERPL-MCNC: 40 MG/DL (ref 6–20)
BUN SERPL-MCNC: 40 MG/DL (ref 6–20)
BUN SERPL-MCNC: 43 MG/DL (ref 6–20)
BUN SERPL-MCNC: 43 MG/DL (ref 6–20)
CALCIUM SERPL-MCNC: 8.3 MG/DL (ref 8.6–10.2)
CALCIUM SERPL-MCNC: 8.3 MG/DL (ref 8.6–10.2)
CALCIUM SERPL-MCNC: 8.4 MG/DL (ref 8.6–10.2)
CALCIUM SERPL-MCNC: 8.4 MG/DL (ref 8.6–10.2)
CALCIUM SERPL-MCNC: 8.5 MG/DL (ref 8.6–10.2)
CHLORIDE SERPL-SCNC: 104 MMOL/L (ref 98–107)
CHLORIDE SERPL-SCNC: 93 MMOL/L (ref 98–107)
CHLORIDE SERPL-SCNC: 95 MMOL/L (ref 98–107)
CHLORIDE SERPL-SCNC: 96 MMOL/L (ref 98–107)
CHLORIDE SERPL-SCNC: 99 MMOL/L (ref 98–107)
CLARITY UR: CLEAR
CO2 SERPL-SCNC: 13 MMOL/L (ref 22–29)
CO2 SERPL-SCNC: 14 MMOL/L (ref 22–29)
CO2 SERPL-SCNC: 14 MMOL/L (ref 22–29)
CO2 SERPL-SCNC: 15 MMOL/L (ref 22–29)
CO2 SERPL-SCNC: 18 MMOL/L (ref 22–29)
COHB: 0.9 % (ref 0–1.5)
COLOR UR: YELLOW
CREAT SERPL-MCNC: 3 MG/DL (ref 0.7–1.2)
CREAT SERPL-MCNC: 3 MG/DL (ref 0.7–1.2)
CREAT SERPL-MCNC: 3.1 MG/DL (ref 0.7–1.2)
CREAT SERPL-MCNC: 3.2 MG/DL (ref 0.7–1.2)
CREAT SERPL-MCNC: 3.3 MG/DL (ref 0.7–1.2)
CREAT UR-MCNC: 58.7 MG/DL (ref 40–278)
CREAT UR-MCNC: 59.3 MG/DL (ref 40–278)
CRITICAL: ABNORMAL
CRP SERPL HS-MCNC: 415 MG/L (ref 0–5)
DATE ANALYZED: ABNORMAL
DATE OF COLLECTION: ABNORMAL
EOSINOPHIL # BLD: 0 K/UL (ref 0.05–0.5)
EOSINOPHILS RELATIVE PERCENT: 0 % (ref 0–6)
EPI CELLS #/AREA URNS HPF: ABNORMAL /HPF
ERYTHROCYTE [DISTWIDTH] IN BLOOD BY AUTOMATED COUNT: 12.4 % (ref 11.5–15)
ERYTHROCYTE [SEDIMENTATION RATE] IN BLOOD BY WESTERGREN METHOD: 76 MM/HR (ref 0–15)
GFR SERPL CREATININE-BSD FRML MDRD: 24 ML/MIN/1.73M2
GFR SERPL CREATININE-BSD FRML MDRD: 25 ML/MIN/1.73M2
GFR SERPL CREATININE-BSD FRML MDRD: 26 ML/MIN/1.73M2
GFR SERPL CREATININE-BSD FRML MDRD: 27 ML/MIN/1.73M2
GFR SERPL CREATININE-BSD FRML MDRD: 27 ML/MIN/1.73M2
GLUCOSE BLD-MCNC: 174 MG/DL (ref 74–99)
GLUCOSE BLD-MCNC: 180 MG/DL (ref 74–99)
GLUCOSE BLD-MCNC: 186 MG/DL (ref 74–99)
GLUCOSE BLD-MCNC: 188 MG/DL (ref 74–99)
GLUCOSE BLD-MCNC: 194 MG/DL (ref 74–99)
GLUCOSE BLD-MCNC: 224 MG/DL (ref 74–99)
GLUCOSE BLD-MCNC: 264 MG/DL (ref 74–99)
GLUCOSE BLD-MCNC: 288 MG/DL (ref 74–99)
GLUCOSE BLD-MCNC: 288 MG/DL (ref 74–99)
GLUCOSE BLD-MCNC: 358 MG/DL (ref 74–99)
GLUCOSE BLD-MCNC: 365 MG/DL (ref 74–99)
GLUCOSE BLD-MCNC: 368 MG/DL (ref 74–99)
GLUCOSE SERPL-MCNC: 191 MG/DL (ref 74–99)
GLUCOSE SERPL-MCNC: 244 MG/DL (ref 74–99)
GLUCOSE SERPL-MCNC: 354 MG/DL (ref 74–99)
GLUCOSE SERPL-MCNC: 363 MG/DL (ref 74–99)
GLUCOSE SERPL-MCNC: 434 MG/DL (ref 74–99)
GLUCOSE UR STRIP-MCNC: >=1000 MG/DL
HBA1C MFR BLD: 10.8 % (ref 4–5.6)
HCO3: 16.3 MMOL/L (ref 22–26)
HCT VFR BLD AUTO: 30.3 % (ref 37–54)
HGB BLD-MCNC: 9.4 G/DL (ref 12.5–16.5)
HGB UR QL STRIP.AUTO: ABNORMAL
HHB: 5.2 % (ref 0–5)
KETONES UR STRIP-MCNC: 40 MG/DL
LAB: ABNORMAL
LACTATE BLDV-SCNC: 0.9 MMOL/L (ref 0.5–2.2)
LEUKOCYTE ESTERASE UR QL STRIP: NEGATIVE
LYMPHOCYTES NFR BLD: 0.47 K/UL (ref 1.5–4)
LYMPHOCYTES RELATIVE PERCENT: 3 % (ref 20–42)
Lab: 1238
MAGNESIUM SERPL-MCNC: 2 MG/DL (ref 1.6–2.6)
MAGNESIUM SERPL-MCNC: 2.3 MG/DL (ref 1.6–2.6)
MCH RBC QN AUTO: 30.1 PG (ref 26–35)
MCHC RBC AUTO-ENTMCNC: 31 G/DL (ref 32–34.5)
MCV RBC AUTO: 97.1 FL (ref 80–99.9)
METHB: 0.2 % (ref 0–1.5)
MICROALBUMIN UR-MCNC: 810 MG/L (ref 0–19)
MICROALBUMIN/CREAT UR-RTO: 1379 MCG/MG CREAT (ref 0–30)
MODE: ABNORMAL
MONOCYTES NFR BLD: 1.4 K/UL (ref 0.1–0.95)
MONOCYTES NFR BLD: 8 % (ref 2–12)
NEUTROPHILS NFR BLD: 90 % (ref 43–80)
NEUTS SEG NFR BLD: 16.03 K/UL (ref 1.8–7.3)
NITRITE UR QL STRIP: NEGATIVE
O2 CONTENT: 14.3 ML/DL
O2 SATURATION: 94.7 % (ref 92–98.5)
O2HB: 93.7 % (ref 94–97)
OPERATOR ID: 2863
PATIENT TEMP: 37 C
PCO2: 29.8 MMHG (ref 35–45)
PH BLOOD GAS: 7.36 (ref 7.35–7.45)
PH UR STRIP: 6 [PH] (ref 5–9)
PHOSPHATE SERPL-MCNC: 2.6 MG/DL (ref 2.5–4.5)
PHOSPHATE SERPL-MCNC: 2.6 MG/DL (ref 2.5–4.5)
PLATELET # BLD AUTO: 359 K/UL (ref 130–450)
PMV BLD AUTO: 9.8 FL (ref 7–12)
PO2: 72.2 MMHG (ref 75–100)
POTASSIUM SERPL-SCNC: 4.2 MMOL/L (ref 3.5–5)
POTASSIUM SERPL-SCNC: 4.4 MMOL/L (ref 3.5–5)
POTASSIUM SERPL-SCNC: 4.6 MMOL/L (ref 3.5–5)
POTASSIUM SERPL-SCNC: 5.1 MMOL/L (ref 3.5–5)
POTASSIUM SERPL-SCNC: 5.2 MMOL/L (ref 3.5–5)
PROCALCITONIN SERPL-MCNC: 9.47 NG/ML (ref 0–0.08)
PROT UR STRIP-MCNC: 100 MG/DL
RBC # BLD AUTO: 3.12 M/UL (ref 3.8–5.8)
RBC # BLD: ABNORMAL 10*6/UL
RBC #/AREA URNS HPF: ABNORMAL /HPF
SODIUM SERPL-SCNC: 126 MMOL/L (ref 132–146)
SODIUM SERPL-SCNC: 126 MMOL/L (ref 132–146)
SODIUM SERPL-SCNC: 128 MMOL/L (ref 132–146)
SODIUM SERPL-SCNC: 130 MMOL/L (ref 132–146)
SODIUM SERPL-SCNC: 134 MMOL/L (ref 132–146)
SOURCE, BLOOD GAS: ABNORMAL
SP GR UR STRIP: 1.02 (ref 1–1.03)
THB: 10.8 G/DL (ref 11.5–16.5)
TIME ANALYZED: 1254
TOTAL PROTEIN, URINE: 164 MG/DL (ref 0–12)
URINE TOTAL PROTEIN CREATININE RATIO: 2.77 (ref 0–0.2)
UROBILINOGEN UR STRIP-ACNC: 0.2 EU/DL (ref 0–1)
WBC #/AREA URNS HPF: ABNORMAL /HPF
WBC OTHER # BLD: 17.9 K/UL (ref 4.5–11.5)

## 2023-10-03 PROCEDURE — 80048 BASIC METABOLIC PNL TOTAL CA: CPT

## 2023-10-03 PROCEDURE — 2580000003 HC RX 258: Performed by: INTERNAL MEDICINE

## 2023-10-03 PROCEDURE — 6360000002 HC RX W HCPCS: Performed by: INTERNAL MEDICINE

## 2023-10-03 PROCEDURE — 86140 C-REACTIVE PROTEIN: CPT

## 2023-10-03 PROCEDURE — 2580000003 HC RX 258: Performed by: NURSE PRACTITIONER

## 2023-10-03 PROCEDURE — 6370000000 HC RX 637 (ALT 250 FOR IP): Performed by: INTERNAL MEDICINE

## 2023-10-03 PROCEDURE — 2580000003 HC RX 258

## 2023-10-03 PROCEDURE — 99232 SBSQ HOSP IP/OBS MODERATE 35: CPT | Performed by: INTERNAL MEDICINE

## 2023-10-03 PROCEDURE — 6370000000 HC RX 637 (ALT 250 FOR IP)

## 2023-10-03 PROCEDURE — 82962 GLUCOSE BLOOD TEST: CPT

## 2023-10-03 PROCEDURE — 2500000003 HC RX 250 WO HCPCS: Performed by: INTERNAL MEDICINE

## 2023-10-03 PROCEDURE — 36415 COLL VENOUS BLD VENIPUNCTURE: CPT

## 2023-10-03 PROCEDURE — 84100 ASSAY OF PHOSPHORUS: CPT

## 2023-10-03 PROCEDURE — 2000000000 HC ICU R&B

## 2023-10-03 PROCEDURE — 81001 URINALYSIS AUTO W/SCOPE: CPT

## 2023-10-03 PROCEDURE — 82010 KETONE BODYS QUAN: CPT

## 2023-10-03 PROCEDURE — 83735 ASSAY OF MAGNESIUM: CPT

## 2023-10-03 PROCEDURE — 83605 ASSAY OF LACTIC ACID: CPT

## 2023-10-03 PROCEDURE — 83036 HEMOGLOBIN GLYCOSYLATED A1C: CPT

## 2023-10-03 PROCEDURE — 85025 COMPLETE CBC W/AUTO DIFF WBC: CPT

## 2023-10-03 PROCEDURE — 84145 PROCALCITONIN (PCT): CPT

## 2023-10-03 PROCEDURE — 84156 ASSAY OF PROTEIN URINE: CPT

## 2023-10-03 PROCEDURE — 6360000002 HC RX W HCPCS

## 2023-10-03 PROCEDURE — 82043 UR ALBUMIN QUANTITATIVE: CPT

## 2023-10-03 PROCEDURE — 82805 BLOOD GASES W/O2 SATURATION: CPT

## 2023-10-03 PROCEDURE — 85652 RBC SED RATE AUTOMATED: CPT

## 2023-10-03 PROCEDURE — 82570 ASSAY OF URINE CREATININE: CPT

## 2023-10-03 PROCEDURE — 87086 URINE CULTURE/COLONY COUNT: CPT

## 2023-10-03 RX ORDER — 0.9 % SODIUM CHLORIDE 0.9 %
1000 INTRAVENOUS SOLUTION INTRAVENOUS ONCE
Status: COMPLETED | OUTPATIENT
Start: 2023-10-03 | End: 2023-10-03

## 2023-10-03 RX ORDER — DEXTROSE AND SODIUM CHLORIDE 5; .45 G/100ML; G/100ML
INJECTION, SOLUTION INTRAVENOUS CONTINUOUS
Status: ACTIVE | OUTPATIENT
Start: 2023-10-03 | End: 2023-10-04

## 2023-10-03 RX ORDER — INSULIN LISPRO 100 [IU]/ML
5 INJECTION, SOLUTION INTRAVENOUS; SUBCUTANEOUS
Status: DISCONTINUED | OUTPATIENT
Start: 2023-10-03 | End: 2023-10-03

## 2023-10-03 RX ORDER — SODIUM CHLORIDE 9 MG/ML
INJECTION, SOLUTION INTRAVENOUS CONTINUOUS
Status: DISCONTINUED | OUTPATIENT
Start: 2023-10-03 | End: 2023-10-04

## 2023-10-03 RX ORDER — DEXTROSE MONOHYDRATE, SODIUM CHLORIDE, AND POTASSIUM CHLORIDE 50; 1.49; 4.5 G/1000ML; G/1000ML; G/1000ML
INJECTION, SOLUTION INTRAVENOUS CONTINUOUS PRN
Status: DISCONTINUED | OUTPATIENT
Start: 2023-10-03 | End: 2023-10-03

## 2023-10-03 RX ORDER — INSULIN LISPRO 100 [IU]/ML
0-4 INJECTION, SOLUTION INTRAVENOUS; SUBCUTANEOUS NIGHTLY
Status: DISCONTINUED | OUTPATIENT
Start: 2023-10-03 | End: 2023-10-03

## 2023-10-03 RX ORDER — INSULIN LISPRO 100 [IU]/ML
0-8 INJECTION, SOLUTION INTRAVENOUS; SUBCUTANEOUS
Status: DISCONTINUED | OUTPATIENT
Start: 2023-10-03 | End: 2023-10-03

## 2023-10-03 RX ORDER — POTASSIUM CHLORIDE 7.45 MG/ML
10 INJECTION INTRAVENOUS PRN
Status: DISCONTINUED | OUTPATIENT
Start: 2023-10-03 | End: 2023-10-12 | Stop reason: HOSPADM

## 2023-10-03 RX ORDER — MAGNESIUM SULFATE IN WATER 40 MG/ML
2000 INJECTION, SOLUTION INTRAVENOUS PRN
Status: DISCONTINUED | OUTPATIENT
Start: 2023-10-03 | End: 2023-10-12 | Stop reason: HOSPADM

## 2023-10-03 RX ADMIN — SODIUM CHLORIDE: 9 INJECTION, SOLUTION INTRAVENOUS at 05:46

## 2023-10-03 RX ADMIN — PIPERACILLIN AND TAZOBACTAM 3375 MG: 3; .375 INJECTION, POWDER, LYOPHILIZED, FOR SOLUTION INTRAVENOUS at 02:11

## 2023-10-03 RX ADMIN — SODIUM ZIRCONIUM CYCLOSILICATE 10 G: 10 POWDER, FOR SUSPENSION ORAL at 12:52

## 2023-10-03 RX ADMIN — ENOXAPARIN SODIUM 30 MG: 100 INJECTION SUBCUTANEOUS at 10:12

## 2023-10-03 RX ADMIN — LABETALOL HYDROCHLORIDE 10 MG: 5 INJECTION INTRAVENOUS at 13:13

## 2023-10-03 RX ADMIN — POTASSIUM CHLORIDE 10 MEQ: 7.46 INJECTION, SOLUTION INTRAVENOUS at 22:00

## 2023-10-03 RX ADMIN — ONDANSETRON 4 MG: 4 TABLET, ORALLY DISINTEGRATING ORAL at 19:24

## 2023-10-03 RX ADMIN — PIPERACILLIN AND TAZOBACTAM 3375 MG: 3; .375 INJECTION, POWDER, LYOPHILIZED, FOR SOLUTION INTRAVENOUS at 23:36

## 2023-10-03 RX ADMIN — INSULIN LISPRO 8 UNITS: 100 INJECTION, SOLUTION INTRAVENOUS; SUBCUTANEOUS at 10:14

## 2023-10-03 RX ADMIN — PIPERACILLIN AND TAZOBACTAM 3375 MG: 3; .375 INJECTION, POWDER, LYOPHILIZED, FOR SOLUTION INTRAVENOUS at 11:12

## 2023-10-03 RX ADMIN — ACETAMINOPHEN 650 MG: 325 TABLET ORAL at 13:13

## 2023-10-03 RX ADMIN — SODIUM CHLORIDE 1000 ML: 9 INJECTION, SOLUTION INTRAVENOUS at 12:13

## 2023-10-03 RX ADMIN — LEVOTHYROXINE SODIUM 125 MCG: 0.03 TABLET ORAL at 10:12

## 2023-10-03 RX ADMIN — SODIUM CHLORIDE 4.56 UNITS/HR: 9 INJECTION, SOLUTION INTRAVENOUS at 15:39

## 2023-10-03 RX ADMIN — POTASSIUM CHLORIDE 10 MEQ: 7.46 INJECTION, SOLUTION INTRAVENOUS at 21:00

## 2023-10-03 RX ADMIN — AMLODIPINE BESYLATE 10 MG: 10 TABLET ORAL at 10:12

## 2023-10-03 RX ADMIN — POTASSIUM CHLORIDE 10 MEQ: 7.46 INJECTION, SOLUTION INTRAVENOUS at 20:00

## 2023-10-03 RX ADMIN — PIPERACILLIN AND TAZOBACTAM 3375 MG: 3; .375 INJECTION, POWDER, LYOPHILIZED, FOR SOLUTION INTRAVENOUS at 16:20

## 2023-10-03 RX ADMIN — ACETAMINOPHEN 650 MG: 325 TABLET ORAL at 19:23

## 2023-10-03 RX ADMIN — INSULIN LISPRO 5 UNITS: 100 INJECTION, SOLUTION INTRAVENOUS; SUBCUTANEOUS at 12:53

## 2023-10-03 RX ADMIN — ATORVASTATIN CALCIUM 40 MG: 40 TABLET, FILM COATED ORAL at 10:13

## 2023-10-03 RX ADMIN — SODIUM CHLORIDE: 9 INJECTION, SOLUTION INTRAVENOUS at 16:14

## 2023-10-03 RX ADMIN — SODIUM PHOSPHATE, MONOBASIC, MONOHYDRATE AND SODIUM PHOSPHATE, DIBASIC, ANHYDROUS 10 MMOL: 276; 142 INJECTION, SOLUTION INTRAVENOUS at 20:28

## 2023-10-03 RX ADMIN — ENOXAPARIN SODIUM 30 MG: 100 INJECTION SUBCUTANEOUS at 20:02

## 2023-10-03 RX ADMIN — SODIUM CHLORIDE, PRESERVATIVE FREE 10 ML: 5 INJECTION INTRAVENOUS at 20:08

## 2023-10-03 RX ADMIN — INSULIN LISPRO 8 UNITS: 100 INJECTION, SOLUTION INTRAVENOUS; SUBCUTANEOUS at 12:53

## 2023-10-03 RX ADMIN — DEXTROSE AND SODIUM CHLORIDE: 5; 450 INJECTION, SOLUTION INTRAVENOUS at 18:38

## 2023-10-03 RX ADMIN — INSULIN LISPRO 5 UNITS: 100 INJECTION, SOLUTION INTRAVENOUS; SUBCUTANEOUS at 10:13

## 2023-10-03 ASSESSMENT — PAIN DESCRIPTION - DESCRIPTORS
DESCRIPTORS: ACHING

## 2023-10-03 ASSESSMENT — PAIN SCALES - GENERAL
PAINLEVEL_OUTOF10: 0
PAINLEVEL_OUTOF10: 0
PAINLEVEL_OUTOF10: 7
PAINLEVEL_OUTOF10: 2

## 2023-10-03 ASSESSMENT — PAIN DESCRIPTION - LOCATION
LOCATION: GENERALIZED
LOCATION: GENERALIZED
LOCATION: HEAD

## 2023-10-03 NOTE — CARE COORDINATION
Spoke with patient from home, lives with family. Typically independent, tells me he is having trouble walking on his foot since just prior to admission, does not use assistive devices at home. He is a , just got a new job two weeks ago. He is anxious to be discharged as soon as possible, does not want to lose his job. Discussed with patient possible therapy eval to see if he needs assistive devices. Patient stated he cannot work at his job with a device and does not want one at this time. PCP is through internal medicine clinic, does not go often because he has been denied for financial assistance and cannot afford appointments. PBO evaluated patient and applied for [facility], patient also qualifies for help with meds at discharge. For transfer to MICU to initiate DKA protocol. Podiatry consulted for foot pain and MRI foot pending. Mother to transport home when released. Case Management Assessment  Initial Evaluation    Date/Time of Evaluation: 10/3/2023 11:46 AM  Assessment Completed by: Ajay Garcia [place]    If patient is discharged prior to next notation, then this note serves as note for discharge by case management. Patient Name: Roula Pickard                   YOB: 1990  Diagnosis: Dehydration [E86.0]  Septicemia ([address]) [A41.9]  ASHLEY (acute kidney injury) ([address]) [N17.9]  Sepsis ([address]) [A41.9]                   Date / Time: 10/2/2023  8:12 AM    Patient Admission Status: Inpatient   Readmission Risk (Low < 19, Mod (19-27), High > 27): Readmission Risk Score: 12.7    Current PCP: Robson Renee MD  PCP verified by ? Yes    Chart Reviewed: Yes   History Provided by: Patient  Patient Orientation: Alert and Oriented    Patient Cognition: Alert    Hospitalization in the last 30 days (Readmission):  No    If yes, Readmission Assessment in  Navigator will be completed.     Advance Directives:      Code Status: Full Code   Patient's Primary Decision Maker is: Legal Next of Kin Primary Decision Maker: Chandler Carter - Parent - [phone]    Discharge Planning:    Patient lives with: Family Members Type of Home: House  Primary Care Giver: Self  Patient Support Systems include: Family Members   Current Financial resources:    Current community resources:    Current services prior to admission: None            Current DME:              Type of Home Care services:  None    ADLS  Prior functional level: Independent in ADLs/IADLs  Current functional level: Independent in ADLs/IADLs    PT AM-PAC:   /24  OT AM-PAC:   /24    Family can provide assistance at DC: Yes  Would you like Case Management to discuss the discharge plan with any other family members/significant others, and if so, who? No  Plans to Return to Present Housing: Yes  Other Identified Issues/Barriers to RETURNING to current housing: DKA  Potential Assistance needed at discharge: N/A            Potential DME:    Patient expects to discharge to: [address] [place] [address] for transportation at discharge:      Financial    Payor: PENDING [address] / Plan: PENDING MEDICAID / Product Type: *No Product type* /     Does insurance require precert for SNF: No    Potential assistance Purchasing Medications:    Meds-to-Beds request: Yes      [facility] [id number] [phone] Kenna Spurling [phone]  [address] [place] [address],[address] [place] [address]  Phone: [phone] Fax: [phone]      Notes:    Factors facilitating achievement of predicted outcomes: Family support    Barriers to discharge: No insurance coverage    Additional Case Management Notes: The Plan for Transition of Care is related to the following treatment goals of Dehydration [E86.0]  Septicemia ([address]) [A41.9]  ASHLEY (acute kidney injury) ([address]) [N17.9]  Sepsis ([address]) [O51.4]    IF APPLICABLE: The Patient and/or patient representative Polly Martinez and his family were provided with a choice of provider and agrees with the discharge plan.  Freedom of choice list with

## 2023-10-03 NOTE — CONSULTS
Department of Internal Medicine  Nephrology Attending Consult Note      Reason for Consult: Worsening renal function and metabolic acidosis  Requesting Physician:  Samantha Esposito MD    CHIEF COMPLAINT: Persistent nausea    History Obtained From:  patient, electronic medical record    HISTORY OF PRESENT ILLNESS: Briefly Mr. Adilene Ballard is a 25-year-old man with history of CKD stage IIIb, with large proteinuria, 2/2 diabetic nephropathy, baseline creatinine 2.2 mg/dL type I DM, HTN, hyperlipidemia, who was admitted on October 2, 2023 after he presented to the ER reporting having persistent nausea for least 3 to 4 days. After evaluation he was found to have a creatinine level of 3 mg/dL, reason for this consultation. During his stay in the ER according to the patient he did not receive any insulin. His bicarbonate level on admission was 21 and has decreased down to 13 with an anion gap of 20. His glucose on admission was 162 and had increased to 424. Medications prior to admission included lisinopril. Past Medical History:        Diagnosis Date    Abscess of back 8/2011    Bowel obstruction ([facility])     Diabetes mellitus type 1 ([address]) Diagnosed at 6years of age/ in 1998    HTN (hypertension)     Hyperlipidemia      Past Surgical History:    History reviewed. No pertinent surgical history.   Current Medications:    Current Facility-Administered Medications: insulin lispro (HUMALOG) injection vial 0-8 Units, 0-8 Units, SubCUTAneous, TID   insulin lispro (HUMALOG) injection vial 0-4 Units, 0-4 Units, SubCUTAneous, Nightly  insulin lispro (HUMALOG) injection vial 5 Units, 5 Units, SubCUTAneous, TID   levothyroxine (SYNTHROID) tablet 125 mcg, 125 mcg, Oral, Daily  sodium zirconium cyclosilicate (LOKELMA) oral suspension 10 g, 10 g, Oral, Once  sodium chloride 0.9 % bolus 1,000 mL, 1,000 mL, IntraVENous, Once  0.9 % sodium chloride infusion, , IntraVENous, Continuous  amLODIPine (NORVASC) tablet 10 mg, 10 mg, Oral, gap  --------------------------------------------------------------------------------  Probably underlying neurogenic bladder my findings of CT scan, to obtain PVR  Normocytic anemia    Plan:    Transfer patient to ICU  Start DKA protocol  Monitor renal function  Monitor bicarbonate levels and anion gap  Obtain urine albumin/creatinine protein/creatinine ratios  Obtain PVR    Thank you very much Dr. Elsie Beltrán, for allowing us to participate in the care of Mr. De La Torre [facility].

## 2023-10-03 NOTE — PLAN OF CARE
Problem: Discharge Planning  Goal: Discharge to home or other facility with appropriate resources  Outcome: Progressing  Flowsheets (Taken 10/3/2023 1255)  Discharge to home or other facility with appropriate resources: Identify barriers to discharge with patient and caregiver     Problem: Pain  Goal: Verbalizes/displays adequate comfort level or baseline comfort level  Outcome: Progressing     Problem: Chronic Conditions and Co-morbidities  Goal: Patient's chronic conditions and co-morbidity symptoms are monitored and maintained or improved  Outcome: Progressing  Flowsheets (Taken 10/3/2023 1255)  Care Plan - Patient's Chronic Conditions and Co-Morbidity Symptoms are Monitored and Maintained or Improved: Monitor and assess patient's chronic conditions and comorbid symptoms for stability, deterioration, or improvement     Problem: Safety - Adult  Goal: Free from fall injury  Outcome: Progressing

## 2023-10-03 NOTE — CONSULTS
Department of Podiatry   Consult Note        Reason for Consult:  Erythematous edematous right foot     CHIEF COMPLAINT:  Erythematous edematous right foot     HISTORY OF PRESENT ILLNESS:                The patient is a 26 y.o. male with significant past medical history of diabetes presents to podiatry service with and erythematous and edematous right foot. He states that his foot has been feeling like it is cracking and popping for a while now and today it is really painful. He relates that he had some fevers and chills as well as some nausea in the past 4 days. He is concerned about his hospital stay as he states if he does not return to work tomorrow he will be fired. Patient denies any N/V/D/F/C/SOB/CP and has no other pedal complaints at this time. Past Medical History:        Diagnosis Date    Abscess of back 8/2011    Bowel obstruction ([facility])     Diabetes mellitus type 1 ([facility]) Diagnosed at 6years of age/ in 1998    HTN (hypertension)     Hyperlipidemia        Past Surgical History:    History reviewed. No pertinent surgical history. Medications Prior to Admission:    Medications Prior to Admission: insulin glargine (BASAGLAR KWIKPEN) 100 UNIT/ML injection pen, Inject 43 Units into the skin nightly  insulin lispro, 1 Unit Dial, (HUMALOG KWIKPEN) 100 UNIT/ML SOPN, Inject 5 Units into the skin daily (with breakfast) *Plus Sliding Scale*  insulin lispro, 1 Unit Dial, (HUMALOG KWIKPEN) 100 UNIT/ML SOPN, Inject 10 Units into the skin in the morning and 10 Units in the evening. *Plus Sliding Scale*.   levothyroxine (SYNTHROID) 125 MCG tablet, Take 1 tablet by mouth Daily  hydroCHLOROthiazide (HYDRODIURIL) 12.5 MG tablet, Take 1 tablet by mouth daily  lisinopril (PRINIVIL;ZESTRIL) 40 MG tablet, Take 1 tablet by mouth daily  amLODIPine (NORVASC) 10 MG tablet, Take 1 tablet by mouth daily  atorvastatin (LIPITOR) 40 MG tablet, Take 1 tablet by mouth daily    Allergies:  Pineapple    Social History:   TOBACCO: MEDICINE  IP CONSULT TO NEPHROLOGY  IP CONSULT TO PODIATRY  IP CONSULT TO NEPHROLOGY  IP CONSULT TO PODIATRY    MEDICATION:  Scheduled Meds:   insulin lispro  0-8 Units SubCUTAneous TID WC    insulin lispro  0-4 Units SubCUTAneous Nightly    insulin lispro  5 Units SubCUTAneous TID WC    levothyroxine  125 mcg Oral Daily    sodium chloride  1,000 mL IntraVENous Once    amLODIPine  10 mg Oral Daily    atorvastatin  40 mg Oral Daily    sodium chloride flush  5-40 mL IntraVENous 2 times per day    enoxaparin  30 mg SubCUTAneous BID    insulin glargine  20 Units SubCUTAneous Nightly    piperacillin-tazobactam  3,375 mg IntraVENous q8h     Continuous Infusions:   sodium chloride 100 mL/hr at 10/03/23 1214    sodium chloride      dextrose       PRN Meds:.sodium chloride flush, sodium chloride, ondansetron **OR** ondansetron, polyethylene glycol, acetaminophen **OR** acetaminophen, glucose, dextrose bolus **OR** dextrose bolus, glucagon (rDNA), dextrose, hydrALAZINE, labetalol    RADIOLOGY:  US DUP LOWER EXTREMITY RIGHT ANTON   Final Result   No evidence of DVT in the right lower extremity. CT ABDOMEN PELVIS WO CONTRAST Additional Contrast? None   Final Result   Appendix normal.  No mechanical obstructive process of bowel. No obstructing   uropathy      Wall thickening urinary bladder could represent long-standing or chronic   outlet obstructive findings less likely cystitis however correlation with   urinary studies. Mild prostatomegaly. XR ANKLE RIGHT (MIN 3 VIEWS)   Final Result   Multifocal irregularities throughout the right foot as described above   concerning for Charcot foot configuration would be difficult to exclude a   subtle or acute on chronic appearance of fracture. No acute fracture of the   ankle with ankle mortise symmetric. Cortical thickening of the 2nd   metatarsal concerning for periostitis versus stress injury somewhat diffuse   throughout indeterminate.          XR CHEST PORTABLE

## 2023-10-03 NOTE — PROGRESS NOTES
4 Eyes Skin Assessment     NAME:  Augustin Mcgregor  YOB: 1990  MEDICAL RECORD NUMBER:  [id number]    The patient is being assessed for  Transfer to New Unit    I agree that at least one RN has performed a thorough Head to Toe Skin Assessment on the patient. ALL assessment sites listed below have been assessed. Areas assessed by both nurses:    Head, Face, Ears, Shoulders, Back, Chest, Arms, Elbows, Hands, Sacrum. Buttock, Coccyx, Ischium, Legs. Feet and Heels, and Under Medical Devices         Does the Patient have a Wound? Yes wound(s) were present on assessment.  LDA wound assessment was Initiated and completed by RN       Lev Prevention initiated by RN: Yes  Wound Care Orders initiated by RN: No, already being followed, pediatry took pictures and documented vascular wounds to right LE    Pressure Injury (Stage 3,4, Unstageable, DTI, NWPT, and Complex wounds) if present, place Wound referral order by RN under : No    New Ostomies, if present place, Ostomy referral order under : No     Nurse 1 eSignature: Electronically signed by Will Love RN on 10/3/23 at 6:22 PM EDT    **SHARE this note so that the co-signing nurse can place an eSignature**    Nurse 2 eSignature: Electronically signed by Niesha Last RN on 10/3/23 at 6:45 PM EDT

## 2023-10-03 NOTE — CONSULTS
results for input(s): \"BC\" in the last 72 hours. No results for input(s): \"BLOODCULT2\" in the last 72 hours. No results for input(s): \"LABURIN\" in the last 72 hours. Imaging Study      US DUP LOWER EXTREMITY RIGHT ANTON   Final Result   No evidence of DVT in the right lower extremity. CT ABDOMEN PELVIS WO CONTRAST Additional Contrast? None   Final Result   Appendix normal.  No mechanical obstructive process of bowel. No obstructing   uropathy      Wall thickening urinary bladder could represent long-standing or chronic   outlet obstructive findings less likely cystitis however correlation with   urinary studies. Mild prostatomegaly. XR ANKLE RIGHT (MIN 3 VIEWS)   Final Result   Multifocal irregularities throughout the right foot as described above   concerning for Charcot foot configuration would be difficult to exclude a   subtle or acute on chronic appearance of fracture. No acute fracture of the   ankle with ankle mortise symmetric. Cortical thickening of the 2nd   metatarsal concerning for periostitis versus stress injury somewhat diffuse   throughout indeterminate. XR CHEST PORTABLE   Final Result   Cardiac size enlarged with mild central vascular congestion however no overt   edema or effusion         XR FOOT RIGHT (MIN 3 VIEWS)   Final Result   Multifocal irregularities throughout the right foot as described above   concerning for Charcot foot configuration would be difficult to exclude a   subtle or acute on chronic appearance of fracture. No acute fracture of the   ankle with ankle mortise symmetric. Cortical thickening of the 2nd   metatarsal concerning for periostitis versus stress injury somewhat diffuse   throughout indeterminate.          MRI FOOT RIGHT WO CONTRAST    (Results Pending)           Assessment and Plan     Diagnosis:  DKA  Diabetic foot  Hypothyroidism  Hypertension   ASHLEY likely prerenal  Elevated procalcitonin, elevated CRP and ESR    Plan:    Neuro: No focal neurodeficit, following commands  Pulmonary: Comfortable in room air  Cardiovascular: Hypertension to be managed with hydralazine and as needed labetalol  Abdomen/GI: N.p.o. as per DKA protocol  Renal: 0.9% saline to run at 250 mill per hour, monitor urine output, ASHLEY noted likely prerenal  MSK: Right foot has ulcers, MRI foot ordered to rule out osteomyelitis skin: No active issues   Hematology: Leukocytosis, no other active issues  ID: Empirical Zosyn to continue, ID consult requested. Endocrine: Insulin drip as per DKA protocol, monitor electrolytes as per DKA protocol and replace. IV fluids as per DKA protocol. Endocrinology consult requested. DVT/GI: Prophylaxis: Protonix and Lovenox  Code Status: Full Code  Disposition: ICU  Total Critical care time spent  35 mins. This did not include any procedures.          Manju Leslie MD  Critical Care

## 2023-10-03 NOTE — PROGRESS NOTES
Reason for consult:  DKA    A1C:  10.8%- 10/3/23                 Patient states the following concerns/barriers to diabetes self-management:     [x] None       [] Medication cost   [] Food cost/availability        [] Reading  [] Hearing   [] Vision                [] Work    [] Transportation  [] No insurance  [] Physical limitations    [] Other:        Patient states the following about their diabetes/health:   [x]   Diagnosed Type 1 DM at age 6. Previous diabetes education. Upset regarding infection in foot- views as a set back to his efforts. [x]   Attempts to follow a meal plan, but is not always consistent. Denies drinking sugary beverages. [x]   Uses glucometer AC/HS at home  [x]   Takes Lispro/ Lantus at home. [x]   Does not follow exercise routine, but reports being a  which is physically demanding. [x]   Has support of family. Diabetes survival packet provided to:   [x] Patient        Information reviewed:   Definition of diabetes   Target glucose ranges/A1C   Self-monitoring of blood glucose   Prevention/symptoms/treatment of hypo-/hyperglycemia   Medication adherence   The plate method/meal planning guidelines   The benefits of exercise and recommendations   Reducing the risk of chronic complications      Diabetes medications reviewed (use, purpose, action): Chart reviewed. Education provided regarding current basal/bolus regimen of Lantus and Humalog including differences in types of insulin, timing with meals, onset, duration of effect and peak of insulin dose. Hypoglycemia signs, symptoms and treatments reviewed and literature provided. Patient instructed on the preparation and injection of insulin dose via pen method. Pt reports confidence in use/ administration of Lantus/Lispro.                  Post-education Assessment  [x]  Attentive to teaching  [x]  Answered questions appropriately when asked   [x]  Seems motivated to do well  [x]  Verbalized an understanding of the plate method for portion control   [x]  Verbalized an understanding of prescribed antidiabetes medications   [x]  Verbalized an understanding of target glucose ranges/A1C level  [x]  Seems to have trouble applying concepts to daily lifestyle       COMMENTS:  Pt not interested in further outpatient education at this time. Provided contact information for diabetes education if any educational needs arise. Recommendations:   [x] Carbohydrate-controlled diet            Thank you for this consult.     Xavi Zamora RN 10/3/23 15:05

## 2023-10-04 ENCOUNTER — APPOINTMENT (OUTPATIENT)
Dept: INTERVENTIONAL RADIOLOGY/VASCULAR | Age: 33
End: 2023-10-04
Payer: MEDICAID

## 2023-10-04 PROBLEM — E11.65 POORLY CONTROLLED DIABETES MELLITUS (HCC): Status: ACTIVE | Noted: 2023-10-04

## 2023-10-04 PROBLEM — Z91.119 DIETARY NONCOMPLIANCE: Status: ACTIVE | Noted: 2023-10-04

## 2023-10-04 LAB
ALBUMIN SERPL-MCNC: 2.5 G/DL (ref 3.5–5.2)
ALP SERPL-CCNC: 287 U/L (ref 40–129)
ALT SERPL-CCNC: 27 U/L (ref 0–40)
ANION GAP SERPL CALCULATED.3IONS-SCNC: 10 MMOL/L (ref 7–16)
ANION GAP SERPL CALCULATED.3IONS-SCNC: 12 MMOL/L (ref 7–16)
ANION GAP SERPL CALCULATED.3IONS-SCNC: 12 MMOL/L (ref 7–16)
ANION GAP SERPL CALCULATED.3IONS-SCNC: 14 MMOL/L (ref 7–16)
ANION GAP SERPL CALCULATED.3IONS-SCNC: 9 MMOL/L (ref 7–16)
AST SERPL-CCNC: 82 U/L (ref 0–39)
B-OH-BUTYR SERPL-MCNC: 0.27 MMOL/L (ref 0.02–0.27)
B-OH-BUTYR SERPL-MCNC: 0.8 MMOL/L (ref 0.02–0.27)
BASOPHILS # BLD: 0.06 K/UL (ref 0–0.2)
BASOPHILS NFR BLD: 0 % (ref 0–2)
BILIRUB SERPL-MCNC: 0.4 MG/DL (ref 0–1.2)
BUN SERPL-MCNC: 28 MG/DL (ref 6–20)
BUN SERPL-MCNC: 30 MG/DL (ref 6–20)
BUN SERPL-MCNC: 31 MG/DL (ref 6–20)
BUN SERPL-MCNC: 33 MG/DL (ref 6–20)
BUN SERPL-MCNC: 35 MG/DL (ref 6–20)
CALCIUM SERPL-MCNC: 8.3 MG/DL (ref 8.6–10.2)
CALCIUM SERPL-MCNC: 8.4 MG/DL (ref 8.6–10.2)
CALCIUM SERPL-MCNC: 8.4 MG/DL (ref 8.6–10.2)
CALCIUM SERPL-MCNC: 8.5 MG/DL (ref 8.6–10.2)
CALCIUM SERPL-MCNC: 8.6 MG/DL (ref 8.6–10.2)
CHLORIDE SERPL-SCNC: 102 MMOL/L (ref 98–107)
CHLORIDE SERPL-SCNC: 103 MMOL/L (ref 98–107)
CHLORIDE SERPL-SCNC: 103 MMOL/L (ref 98–107)
CHLORIDE SERPL-SCNC: 104 MMOL/L (ref 98–107)
CHLORIDE SERPL-SCNC: 104 MMOL/L (ref 98–107)
CO2 SERPL-SCNC: 16 MMOL/L (ref 22–29)
CO2 SERPL-SCNC: 18 MMOL/L (ref 22–29)
CO2 SERPL-SCNC: 19 MMOL/L (ref 22–29)
CREAT SERPL-MCNC: 2.9 MG/DL (ref 0.7–1.2)
CREAT SERPL-MCNC: 2.9 MG/DL (ref 0.7–1.2)
CREAT SERPL-MCNC: 3 MG/DL (ref 0.7–1.2)
EOSINOPHIL # BLD: 0.08 K/UL (ref 0.05–0.5)
EOSINOPHILS RELATIVE PERCENT: 0 % (ref 0–6)
ERYTHROCYTE [DISTWIDTH] IN BLOOD BY AUTOMATED COUNT: 12.5 % (ref 11.5–15)
FERRITIN SERPL-MCNC: 1220 NG/ML
GFR SERPL CREATININE-BSD FRML MDRD: 27 ML/MIN/1.73M2
GFR SERPL CREATININE-BSD FRML MDRD: 28 ML/MIN/1.73M2
GFR SERPL CREATININE-BSD FRML MDRD: 29 ML/MIN/1.73M2
GLUCOSE BLD-MCNC: 121 MG/DL (ref 74–99)
GLUCOSE BLD-MCNC: 142 MG/DL (ref 74–99)
GLUCOSE BLD-MCNC: 148 MG/DL (ref 74–99)
GLUCOSE BLD-MCNC: 148 MG/DL (ref 74–99)
GLUCOSE BLD-MCNC: 149 MG/DL (ref 74–99)
GLUCOSE BLD-MCNC: 155 MG/DL (ref 74–99)
GLUCOSE BLD-MCNC: 156 MG/DL (ref 74–99)
GLUCOSE BLD-MCNC: 160 MG/DL (ref 74–99)
GLUCOSE BLD-MCNC: 167 MG/DL (ref 74–99)
GLUCOSE BLD-MCNC: 170 MG/DL (ref 74–99)
GLUCOSE BLD-MCNC: 170 MG/DL (ref 74–99)
GLUCOSE BLD-MCNC: 181 MG/DL (ref 74–99)
GLUCOSE BLD-MCNC: 183 MG/DL (ref 74–99)
GLUCOSE BLD-MCNC: 192 MG/DL (ref 74–99)
GLUCOSE BLD-MCNC: 193 MG/DL (ref 74–99)
GLUCOSE BLD-MCNC: 205 MG/DL (ref 74–99)
GLUCOSE BLD-MCNC: 207 MG/DL (ref 74–99)
GLUCOSE BLD-MCNC: 213 MG/DL (ref 74–99)
GLUCOSE BLD-MCNC: 225 MG/DL (ref 74–99)
GLUCOSE BLD-MCNC: 247 MG/DL (ref 74–99)
GLUCOSE BLD-MCNC: 253 MG/DL (ref 74–99)
GLUCOSE BLD-MCNC: 298 MG/DL (ref 74–99)
GLUCOSE BLD-MCNC: >500 MG/DL (ref 74–99)
GLUCOSE SERPL-MCNC: 155 MG/DL (ref 74–99)
GLUCOSE SERPL-MCNC: 157 MG/DL (ref 74–99)
GLUCOSE SERPL-MCNC: 193 MG/DL (ref 74–99)
GLUCOSE SERPL-MCNC: 233 MG/DL (ref 74–99)
GLUCOSE SERPL-MCNC: 241 MG/DL (ref 74–99)
HAPTOGLOB SERPL-MCNC: 448 MG/DL (ref 30–200)
HCT VFR BLD AUTO: 27.8 % (ref 37–54)
HGB BLD-MCNC: 8.8 G/DL (ref 12.5–16.5)
IMM GRANULOCYTES # BLD AUTO: 0.13 K/UL (ref 0–0.58)
IMM GRANULOCYTES NFR BLD: 1 % (ref 0–5)
IMM RETICS NFR: 8.9 % (ref 2.3–13.4)
INR PPP: 1.3
IRON SATN MFR SERPL: 13 % (ref 20–55)
IRON SERPL-MCNC: 17 UG/DL (ref 59–158)
LDH SERPL-CCNC: 156 U/L (ref 135–225)
LYMPHOCYTES NFR BLD: 0.79 K/UL (ref 1.5–4)
LYMPHOCYTES RELATIVE PERCENT: 4 % (ref 20–42)
MAGNESIUM SERPL-MCNC: 2 MG/DL (ref 1.6–2.6)
MAGNESIUM SERPL-MCNC: 2 MG/DL (ref 1.6–2.6)
MAGNESIUM SERPL-MCNC: 2.1 MG/DL (ref 1.6–2.6)
MAGNESIUM SERPL-MCNC: 2.2 MG/DL (ref 1.6–2.6)
MAGNESIUM SERPL-MCNC: 2.2 MG/DL (ref 1.6–2.6)
MCH RBC QN AUTO: 30.3 PG (ref 26–35)
MCHC RBC AUTO-ENTMCNC: 31.7 G/DL (ref 32–34.5)
MCV RBC AUTO: 95.9 FL (ref 80–99.9)
MICROORGANISM SPEC CULT: NO GROWTH
MONOCYTES NFR BLD: 1.88 K/UL (ref 0.1–0.95)
MONOCYTES NFR BLD: 10 % (ref 2–12)
NEUTROPHILS NFR BLD: 84 % (ref 43–80)
NEUTS SEG NFR BLD: 15.49 K/UL (ref 1.8–7.3)
PHOSPHATE SERPL-MCNC: 2 MG/DL (ref 2.5–4.5)
PHOSPHATE SERPL-MCNC: 2 MG/DL (ref 2.5–4.5)
PHOSPHATE SERPL-MCNC: 2.7 MG/DL (ref 2.5–4.5)
PHOSPHATE SERPL-MCNC: 3 MG/DL (ref 2.5–4.5)
PHOSPHATE SERPL-MCNC: 3.8 MG/DL (ref 2.5–4.5)
PLATELET # BLD AUTO: 376 K/UL (ref 130–450)
PMV BLD AUTO: 9.8 FL (ref 7–12)
POTASSIUM SERPL-SCNC: 3.9 MMOL/L (ref 3.5–5)
POTASSIUM SERPL-SCNC: 4.1 MMOL/L (ref 3.5–5)
POTASSIUM SERPL-SCNC: 4.2 MMOL/L (ref 3.5–5)
POTASSIUM SERPL-SCNC: 4.4 MMOL/L (ref 3.5–5)
POTASSIUM SERPL-SCNC: 4.6 MMOL/L (ref 3.5–5)
PROT SERPL-MCNC: 6.4 G/DL (ref 6.4–8.3)
PROTHROMBIN TIME: 13.7 SEC (ref 9.3–12.4)
RBC # BLD AUTO: 2.9 M/UL (ref 3.8–5.8)
RBC # BLD: ABNORMAL 10*6/UL
RETIC HEMOGLOBIN: 25.6 PG (ref 28.2–36.6)
RETICS # AUTO: 0.03 M/UL
RETICS/RBC NFR AUTO: 0.9 % (ref 0.4–1.9)
SODIUM SERPL-SCNC: 131 MMOL/L (ref 132–146)
SODIUM SERPL-SCNC: 132 MMOL/L (ref 132–146)
SODIUM SERPL-SCNC: 132 MMOL/L (ref 132–146)
SODIUM SERPL-SCNC: 133 MMOL/L (ref 132–146)
SODIUM SERPL-SCNC: 134 MMOL/L (ref 132–146)
SPECIMEN DESCRIPTION: NORMAL
TIBC SERPL-MCNC: 135 UG/DL (ref 250–450)
TRANSFERRIN SERPL-MCNC: 103 MG/DL (ref 200–360)
WBC OTHER # BLD: 18.4 K/UL (ref 4.5–11.5)

## 2023-10-04 PROCEDURE — 83010 ASSAY OF HAPTOGLOBIN QUANT: CPT

## 2023-10-04 PROCEDURE — 2700000000 HC OXYGEN THERAPY PER DAY

## 2023-10-04 PROCEDURE — 2000000000 HC ICU R&B

## 2023-10-04 PROCEDURE — 51798 US URINE CAPACITY MEASURE: CPT

## 2023-10-04 PROCEDURE — 80048 BASIC METABOLIC PNL TOTAL CA: CPT

## 2023-10-04 PROCEDURE — 85610 PROTHROMBIN TIME: CPT

## 2023-10-04 PROCEDURE — 82010 KETONE BODYS QUAN: CPT

## 2023-10-04 PROCEDURE — 2580000003 HC RX 258: Performed by: NURSE PRACTITIONER

## 2023-10-04 PROCEDURE — 2580000003 HC RX 258

## 2023-10-04 PROCEDURE — 83735 ASSAY OF MAGNESIUM: CPT

## 2023-10-04 PROCEDURE — 93923 UPR/LXTR ART STDY 3+ LVLS: CPT

## 2023-10-04 PROCEDURE — 6360000002 HC RX W HCPCS: Performed by: INTERNAL MEDICINE

## 2023-10-04 PROCEDURE — 6370000000 HC RX 637 (ALT 250 FOR IP)

## 2023-10-04 PROCEDURE — 85045 AUTOMATED RETICULOCYTE COUNT: CPT

## 2023-10-04 PROCEDURE — 80053 COMPREHEN METABOLIC PANEL: CPT

## 2023-10-04 PROCEDURE — 6360000002 HC RX W HCPCS

## 2023-10-04 PROCEDURE — 2580000003 HC RX 258: Performed by: INTERNAL MEDICINE

## 2023-10-04 PROCEDURE — 84466 ASSAY OF TRANSFERRIN: CPT

## 2023-10-04 PROCEDURE — 82962 GLUCOSE BLOOD TEST: CPT

## 2023-10-04 PROCEDURE — 82728 ASSAY OF FERRITIN: CPT

## 2023-10-04 PROCEDURE — 85025 COMPLETE CBC W/AUTO DIFF WBC: CPT

## 2023-10-04 PROCEDURE — 93922 UPR/L XTREMITY ART 2 LEVELS: CPT | Performed by: SURGERY

## 2023-10-04 PROCEDURE — 83615 LACTATE (LD) (LDH) ENZYME: CPT

## 2023-10-04 PROCEDURE — 83540 ASSAY OF IRON: CPT

## 2023-10-04 PROCEDURE — 84100 ASSAY OF PHOSPHORUS: CPT

## 2023-10-04 PROCEDURE — 6370000000 HC RX 637 (ALT 250 FOR IP): Performed by: NURSE PRACTITIONER

## 2023-10-04 PROCEDURE — 6370000000 HC RX 637 (ALT 250 FOR IP): Performed by: INTERNAL MEDICINE

## 2023-10-04 RX ORDER — INSULIN LISPRO 100 [IU]/ML
0-8 INJECTION, SOLUTION INTRAVENOUS; SUBCUTANEOUS
Status: DISCONTINUED | OUTPATIENT
Start: 2023-10-05 | End: 2023-10-05

## 2023-10-04 RX ORDER — INSULIN GLARGINE 100 [IU]/ML
22 INJECTION, SOLUTION SUBCUTANEOUS NIGHTLY
Status: DISCONTINUED | OUTPATIENT
Start: 2023-10-04 | End: 2023-10-05

## 2023-10-04 RX ORDER — FERROUS SULFATE 325(65) MG
325 TABLET ORAL 2 TIMES DAILY WITH MEALS
Status: DISCONTINUED | OUTPATIENT
Start: 2023-10-04 | End: 2023-10-12 | Stop reason: HOSPADM

## 2023-10-04 RX ORDER — INSULIN LISPRO 100 [IU]/ML
0-4 INJECTION, SOLUTION INTRAVENOUS; SUBCUTANEOUS NIGHTLY
Status: DISCONTINUED | OUTPATIENT
Start: 2023-10-04 | End: 2023-10-05

## 2023-10-04 RX ORDER — INSULIN LISPRO 100 [IU]/ML
7 INJECTION, SOLUTION INTRAVENOUS; SUBCUTANEOUS
Status: DISCONTINUED | OUTPATIENT
Start: 2023-10-05 | End: 2023-10-05

## 2023-10-04 RX ADMIN — AMLODIPINE BESYLATE 10 MG: 10 TABLET ORAL at 08:30

## 2023-10-04 RX ADMIN — LEVOTHYROXINE SODIUM 125 MCG: 0.03 TABLET ORAL at 08:30

## 2023-10-04 RX ADMIN — ENOXAPARIN SODIUM 30 MG: 100 INJECTION SUBCUTANEOUS at 08:30

## 2023-10-04 RX ADMIN — FERROUS SULFATE TAB 325 MG (65 MG ELEMENTAL FE) 325 MG: 325 (65 FE) TAB at 19:36

## 2023-10-04 RX ADMIN — ATORVASTATIN CALCIUM 40 MG: 40 TABLET, FILM COATED ORAL at 08:30

## 2023-10-04 RX ADMIN — PIPERACILLIN AND TAZOBACTAM 3375 MG: 3; .375 INJECTION, POWDER, LYOPHILIZED, FOR SOLUTION INTRAVENOUS at 15:30

## 2023-10-04 RX ADMIN — INSULIN GLARGINE 22 UNITS: 100 INJECTION, SOLUTION SUBCUTANEOUS at 20:19

## 2023-10-04 RX ADMIN — ACETAMINOPHEN 650 MG: 325 TABLET ORAL at 10:46

## 2023-10-04 RX ADMIN — ACETAMINOPHEN 650 MG: 325 TABLET ORAL at 19:36

## 2023-10-04 RX ADMIN — DEXTROSE AND SODIUM CHLORIDE: 5; 450 INJECTION, SOLUTION INTRAVENOUS at 07:25

## 2023-10-04 RX ADMIN — POTASSIUM CHLORIDE 10 MEQ: 7.46 INJECTION, SOLUTION INTRAVENOUS at 12:37

## 2023-10-04 RX ADMIN — DEXTROSE AND SODIUM CHLORIDE: 5; 450 INJECTION, SOLUTION INTRAVENOUS at 20:57

## 2023-10-04 RX ADMIN — SODIUM CHLORIDE, PRESERVATIVE FREE 10 ML: 5 INJECTION INTRAVENOUS at 19:37

## 2023-10-04 RX ADMIN — DEXTROSE AND SODIUM CHLORIDE: 5; 450 INJECTION, SOLUTION INTRAVENOUS at 01:02

## 2023-10-04 RX ADMIN — PIPERACILLIN AND TAZOBACTAM 3375 MG: 3; .375 INJECTION, POWDER, LYOPHILIZED, FOR SOLUTION INTRAVENOUS at 09:34

## 2023-10-04 RX ADMIN — SODIUM CHLORIDE, PRESERVATIVE FREE 10 ML: 5 INJECTION INTRAVENOUS at 08:30

## 2023-10-04 RX ADMIN — Medication 250 MG: at 19:36

## 2023-10-04 RX ADMIN — PIPERACILLIN AND TAZOBACTAM 3375 MG: 3; .375 INJECTION, POWDER, LYOPHILIZED, FOR SOLUTION INTRAVENOUS at 23:44

## 2023-10-04 RX ADMIN — ONDANSETRON 4 MG: 2 INJECTION INTRAMUSCULAR; INTRAVENOUS at 04:08

## 2023-10-04 RX ADMIN — ONDANSETRON 4 MG: 2 INJECTION INTRAMUSCULAR; INTRAVENOUS at 19:36

## 2023-10-04 RX ADMIN — POTASSIUM CHLORIDE 10 MEQ: 7.46 INJECTION, SOLUTION INTRAVENOUS at 02:48

## 2023-10-04 RX ADMIN — POTASSIUM CHLORIDE 10 MEQ: 7.46 INJECTION, SOLUTION INTRAVENOUS at 04:58

## 2023-10-04 RX ADMIN — POTASSIUM CHLORIDE 10 MEQ: 7.46 INJECTION, SOLUTION INTRAVENOUS at 03:54

## 2023-10-04 RX ADMIN — SODIUM CHLORIDE 2.64 UNITS/HR: 9 INJECTION, SOLUTION INTRAVENOUS at 18:34

## 2023-10-04 RX ADMIN — POTASSIUM CHLORIDE 10 MEQ: 7.46 INJECTION, SOLUTION INTRAVENOUS at 13:49

## 2023-10-04 RX ADMIN — DEXTROSE AND SODIUM CHLORIDE: 5; 450 INJECTION, SOLUTION INTRAVENOUS at 13:45

## 2023-10-04 RX ADMIN — ENOXAPARIN SODIUM 30 MG: 100 INJECTION SUBCUTANEOUS at 19:30

## 2023-10-04 ASSESSMENT — PAIN DESCRIPTION - ORIENTATION
ORIENTATION: RIGHT
ORIENTATION: RIGHT;LEFT
ORIENTATION: RIGHT
ORIENTATION: RIGHT

## 2023-10-04 ASSESSMENT — PAIN DESCRIPTION - DESCRIPTORS
DESCRIPTORS: BURNING;SHARP;THROBBING
DESCRIPTORS: ACHING

## 2023-10-04 ASSESSMENT — PAIN SCALES - GENERAL
PAINLEVEL_OUTOF10: 0
PAINLEVEL_OUTOF10: 4
PAINLEVEL_OUTOF10: 0
PAINLEVEL_OUTOF10: 2
PAINLEVEL_OUTOF10: 0
PAINLEVEL_OUTOF10: 3
PAINLEVEL_OUTOF10: 3
PAINLEVEL_OUTOF10: 7
PAINLEVEL_OUTOF10: 2
PAINLEVEL_OUTOF10: 0
PAINLEVEL_OUTOF10: 0
PAINLEVEL_OUTOF10: 7

## 2023-10-04 ASSESSMENT — PAIN - FUNCTIONAL ASSESSMENT
PAIN_FUNCTIONAL_ASSESSMENT: PREVENTS OR INTERFERES WITH MANY ACTIVE NOT PASSIVE ACTIVITIES
PAIN_FUNCTIONAL_ASSESSMENT: ACTIVITIES ARE NOT PREVENTED
PAIN_FUNCTIONAL_ASSESSMENT: PREVENTS OR INTERFERES SOME ACTIVE ACTIVITIES AND ADLS
PAIN_FUNCTIONAL_ASSESSMENT: PREVENTS OR INTERFERES SOME ACTIVE ACTIVITIES AND ADLS

## 2023-10-04 ASSESSMENT — PAIN DESCRIPTION - LOCATION
LOCATION: FOOT
LOCATION: FOOT
LOCATION: HEAD
LOCATION: FOOT

## 2023-10-04 NOTE — PROGRESS NOTES
Wound care: Podiatry following for right foot wound, per patients nurse no other need for wound care to see patient. Wound care will sign off, re-consult if needed. Barney Severance, RN

## 2023-10-04 NOTE — PROGRESS NOTES
[address] [facility]             Pulmonary & Critical Care Medicine                MICU Progress Note                 Written by: Calixto Martin MD  Name: Deedee Page : 1990       Age: 26 y.o. MR/Act #    : [id number],  Billing  #    : [id number]   Admit Date: 10/2/2023  8:12 AM LOS: 2,   Hospital Day: 3 Room #      : [id number]   PCP            : Eric Valverde MD,   Referred by: Janet Bellamy DO ICU Attending: MD Naseem Cook date: 10/4/2023 11:25 AM   ICU Days:       2 Vent Days:     0 LOS: 2,                          Reason for ICU admission           Chief Complaint   Patient presents with    Nausea     Nausea and vomiting since Saturday. Unable to keep anything down. Brief HPI, Presentation & Synopsis                       26 y.o. male , with past medical history of  DM typetype 1, HTN. HLP, presented with for nausea for the past 4 days and subjective fever and chills, since 12 weeks ago he hears a pop in right foot  while he was walking and start swelling and got much worse during past 3 days with a wound at the outside part  of the foot and erythema in medial part of the foot. He was found to be in DKA with severe acidosis. Admitted in MICU for DKA management and diabetic foot.     Active problem list of yesterday:  Active Hospital Problems    Diagnosis Date Noted    HTN (hypertension) [I10] 06/10/2018     Priority: Low    Septicemia ([address]) [A41.9] 10/02/2023    ASHLEY (acute kidney injury) ([address]) [N17.9] 10/02/2023    Cellulitis of right lower extremity [L03.115] 10/02/2023    Dehydration [E86.0] 10/02/2023    Type 1 diabetes mellitus with ketoacidosis without coma ([address]) [E10.10]     Type 1 diabetes mellitus ([address]) [E10.9] 1998                           Events of last night                      Continues to be on insulin drip                      Subjective   10/4/2023               Hemodynamically stable symmetric. Cortical thickening of the 2nd metatarsal concerning for periostitis versus stress injury somewhat diffuse throughout indeterminate. XR CHEST PORTABLE    Result Date: 10/2/2023  Cardiac size enlarged with mild central vascular congestion however no overt edema or effusion       EKG  :     Rhythm Strip :Normal Sinus Rhythm    ECHO  :                        Assessment and Plan of care     Diagnosis:  DKA  Diabetic foot, r/o Osteomyelitis  Hypothyroidism  Hypertension   ASHLEY likely prerenal  Elevated procalcitonin, elevated CRP and ESR     Plan:     Neuro: No focal neurodeficit, following commands  Pulmonary: Comfortable in room air  Cardiovascular: Hypertension to be managed with hydralazine and as needed labetalol  Abdomen/GI: N.p.o. as per DKA protocol  Renal: 0.9% saline to run at 250 mill per hour, monitor urine output, ASHLEY noted likely prerenal  MSK: Right foot has ulcers, MRI foot ordered to rule out osteomyelitis. Podiatry consult appreciated. skin: No active issues   Hematology: Leukocytosis, no other active issues  ID: Empirical Zosyn to continue, ID consult appreciated. Endocrine: Insulin drip as per DKA protocol, monitor electrolytes as per DKA protocol and replace. IV fluids as per DKA protocol. Endocrinology consult requested. DVT/GI: Prophylaxis: Protonix and Lovenox  Code Status: Full Code  Disposition: ICU  Total Critical care time spent  35 mins. This did not include any procedures. During multidisciplinary team rounds Roselia Benito, was seen, examined and discussed. This is confirmation that I have personally seen and examined the patient and that the key elements of the encounter were performed by me (> 85 % time). The medications & laboratory data and imagery was discussed and adjusted where necessary. Key issues of the case were discussed among consultants. This patient has a high probability of sudden clinically significant deterioration.  I managed/supervised life or

## 2023-10-04 NOTE — PLAN OF CARE
Problem: Discharge Planning  Goal: Discharge to home or other facility with appropriate resources  10/4/2023 0021 by Fabiana Kinney, RAMA  Outcome: Progressing  10/3/2023 1818 by Lauren Rajan RN  Outcome: Progressing  Flowsheets (Taken 10/3/2023 1255)  Discharge to home or other facility with appropriate resources: Identify barriers to discharge with patient and caregiver     Problem: Pain  Goal: Verbalizes/displays adequate comfort level or baseline comfort level  10/4/2023 0021 by Fabiana Kinney RN  Outcome: Progressing  10/3/2023 1818 by Lauren Rajan RN  Outcome: Progressing     Problem: Chronic Conditions and Co-morbidities  Goal: Patient's chronic conditions and co-morbidity symptoms are monitored and maintained or improved  10/4/2023 0021 by Fabiana Kinney RN  Outcome: Progressing  10/3/2023 1818 by Lauren Rajan RN  Outcome: Progressing  Flowsheets (Taken 10/3/2023 1255)  Care Plan - Patient's Chronic Conditions and Co-Morbidity Symptoms are Monitored and Maintained or Improved: Monitor and assess patient's chronic conditions and comorbid symptoms for stability, deterioration, or improvement     Problem: Safety - Adult  Goal: Free from fall injury  10/4/2023 0021 by Fabiana Kinney, RAMA  Outcome: Progressing  10/3/2023 1818 by Lauren Rajan RN  Outcome: Progressing

## 2023-10-04 NOTE — CARE COORDINATION
Care Coordination LOS 2 day, transfer from 39. DKA , R foot cellulitis . Insulin gtt, Zosyn q8. Per previous not, pt is Medicaid pending per PBO and qualifies for assist with meds at discharge.  Plan is home at discharge, mother to transport home     Electronically signed by Ml Amador RN on 10/4/2023 at 10:25 AM

## 2023-10-05 ENCOUNTER — APPOINTMENT (OUTPATIENT)
Dept: MRI IMAGING | Age: 33
End: 2023-10-05
Payer: MEDICAID

## 2023-10-05 LAB
ALBUMIN SERPL-MCNC: 2.6 G/DL (ref 3.5–5.2)
ALP SERPL-CCNC: 179 U/L (ref 40–129)
ALT SERPL-CCNC: 21 U/L (ref 0–40)
ANION GAP SERPL CALCULATED.3IONS-SCNC: 13 MMOL/L (ref 7–16)
AST SERPL-CCNC: 22 U/L (ref 0–39)
BASOPHILS # BLD: 0.17 K/UL (ref 0–0.2)
BASOPHILS NFR BLD: 1 % (ref 0–2)
BILIRUB SERPL-MCNC: 0.2 MG/DL (ref 0–1.2)
BUN SERPL-MCNC: 28 MG/DL (ref 6–20)
CALCIUM SERPL-MCNC: 8.9 MG/DL (ref 8.6–10.2)
CHLORIDE SERPL-SCNC: 103 MMOL/L (ref 98–107)
CO2 SERPL-SCNC: 17 MMOL/L (ref 22–29)
CREAT SERPL-MCNC: 3.1 MG/DL (ref 0.7–1.2)
EOSINOPHIL # BLD: 0.51 K/UL (ref 0.05–0.5)
EOSINOPHILS RELATIVE PERCENT: 3 % (ref 0–6)
ERYTHROCYTE [DISTWIDTH] IN BLOOD BY AUTOMATED COUNT: 12.7 % (ref 11.5–15)
GFR SERPL CREATININE-BSD FRML MDRD: 26 ML/MIN/1.73M2
GLUCOSE BLD-MCNC: 210 MG/DL (ref 74–99)
GLUCOSE BLD-MCNC: 284 MG/DL (ref 74–99)
GLUCOSE BLD-MCNC: 291 MG/DL (ref 74–99)
GLUCOSE BLD-MCNC: 338 MG/DL (ref 74–99)
GLUCOSE BLD-MCNC: 346 MG/DL (ref 74–99)
GLUCOSE BLD-MCNC: 350 MG/DL (ref 74–99)
GLUCOSE SERPL-MCNC: 311 MG/DL (ref 74–99)
HCT VFR BLD AUTO: 29.2 % (ref 37–54)
HGB BLD-MCNC: 9 G/DL (ref 12.5–16.5)
INR PPP: 1.3
LYMPHOCYTES NFR BLD: 1.69 K/UL (ref 1.5–4)
LYMPHOCYTES RELATIVE PERCENT: 9 % (ref 20–42)
MAGNESIUM SERPL-MCNC: 2.1 MG/DL (ref 1.6–2.6)
MCH RBC QN AUTO: 29.9 PG (ref 26–35)
MCHC RBC AUTO-ENTMCNC: 30.8 G/DL (ref 32–34.5)
MCV RBC AUTO: 97 FL (ref 80–99.9)
MONOCYTES NFR BLD: 1.35 K/UL (ref 0.1–0.95)
MONOCYTES NFR BLD: 7 % (ref 2–12)
NEUTROPHILS NFR BLD: 81 % (ref 43–80)
NEUTS SEG NFR BLD: 15.69 K/UL (ref 1.8–7.3)
PARTIAL THROMBOPLASTIN TIME: 39.6 SEC (ref 24.5–35.1)
PATH REV BLD -IMP: NORMAL
PHOSPHATE SERPL-MCNC: 3.1 MG/DL (ref 2.5–4.5)
PLATELET # BLD AUTO: 438 K/UL (ref 130–450)
PMV BLD AUTO: 10 FL (ref 7–12)
POTASSIUM SERPL-SCNC: 4.9 MMOL/L (ref 3.5–5)
PROT SERPL-MCNC: 6.5 G/DL (ref 6.4–8.3)
PROTHROMBIN TIME: 13.8 SEC (ref 9.3–12.4)
RBC # BLD AUTO: 3.01 M/UL (ref 3.8–5.8)
RBC # BLD: ABNORMAL 10*6/UL
RBC # BLD: ABNORMAL 10*6/UL
SODIUM SERPL-SCNC: 133 MMOL/L (ref 132–146)
WBC OTHER # BLD: 19.4 K/UL (ref 4.5–11.5)

## 2023-10-05 PROCEDURE — 84100 ASSAY OF PHOSPHORUS: CPT

## 2023-10-05 PROCEDURE — 73718 MRI LOWER EXTREMITY W/O DYE: CPT

## 2023-10-05 PROCEDURE — 6370000000 HC RX 637 (ALT 250 FOR IP): Performed by: NURSE PRACTITIONER

## 2023-10-05 PROCEDURE — 2580000003 HC RX 258

## 2023-10-05 PROCEDURE — 6370000000 HC RX 637 (ALT 250 FOR IP): Performed by: INTERNAL MEDICINE

## 2023-10-05 PROCEDURE — 2580000003 HC RX 258: Performed by: INTERNAL MEDICINE

## 2023-10-05 PROCEDURE — 6370000000 HC RX 637 (ALT 250 FOR IP)

## 2023-10-05 PROCEDURE — 85610 PROTHROMBIN TIME: CPT

## 2023-10-05 PROCEDURE — 2700000000 HC OXYGEN THERAPY PER DAY

## 2023-10-05 PROCEDURE — 2000000000 HC ICU R&B

## 2023-10-05 PROCEDURE — 6360000002 HC RX W HCPCS

## 2023-10-05 PROCEDURE — 82962 GLUCOSE BLOOD TEST: CPT

## 2023-10-05 PROCEDURE — 85730 THROMBOPLASTIN TIME PARTIAL: CPT

## 2023-10-05 PROCEDURE — 85025 COMPLETE CBC W/AUTO DIFF WBC: CPT

## 2023-10-05 PROCEDURE — 80053 COMPREHEN METABOLIC PANEL: CPT

## 2023-10-05 PROCEDURE — 6370000000 HC RX 637 (ALT 250 FOR IP): Performed by: STUDENT IN AN ORGANIZED HEALTH CARE EDUCATION/TRAINING PROGRAM

## 2023-10-05 PROCEDURE — 83735 ASSAY OF MAGNESIUM: CPT

## 2023-10-05 RX ORDER — INSULIN GLARGINE 100 [IU]/ML
25 INJECTION, SOLUTION SUBCUTANEOUS 2 TIMES DAILY
Status: DISCONTINUED | OUTPATIENT
Start: 2023-10-05 | End: 2023-10-06

## 2023-10-05 RX ORDER — INSULIN LISPRO 100 [IU]/ML
0-16 INJECTION, SOLUTION INTRAVENOUS; SUBCUTANEOUS
Status: DISCONTINUED | OUTPATIENT
Start: 2023-10-05 | End: 2023-10-05

## 2023-10-05 RX ORDER — SODIUM BICARBONATE 650 MG/1
1300 TABLET ORAL 2 TIMES DAILY
Status: DISCONTINUED | OUTPATIENT
Start: 2023-10-05 | End: 2023-10-11

## 2023-10-05 RX ORDER — INSULIN GLARGINE 100 [IU]/ML
35 INJECTION, SOLUTION SUBCUTANEOUS NIGHTLY
Status: DISCONTINUED | OUTPATIENT
Start: 2023-10-06 | End: 2023-10-05

## 2023-10-05 RX ORDER — SODIUM CHLORIDE 9 MG/ML
INJECTION, SOLUTION INTRAVENOUS CONTINUOUS
Status: DISCONTINUED | OUTPATIENT
Start: 2023-10-05 | End: 2023-10-05

## 2023-10-05 RX ORDER — INSULIN GLARGINE 100 [IU]/ML
22 INJECTION, SOLUTION SUBCUTANEOUS 2 TIMES DAILY
Status: DISCONTINUED | OUTPATIENT
Start: 2023-10-05 | End: 2023-10-05

## 2023-10-05 RX ORDER — INSULIN LISPRO 100 [IU]/ML
13 INJECTION, SOLUTION INTRAVENOUS; SUBCUTANEOUS
Status: DISCONTINUED | OUTPATIENT
Start: 2023-10-05 | End: 2023-10-06

## 2023-10-05 RX ORDER — INSULIN LISPRO 100 [IU]/ML
0-4 INJECTION, SOLUTION INTRAVENOUS; SUBCUTANEOUS NIGHTLY
Status: DISCONTINUED | OUTPATIENT
Start: 2023-10-05 | End: 2023-10-10

## 2023-10-05 RX ORDER — TRAMADOL HYDROCHLORIDE 50 MG/1
50 TABLET ORAL EVERY 6 HOURS PRN
Status: DISCONTINUED | OUTPATIENT
Start: 2023-10-05 | End: 2023-10-12 | Stop reason: HOSPADM

## 2023-10-05 RX ORDER — INSULIN LISPRO 100 [IU]/ML
0-18 INJECTION, SOLUTION INTRAVENOUS; SUBCUTANEOUS
Status: DISCONTINUED | OUTPATIENT
Start: 2023-10-05 | End: 2023-10-08

## 2023-10-05 RX ADMIN — SODIUM BICARBONATE 1300 MG: 650 TABLET ORAL at 20:07

## 2023-10-05 RX ADMIN — ENOXAPARIN SODIUM 30 MG: 100 INJECTION SUBCUTANEOUS at 08:24

## 2023-10-05 RX ADMIN — LEVOTHYROXINE SODIUM 125 MCG: 0.03 TABLET ORAL at 08:23

## 2023-10-05 RX ADMIN — SODIUM BICARBONATE 1300 MG: 650 TABLET ORAL at 13:59

## 2023-10-05 RX ADMIN — FERROUS SULFATE TAB 325 MG (65 MG ELEMENTAL FE) 325 MG: 325 (65 FE) TAB at 16:19

## 2023-10-05 RX ADMIN — INSULIN LISPRO 6 UNITS: 100 INJECTION, SOLUTION INTRAVENOUS; SUBCUTANEOUS at 09:02

## 2023-10-05 RX ADMIN — ENOXAPARIN SODIUM 30 MG: 100 INJECTION SUBCUTANEOUS at 20:07

## 2023-10-05 RX ADMIN — INSULIN LISPRO 13 UNITS: 100 INJECTION, SOLUTION INTRAVENOUS; SUBCUTANEOUS at 13:57

## 2023-10-05 RX ADMIN — SODIUM CHLORIDE, PRESERVATIVE FREE 10 ML: 5 INJECTION INTRAVENOUS at 20:07

## 2023-10-05 RX ADMIN — AMLODIPINE BESYLATE 10 MG: 10 TABLET ORAL at 08:23

## 2023-10-05 RX ADMIN — SODIUM CHLORIDE, PRESERVATIVE FREE 10 ML: 5 INJECTION INTRAVENOUS at 08:24

## 2023-10-05 RX ADMIN — INSULIN LISPRO 8 UNITS: 100 INJECTION, SOLUTION INTRAVENOUS; SUBCUTANEOUS at 13:56

## 2023-10-05 RX ADMIN — PIPERACILLIN AND TAZOBACTAM 3375 MG: 3; .375 INJECTION, POWDER, LYOPHILIZED, FOR SOLUTION INTRAVENOUS at 16:24

## 2023-10-05 RX ADMIN — INSULIN LISPRO 9 UNITS: 100 INJECTION, SOLUTION INTRAVENOUS; SUBCUTANEOUS at 18:57

## 2023-10-05 RX ADMIN — Medication 250 MG: at 20:07

## 2023-10-05 RX ADMIN — ATORVASTATIN CALCIUM 40 MG: 40 TABLET, FILM COATED ORAL at 08:24

## 2023-10-05 RX ADMIN — PIPERACILLIN AND TAZOBACTAM 3375 MG: 3; .375 INJECTION, POWDER, LYOPHILIZED, FOR SOLUTION INTRAVENOUS at 23:59

## 2023-10-05 RX ADMIN — INSULIN LISPRO 13 UNITS: 100 INJECTION, SOLUTION INTRAVENOUS; SUBCUTANEOUS at 18:57

## 2023-10-05 RX ADMIN — Medication 250 MG: at 08:23

## 2023-10-05 RX ADMIN — SODIUM CHLORIDE: 9 INJECTION, SOLUTION INTRAVENOUS at 10:27

## 2023-10-05 RX ADMIN — FERROUS SULFATE TAB 325 MG (65 MG ELEMENTAL FE) 325 MG: 325 (65 FE) TAB at 08:23

## 2023-10-05 RX ADMIN — ONDANSETRON 4 MG: 2 INJECTION INTRAMUSCULAR; INTRAVENOUS at 01:31

## 2023-10-05 RX ADMIN — INSULIN LISPRO 7 UNITS: 100 INJECTION, SOLUTION INTRAVENOUS; SUBCUTANEOUS at 09:02

## 2023-10-05 RX ADMIN — ACETAMINOPHEN 650 MG: 325 TABLET ORAL at 16:19

## 2023-10-05 RX ADMIN — PIPERACILLIN AND TAZOBACTAM 3375 MG: 3; .375 INJECTION, POWDER, LYOPHILIZED, FOR SOLUTION INTRAVENOUS at 08:22

## 2023-10-05 RX ADMIN — TRAMADOL HYDROCHLORIDE 50 MG: 50 TABLET ORAL at 18:16

## 2023-10-05 RX ADMIN — INSULIN GLARGINE 22 UNITS: 100 INJECTION, SOLUTION SUBCUTANEOUS at 10:26

## 2023-10-05 RX ADMIN — INSULIN GLARGINE 25 UNITS: 100 INJECTION, SOLUTION SUBCUTANEOUS at 21:27

## 2023-10-05 ASSESSMENT — PAIN SCALES - GENERAL
PAINLEVEL_OUTOF10: 0
PAINLEVEL_OUTOF10: 9
PAINLEVEL_OUTOF10: 0
PAINLEVEL_OUTOF10: 0
PAINLEVEL_OUTOF10: 9
PAINLEVEL_OUTOF10: 0
PAINLEVEL_OUTOF10: 0
PAINLEVEL_OUTOF10: 9
PAINLEVEL_OUTOF10: 0
PAINLEVEL_OUTOF10: 0
PAINLEVEL_OUTOF10: 6

## 2023-10-05 ASSESSMENT — PAIN DESCRIPTION - DESCRIPTORS: DESCRIPTORS: ACHING;DISCOMFORT

## 2023-10-05 ASSESSMENT — PAIN DESCRIPTION - LOCATION
LOCATION: FOOT

## 2023-10-05 ASSESSMENT — PAIN - FUNCTIONAL ASSESSMENT: PAIN_FUNCTIONAL_ASSESSMENT: PREVENTS OR INTERFERES SOME ACTIVE ACTIVITIES AND ADLS

## 2023-10-05 ASSESSMENT — PAIN DESCRIPTION - ORIENTATION: ORIENTATION: RIGHT

## 2023-10-05 NOTE — PROGRESS NOTES
[address] [facility]             Pulmonary & Critical Care Medicine                MICU Progress Note                 Written by: Niya Harvey MD  Name: Gabby Merchant : 1990       Age: 26 y.o. MR/Act #    : [id number],  Billing  #    : [id number]   Admit Date: 10/2/2023  8:12 AM LOS: 3,   Hospital Day: 4 Room #      : [id number]   PCP            : Alba Arteaga MD,   Referred by: Cleveland Perez DO ICU Attending: MD Naseem Ceja date: 10/5/2023 10:25 AM   ICU Days:       3 Vent Days:     0 LOS: 3,                          Reason for ICU admission           Chief Complaint   Patient presents with    Nausea     Nausea and vomiting since Saturday. Unable to keep anything down. Brief HPI, Presentation & Synopsis                       26 y.o. male , with past medical history of  DM typetype 1, HTN. HLP, presented with for nausea for the past 4 days and subjective fever and chills, since 12 weeks ago he hears a pop in right foot  while he was walking and start swelling and got much worse during past 3 days with a wound at the outside part  of the foot and erythema in medial part of the foot. He was found to be in DKA with severe acidosis. Admitted in MICU for DKA management and diabetic foot.     Active problem list of yesterday:  Active Hospital Problems    Diagnosis Date Noted    HTN (hypertension) [I10] 06/10/2018     Priority: Low    Poorly controlled diabetes mellitus ([address]) [E11.65] 10/04/2023    Dietary noncompliance [Z91.119] 10/04/2023    Septicemia ([address]) [A41.9] 10/02/2023    ASHLEY (acute kidney injury) ([address]) [N17.9] 10/02/2023    Cellulitis of right lower extremity [L03.115] 10/02/2023    Dehydration [E86.0] 10/02/2023    Type 1 diabetes mellitus with ketoacidosis without coma ([address]) [E10.10]     Type 1 diabetes mellitus ([address]) [E10.9] 1998                           Events of last night                      Continues to be on

## 2023-10-05 NOTE — PROGRESS NOTES
ENDOCRINOLOGY PROGRESS NOTE      Date of admission: 10/2/2023  Date of service: 10/5/2023  Admitting physician: Angela Perdue DO   Primary Care Physician: Denia Block MD  Consultant physician: Barry Rodriguez MD     Reason for the consultation:  Uncontrolled DM    History of Present Illness:  Jarett Cano is a very pleasant 26 y.o. old male with PMH of DM1, HTN, HLD and other listed below admitted to [facility] on 10/2/2023 because of nausea, fever, and chills, endocrine service was consulted for diabetes management. Patient was initially admitted to the floor for management of sepsis 2/2 cellulitis however he clinically deteriorated with worsening ASHLEY and also entered into DKA. He was transferred to MICU and placed on an insulin drip. Subjective: Patient still complaining of intermittent nausea that is worse at night. Ate full breakfast this morning. 6 cans of diet soda on his tray this morning which may make sugar control difficult. No hypoglycemic episodes overnight. BG readings remain above goal.       Lab Results   Component Value Date/Time    LABA1C 10.8 10/03/2023 05:07 AM       Inpatient diet:   Carb Restricted diet     Point of care glucose monitoring   (Independently reviewed)   No results for input(s): \"GLUMET\" in the last 72 hours. Past medical history:   Past Medical History:   Diagnosis Date    Abscess of back 8/2011    Bowel obstruction ([address])     Diabetes mellitus type 1 ([address]) Diagnosed at 6years of age/ in 1998    HTN (hypertension)     Hyperlipidemia        Past surgical history:  History reviewed. No pertinent surgical history. Social history:   Tobacco:   reports that he has never smoked. His smokeless tobacco use includes chew. Alcohol:   reports that he does not currently use alcohol. Drugs:   reports no history of drug use. Family history:    Family History   Problem Relation Age of Onset    Diabetes Mother        Allergy and drug reactions:    Allergies   Allergen Reactions subtle or acute on chronic appearance of fracture. No acute fracture of the   ankle with ankle mortise symmetric. Cortical thickening of the 2nd   metatarsal concerning for periostitis versus stress injury somewhat diffuse   throughout indeterminate. MRI FOOT RIGHT WO CONTRAST    (Results Pending)       Medical Records/Labs/Images review:   I personally reviewed and summarized previous records   All labs and imaging were reviewed independently     [address] [place] [address], a 26 y.o.-old male seen today for inpatient diabetes management    Diabetes Mellitus type 2  Patient's diabetes is uncontrolled, A1c 10.8  When patient is ready to be bridged, we recommend the following DM regimen  Increase Lantus 25u BID  Increase Lispro premeal to 13u   Increase to high dose sliding scale  Continue glucose check with meals and at bedtime  Will titrate insulin dose based on the blood glucose trend & insulin requirement  Upon discharge, I will arrange for the patient to be seen in the endocrinology clinic for routine diabetes maintenance and prevention     Dietary noncompliance  Discussed with patient the importance of eating consistent carbohydrate meals, avoiding high glycemic index food. Also, discussed with patient the risk and negative consequences of dietary noncompliance on blood glucose control, blood pressure and weight    Cellulitis of right leg  Management per ID  Discussed the effect of infection on the blood sugar control.   Also discussed the importance of controlling diabetes and management intervention of soft tissue infection    Sepsis  Management per critical care  Insulin requirements while septic are likely to differ from home regimen/discharge regimen    Interdisciplinary plan for communication with healthcare providers:   Consult recommendations were discussed with the Primary Service/Nursing staff      The above issues were reviewed with the patient who understood and agreed with the

## 2023-10-05 NOTE — PROGRESS NOTES
Comprehensive Nutrition Assessment    Type and Reason for Visit:  Initial, Consult, Wound    Nutrition Recommendations/Plan:     Continue Current Diet  Start Oral Nutrition Supplements     Malnutrition Assessment:  Malnutrition Status:  No malnutrition (10/05/23 1250)    Context:  Acute Illness     Findings of the 6 clinical characteristics of malnutrition:  Energy Intake:  Mild decrease in energy intake (improving)  Weight Loss:  No significant weight loss     Body Fat Loss:  No significant body fat loss     Muscle Mass Loss:  No significant muscle mass loss    Fluid Accumulation:  No significant fluid accumulation     Strength:  Not Performed    Nutrition Assessment:    Pt admit w/ diabetic foot infection/ cellulitis complicated by ASHLEY & DKA now transferred to MICU. PMHx DM/ dietary noncompliance (A1C 10.8). PO diet just advanced today- appears to be eating well per breakfast tray. Will add Ensure HP & Ronal BID (low calorie/low CHO) & monitor. Nutrition Related Findings:    Pt alert, MAP WNL, +I/O's 7L, nonpitting edema, hypoactive BS, intermittent nausea noted, A1C 10.8     Wound Type: Diabetic Ulcer (R foot)       Current Nutrition Intake & Therapies:    Average Meal Intake: % (x 1 meal since diet advanced)     ADULT DIET; Regular; 3 carb choices (45 gm/meal)    Anthropometric Measures:  Height: 5' 9\" (175.3 cm)  Ideal Body Weight (IBW): 160 lbs (73 kg)    Admission Body Weight: 265 lb (120.2 kg) (10/5 first measured)  Current Body Weight: 265 lb (120.2 kg) (10/5 bedscale), 165.6 % IBW.     Current BMI (kg/m2): 39.1  Usual Body Weight: 253 lb 6 oz (114.9 kg) (12/16/22 EMR measured wt, hx stable)  % Weight Change (Calculated): 4.6                    BMI Categories: Obese Class 2 (BMI 35.0 -39.9)    Estimated Daily Nutrient Needs:  Energy Requirements Based On: Kcal/kg  Weight Used for Energy Requirements: Current  Energy (kcal/day): 15-18 kcal/kg; 1597-4352  Weight Used for Protein Requirements:

## 2023-10-05 NOTE — PLAN OF CARE
Problem: Discharge Planning  Goal: Discharge to home or other facility with appropriate resources  10/5/2023 0922 by Cheryle Ferguson  Outcome: Progressing     Problem: Pain  Goal: Verbalizes/displays adequate comfort level or baseline comfort level  10/5/2023 0922 by Cheryle Ferguson  Outcome: Progressing     Problem: Chronic Conditions and Co-morbidities  Goal: Patient's chronic conditions and co-morbidity symptoms are monitored and maintained or improved  10/5/2023 0922 by Cheryle Ferguson  Outcome: Progressing     Problem: Safety - Adult  Goal: Free from fall injury  10/5/2023 0922 by Cheryle Ferguson  Outcome: Progressing     Problem: Skin/Tissue Integrity  Goal: Absence of new skin breakdown  Description: 1. Monitor for areas of redness and/or skin breakdown  2. Assess vascular access sites hourly  3. Every 4-6 hours minimum:  Change oxygen saturation probe site  4. Every 4-6 hours:  If on nasal continuous positive airway pressure, respiratory therapy assess nares and determine need for appliance change or resting period.   10/5/2023 6823 by Cheryle Ferguson  Outcome: Progressing     Problem: Skin/Tissue Integrity - Adult  Goal: Skin integrity remains intact  Outcome: Progressing     Problem: Skin/Tissue Integrity - Adult  Goal: Incisions, wounds, or drain sites healing without S/S of infection  Outcome: Progressing     Problem: Musculoskeletal - Adult  Goal: Return mobility to safest level of function  Outcome: Progressing     Problem: Infection - Adult  Goal: Absence of infection at discharge  Outcome: Progressing     Problem: Metabolic/Fluid and Electrolytes - Adult  Goal: Electrolytes maintained within normal limits  Outcome: Progressing

## 2023-10-05 NOTE — PROGRESS NOTES
in February  No renal function improvement despite IV fluid resuscitation since admission (>7 L)   Plan  Per nephrology   Discontinue IV fluids  Okay to transfer out of ICU  Continue to monitor renal function  Start sodium bicarbonate 1300 mg p.o. twice daily  Continue to monitor bicarbonate levels and anion gap         HTN  Patient is on HCTZ/ amlodipine/Lisinopril at home   Plan  Amlodipine  10  Hydralazine 10 for SBP>160 HR<65  Labetalol 10 for SBP>160 HR>65       HLP    Plan  Atorvastatin 40     6. Hypothyroidism   Plan  TSH-->14.15  FT4 1  Levothyroxine 125 like home dose      7. DM type 1   HBA1C 10.8   Lantus to  35 u nightly and Lispro to 13 u pre meal plus high dose sliding scale .       PT/OT evaluation: not indicated at this time       DVT prophylaxis:  Lovenox  GI prophylaxis: none  Diet:   Diet NPO   Bowel regimen:  Glycolex  Pain management: as needed  Code status: Full Code   Disposition: Transfer / Discharge / Continue Current Care  Family: updated as available    Diana Bundy MD, PGY-1   Attending physician: Dr. Amelia Washington

## 2023-10-06 ENCOUNTER — ANESTHESIA EVENT (OUTPATIENT)
Dept: OPERATING ROOM | Age: 33
End: 2023-10-06
Payer: MEDICAID

## 2023-10-06 ENCOUNTER — ANESTHESIA (OUTPATIENT)
Dept: OPERATING ROOM | Age: 33
End: 2023-10-06
Payer: MEDICAID

## 2023-10-06 PROBLEM — R78.81 POSITIVE BLOOD CULTURE: Status: ACTIVE | Noted: 2023-10-06

## 2023-10-06 LAB
ALBUMIN SERPL-MCNC: 2.5 G/DL (ref 3.5–5.2)
ALP SERPL-CCNC: 170 U/L (ref 40–129)
ALT SERPL-CCNC: 15 U/L (ref 0–40)
ANION GAP SERPL CALCULATED.3IONS-SCNC: 13 MMOL/L (ref 7–16)
ANION GAP SERPL CALCULATED.3IONS-SCNC: 13 MMOL/L (ref 7–16)
AST SERPL-CCNC: 11 U/L (ref 0–39)
BASOPHILS # BLD: 0.09 K/UL (ref 0–0.2)
BASOPHILS NFR BLD: 0 % (ref 0–2)
BILIRUB SERPL-MCNC: 0.3 MG/DL (ref 0–1.2)
BUN SERPL-MCNC: 26 MG/DL (ref 6–20)
BUN SERPL-MCNC: 27 MG/DL (ref 6–20)
CALCIUM SERPL-MCNC: 9.1 MG/DL (ref 8.6–10.2)
CALCIUM SERPL-MCNC: 9.3 MG/DL (ref 8.6–10.2)
CHLORIDE SERPL-SCNC: 102 MMOL/L (ref 98–107)
CHLORIDE SERPL-SCNC: 99 MMOL/L (ref 98–107)
CO2 SERPL-SCNC: 18 MMOL/L (ref 22–29)
CO2 SERPL-SCNC: 19 MMOL/L (ref 22–29)
CREAT SERPL-MCNC: 3 MG/DL (ref 0.7–1.2)
CREAT SERPL-MCNC: 3.2 MG/DL (ref 0.7–1.2)
EOSINOPHIL # BLD: 0.25 K/UL (ref 0.05–0.5)
EOSINOPHILS RELATIVE PERCENT: 1 % (ref 0–6)
ERYTHROCYTE [DISTWIDTH] IN BLOOD BY AUTOMATED COUNT: 13 % (ref 11.5–15)
GFR SERPL CREATININE-BSD FRML MDRD: 25 ML/MIN/1.73M2
GFR SERPL CREATININE-BSD FRML MDRD: 27 ML/MIN/1.73M2
GLUCOSE BLD-MCNC: 135 MG/DL (ref 74–99)
GLUCOSE BLD-MCNC: 148 MG/DL (ref 74–99)
GLUCOSE BLD-MCNC: 157 MG/DL (ref 74–99)
GLUCOSE BLD-MCNC: 196 MG/DL (ref 74–99)
GLUCOSE BLD-MCNC: 227 MG/DL (ref 74–99)
GLUCOSE SERPL-MCNC: 163 MG/DL (ref 74–99)
GLUCOSE SERPL-MCNC: 229 MG/DL (ref 74–99)
HCT VFR BLD AUTO: 28.4 % (ref 37–54)
HGB BLD-MCNC: 8.9 G/DL (ref 12.5–16.5)
IMM GRANULOCYTES # BLD AUTO: 0.19 K/UL (ref 0–0.58)
IMM GRANULOCYTES NFR BLD: 1 % (ref 0–5)
INR PPP: 1.2
LYMPHOCYTES NFR BLD: 1.35 K/UL (ref 1.5–4)
LYMPHOCYTES RELATIVE PERCENT: 6 % (ref 20–42)
MAGNESIUM SERPL-MCNC: 2 MG/DL (ref 1.6–2.6)
MCH RBC QN AUTO: 30.2 PG (ref 26–35)
MCHC RBC AUTO-ENTMCNC: 31.3 G/DL (ref 32–34.5)
MCV RBC AUTO: 96.3 FL (ref 80–99.9)
MICROORGANISM SPEC CULT: ABNORMAL
MICROORGANISM/AGENT SPEC: ABNORMAL
MONOCYTES NFR BLD: 1.9 K/UL (ref 0.1–0.95)
MONOCYTES NFR BLD: 9 % (ref 2–12)
NEUTROPHILS NFR BLD: 83 % (ref 43–80)
NEUTS SEG NFR BLD: 18.21 K/UL (ref 1.8–7.3)
PARTIAL THROMBOPLASTIN TIME: 41.5 SEC (ref 24.5–35.1)
PHOSPHATE SERPL-MCNC: 3.5 MG/DL (ref 2.5–4.5)
PLATELET # BLD AUTO: 455 K/UL (ref 130–450)
PMV BLD AUTO: 9.8 FL (ref 7–12)
POTASSIUM SERPL-SCNC: 4.2 MMOL/L (ref 3.5–5)
POTASSIUM SERPL-SCNC: 4.6 MMOL/L (ref 3.5–5)
PROT SERPL-MCNC: 6.4 G/DL (ref 6.4–8.3)
PROTHROMBIN TIME: 13.1 SEC (ref 9.3–12.4)
RBC # BLD AUTO: 2.95 M/UL (ref 3.8–5.8)
RBC # BLD: ABNORMAL 10*6/UL
RBC # BLD: ABNORMAL 10*6/UL
SERVICE CMNT-IMP: ABNORMAL
SODIUM SERPL-SCNC: 130 MMOL/L (ref 132–146)
SODIUM SERPL-SCNC: 134 MMOL/L (ref 132–146)
SPECIMEN DESCRIPTION: ABNORMAL
WBC OTHER # BLD: 22 K/UL (ref 4.5–11.5)

## 2023-10-06 PROCEDURE — 80048 BASIC METABOLIC PNL TOTAL CA: CPT

## 2023-10-06 PROCEDURE — 85610 PROTHROMBIN TIME: CPT

## 2023-10-06 PROCEDURE — 97530 THERAPEUTIC ACTIVITIES: CPT

## 2023-10-06 PROCEDURE — 6370000000 HC RX 637 (ALT 250 FOR IP)

## 2023-10-06 PROCEDURE — 97162 PT EVAL MOD COMPLEX 30 MIN: CPT

## 2023-10-06 PROCEDURE — 97535 SELF CARE MNGMENT TRAINING: CPT

## 2023-10-06 PROCEDURE — 6370000000 HC RX 637 (ALT 250 FOR IP): Performed by: STUDENT IN AN ORGANIZED HEALTH CARE EDUCATION/TRAINING PROGRAM

## 2023-10-06 PROCEDURE — 6360000002 HC RX W HCPCS

## 2023-10-06 PROCEDURE — 83735 ASSAY OF MAGNESIUM: CPT

## 2023-10-06 PROCEDURE — 93306 TTE W/DOPPLER COMPLETE: CPT

## 2023-10-06 PROCEDURE — P9047 ALBUMIN (HUMAN), 25%, 50ML: HCPCS | Performed by: INTERNAL MEDICINE

## 2023-10-06 PROCEDURE — 85730 THROMBOPLASTIN TIME PARTIAL: CPT

## 2023-10-06 PROCEDURE — 2580000003 HC RX 258

## 2023-10-06 PROCEDURE — 2700000000 HC OXYGEN THERAPY PER DAY

## 2023-10-06 PROCEDURE — 97165 OT EVAL LOW COMPLEX 30 MIN: CPT

## 2023-10-06 PROCEDURE — 87040 BLOOD CULTURE FOR BACTERIA: CPT

## 2023-10-06 PROCEDURE — 6370000000 HC RX 637 (ALT 250 FOR IP): Performed by: INTERNAL MEDICINE

## 2023-10-06 PROCEDURE — 80053 COMPREHEN METABOLIC PANEL: CPT

## 2023-10-06 PROCEDURE — 85025 COMPLETE CBC W/AUTO DIFF WBC: CPT

## 2023-10-06 PROCEDURE — 6360000002 HC RX W HCPCS: Performed by: INTERNAL MEDICINE

## 2023-10-06 PROCEDURE — 36415 COLL VENOUS BLD VENIPUNCTURE: CPT

## 2023-10-06 PROCEDURE — 2000000000 HC ICU R&B

## 2023-10-06 PROCEDURE — 6370000000 HC RX 637 (ALT 250 FOR IP): Performed by: NURSE PRACTITIONER

## 2023-10-06 PROCEDURE — 82962 GLUCOSE BLOOD TEST: CPT

## 2023-10-06 PROCEDURE — 84100 ASSAY OF PHOSPHORUS: CPT

## 2023-10-06 RX ORDER — INSULIN GLARGINE 100 [IU]/ML
15 INJECTION, SOLUTION SUBCUTANEOUS ONCE
Status: COMPLETED | OUTPATIENT
Start: 2023-10-07 | End: 2023-10-06

## 2023-10-06 RX ORDER — ALBUMIN (HUMAN) 12.5 G/50ML
25 SOLUTION INTRAVENOUS EVERY 8 HOURS
Status: COMPLETED | OUTPATIENT
Start: 2023-10-06 | End: 2023-10-07

## 2023-10-06 RX ORDER — INSULIN GLARGINE 100 [IU]/ML
30 INJECTION, SOLUTION SUBCUTANEOUS 2 TIMES DAILY
Status: DISCONTINUED | OUTPATIENT
Start: 2023-10-06 | End: 2023-10-07

## 2023-10-06 RX ORDER — ENOXAPARIN SODIUM 100 MG/ML
30 INJECTION SUBCUTANEOUS 2 TIMES DAILY
Status: DISCONTINUED | OUTPATIENT
Start: 2023-10-07 | End: 2023-10-12 | Stop reason: HOSPADM

## 2023-10-06 RX ORDER — INSULIN GLARGINE 100 [IU]/ML
5 INJECTION, SOLUTION SUBCUTANEOUS ONCE
Status: COMPLETED | OUTPATIENT
Start: 2023-10-06 | End: 2023-10-06

## 2023-10-06 RX ORDER — INSULIN LISPRO 100 [IU]/ML
16 INJECTION, SOLUTION INTRAVENOUS; SUBCUTANEOUS
Status: DISCONTINUED | OUTPATIENT
Start: 2023-10-06 | End: 2023-10-07

## 2023-10-06 RX ADMIN — SODIUM BICARBONATE 1300 MG: 650 TABLET ORAL at 21:00

## 2023-10-06 RX ADMIN — TRAMADOL HYDROCHLORIDE 50 MG: 50 TABLET ORAL at 17:38

## 2023-10-06 RX ADMIN — INSULIN GLARGINE 5 UNITS: 100 INJECTION, SOLUTION SUBCUTANEOUS at 10:53

## 2023-10-06 RX ADMIN — INSULIN LISPRO 3 UNITS: 100 INJECTION, SOLUTION INTRAVENOUS; SUBCUTANEOUS at 17:38

## 2023-10-06 RX ADMIN — LABETALOL HYDROCHLORIDE 10 MG: 5 INJECTION INTRAVENOUS at 17:39

## 2023-10-06 RX ADMIN — ALBUMIN (HUMAN) 25 G: 0.25 INJECTION, SOLUTION INTRAVENOUS at 23:36

## 2023-10-06 RX ADMIN — INSULIN LISPRO 3 UNITS: 100 INJECTION, SOLUTION INTRAVENOUS; SUBCUTANEOUS at 13:24

## 2023-10-06 RX ADMIN — TRAMADOL HYDROCHLORIDE 50 MG: 50 TABLET ORAL at 05:43

## 2023-10-06 RX ADMIN — SODIUM BICARBONATE 1300 MG: 650 TABLET ORAL at 10:53

## 2023-10-06 RX ADMIN — FERROUS SULFATE TAB 325 MG (65 MG ELEMENTAL FE) 325 MG: 325 (65 FE) TAB at 17:38

## 2023-10-06 RX ADMIN — ACETAMINOPHEN 650 MG: 325 TABLET ORAL at 17:43

## 2023-10-06 RX ADMIN — ALBUMIN (HUMAN) 25 G: 0.25 INJECTION, SOLUTION INTRAVENOUS at 13:20

## 2023-10-06 RX ADMIN — AMLODIPINE BESYLATE 10 MG: 10 TABLET ORAL at 08:02

## 2023-10-06 RX ADMIN — PIPERACILLIN AND TAZOBACTAM 3375 MG: 3; .375 INJECTION, POWDER, LYOPHILIZED, FOR SOLUTION INTRAVENOUS at 15:13

## 2023-10-06 RX ADMIN — PIPERACILLIN AND TAZOBACTAM 3375 MG: 3; .375 INJECTION, POWDER, LYOPHILIZED, FOR SOLUTION INTRAVENOUS at 23:37

## 2023-10-06 RX ADMIN — ATORVASTATIN CALCIUM 40 MG: 40 TABLET, FILM COATED ORAL at 08:02

## 2023-10-06 RX ADMIN — INSULIN LISPRO 6 UNITS: 100 INJECTION, SOLUTION INTRAVENOUS; SUBCUTANEOUS at 08:04

## 2023-10-06 RX ADMIN — INSULIN LISPRO 13 UNITS: 100 INJECTION, SOLUTION INTRAVENOUS; SUBCUTANEOUS at 08:04

## 2023-10-06 RX ADMIN — LEVOTHYROXINE SODIUM 125 MCG: 0.03 TABLET ORAL at 05:43

## 2023-10-06 RX ADMIN — PIPERACILLIN AND TAZOBACTAM 3375 MG: 3; .375 INJECTION, POWDER, LYOPHILIZED, FOR SOLUTION INTRAVENOUS at 07:58

## 2023-10-06 RX ADMIN — INSULIN GLARGINE 15 UNITS: 100 INJECTION, SOLUTION SUBCUTANEOUS at 23:53

## 2023-10-06 RX ADMIN — FERROUS SULFATE TAB 325 MG (65 MG ELEMENTAL FE) 325 MG: 325 (65 FE) TAB at 08:02

## 2023-10-06 RX ADMIN — ENOXAPARIN SODIUM 30 MG: 100 INJECTION SUBCUTANEOUS at 08:03

## 2023-10-06 RX ADMIN — SODIUM CHLORIDE, PRESERVATIVE FREE 10 ML: 5 INJECTION INTRAVENOUS at 08:02

## 2023-10-06 RX ADMIN — Medication 250 MG: at 10:53

## 2023-10-06 RX ADMIN — INSULIN GLARGINE 25 UNITS: 100 INJECTION, SOLUTION SUBCUTANEOUS at 08:02

## 2023-10-06 ASSESSMENT — PAIN SCALES - GENERAL
PAINLEVEL_OUTOF10: 0
PAINLEVEL_OUTOF10: 2
PAINLEVEL_OUTOF10: 6
PAINLEVEL_OUTOF10: 8
PAINLEVEL_OUTOF10: 6
PAINLEVEL_OUTOF10: 2
PAINLEVEL_OUTOF10: 0
PAINLEVEL_OUTOF10: 0
PAINLEVEL_OUTOF10: 4
PAINLEVEL_OUTOF10: 0
PAINLEVEL_OUTOF10: 6

## 2023-10-06 ASSESSMENT — PAIN DESCRIPTION - ORIENTATION
ORIENTATION: RIGHT

## 2023-10-06 ASSESSMENT — PAIN DESCRIPTION - DESCRIPTORS
DESCRIPTORS: ACHING
DESCRIPTORS: ACHING;DISCOMFORT
DESCRIPTORS: ACHING;DISCOMFORT

## 2023-10-06 ASSESSMENT — PAIN DESCRIPTION - LOCATION
LOCATION: FOOT

## 2023-10-06 NOTE — PROGRESS NOTES
[facility]             Pulmonary & Critical Care Medicine                MICU Progress Note                 Written by: Leesa Stewart MD  Name: Celia Trivedi : 1990       Age: 26 y.o. MR/Act #    : [id number],  Billing  #    : [id number]   Admit Date: 10/2/2023  8:12 AM LOS: 4,   Hospital Day: 5 Room #      : 6382/7953-I   PCP            : Stacey Esposito MD,   Referred by: Adam Seth DO ICU Attending: MD Naseem Lyman date: 10/6/2023 12:02 PM   ICU Days:       4 Vent Days:     0 LOS: 4,                          Reason for ICU admission           Chief Complaint   Patient presents with    Nausea     Nausea and vomiting since Saturday. Unable to keep anything down. Brief HPI, Presentation & Synopsis                       26 y.o. male , with past medical history of  DM typetype 1, HTN. HLP, presented with for nausea for the past 4 days and subjective fever and chills, since 12 weeks ago he hears a pop in right foot  while he was walking and start swelling and got much worse during past 3 days with a wound at the outside part  of the foot and erythema in medial part of the foot. He was found to be in DKA with severe acidosis. Admitted in MICU for DKA management and diabetic foot.     Active problem list of yesterday:  Active Hospital Problems    Diagnosis Date Noted    HTN (hypertension) [I10] 06/10/2018     Priority: Low    Poorly controlled diabetes mellitus ([address]) [E11.65] 10/04/2023    Dietary noncompliance [Z91.119] 10/04/2023    Septicemia ([address]) [A41.9] 10/02/2023    ASHLEY (acute kidney injury) ([address]) [N17.9] 10/02/2023    Cellulitis of right lower extremity [L03.115] 10/02/2023    Dehydration [E86.0] 10/02/2023    Type 1 diabetes mellitus with ketoacidosis without coma ([address]) [E10.10]     Type 1 diabetes mellitus ([address]) [E10.9] 1998                           Events of last night                      Continues to be on --   --  21 15    < > = values in this interval not displayed. Cardiac :   Lab Results   Component Value Date    CKTOTAL 59 06/23/2019    CKTOTAL 63 06/22/2019    CKTOTAL 68 06/22/2019    CKMB 2.2 06/23/2019    CKMB 2.6 06/22/2019    CKMB 7.4 06/16/2019    TROPONINI 0.02 06/23/2019    TROPONINI 0.03 06/22/2019    TROPONINI 0.06 (H) 06/22/2019     Lab Results   Component Value Date    PROBNP 399 (H) 07/02/2023     PRO-BNP : No results for input(s): \"BNP\" in the last 72 hours. BNP: No results for input(s): \"BNP\" in the last 72 hours. Lactic Acid : @BRIEFLAB(LACTA:3)    Lipase  :   No results for input(s): \"LIPASE\" in the last 72 hours. Sed rate :   Sed Rate   Date Value Ref Range Status   10/03/2023 76 (H) 0 - 15 mm/Hr Final   01/20/2013 24 (H) 0 - 15 mm/hr Final     CReactive Protein:   CRP   Date Value Ref Range Status   10/03/2023 415.0 (H) 0 - 5 mg/L Final   01/20/2013 11.7 (H) 0.0 - 0.9 mg/dL Final     Globulins :   No results for input(s): \"IGG\" in the last 72 hours. No results for input(s): \"IGM\" in the last 72 hours. No results for input(s): \"IGA\" in the last 72 hours. No results for input(s): \"IGG1\" in the last 72 hours. No results for input(s): \"IGG2\" in the last 72 hours. No results for input(s): \"IGG3\" in the last 72 hours. No results for input(s): \"IGG4\" in the last 72 hours. Magnesium  Lab Results   Component Value Date/Time    MG 2.0 10/06/2023 05:27 AM     Phosphorus  Lab Results   Component Value Date/Time    PHOS 3.5 10/06/2023 05:27 AM     Ionized Calcium  Lab Results   Component Value Date/Time    CAION 1.26 04/10/2020 04:10 AM        COVID-19 Labs:  Lab Results   Component Value Date/Time    COVID19 Not Detected 10/02/2023 11:27 AM       Notable Cultures:    Blood cultures   Blood Culture, Routine   Date Value Ref Range Status   04/09/2020 5 Days- no growth  Final    No results for input(s): \"BC\" in the last 72 hours.  No results for input(s): \"BLOODCULT2\" in the last 72

## 2023-10-06 NOTE — CARE COORDINATION
Care Coordination:  LOS 4 day DKA, R foot cellulitis . bridged from insulin gtt. Zosyn q8. Room Air. Per previous SW note, pt is Medicaid pending per PBO and qualifies for assist with meds at discharge. Endocrine following, Plan is home at discharge, mother to transport home.     Electronically signed by Ko Sawyer RN on 10/6/2023 at 12:23 PM

## 2023-10-06 NOTE — PLAN OF CARE
Stat blood cultures ordered, lab called and notified of need to draw. Problem: Discharge Planning  Goal: Discharge to home or other facility with appropriate resources  Outcome: Progressing  Flowsheets (Taken 10/6/2023 0800)  Discharge to home or other facility with appropriate resources: Identify barriers to discharge with patient and caregiver     Problem: Pain  Goal: Verbalizes/displays adequate comfort level or baseline comfort level  Outcome: Progressing     Problem: Chronic Conditions and Co-morbidities  Goal: Patient's chronic conditions and co-morbidity symptoms are monitored and maintained or improved  Outcome: Progressing  Flowsheets (Taken 10/6/2023 0800)  Care Plan - Patient's Chronic Conditions and Co-Morbidity Symptoms are Monitored and Maintained or Improved: Monitor and assess patient's chronic conditions and comorbid symptoms for stability, deterioration, or improvement     Problem: Safety - Adult  Goal: Free from fall injury  Outcome: Progressing     Problem: Skin/Tissue Integrity  Goal: Absence of new skin breakdown  Description: 1. Monitor for areas of redness and/or skin breakdown  2. Assess vascular access sites hourly  3. Every 4-6 hours minimum:  Change oxygen saturation probe site  4. Every 4-6 hours:  If on nasal continuous positive airway pressure, respiratory therapy assess nares and determine need for appliance change or resting period.   Outcome: Progressing     Problem: Skin/Tissue Integrity - Adult  Goal: Skin integrity remains intact  Outcome: Progressing  Goal: Incisions, wounds, or drain sites healing without S/S of infection  Outcome: Progressing     Problem: Musculoskeletal - Adult  Goal: Return mobility to safest level of function  Outcome: Progressing     Problem: Infection - Adult  Goal: Absence of infection at discharge  Outcome: Progressing  Flowsheets (Taken 10/6/2023 0800)  Absence of infection at discharge: Assess and monitor for signs and symptoms of infection Problem: Metabolic/Fluid and Electrolytes - Adult  Goal: Electrolytes maintained within normal limits  Outcome: Progressing  Flowsheets (Taken 10/6/2023 0800)  Electrolytes maintained within normal limits: Monitor labs and assess patient for signs and symptoms of electrolyte imbalances     Problem: Nutrition Deficit:  Goal: Optimize nutritional status  Outcome: Progressing

## 2023-10-06 NOTE — PROGRESS NOTES
Physician Progress Note      Durga Evans  CSN #:                  [id number]  :                       1990  ADMIT DATE:       10/2/2023 8:12 AM  DISCH DATE:  RESPONDING  PROVIDER #:        Sandra Rosenberg          QUERY TEXT:    Pt admitted with sepsis and cellulitis of right lower extremity. Pt noted to   have HAGMA 2/2 DKA in DM T1. If possible, please document in progress notes   and discharge summary the relationship, if any, between cellulitis and DM. The medical record reflects the following:  Risk Factors: Type 1 DM  Clinical Indicators: 10/4  Progress note:  right lower extremity; HAGMA 2/2   DKA in DM T1  Treatment: IV abs; Podiatry consult; wound care; VS; labs    Thank you,  Xavi Babcock RN/OLIVER [email or web address]  Options provided:  -- Right lower extremity cellulitis associated with Diabetes  -- Right lower extremity cellulitis unrelated to Diabetes  -- Other - I will add my own diagnosis  -- Disagree - Not applicable / Not valid  -- Disagree - Clinically unable to determine / Unknown  -- Refer to Clinical Documentation Reviewer    PROVIDER RESPONSE TEXT:    Right lower extremity cellulitis associated with Diabetes.     Query created by: Xavi Babcock on 10/5/2023 2:21 PM      Electronically signed by:  Sandra Rosenberg 10/6/2023 7:59 AM

## 2023-10-06 NOTE — PROGRESS NOTES
[address]          EIQC:                                                   Patient Name: Augustin Mcgregor     MRN: [id number]     : 1990     Room: [address] [place], [place] [address]       Evaluating OT: Rahul WITT, OTR/L, [id number]      Referring Provider: Rebekah Woods DO    Specific Provider Orders/Date: OT eval and treat (10/6/23)    Diagnosis: Dehydration [E86.0]  Septicemia ([address]) [A41.9]  ASHLEY (acute kidney injury) ([address]) [N17.9]  Sepsis ([address]) [A41.9]    Surgeries/Procedures: None this admission       Pt admitted with sepsis, chronic wounds, RLE foot deformity. Pertinent Medical History:       has a past medical history of Abscess of back, Bowel obstruction ([address]), Diabetes mellitus type 1 ([address]), HTN (hypertension), and Hyperlipidemia.          Precautions:  Fall Risk, NWB RLE, chronic wounds, agitation     Assessment of current deficits    [x] Functional mobility  [x]ADLs  [x] Strength               [x]Cognition    [x] Functional transfers   [x] IADLs         [x] Safety Awareness   [x]Endurance    [x] Fine Coordination              [x] Balance      [] Vision/perception   [x]Sensation     []Gross Motor Coordination  [] ROM  [] Delirium                   [] Motor Control     OT PLAN OF CARE   OT POC based on physician orders, patient diagnosis and results of clinical assessment    Frequency/Duration 1-3 days/wk for 2 weeks PRN   Specific OT Treatment Interventions to include:   * Instruction/training on adapted ADL techniques and AE recommendations to increase functional independence within precautions       * Training on energy conservation strategies, correct breathing pattern and techniques to improve independence/tolerance for self-care routine  * Functional transfer/mobility training/DME recommendations for increased independence, safety, and fall prevention  * Patient/Family

## 2023-10-06 NOTE — PROGRESS NOTES
Heart Rate post session 106 bpm    SPO2 at rest 97% on 2L SPO2 post session 93% on RA         Functional Status Score-Intensive Care Unit (FSS-ICU)   Rolling -/7   Supine to sit transfer 5/7   Unsupported sitting  5/7   Sit to stand transfers 5/7   Ambulation 1/7   Total  16/35     Therapeutic Exercises:    - Bed mobility  - STS x2    Patient education  Pt educated on PT role, safety during functional mobility, weight-bearing precautions, correct technique with transfers, & correct gait pattern with his current diagnosis. Patient response to education:   Pt verbalized understanding Pt demonstrated skill Pt requires further education in this area   Yes Yes Yes     ASSESSMENT:    Conditions Requiring Skilled Therapeutic Intervention:    [x]Decreased strength     [x]Decreased ROM  [x]Decreased functional mobility  [x]Decreased balance   [x]Decreased endurance   [x]Decreased posture  []Decreased sensation  []Decreased coordination   []Decreased vision  [x]Decreased safety awareness   [x]Increased pain       Comments:  Pt received supine in bed and agreeable to PT evaluation with OT collaboration. Pt cleared for participation by RN prior to session. Vitals monitored during session. Pt very resistive to therapy d/t the requirement to move his RLE. He was educated on weight-bearing precautions prior to mobility. Once sitting EOB, he expressed that he wasn't comfortable resting his foot on any surface d/t increased pain. Education was provided on how to appropriately use a FWW with a 3-pt gait pattern to transfer to chair. Once transferred to chair, therapy staff assisted with positioning pt's foot for comfort when he became very argumentative that his foot was too painful to the touch. A discussion was held with pt about the benefits of propping his foot up and the requirements for an AD for safety and he was eventually agreeable.  Pt left seated in bedside chair with call button in reach, lines attached, and needs met with RLE elevated on a foot stool with pillows. Treatment:  Patient practiced and was instructed in the following treatment:    Bed mobility training - pt given verbal and tactile cues to facilitate proper sequencing and safety during rolling and supine>sit as well as provided with physical assistance to complete task   Sitting EOB for >8 minutes for upright tolerance, postural awareness and BLE ROM  STS and pivot transfer training - pt educated on proper hand and foot placement, safety and sequencing, and use of 3-point gait pattern to safely complete sit<>stand and pivot transfers with hands on assistance to complete task safely     Pt's/ family goals   1. To return to home. 2. To reduce his pain. Prognosis is fair for reaching above PT goals. Patient and or family understand(s) diagnosis, prognosis, and plan of care. PHYSICAL THERAPY PLAN OF CARE:    PT POC is established based on physician order and patient diagnosis     Referring provider/PT Order:  Reynaldo Gomes, DO / PT eval and treat   Diagnosis:  Dehydration [E86.0]  Septicemia ([address]) [A41.9]  ASHLEY (acute kidney injury) ([address]) [N17.9]  Sepsis ([address]) [A41.9]  Specific instructions for next treatment:  Progress pt with transfer training & gait with a 3-pt gait pattern as appropriate.     Current Treatment Recommendations:     [x] Strengthening to improve independence with functional mobility   [x] ROM to improve independence with functional mobility   [x] Balance Training to improve static/dynamic balance and to reduce fall risk  [x] Endurance Training to improve activity tolerance during functional mobility   [x] Transfer Training to improve safety and independence with all functional transfers   [x] Gait Training to improve gait mechanics, endurance and asses need for appropriate assistive device  [x] Stair Training in preparation for safe discharge home and/or into the community   [x] Positioning to prevent skin breakdown and

## 2023-10-06 NOTE — PROGRESS NOTES
ENDOCRINOLOGY PROGRESS NOTE      Date of admission: 10/2/2023  Date of service: 10/6/2023  Admitting physician: Woodrow Birmingham DO   Primary Care Physician: Jailene Swift MD  Consultant physician: René Gale MD     Reason for the consultation:  Uncontrolled DM    History of Present Illness:  Eloise Witt is a very pleasant 26 y.o. old male with PMH of DM1, HTN, HLD and other listed below admitted to [facility] on 10/2/2023 because of nausea, fever, and chills, endocrine service was consulted for diabetes management. Patient was initially admitted to the floor for management of sepsis 2/2 cellulitis however he clinically deteriorated with worsening ASHLEY and also entered into DKA. He was transferred to MICU and placed on an insulin drip. Subjective: Patient reports feeling overall better than yesterday. Complaining about food. Did no receive breakfast yet,  No hypoglycemic episodes overnight. BG readings remain above goal.       Lab Results   Component Value Date/Time    LABA1C 10.8 10/03/2023 05:07 AM       Inpatient diet:   Carb Restricted diet     Point of care glucose monitoring   (Independently reviewed)   No results for input(s): \"GLUMET\" in the last 72 hours. Past medical history:   Past Medical History:   Diagnosis Date    Abscess of back 8/2011    Bowel obstruction ([address])     Diabetes mellitus type 1 ([address]) Diagnosed at 6years of age/ in 1998    HTN (hypertension)     Hyperlipidemia        Past surgical history:  History reviewed. No pertinent surgical history. Social history:   Tobacco:   reports that he has never smoked. His smokeless tobacco use includes chew. Alcohol:   reports that he does not currently use alcohol. Drugs:   reports no history of drug use. Family history:    Family History   Problem Relation Age of Onset    Diabetes Mother        Allergy and drug reactions:    Allergies   Allergen Reactions    Pineapple        Scheduled Meds:   potassium & sodium phosphates  1 packet

## 2023-10-07 LAB
ALBUMIN SERPL-MCNC: 2.6 G/DL (ref 3.5–5.2)
ALP SERPL-CCNC: 151 U/L (ref 40–129)
ALT SERPL-CCNC: 10 U/L (ref 0–40)
ANION GAP SERPL CALCULATED.3IONS-SCNC: 17 MMOL/L (ref 7–16)
AST SERPL-CCNC: 10 U/L (ref 0–39)
BASOPHILS # BLD: 0.06 K/UL (ref 0–0.2)
BASOPHILS NFR BLD: 0 % (ref 0–2)
BILIRUB SERPL-MCNC: 0.2 MG/DL (ref 0–1.2)
BUN SERPL-MCNC: 25 MG/DL (ref 6–20)
CALCIUM SERPL-MCNC: 9.1 MG/DL (ref 8.6–10.2)
CHLORIDE SERPL-SCNC: 102 MMOL/L (ref 98–107)
CO2 SERPL-SCNC: 17 MMOL/L (ref 22–29)
CREAT SERPL-MCNC: 3.1 MG/DL (ref 0.7–1.2)
EOSINOPHIL # BLD: 0.36 K/UL (ref 0.05–0.5)
EOSINOPHILS RELATIVE PERCENT: 2 % (ref 0–6)
ERYTHROCYTE [DISTWIDTH] IN BLOOD BY AUTOMATED COUNT: 13.2 % (ref 11.5–15)
GFR SERPL CREATININE-BSD FRML MDRD: 25 ML/MIN/1.73M2
GLUCOSE BLD-MCNC: 124 MG/DL (ref 74–99)
GLUCOSE BLD-MCNC: 127 MG/DL (ref 74–99)
GLUCOSE BLD-MCNC: 162 MG/DL (ref 74–99)
GLUCOSE BLD-MCNC: 164 MG/DL (ref 74–99)
GLUCOSE BLD-MCNC: 85 MG/DL (ref 74–99)
GLUCOSE SERPL-MCNC: 162 MG/DL (ref 74–99)
HCT VFR BLD AUTO: 26 % (ref 37–54)
HGB BLD-MCNC: 8.1 G/DL (ref 12.5–16.5)
IMM GRANULOCYTES # BLD AUTO: 0.21 K/UL (ref 0–0.58)
IMM GRANULOCYTES NFR BLD: 1 % (ref 0–5)
INR PPP: 1.2
LYMPHOCYTES NFR BLD: 1.19 K/UL (ref 1.5–4)
LYMPHOCYTES RELATIVE PERCENT: 6 % (ref 20–42)
MAGNESIUM SERPL-MCNC: 2 MG/DL (ref 1.6–2.6)
MCH RBC QN AUTO: 29.9 PG (ref 26–35)
MCHC RBC AUTO-ENTMCNC: 31.2 G/DL (ref 32–34.5)
MCV RBC AUTO: 95.9 FL (ref 80–99.9)
MICROORGANISM SPEC CULT: NORMAL
MONOCYTES NFR BLD: 1.85 K/UL (ref 0.1–0.95)
MONOCYTES NFR BLD: 10 % (ref 2–12)
NEUTROPHILS NFR BLD: 81 % (ref 43–80)
NEUTS SEG NFR BLD: 15.88 K/UL (ref 1.8–7.3)
PARTIAL THROMBOPLASTIN TIME: 40.6 SEC (ref 24.5–35.1)
PHOSPHATE SERPL-MCNC: 4.7 MG/DL (ref 2.5–4.5)
PLATELET # BLD AUTO: 483 K/UL (ref 130–450)
PMV BLD AUTO: 9.5 FL (ref 7–12)
POTASSIUM SERPL-SCNC: 3.8 MMOL/L (ref 3.5–5)
PROT SERPL-MCNC: 6.5 G/DL (ref 6.4–8.3)
PROTHROMBIN TIME: 13.5 SEC (ref 9.3–12.4)
RBC # BLD AUTO: 2.71 M/UL (ref 3.8–5.8)
RBC # BLD: ABNORMAL 10*6/UL
SERVICE CMNT-IMP: NORMAL
SODIUM SERPL-SCNC: 136 MMOL/L (ref 132–146)
SPECIMEN DESCRIPTION: NORMAL
WBC OTHER # BLD: 19.6 K/UL (ref 4.5–11.5)

## 2023-10-07 PROCEDURE — 6360000002 HC RX W HCPCS: Performed by: INTERNAL MEDICINE

## 2023-10-07 PROCEDURE — 87206 SMEAR FLUORESCENT/ACID STAI: CPT

## 2023-10-07 PROCEDURE — 6360000002 HC RX W HCPCS

## 2023-10-07 PROCEDURE — 80053 COMPREHEN METABOLIC PANEL: CPT

## 2023-10-07 PROCEDURE — 6360000002 HC RX W HCPCS: Performed by: PODIATRIST

## 2023-10-07 PROCEDURE — 2580000003 HC RX 258

## 2023-10-07 PROCEDURE — 85025 COMPLETE CBC W/AUTO DIFF WBC: CPT

## 2023-10-07 PROCEDURE — 85730 THROMBOPLASTIN TIME PARTIAL: CPT

## 2023-10-07 PROCEDURE — 3600000015 HC SURGERY LEVEL 5 ADDTL 15MIN: Performed by: PODIATRIST

## 2023-10-07 PROCEDURE — 87176 TISSUE HOMOGENIZATION CULTR: CPT

## 2023-10-07 PROCEDURE — 87205 SMEAR GRAM STAIN: CPT

## 2023-10-07 PROCEDURE — 3700000000 HC ANESTHESIA ATTENDED CARE: Performed by: PODIATRIST

## 2023-10-07 PROCEDURE — 88307 TISSUE EXAM BY PATHOLOGIST: CPT

## 2023-10-07 PROCEDURE — 0QBQ0ZX EXCISION OF RIGHT TOE PHALANX, OPEN APPROACH, DIAGNOSTIC: ICD-10-PCS

## 2023-10-07 PROCEDURE — 88305 TISSUE EXAM BY PATHOLOGIST: CPT

## 2023-10-07 PROCEDURE — 87070 CULTURE OTHR SPECIMN AEROBIC: CPT

## 2023-10-07 PROCEDURE — 6370000000 HC RX 637 (ALT 250 FOR IP)

## 2023-10-07 PROCEDURE — 2700000000 HC OXYGEN THERAPY PER DAY

## 2023-10-07 PROCEDURE — 6370000000 HC RX 637 (ALT 250 FOR IP): Performed by: NURSE PRACTITIONER

## 2023-10-07 PROCEDURE — 84100 ASSAY OF PHOSPHORUS: CPT

## 2023-10-07 PROCEDURE — 6370000000 HC RX 637 (ALT 250 FOR IP): Performed by: INTERNAL MEDICINE

## 2023-10-07 PROCEDURE — 0J9Q0ZZ DRAINAGE OF RIGHT FOOT SUBCUTANEOUS TISSUE AND FASCIA, OPEN APPROACH: ICD-10-PCS

## 2023-10-07 PROCEDURE — 87102 FUNGUS ISOLATION CULTURE: CPT

## 2023-10-07 PROCEDURE — 88311 DECALCIFY TISSUE: CPT

## 2023-10-07 PROCEDURE — 3700000001 HC ADD 15 MINUTES (ANESTHESIA): Performed by: PODIATRIST

## 2023-10-07 PROCEDURE — 6360000002 HC RX W HCPCS: Performed by: ANESTHESIOLOGIST ASSISTANT

## 2023-10-07 PROCEDURE — 2709999900 HC NON-CHARGEABLE SUPPLY: Performed by: PODIATRIST

## 2023-10-07 PROCEDURE — 2060000000 HC ICU INTERMEDIATE R&B

## 2023-10-07 PROCEDURE — P9047 ALBUMIN (HUMAN), 25%, 50ML: HCPCS | Performed by: INTERNAL MEDICINE

## 2023-10-07 PROCEDURE — 88304 TISSUE EXAM BY PATHOLOGIST: CPT

## 2023-10-07 PROCEDURE — 85610 PROTHROMBIN TIME: CPT

## 2023-10-07 PROCEDURE — 3600000005 HC SURGERY LEVEL 5 BASE: Performed by: PODIATRIST

## 2023-10-07 PROCEDURE — 87075 CULTR BACTERIA EXCEPT BLOOD: CPT

## 2023-10-07 PROCEDURE — 87015 SPECIMEN INFECT AGNT CONCNTJ: CPT

## 2023-10-07 PROCEDURE — 0QBN0ZX EXCISION OF RIGHT METATARSAL, OPEN APPROACH, DIAGNOSTIC: ICD-10-PCS

## 2023-10-07 PROCEDURE — 87116 MYCOBACTERIA CULTURE: CPT

## 2023-10-07 PROCEDURE — 83735 ASSAY OF MAGNESIUM: CPT

## 2023-10-07 PROCEDURE — 82962 GLUCOSE BLOOD TEST: CPT

## 2023-10-07 DEVICE — INTEGRA® MESHED BILAYER WOUND MATRIX 2 IN*2 IN (5 CM*5 CM)
Type: IMPLANTABLE DEVICE | Site: FOOT | Status: FUNCTIONAL
Brand: INTEGRA®

## 2023-10-07 RX ORDER — INSULIN GLARGINE 100 [IU]/ML
35 INJECTION, SOLUTION SUBCUTANEOUS NIGHTLY
Status: DISCONTINUED | OUTPATIENT
Start: 2023-10-07 | End: 2023-10-08

## 2023-10-07 RX ORDER — FENTANYL CITRATE 50 UG/ML
INJECTION, SOLUTION INTRAMUSCULAR; INTRAVENOUS PRN
Status: DISCONTINUED | OUTPATIENT
Start: 2023-10-07 | End: 2023-10-07 | Stop reason: SDUPTHER

## 2023-10-07 RX ORDER — MIDAZOLAM HYDROCHLORIDE 1 MG/ML
INJECTION INTRAMUSCULAR; INTRAVENOUS PRN
Status: DISCONTINUED | OUTPATIENT
Start: 2023-10-07 | End: 2023-10-07 | Stop reason: SDUPTHER

## 2023-10-07 RX ORDER — PHENYLEPHRINE HCL IN 0.9% NACL 1 MG/10 ML
SYRINGE (ML) INTRAVENOUS PRN
Status: DISCONTINUED | OUTPATIENT
Start: 2023-10-07 | End: 2023-10-07 | Stop reason: SDUPTHER

## 2023-10-07 RX ORDER — SODIUM CHLORIDE 9 MG/ML
INJECTION, SOLUTION INTRAVENOUS CONTINUOUS PRN
Status: DISCONTINUED | OUTPATIENT
Start: 2023-10-07 | End: 2023-10-07 | Stop reason: SDUPTHER

## 2023-10-07 RX ORDER — INSULIN GLARGINE 100 [IU]/ML
15 INJECTION, SOLUTION SUBCUTANEOUS ONCE
Status: COMPLETED | OUTPATIENT
Start: 2023-10-07 | End: 2023-10-07

## 2023-10-07 RX ORDER — BUPIVACAINE HYDROCHLORIDE 5 MG/ML
INJECTION, SOLUTION EPIDURAL; INTRACAUDAL PRN
Status: DISCONTINUED | OUTPATIENT
Start: 2023-10-07 | End: 2023-10-07 | Stop reason: ALTCHOICE

## 2023-10-07 RX ORDER — INSULIN LISPRO 100 [IU]/ML
14 INJECTION, SOLUTION INTRAVENOUS; SUBCUTANEOUS
Status: DISCONTINUED | OUTPATIENT
Start: 2023-10-07 | End: 2023-10-08

## 2023-10-07 RX ORDER — PROPOFOL 10 MG/ML
INJECTION, EMULSION INTRAVENOUS CONTINUOUS PRN
Status: DISCONTINUED | OUTPATIENT
Start: 2023-10-07 | End: 2023-10-07 | Stop reason: SDUPTHER

## 2023-10-07 RX ADMIN — SODIUM CHLORIDE, PRESERVATIVE FREE 10 ML: 5 INJECTION INTRAVENOUS at 08:48

## 2023-10-07 RX ADMIN — PROPOFOL 50 MCG/KG/MIN: 10 INJECTION, EMULSION INTRAVENOUS at 06:54

## 2023-10-07 RX ADMIN — ATORVASTATIN CALCIUM 40 MG: 40 TABLET, FILM COATED ORAL at 08:45

## 2023-10-07 RX ADMIN — SODIUM CHLORIDE: 9 INJECTION, SOLUTION INTRAVENOUS at 06:50

## 2023-10-07 RX ADMIN — Medication 100 MCG: at 07:39

## 2023-10-07 RX ADMIN — SODIUM CHLORIDE, PRESERVATIVE FREE 10 ML: 5 INJECTION INTRAVENOUS at 20:14

## 2023-10-07 RX ADMIN — SODIUM BICARBONATE 1300 MG: 650 TABLET ORAL at 08:46

## 2023-10-07 RX ADMIN — INSULIN GLARGINE 15 UNITS: 100 INJECTION, SOLUTION SUBCUTANEOUS at 20:17

## 2023-10-07 RX ADMIN — MIDAZOLAM 2 MG: 1 INJECTION INTRAMUSCULAR; INTRAVENOUS at 06:54

## 2023-10-07 RX ADMIN — PIPERACILLIN AND TAZOBACTAM 3375 MG: 3; .375 INJECTION, POWDER, LYOPHILIZED, FOR SOLUTION INTRAVENOUS at 08:58

## 2023-10-07 RX ADMIN — AMLODIPINE BESYLATE 10 MG: 10 TABLET ORAL at 08:46

## 2023-10-07 RX ADMIN — LABETALOL HYDROCHLORIDE 10 MG: 5 INJECTION INTRAVENOUS at 05:08

## 2023-10-07 RX ADMIN — ACETAMINOPHEN 650 MG: 325 TABLET ORAL at 15:50

## 2023-10-07 RX ADMIN — INSULIN GLARGINE 30 UNITS: 100 INJECTION, SOLUTION SUBCUTANEOUS at 08:44

## 2023-10-07 RX ADMIN — FERROUS SULFATE TAB 325 MG (65 MG ELEMENTAL FE) 325 MG: 325 (65 FE) TAB at 08:46

## 2023-10-07 RX ADMIN — ENOXAPARIN SODIUM 30 MG: 100 INJECTION SUBCUTANEOUS at 08:45

## 2023-10-07 RX ADMIN — FENTANYL CITRATE 100 MCG: 50 INJECTION, SOLUTION INTRAMUSCULAR; INTRAVENOUS at 06:54

## 2023-10-07 RX ADMIN — FERROUS SULFATE TAB 325 MG (65 MG ELEMENTAL FE) 325 MG: 325 (65 FE) TAB at 17:37

## 2023-10-07 RX ADMIN — SODIUM BICARBONATE 1300 MG: 650 TABLET ORAL at 20:14

## 2023-10-07 RX ADMIN — INSULIN LISPRO 3 UNITS: 100 INJECTION, SOLUTION INTRAVENOUS; SUBCUTANEOUS at 08:44

## 2023-10-07 RX ADMIN — PIPERACILLIN AND TAZOBACTAM 3375 MG: 3; .375 INJECTION, POWDER, LYOPHILIZED, FOR SOLUTION INTRAVENOUS at 15:55

## 2023-10-07 RX ADMIN — ALBUMIN (HUMAN) 25 G: 0.25 INJECTION, SOLUTION INTRAVENOUS at 05:11

## 2023-10-07 RX ADMIN — ACETAMINOPHEN 650 MG: 325 TABLET ORAL at 21:54

## 2023-10-07 RX ADMIN — ENOXAPARIN SODIUM 30 MG: 100 INJECTION SUBCUTANEOUS at 20:15

## 2023-10-07 RX ADMIN — LEVOTHYROXINE SODIUM 125 MCG: 0.03 TABLET ORAL at 08:46

## 2023-10-07 RX ADMIN — EPOETIN ALFA-EPBX 10000 UNITS: 10000 INJECTION, SOLUTION INTRAVENOUS; SUBCUTANEOUS at 17:37

## 2023-10-07 ASSESSMENT — PAIN DESCRIPTION - ORIENTATION
ORIENTATION: RIGHT
ORIENTATION: RIGHT

## 2023-10-07 ASSESSMENT — PAIN DESCRIPTION - DESCRIPTORS
DESCRIPTORS: ACHING;BURNING
DESCRIPTORS: PRESSURE

## 2023-10-07 ASSESSMENT — PAIN SCALES - GENERAL
PAINLEVEL_OUTOF10: 3
PAINLEVEL_OUTOF10: 0
PAINLEVEL_OUTOF10: 3
PAINLEVEL_OUTOF10: 7

## 2023-10-07 ASSESSMENT — PAIN - FUNCTIONAL ASSESSMENT: PAIN_FUNCTIONAL_ASSESSMENT: PREVENTS OR INTERFERES SOME ACTIVE ACTIVITIES AND ADLS

## 2023-10-07 ASSESSMENT — PAIN DESCRIPTION - LOCATION
LOCATION: FOOT
LOCATION: FOOT

## 2023-10-07 ASSESSMENT — PAIN DESCRIPTION - PAIN TYPE: TYPE: SURGICAL PAIN

## 2023-10-07 NOTE — PLAN OF CARE
Problem: Discharge Planning  Goal: Discharge to home or other facility with appropriate resources  10/7/2023 0006 by Nusrat Vora RN  Outcome: Progressing  10/6/2023 1031 by Joey Scott RN  Outcome: Progressing  Flowsheets (Taken 10/6/2023 0800)  Discharge to home or other facility with appropriate resources: Identify barriers to discharge with patient and caregiver     Problem: Pain  Goal: Verbalizes/displays adequate comfort level or baseline comfort level  10/7/2023 0006 by Nusrat Vora RN  Outcome: Progressing  10/6/2023 1031 by Joey Scott RN  Outcome: Progressing     Problem: Chronic Conditions and Co-morbidities  Goal: Patient's chronic conditions and co-morbidity symptoms are monitored and maintained or improved  10/7/2023 0006 by Nusrat Vora RN  Outcome: Progressing  10/6/2023 1031 by Joey Scott RN  Outcome: Progressing  Flowsheets (Taken 10/6/2023 0800)  Care Plan - Patient's Chronic Conditions and Co-Morbidity Symptoms are Monitored and Maintained or Improved: Monitor and assess patient's chronic conditions and comorbid symptoms for stability, deterioration, or improvement     Problem: Safety - Adult  Goal: Free from fall injury  10/7/2023 0006 by Nusrat Vora RN  Outcome: Progressing  10/6/2023 1031 by Joey Scott RN  Outcome: Progressing     Problem: Skin/Tissue Integrity  Goal: Absence of new skin breakdown  Description: 1. Monitor for areas of redness and/or skin breakdown  2. Assess vascular access sites hourly  3. Every 4-6 hours minimum:  Change oxygen saturation probe site  4. Every 4-6 hours:  If on nasal continuous positive airway pressure, respiratory therapy assess nares and determine need for appliance change or resting period.   10/7/2023 0006 by Nusrat Vora RN  Outcome: Progressing  10/6/2023 1031 by Joey Scott RN  Outcome: Progressing     Problem: Skin/Tissue Integrity - Adult  Goal: Skin integrity remains

## 2023-10-07 NOTE — BRIEF OP NOTE
Brief Postoperative Note      Patient: Ap Jacobs  YOB: 1990  MRN: [id number]    Date of Procedure: 10/5/2023    Pre op diagnosis: right foot osteomyelitis: M86. 171    Post-Op Diagnosis: Same       Procedure: Right foot incision and drainage to bone with bone biopsy and wound vac application    Surgeon:  Neftaly Yen DPM    Assistant:  Kaylyn Mejia     Anesthesia: * No anesthesia type entered *    Estimated Blood Loss (mL): Minimal    Complications: None    Specimens:   Bone biopsy 5th digit for culture and pathology  Bone biopsy 5th metatarsal base for culture and pathology  Bone biopsy second metatarsal base for culture and pathology    Implants:  * No implants in log *      Drains:   Negative Pressure Wound Therapy Foot Right (Active)       Findings: Purulent discharge noted coming from the midfoot.        Electronically signed by Kaylyn Mejia DPM on 10/7/2023 at 8:03 AM

## 2023-10-07 NOTE — PROGRESS NOTES
ENDOCRINOLOGY PROGRESS NOTE      Date of admission: 10/2/2023  Date of service: 10/7/2023  Admitting physician: Bailee Watson DO   Primary Care Physician: Jade Owens MD  Consultant physician: Braden Galeana MD     Reason for the consultation:  Uncontrolled DM    History of Present Illness:  Ciro Rowe is a very pleasant 26 y.o. old male with PMH of DM1, HTN, HLD and other listed below admitted to [facility] on 10/2/2023 because of nausea, fever, and chills, endocrine service was consulted for diabetes management. Patient was initially admitted to the floor for management of sepsis 2/2 cellulitis however he clinically deteriorated with worsening ASHLEY and also entered into DKA. He was transferred to MICU and placed on an insulin drip. Subjective:   Was seen and examined at bedside this morning. No acute events overnight. Blood sugar improving. No hypoglycemia. Inpatient diet:   Carb Restricted diet     Point of care glucose monitoring   (Independently reviewed)   No results for input(s): \"GLUMET\" in the last 72 hours.       Scheduled Meds:   insulin glargine  35 Units SubCUTAneous Nightly    insulin lispro  16 Units SubCUTAneous TID WC    enoxaparin  30 mg SubCUTAneous BID    insulin lispro  0-4 Units SubCUTAneous Nightly    sodium bicarbonate  1,300 mg Oral BID    insulin lispro  0-18 Units SubCUTAneous TID WC    ferrous sulfate  325 mg Oral BID WC    levothyroxine  125 mcg Oral Daily    amLODIPine  10 mg Oral Daily    atorvastatin  40 mg Oral Daily    sodium chloride flush  5-40 mL IntraVENous 2 times per day    piperacillin-tazobactam  3,375 mg IntraVENous q8h       PRN Meds:   traMADol, 50 mg, Q6H PRN  dextrose bolus, 125 mL, PRN   Or  dextrose bolus, 250 mL, PRN  potassium chloride, 10 mEq, PRN  magnesium sulfate, 2,000 mg, PRN  sodium phosphate 10 mmol in sodium chloride 0.9 % 250 mL IVPB, 10 mmol, PRN   Or  sodium phosphate 15 mmol in sodium chloride 0.9 % 250 mL IVPB, 15 mmol, PRN   Or  sodium All labs and imaging were reviewed independently     [facility] 26 [facility] seen today for inpatient diabetes management    Diabetes Mellitus type 2  Patient's diabetes is uncontrolled, A1c 10.8  When patient is ready to be bridged, we recommend the following DM regimen  Decrease Lantus to 35 units nightly  Humalog to 14 units with meals  Continue high-dose sliding scale ACHS  Continue glucose check with meals and at bedtime  Will titrate insulin dose based on the blood glucose trend & insulin requirement  Upon discharge, I will arrange for the patient to be seen in the endocrinology clinic for routine diabetes maintenance and prevention     Dietary noncompliance  Discussed with patient the importance of eating consistent carbohydrate meals, avoiding high glycemic index food. Also, discussed with patient the risk and negative consequences of dietary noncompliance on blood glucose control, blood pressure and weight    Cellulitis of right leg  Management per ID  Discussed the effect of infection on the blood sugar control. Also discussed the importance of controlling diabetes and management intervention of soft tissue infection    Sepsis  Management per critical care  Insulin requirements while septic are likely to differ from home regimen/discharge regimen    Interdisciplinary plan for communication with healthcare providers:   Consult recommendations were discussed with the Primary Service/Nursing staff      The above issues were reviewed with the patient who understood and agreed with the plan. Thank you for allowing us to participate in the care of this patient. Please do not hesitate to contact us with any additional questions. Thomas Holcomb MD  Endocrinologist, [facility]   [address] [place] [address]   Phone: [phone]  Fax: [phone]  --------------------------  An electronic signature was used to authenticate this note.  Jerrell

## 2023-10-07 NOTE — OP NOTE
Operative Note      Patient: Meggan Newton  YOB: 1990  MRN: [id number]    Date of Procedure: 10/7/2023    Pre-Op Diagnosis Codes:     * Osteomyelitis of right foot, unspecified type ([address]) [M86.9]    Post-Op Diagnosis:  1. Osteomyelitis of right foot 2.  Abscess of right foot        Procedure(s):  RIGHT FOOT BONE BIOPSY, INCISION AND DRAINAGE TO LEVEL OF BONE with application of wound vac    Surgeon: Tin Cote DPM    Assistant:   Zena Noel, PGY2    Anesthesia: Monitor Anesthesia Care with local     Estimated Blood Loss (mL): Minimal    Complications: none    Vascular status: intact     Specimens:   ID Type Source Tests Collected by Time Destination   1 : Fifth Proximal Phalanx of Right Foot for Culture  Tissue Tissue CULTURE, ANAEROBIC, CULTURE, FUNGUS, GRAM STAIN (Canceled), CULTURE, SURGICAL, CULTURE WITH SMEAR, ACID FAST Jazmín Spine, DP 10/7/2023 0924    2 : Fifth Metatarsal Bone for Culture Tissue Tissue CULTURE, ANAEROBIC, CULTURE, FUNGUS, GRAM STAIN (Canceled), CULTURE, SURGICAL, CULTURE WITH SMEAR, ACID FAST BACILLIUS Tin Cote, DP 10/7/2023 0813    3 : Right Foot Tissue for Culture Tissue Tissue CULTURE, ANAEROBIC, CULTURE, FUNGUS, GRAM STAIN (Canceled), CULTURE, SURGICAL, CULTURE WITH SMEAR, ACID FAST BACILLIUS Tin Cote, DP 10/7/2023 0816    4 : Second Metatarsal Bone for Culture  Tissue Tissue CULTURE, ANAEROBIC, CULTURE, FUNGUS, CULTURE, SURGICAL, CULTURE WITH SMEAR, ACID FAST Jazmín Spine, DPM 10/7/2023 6664    A : Fifth Proximal Phalanx of Right Foot for Pathology  Tissue Tissue SURGICAL PATHOLOGY Tin Cote, DPM 10/7/2023 0725    B : Fifth Metatarrsal Bone for Pathology Tissue Tissue SURGICAL PATHOLOGY Tin Cote, DPM 10/7/2023 8950    C : Right Foot Tissue for Pathology  Tissue Tissue SURGICAL PATHOLOGY Payton Barriga 10/7/2023 6761    D : Second Metatarsal Bone for Pathology  Tissue Tissue SURGICAL PATHOLOGY incision and adaptic 4x4 gauze kerilex and ace bandage. POSTOPERATIVE INFORMATION: The patient tolerated the above noted procedure and anesthesia well and was transferred to  the PACU with vital signs stable, and vascular status intact with capillary refill intact to all digits. Postoperative instructions  reviewed in detail with the patient with written instructions provided.  Patient is admitted to [address] [place] [address] and will be followed in house      Electronically signed by Scott Portillo DPM on 10/7/2023 at 5:01 PM

## 2023-10-07 NOTE — PROGRESS NOTES
injection 10 mg  10 mg IntraVENous Q4H PRN Jessica Perez MD        labetalol (NORMODYNE;TRANDATE) injection 10 mg  10 mg IntraVENous Q4H PRN Jessica Perez MD   10 mg at 10/07/23 0508     PRN Meds      : traMADol, dextrose bolus **OR** dextrose bolus, potassium chloride, magnesium sulfate, sodium phosphate 10 mmol in sodium chloride 0.9 % 250 mL IVPB **OR** sodium phosphate 15 mmol in sodium chloride 0.9 % 250 mL IVPB **OR** sodium phosphate 20 mmol in sodium chloride 0.9 % 250 mL IVPB, sodium chloride flush, sodium chloride, ondansetron **OR** ondansetron, polyethylene glycol, acetaminophen **OR** acetaminophen, glucose, glucagon (rDNA), dextrose, hydrALAZINE, labetalol    Allergy            : Pineapple  Immunization      :   Immunization History   Administered Date(s) Administered    Hep B, ENGERIX-B, RECOMBIVAX-HB, (age Birth - 22y), IM, 0.5mL 08/15/2000, 11/03/2000    Influenza Vaccine, unspecified formulation 10/05/2017    Influenza, AFLURIA (age 1 yrs+), FLUZONE, (age 10 mo+), MDV, 0.5mL 10/05/2017    Influenza, Quadv, 6 mo and older, IM (Fluzone, Flulaval) 10/05/2017    MMR, Nash Menchaca, M-M-R II, (age 12m+), SC, 0.5mL 08/15/2000    Td, unspecified formulation 01/20/2013             Labs and Imaging Studies                  CBC  :  Recent Labs     10/05/23  0532 10/06/23  0527 10/07/23  0457   WBC 19.4* 22.0* 19.6*   RBC 3.01* 2.95* 2.71*   HGB 9.0* 8.9* 8.1*   HCT 29.2* 28.4* 26.0*   MCV 97.0 96.3 95.9   MCH 29.9 30.2 29.9   MCHC 30.8* 31.3* 31.2*   RDW 12.7 13.0 13.2    455* 483*   MPV 10.0 9.8 9.5     Comprehensive :   Recent Labs     10/05/23  0532 10/06/23  0527 10/06/23  1640 10/07/23  0457    130* 134 136   K 4.9 4.6 4.2 3.8    99 102 102   CO2 17* 18* 19* 17*   BUN 28* 27* 26* 25*   CREATININE 3.1* 3.2* 3.0* 3.1*   GLUCOSE 311* 229* 163* 162*   CALCIUM 8.9 9.1 9.3 9.1   PROT 6.5 6.4  --  6.5   LABALBU 2.6* 2.5*  --  2.6*   BILITOT 0.2 0.3  --  0.2   ALKPHOS 179* 170*  --  151*   AST 22 appreciated. Endocrine: Lantus 35 units nightly. Humalog 14 U with meals. Endocrinology follow-up appreciated. DVT/GI: Prophylaxis: Protonix and Lovenox  Code Status: Full Code  Disposition: Transfer to Floor  Total Critical care time spent  35 mins. This did not include any procedures. During multidisciplinary team rounds David Gabriel, was seen, examined and discussed. This is confirmation that I have personally seen and examined the patient and that the key elements of the encounter were performed by me (> 85 % time). The medications & laboratory data and imagery was discussed and adjusted where necessary. Key issues of the case were discussed among consultants. This patient has a high probability of sudden clinically significant deterioration. I managed/supervised life or organ supporting interventions that required frequent physician assessment. I devoted my full attention to the direct care of this patient for the period of time indicated below. In addition to above, time was devoted to teaching and to any procedure. NOTE: This report, in part or full, may have been transcribed using voice recognition software. Every effort was made to ensure accuracy; however, inadvertent computerized transcription errors may be present. Please excuse any transcriptional grammatical or spelling errors that may have escaped my editorial review. Total critical care time caring for this patient with life threatening, unstable organ failure, including direct patient contact, management of life support systems, review of data including imaging and labs, discussions with other team members and physicians is at least 28 Min so far today, excluding procedures.       Electronically signed by Kathe Jasso MD on 10/7/2023 at 12:24 PM  [address]

## 2023-10-08 ENCOUNTER — APPOINTMENT (OUTPATIENT)
Dept: MRI IMAGING | Age: 33
End: 2023-10-08
Payer: MEDICAID

## 2023-10-08 LAB
ALBUMIN SERPL-MCNC: 2.6 G/DL (ref 3.5–5.2)
ALP SERPL-CCNC: 213 U/L (ref 40–129)
ALT SERPL-CCNC: 10 U/L (ref 0–40)
ANION GAP SERPL CALCULATED.3IONS-SCNC: 16 MMOL/L (ref 7–16)
AST SERPL-CCNC: 15 U/L (ref 0–39)
BASOPHILS # BLD: 0.06 K/UL (ref 0–0.2)
BASOPHILS NFR BLD: 0 % (ref 0–2)
BILIRUB SERPL-MCNC: 0.2 MG/DL (ref 0–1.2)
BUN SERPL-MCNC: 21 MG/DL (ref 6–20)
CALCIUM SERPL-MCNC: 9.1 MG/DL (ref 8.6–10.2)
CHLORIDE SERPL-SCNC: 101 MMOL/L (ref 98–107)
CO2 SERPL-SCNC: 17 MMOL/L (ref 22–29)
CREAT SERPL-MCNC: 3 MG/DL (ref 0.7–1.2)
EOSINOPHIL # BLD: 0.53 K/UL (ref 0.05–0.5)
EOSINOPHILS RELATIVE PERCENT: 3 % (ref 0–6)
ERYTHROCYTE [DISTWIDTH] IN BLOOD BY AUTOMATED COUNT: 13.4 % (ref 11.5–15)
GFR SERPL CREATININE-BSD FRML MDRD: 27 ML/MIN/1.73M2
GLUCOSE BLD-MCNC: 138 MG/DL (ref 74–99)
GLUCOSE BLD-MCNC: 152 MG/DL (ref 74–99)
GLUCOSE BLD-MCNC: 168 MG/DL (ref 74–99)
GLUCOSE BLD-MCNC: 95 MG/DL (ref 74–99)
GLUCOSE SERPL-MCNC: 160 MG/DL (ref 74–99)
HCT VFR BLD AUTO: 26 % (ref 37–54)
HGB BLD-MCNC: 8.2 G/DL (ref 12.5–16.5)
IMM GRANULOCYTES # BLD AUTO: 0.14 K/UL (ref 0–0.58)
IMM GRANULOCYTES NFR BLD: 1 % (ref 0–5)
INR PPP: 1.2
LYMPHOCYTES NFR BLD: 1.04 K/UL (ref 1.5–4)
LYMPHOCYTES RELATIVE PERCENT: 7 % (ref 20–42)
MAGNESIUM SERPL-MCNC: 2 MG/DL (ref 1.6–2.6)
MCH RBC QN AUTO: 29.8 PG (ref 26–35)
MCHC RBC AUTO-ENTMCNC: 31.5 G/DL (ref 32–34.5)
MCV RBC AUTO: 94.5 FL (ref 80–99.9)
MONOCYTES NFR BLD: 1.53 K/UL (ref 0.1–0.95)
MONOCYTES NFR BLD: 10 % (ref 2–12)
NEUTROPHILS NFR BLD: 79 % (ref 43–80)
NEUTS SEG NFR BLD: 12.77 K/UL (ref 1.8–7.3)
PARTIAL THROMBOPLASTIN TIME: 41.3 SEC (ref 24.5–35.1)
PHOSPHATE SERPL-MCNC: 3.7 MG/DL (ref 2.5–4.5)
PLATELET # BLD AUTO: 555 K/UL (ref 130–450)
PMV BLD AUTO: 9.5 FL (ref 7–12)
POTASSIUM SERPL-SCNC: 3.6 MMOL/L (ref 3.5–5)
PROT SERPL-MCNC: 6.9 G/DL (ref 6.4–8.3)
PROTHROMBIN TIME: 13.1 SEC (ref 9.3–12.4)
RBC # BLD AUTO: 2.75 M/UL (ref 3.8–5.8)
RBC # BLD: ABNORMAL 10*6/UL
SODIUM SERPL-SCNC: 134 MMOL/L (ref 132–146)
WBC OTHER # BLD: 16.1 K/UL (ref 4.5–11.5)

## 2023-10-08 PROCEDURE — 85610 PROTHROMBIN TIME: CPT

## 2023-10-08 PROCEDURE — 2580000003 HC RX 258

## 2023-10-08 PROCEDURE — 6370000000 HC RX 637 (ALT 250 FOR IP): Performed by: INTERNAL MEDICINE

## 2023-10-08 PROCEDURE — 6360000002 HC RX W HCPCS

## 2023-10-08 PROCEDURE — 2700000000 HC OXYGEN THERAPY PER DAY

## 2023-10-08 PROCEDURE — 85025 COMPLETE CBC W/AUTO DIFF WBC: CPT

## 2023-10-08 PROCEDURE — 6370000000 HC RX 637 (ALT 250 FOR IP)

## 2023-10-08 PROCEDURE — 36415 COLL VENOUS BLD VENIPUNCTURE: CPT

## 2023-10-08 PROCEDURE — 83735 ASSAY OF MAGNESIUM: CPT

## 2023-10-08 PROCEDURE — 73721 MRI JNT OF LWR EXTRE W/O DYE: CPT

## 2023-10-08 PROCEDURE — 82962 GLUCOSE BLOOD TEST: CPT

## 2023-10-08 PROCEDURE — 85730 THROMBOPLASTIN TIME PARTIAL: CPT

## 2023-10-08 PROCEDURE — 80053 COMPREHEN METABOLIC PANEL: CPT

## 2023-10-08 PROCEDURE — 84100 ASSAY OF PHOSPHORUS: CPT

## 2023-10-08 PROCEDURE — 2060000000 HC ICU INTERMEDIATE R&B

## 2023-10-08 RX ORDER — INSULIN LISPRO 100 [IU]/ML
0-12 INJECTION, SOLUTION INTRAVENOUS; SUBCUTANEOUS
Status: DISCONTINUED | OUTPATIENT
Start: 2023-10-08 | End: 2023-10-10

## 2023-10-08 RX ORDER — POTASSIUM CHLORIDE 20 MEQ/1
40 TABLET, EXTENDED RELEASE ORAL ONCE
Status: COMPLETED | OUTPATIENT
Start: 2023-10-08 | End: 2023-10-08

## 2023-10-08 RX ORDER — INSULIN LISPRO 100 [IU]/ML
6 INJECTION, SOLUTION INTRAVENOUS; SUBCUTANEOUS
Status: DISCONTINUED | OUTPATIENT
Start: 2023-10-08 | End: 2023-10-09

## 2023-10-08 RX ORDER — INSULIN GLARGINE 100 [IU]/ML
25 INJECTION, SOLUTION SUBCUTANEOUS NIGHTLY
Status: DISCONTINUED | OUTPATIENT
Start: 2023-10-08 | End: 2023-10-09

## 2023-10-08 RX ADMIN — FERROUS SULFATE TAB 325 MG (65 MG ELEMENTAL FE) 325 MG: 325 (65 FE) TAB at 08:02

## 2023-10-08 RX ADMIN — TRAMADOL HYDROCHLORIDE 50 MG: 50 TABLET ORAL at 12:40

## 2023-10-08 RX ADMIN — AMLODIPINE BESYLATE 10 MG: 10 TABLET ORAL at 08:02

## 2023-10-08 RX ADMIN — SODIUM BICARBONATE 1300 MG: 650 TABLET ORAL at 08:02

## 2023-10-08 RX ADMIN — INSULIN LISPRO 2 UNITS: 100 INJECTION, SOLUTION INTRAVENOUS; SUBCUTANEOUS at 08:05

## 2023-10-08 RX ADMIN — INSULIN LISPRO 6 UNITS: 100 INJECTION, SOLUTION INTRAVENOUS; SUBCUTANEOUS at 08:01

## 2023-10-08 RX ADMIN — PIPERACILLIN AND TAZOBACTAM 3375 MG: 3; .375 INJECTION, POWDER, LYOPHILIZED, FOR SOLUTION INTRAVENOUS at 07:01

## 2023-10-08 RX ADMIN — ATORVASTATIN CALCIUM 40 MG: 40 TABLET, FILM COATED ORAL at 08:02

## 2023-10-08 RX ADMIN — SODIUM CHLORIDE, PRESERVATIVE FREE 10 ML: 5 INJECTION INTRAVENOUS at 16:08

## 2023-10-08 RX ADMIN — PIPERACILLIN AND TAZOBACTAM 3375 MG: 3; .375 INJECTION, POWDER, LYOPHILIZED, FOR SOLUTION INTRAVENOUS at 16:06

## 2023-10-08 RX ADMIN — ENOXAPARIN SODIUM 30 MG: 100 INJECTION SUBCUTANEOUS at 08:01

## 2023-10-08 RX ADMIN — PIPERACILLIN AND TAZOBACTAM 3375 MG: 3; .375 INJECTION, POWDER, LYOPHILIZED, FOR SOLUTION INTRAVENOUS at 00:06

## 2023-10-08 RX ADMIN — LEVOTHYROXINE SODIUM 125 MCG: 0.03 TABLET ORAL at 07:01

## 2023-10-08 RX ADMIN — ENOXAPARIN SODIUM 30 MG: 100 INJECTION SUBCUTANEOUS at 20:44

## 2023-10-08 RX ADMIN — INSULIN LISPRO 6 UNITS: 100 INJECTION, SOLUTION INTRAVENOUS; SUBCUTANEOUS at 12:39

## 2023-10-08 RX ADMIN — INSULIN GLARGINE 25 UNITS: 100 INJECTION, SOLUTION SUBCUTANEOUS at 20:44

## 2023-10-08 RX ADMIN — FERROUS SULFATE TAB 325 MG (65 MG ELEMENTAL FE) 325 MG: 325 (65 FE) TAB at 16:07

## 2023-10-08 RX ADMIN — INSULIN LISPRO 6 UNITS: 100 INJECTION, SOLUTION INTRAVENOUS; SUBCUTANEOUS at 18:18

## 2023-10-08 RX ADMIN — POTASSIUM CHLORIDE 40 MEQ: 1500 TABLET, EXTENDED RELEASE ORAL at 08:02

## 2023-10-08 RX ADMIN — SODIUM BICARBONATE 1300 MG: 650 TABLET ORAL at 20:44

## 2023-10-08 ASSESSMENT — PAIN DESCRIPTION - LOCATION: LOCATION: LEG

## 2023-10-08 ASSESSMENT — PAIN DESCRIPTION - DESCRIPTORS: DESCRIPTORS: SQUEEZING

## 2023-10-08 ASSESSMENT — PAIN SCALES - GENERAL
PAINLEVEL_OUTOF10: 0
PAINLEVEL_OUTOF10: 7

## 2023-10-08 ASSESSMENT — PAIN DESCRIPTION - ORIENTATION: ORIENTATION: RIGHT

## 2023-10-08 NOTE — PROGRESS NOTES
Patient unplugged wound vac and got up and went to bathroom before I got to room, ace wrap is a little bloody; however, it is plugged back in and working again   Dr. Juliano Villela notified via perfect serve and she responded that she will forward to house team 2

## 2023-10-08 NOTE — ANESTHESIA POSTPROCEDURE EVALUATION
Department of Anesthesiology  Postprocedure Note    Patient: Kwadwo Castro  MRN: [id number]  YOB: 1990  Date of evaluation: 10/7/2023      Procedure Summary     Date: 10/07/23 Room / Location: [facility] OR 08 / [facility]    Anesthesia Start: 1005 Anesthesia Stop: 0075    Procedure: RIGHT FOOT BONE BIOPSY, INCISION AND DRAINAGE TO LEVEL OF BONE (Right: Foot) Diagnosis:       Osteomyelitis of right foot, unspecified type ([address])      (Osteomyelitis of right foot, unspecified type ([address]) [M86.9])    Surgeons: Tg Pineda DPM Responsible Provider: Jojo Li MD    Anesthesia Type: MAC ASA Status: 3          Anesthesia Type: MAC    Junie Phase I:      Junie Phase II:        Anesthesia Post Evaluation    Patient location during evaluation: PACU  Patient participation: complete - patient participated  Level of consciousness: awake and alert  Airway patency: patent  Nausea & Vomiting: no nausea and no vomiting  Complications: no  Cardiovascular status: blood pressure returned to baseline  Respiratory status: acceptable  Hydration status: euvolemic  Pain management: adequate

## 2023-10-08 NOTE — PROGRESS NOTES
available    Kyle Perkins MD, PGY-1   Attending physician: Dr. Quintin Akbar    [facility]  Internal Medicine Residency / [address] to resident note  Attending Physician Statement:  Saima Escobar M.D., F.A.C.P. Hospital charts reviewed, including other providers notes, relevant labs and imaging. Coordinating care with residents, other providers and/or counseling patient    I have seen patient/discussed the history and PE with the resident, and I agree with workup/plan and orders as documented by the resident. (billing based on complexity of Medical decision making)  My Assessment as follows:                 Sepsis 2 to diabetic/neuropathic- charcot foot- osteomyelitis/abscess formation  Sp I and D  +bx and Cx  -right foot yesterday. + abscess in 1st and 2nd metatarsal area, bone and soft tissue culture sent.     Ongoing fevers, WBC  +nausea/vomting- GI distress     ASHLEY on ckd, 2 to progressive DM nephropathy  Vs ATN  Vs AIN  Vs post infectious GN etc..  Given fluids     HTN - no hypotensive  ACD- now on 10,000  epo  Dm-1  long hx insulin adjustments made- Lantus to 35 units nightly Humalog to 14 units with meals high-dose sliding scale ACHS  Hypothyroidism - reassess thyoid  and inc lipids     Plan repeat MRI- ?further debridement   post op  -sepsis/ hypotensive improved

## 2023-10-08 NOTE — PROGRESS NOTES
ENDOCRINOLOGY PROGRESS NOTE      Date of admission: 10/2/2023  Date of service: 10/8/2023  Admitting physician: Eula Mc DO   Primary Care Physician: Robson Renee MD  Consultant physician: Kwan Juares MD     Reason for the consultation:  Uncontrolled DM    History of Present Illness:  Roula Pickard is a very pleasant 26 y.o. old male with PMH of DM1, HTN, HLD and other listed below admitted to [facility] on 10/2/2023 because of nausea, fever, and chills, endocrine service was consulted for diabetes management. Patient was initially admitted to the floor for management of sepsis 2/2 cellulitis however he clinically deteriorated with worsening ASHLEY and also entered into DKA. He was transferred to MICU and placed on an insulin drip. Subjective:   Patient was seen and examined this morning. Transferred out of the ICU. No acute events overnight. Blood sugar at goal.  No hypoglycemia. Inpatient diet:   Carb Restricted diet     Point of care glucose monitoring   (Independently reviewed)   No results for input(s): \"GLUMET\" in the last 72 hours.       Scheduled Meds:   insulin lispro  6 Units SubCUTAneous TID WC    insulin glargine  25 Units SubCUTAneous Nightly    insulin lispro  0-12 Units SubCUTAneous TID WC    epoetin dillon-epbx  10,000 Units SubCUTAneous Weekly    enoxaparin  30 mg SubCUTAneous BID    insulin lispro  0-4 Units SubCUTAneous Nightly    sodium bicarbonate  1,300 mg Oral BID    ferrous sulfate  325 mg Oral BID WC    levothyroxine  125 mcg Oral Daily    amLODIPine  10 mg Oral Daily    atorvastatin  40 mg Oral Daily    sodium chloride flush  5-40 mL IntraVENous 2 times per day    piperacillin-tazobactam  3,375 mg IntraVENous q8h       PRN Meds:   traMADol, 50 mg, Q6H PRN  dextrose bolus, 125 mL, PRN   Or  dextrose bolus, 250 mL, PRN  potassium chloride, 10 mEq, PRN  magnesium sulfate, 2,000 mg, PRN  sodium phosphate 10 mmol in sodium chloride 0.9 % 250 mL IVPB, 10 mmol, PRN   Or  sodium

## 2023-10-08 NOTE — PROGRESS NOTES
Noted consult for nutrition supplements:  Re-added Ensure High Protein & Ronal BID to aid in wound healing & recovery    See RD progress note dated 10/6 for complete assessment  Electronically signed by Vernon Murcia RD, LD on 10/8/2023 at 10:14 AM

## 2023-10-08 NOTE — PROGRESS NOTES
growth noted to the distal aspects dorsally. NEUROLOGIC:  Protective sensation is diminished by grossly intact     DERM:  Skin is intact and well hydrated to the bilateral lower extremities. 2 hematogenous crust noted to left 5th digit and one noted to base of the left 5th metatarsal as pictured below. Surrounding bogginess noted. Whole left foot is grossly erythematous and edematous and painful to palpation. Right lower extremity is diffusely warm to touch compared to left lower extremity      MUSCULOSKELETAL: No deformities noted. 5/5 Gross Muscle strength in all 4 quadrants. Scheduled Meds:   insulin lispro  6 Units SubCUTAneous TID WC    insulin glargine  25 Units SubCUTAneous Nightly    insulin lispro  0-12 Units SubCUTAneous TID WC    epoetin dillon-epbx  10,000 Units SubCUTAneous Weekly    enoxaparin  30 mg SubCUTAneous BID    insulin lispro  0-4 Units SubCUTAneous Nightly    sodium bicarbonate  1,300 mg Oral BID    ferrous sulfate  325 mg Oral BID WC    levothyroxine  125 mcg Oral Daily    amLODIPine  10 mg Oral Daily    atorvastatin  40 mg Oral Daily    sodium chloride flush  5-40 mL IntraVENous 2 times per day    piperacillin-tazobactam  3,375 mg IntraVENous q8h     Continuous Infusions:   sodium chloride      dextrose       PRN Meds:.traMADol, dextrose bolus **OR** dextrose bolus, potassium chloride, magnesium sulfate, sodium phosphate 10 mmol in sodium chloride 0.9 % 250 mL IVPB **OR** sodium phosphate 15 mmol in sodium chloride 0.9 % 250 mL IVPB **OR** sodium phosphate 20 mmol in sodium chloride 0.9 % 250 mL IVPB, sodium chloride flush, sodium chloride, ondansetron **OR** ondansetron, polyethylene glycol, acetaminophen **OR** acetaminophen, glucose, glucagon (rDNA), dextrose, hydrALAZINE, labetalol    RADIOLOGY:  MRI FOOT RIGHT WO CONTRAST   Final Result   1.  Multifocal bone marrow edema throughout all of the bones of the midfoot,   all of the metatarsals, and the bones of the throughout indeterminate.  - MRI right foot: 1. Multifocal bone marrow edema throughout all of the bones of the midfoot,  all of the metatarsals, and the bones of the small toe. Some of this is  probably related to changes of Charcot arthropathy given the location and  multi focality; however, osteomyelitis cannot be excluded. 2. Periosteal reaction and cortical thickening involving the 2nd metatarsal.  Small chronic ossicles near the 2nd MTP joint. 3. Charcot arthropathy of the midfoot with large amount of fluid insinuated  amongst the midfoot bones. Septic arthritis within the midfoot cannot be  excluded based on the appearance. 4. Diabetic myopathy. 5. Subcutaneous soft tissue edema. 6. 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 - Wound vac on and running at 200 mm hg weekly changes. Podiatry can manage.      - Discussed patient with Derrick Munguia and Teri Quiñonez   - Will continue to follow patient while they are in-house. Thank you for the opportunity to take part in the patient's care. Please do not hesitate to call for any questions or concerns.

## 2023-10-08 NOTE — PROGRESS NOTES
Patient wound vac occluded on patient side, dr Will Roblero notified via [employer]. Attempted trouble shooting. Wound vac no longer sealed and needs rewrapped.  Dr Will Roblero notified podiatry

## 2023-10-08 NOTE — PROGRESS NOTES
Pt foot/ankle MRI results had multiple findings.  [facility] radiology called to see

## 2023-10-09 PROBLEM — E10.628 TYPE 1 DIABETES MELLITUS WITH DIABETIC FOOT INFECTION (HCC): Status: ACTIVE | Noted: 2023-10-09

## 2023-10-09 PROBLEM — E10.621 DIABETIC ULCER OF RIGHT FOOT ASSOCIATED WITH TYPE 1 DIABETES MELLITUS, WITH NECROSIS OF MUSCLE (HCC): Status: ACTIVE | Noted: 2023-10-09

## 2023-10-09 PROBLEM — L97.513 DIABETIC ULCER OF RIGHT FOOT ASSOCIATED WITH TYPE 1 DIABETES MELLITUS, WITH NECROSIS OF MUSCLE (HCC): Status: ACTIVE | Noted: 2023-10-09

## 2023-10-09 PROBLEM — E11.610 TYPE 2 DIABETES MELLITUS WITH CHARCOT'S JOINT OF RIGHT FOOT (HCC): Status: ACTIVE | Noted: 2023-10-09

## 2023-10-09 PROBLEM — L08.9 TYPE 1 DIABETES MELLITUS WITH DIABETIC FOOT INFECTION (HCC): Status: ACTIVE | Noted: 2023-10-09

## 2023-10-09 LAB
ALBUMIN SERPL-MCNC: 2.6 G/DL (ref 3.5–5.2)
ALP SERPL-CCNC: 189 U/L (ref 40–129)
ALT SERPL-CCNC: 9 U/L (ref 0–40)
ANION GAP SERPL CALCULATED.3IONS-SCNC: 17 MMOL/L (ref 7–16)
AST SERPL-CCNC: 10 U/L (ref 0–39)
BASOPHILS # BLD: 0.07 K/UL (ref 0–0.2)
BASOPHILS NFR BLD: 1 % (ref 0–2)
BILIRUB SERPL-MCNC: 0.2 MG/DL (ref 0–1.2)
BUN SERPL-MCNC: 24 MG/DL (ref 6–20)
CALCIUM SERPL-MCNC: 9.2 MG/DL (ref 8.6–10.2)
CHLORIDE SERPL-SCNC: 99 MMOL/L (ref 98–107)
CO2 SERPL-SCNC: 18 MMOL/L (ref 22–29)
CREAT SERPL-MCNC: 3 MG/DL (ref 0.7–1.2)
CREAT UR-MCNC: 99.1 MG/DL (ref 40–278)
EOSINOPHIL # BLD: 0.59 K/UL (ref 0.05–0.5)
EOSINOPHILS RELATIVE PERCENT: 4 % (ref 0–6)
ERYTHROCYTE [DISTWIDTH] IN BLOOD BY AUTOMATED COUNT: 13.5 % (ref 11.5–15)
GFR SERPL CREATININE-BSD FRML MDRD: 27 ML/MIN/1.73M2
GLUCOSE BLD-MCNC: 231 MG/DL (ref 74–99)
GLUCOSE BLD-MCNC: 291 MG/DL (ref 74–99)
GLUCOSE BLD-MCNC: 317 MG/DL (ref 74–99)
GLUCOSE SERPL-MCNC: 259 MG/DL (ref 74–99)
HCT VFR BLD AUTO: 26.3 % (ref 37–54)
HGB BLD-MCNC: 8.1 G/DL (ref 12.5–16.5)
IMM GRANULOCYTES # BLD AUTO: 0.19 K/UL (ref 0–0.58)
IMM GRANULOCYTES NFR BLD: 1 % (ref 0–5)
LYMPHOCYTES NFR BLD: 1.5 K/UL (ref 1.5–4)
LYMPHOCYTES RELATIVE PERCENT: 11 % (ref 20–42)
MCH RBC QN AUTO: 29.6 PG (ref 26–35)
MCHC RBC AUTO-ENTMCNC: 30.8 G/DL (ref 32–34.5)
MCV RBC AUTO: 96 FL (ref 80–99.9)
MONOCYTES NFR BLD: 1.45 K/UL (ref 0.1–0.95)
MONOCYTES NFR BLD: 11 % (ref 2–12)
NEUTROPHILS NFR BLD: 72 % (ref 43–80)
NEUTS SEG NFR BLD: 9.71 K/UL (ref 1.8–7.3)
PLATELET # BLD AUTO: 580 K/UL (ref 130–450)
PMV BLD AUTO: 9.3 FL (ref 7–12)
POTASSIUM SERPL-SCNC: 4.1 MMOL/L (ref 3.5–5)
PROT SERPL-MCNC: 7.1 G/DL (ref 6.4–8.3)
RBC # BLD AUTO: 2.74 M/UL (ref 3.8–5.8)
SODIUM SERPL-SCNC: 134 MMOL/L (ref 132–146)
WBC OTHER # BLD: 13.5 K/UL (ref 4.5–11.5)

## 2023-10-09 PROCEDURE — 6370000000 HC RX 637 (ALT 250 FOR IP)

## 2023-10-09 PROCEDURE — 6370000000 HC RX 637 (ALT 250 FOR IP): Performed by: INTERNAL MEDICINE

## 2023-10-09 PROCEDURE — 2700000000 HC OXYGEN THERAPY PER DAY

## 2023-10-09 PROCEDURE — 82962 GLUCOSE BLOOD TEST: CPT

## 2023-10-09 PROCEDURE — 82570 ASSAY OF URINE CREATININE: CPT

## 2023-10-09 PROCEDURE — 97530 THERAPEUTIC ACTIVITIES: CPT

## 2023-10-09 PROCEDURE — 80053 COMPREHEN METABOLIC PANEL: CPT

## 2023-10-09 PROCEDURE — 36415 COLL VENOUS BLD VENIPUNCTURE: CPT

## 2023-10-09 PROCEDURE — 85025 COMPLETE CBC W/AUTO DIFF WBC: CPT

## 2023-10-09 PROCEDURE — 2580000003 HC RX 258

## 2023-10-09 PROCEDURE — 6360000002 HC RX W HCPCS

## 2023-10-09 PROCEDURE — 97535 SELF CARE MNGMENT TRAINING: CPT

## 2023-10-09 PROCEDURE — 2060000000 HC ICU INTERMEDIATE R&B

## 2023-10-09 RX ORDER — INSULIN GLARGINE 100 [IU]/ML
35 INJECTION, SOLUTION SUBCUTANEOUS NIGHTLY
Status: DISCONTINUED | OUTPATIENT
Start: 2023-10-09 | End: 2023-10-10

## 2023-10-09 RX ORDER — SODIUM CHLORIDE 0.9 % (FLUSH) 0.9 %
5-40 SYRINGE (ML) INJECTION EVERY 12 HOURS SCHEDULED
Status: DISCONTINUED | OUTPATIENT
Start: 2023-10-09 | End: 2023-10-12 | Stop reason: HOSPADM

## 2023-10-09 RX ORDER — SODIUM CHLORIDE 0.9 % (FLUSH) 0.9 %
5-40 SYRINGE (ML) INJECTION PRN
Status: DISCONTINUED | OUTPATIENT
Start: 2023-10-09 | End: 2023-10-12 | Stop reason: HOSPADM

## 2023-10-09 RX ORDER — HEPARIN 100 UNIT/ML
3 SYRINGE INTRAVENOUS PRN
Status: DISCONTINUED | OUTPATIENT
Start: 2023-10-09 | End: 2023-10-12 | Stop reason: HOSPADM

## 2023-10-09 RX ORDER — HEPARIN 100 UNIT/ML
3 SYRINGE INTRAVENOUS EVERY 12 HOURS SCHEDULED
Status: DISCONTINUED | OUTPATIENT
Start: 2023-10-09 | End: 2023-10-12 | Stop reason: HOSPADM

## 2023-10-09 RX ORDER — INSULIN LISPRO 100 [IU]/ML
12 INJECTION, SOLUTION INTRAVENOUS; SUBCUTANEOUS
Status: DISCONTINUED | OUTPATIENT
Start: 2023-10-10 | End: 2023-10-10

## 2023-10-09 RX ORDER — SODIUM CHLORIDE 9 MG/ML
INJECTION, SOLUTION INTRAVENOUS PRN
Status: DISCONTINUED | OUTPATIENT
Start: 2023-10-09 | End: 2023-10-12 | Stop reason: HOSPADM

## 2023-10-09 RX ORDER — LIDOCAINE HYDROCHLORIDE 10 MG/ML
5 INJECTION, SOLUTION EPIDURAL; INFILTRATION; INTRACAUDAL; PERINEURAL ONCE
Status: DISCONTINUED | OUTPATIENT
Start: 2023-10-09 | End: 2023-10-12 | Stop reason: HOSPADM

## 2023-10-09 RX ADMIN — LABETALOL HYDROCHLORIDE 10 MG: 5 INJECTION INTRAVENOUS at 20:41

## 2023-10-09 RX ADMIN — PIPERACILLIN AND TAZOBACTAM 3375 MG: 3; .375 INJECTION, POWDER, LYOPHILIZED, FOR SOLUTION INTRAVENOUS at 05:45

## 2023-10-09 RX ADMIN — FERROUS SULFATE TAB 325 MG (65 MG ELEMENTAL FE) 325 MG: 325 (65 FE) TAB at 17:05

## 2023-10-09 RX ADMIN — ENOXAPARIN SODIUM 30 MG: 100 INJECTION SUBCUTANEOUS at 20:44

## 2023-10-09 RX ADMIN — INSULIN LISPRO 6 UNITS: 100 INJECTION, SOLUTION INTRAVENOUS; SUBCUTANEOUS at 17:06

## 2023-10-09 RX ADMIN — INSULIN LISPRO 6 UNITS: 100 INJECTION, SOLUTION INTRAVENOUS; SUBCUTANEOUS at 17:05

## 2023-10-09 RX ADMIN — INSULIN LISPRO 6 UNITS: 100 INJECTION, SOLUTION INTRAVENOUS; SUBCUTANEOUS at 11:48

## 2023-10-09 RX ADMIN — INSULIN LISPRO 6 UNITS: 100 INJECTION, SOLUTION INTRAVENOUS; SUBCUTANEOUS at 08:13

## 2023-10-09 RX ADMIN — SODIUM CHLORIDE, PRESERVATIVE FREE 10 ML: 5 INJECTION INTRAVENOUS at 08:13

## 2023-10-09 RX ADMIN — FERROUS SULFATE TAB 325 MG (65 MG ELEMENTAL FE) 325 MG: 325 (65 FE) TAB at 08:13

## 2023-10-09 RX ADMIN — INSULIN GLARGINE 35 UNITS: 100 INJECTION, SOLUTION SUBCUTANEOUS at 20:41

## 2023-10-09 RX ADMIN — AMLODIPINE BESYLATE 10 MG: 10 TABLET ORAL at 08:13

## 2023-10-09 RX ADMIN — LEVOTHYROXINE SODIUM 125 MCG: 0.03 TABLET ORAL at 05:46

## 2023-10-09 RX ADMIN — LABETALOL HYDROCHLORIDE 10 MG: 5 INJECTION INTRAVENOUS at 08:16

## 2023-10-09 RX ADMIN — INSULIN LISPRO 4 UNITS: 100 INJECTION, SOLUTION INTRAVENOUS; SUBCUTANEOUS at 20:41

## 2023-10-09 RX ADMIN — SODIUM BICARBONATE 1300 MG: 650 TABLET ORAL at 08:12

## 2023-10-09 RX ADMIN — SODIUM BICARBONATE 1300 MG: 650 TABLET ORAL at 20:41

## 2023-10-09 RX ADMIN — SODIUM CHLORIDE, PRESERVATIVE FREE 10 ML: 5 INJECTION INTRAVENOUS at 20:42

## 2023-10-09 RX ADMIN — ENOXAPARIN SODIUM 30 MG: 100 INJECTION SUBCUTANEOUS at 08:12

## 2023-10-09 RX ADMIN — PIPERACILLIN AND TAZOBACTAM 3375 MG: 3; .375 INJECTION, POWDER, LYOPHILIZED, FOR SOLUTION INTRAVENOUS at 00:05

## 2023-10-09 RX ADMIN — ACETAMINOPHEN 650 MG: 325 TABLET ORAL at 20:41

## 2023-10-09 RX ADMIN — ATORVASTATIN CALCIUM 40 MG: 40 TABLET, FILM COATED ORAL at 08:12

## 2023-10-09 RX ADMIN — INSULIN LISPRO 4 UNITS: 100 INJECTION, SOLUTION INTRAVENOUS; SUBCUTANEOUS at 11:49

## 2023-10-09 ASSESSMENT — PAIN SCALES - GENERAL: PAINLEVEL_OUTOF10: 5

## 2023-10-09 NOTE — PROGRESS NOTES
ENDOCRINOLOGY PROGRESS NOTE      Date of admission: 10/2/2023  Date of service: 10/9/2023  Admitting physician: León Junior DO   Primary Care Physician: Lawyer Zuleyka MD  Consultant physician: Camellia Bosworth MD     Reason for the consultation:  Uncontrolled DM    History of Present Illness:  Sen Koenig is a very pleasant 26 y.o. old male with PMH of DM1, HTN, HLD and other listed below admitted to [facility] on 10/2/2023 because of nausea, fever, and chills, endocrine service was consulted for diabetes management. Patient was initially admitted to the floor for management of sepsis 2/2 cellulitis however he clinically deteriorated with worsening ASHLEY and also entered into DKA. He was transferred to MICU and placed on an insulin drip. Subjective:   Patient was seen and examined this morning. No acute events overnight. Blood sugar at goal.  No hypoglycemia. Inpatient diet:   Carb Restricted diet     Point of care glucose monitoring   (Independently reviewed)   No results for input(s): \"GLUMET\" in the last 72 hours.       Scheduled Meds:   insulin lispro  6 Units SubCUTAneous TID WC    insulin glargine  25 Units SubCUTAneous Nightly    insulin lispro  0-12 Units SubCUTAneous TID WC    epoetin dillon-epbx  10,000 Units SubCUTAneous Weekly    enoxaparin  30 mg SubCUTAneous BID    insulin lispro  0-4 Units SubCUTAneous Nightly    sodium bicarbonate  1,300 mg Oral BID    ferrous sulfate  325 mg Oral BID WC    levothyroxine  125 mcg Oral Daily    amLODIPine  10 mg Oral Daily    atorvastatin  40 mg Oral Daily    sodium chloride flush  5-40 mL IntraVENous 2 times per day    piperacillin-tazobactam  3,375 mg IntraVENous q8h       PRN Meds:   traMADol, 50 mg, Q6H PRN  dextrose bolus, 125 mL, PRN   Or  dextrose bolus, 250 mL, PRN  potassium chloride, 10 mEq, PRN  magnesium sulfate, 2,000 mg, PRN  sodium phosphate 10 mmol in sodium chloride 0.9 % 250 mL IVPB, 10 mmol, PRN   Or  sodium phosphate 15 mmol in sodium edema or effusion         XR FOOT RIGHT (MIN 3 VIEWS)   Final Result   Multifocal irregularities throughout the right foot as described above   concerning for Charcot foot configuration would be difficult to exclude a   subtle or acute on chronic appearance of fracture. No acute fracture of the   ankle with ankle mortise symmetric. Cortical thickening of the 2nd   metatarsal concerning for periostitis versus stress injury somewhat diffuse   throughout indeterminate. Medical Records/Labs/Images review:   I personally reviewed and summarized previous records   All labs and imaging were reviewed independently     [facility], a 26 y.o.-old male seen today for inpatient diabetes management    Diabetes Mellitus type 2  Patient's diabetes is uncontrolled, A1c 10.8  We recommend the following DM regimen  Lantus to 35 units nightly  Humalog 12 units with meals  Medium dose sliding scale ACHS  Continue glucose check with meals and at bedtime  Will titrate insulin dose based on the blood glucose trend & insulin requirement  Upon discharge, I will arrange for the patient to be seen in the endocrinology clinic for routine diabetes maintenance and prevention     Dietary noncompliance  Discussed with patient the importance of eating consistent carbohydrate meals, avoiding high glycemic index food. Also, discussed with patient the risk and negative consequences of dietary noncompliance on blood glucose control, blood pressure and weight    Cellulitis of right leg  Management per ID  Discussed the effect of infection on the blood sugar control.   Also discussed the importance of controlling diabetes and management intervention of soft tissue infection    Sepsis  Management per primary  team  Insulin requirements while septic are likely to differ from home regimen/discharge regimen    Interdisciplinary plan for communication with healthcare providers:   Consult recommendations were discussed with

## 2023-10-09 NOTE — DISCHARGE INSTRUCTIONS
extremity. Take all medications as prescribed  Continue to elevate the limb  Please follow up with Dr. Chandler Sherman at [facility] at  12:30 on 10/17/2023.  [address] [facility]  Phone: Phone: [phone]    Internal medicine    Follow ups  Please follow up with the internal medicine clinic at [facility] appointment has been scheduled for October 18th at 10:00 am.   Please keep all other follow up appointments:  Future Appointments   Date Time Provider [address]   10/18/2023 10:00 AM URGENT CARE 1 [facility]   11/1/2023 12:30 PM Rio Welsh MD [facility]    Podiatry follow up with Dr. Chandler Sherman at [address] [facility] made for 10/17/23   Infectious disease appointment  ID Clinic on 11/16.   [address]    Changes in healthcare   Please take all medications as indicated  Diet: regular diet   Activity: Non weight bearing on right foot. New Medications started during this hospital stay  Continue epoetin alpha 10,000 units weekly  Continue sodium bicarbonate 1300 mg p.o. twice daily    Medications you should stop taking   Hydrochlorothiazide    Additional labs, testing or imaging needed after discharge   Continue to monitor renal function  Weekly wound vac changes-podiatry can manage  Consider starting metolazone    Even if you are feeling better and not having symptoms do not stop taking antibiotic earlier then prescribed Continue piperacillin-tazobactam 3.375g q8h for 6 weeks from 10/05-11/16. Please contact us if you have any concerns, wish to change or make an appointment:  Internal medicine clinic   Phone: [phone]  Fax: [phone]  [address]    Should you have further questions in regards to this visit, you can review your clinical note and after visit summary document on your [employer] account.      Other than any new prescriptions given to you today, the list of home medications on this After Visit Summary are based on information provided to us from you and your healthcare providers. This information, including the list, dose, and frequency of medications is only as accurate as the information you provided. If you have any questions or concerns about your home medications, please contact your Primary Care Physician for further clarification.

## 2023-10-09 NOTE — CONSULTS
Vascular : Pt seen,chart,lab and x rays reviewed. Assessment:1.  Diabetic foot infection, with muscle necrosis, possible osteomyelitis suspected, bone biopsies of the second and fifth metatarsal pending    Plan: Discussed the patient, informed that he has satisfactory artery circulation, does not need vascular intervention, recommend continued local wound care by the podiatry service and debridement of any nonviable tissues, antibiotics per ID consultants and consideration for hyperbaric oxygen therapy in coordination with other modalities to promote wound healing    If in spite of all the measures, there is progressive worsening of the wound, and began salvage, at that time consideration could be given for possibility of right below the knee amputation    All his questions were answered    Full consult to follow.     Thank you    Vivi Colon MD

## 2023-10-09 NOTE — CONSULTS
Femoral 2 2  2=diminished   Popliteal    1=barely palpable   Dorsalis pedis    0=absent   Posterior tibial    4=aneurysmal           Other pertinent information:1. The past medical records were reviewed. 2.    Lab Results   Component Value Date    WBC 13.5 (H) 10/09/2023    HGB 8.1 (L) 10/09/2023    HCT 26.3 (L) 10/09/2023    MCV 96.0 10/09/2023     (H) 10/09/2023      Lab Results   Component Value Date     10/09/2023    K 4.1 10/09/2023    CL 99 10/09/2023    CO2 18 (L) 10/09/2023    BUN 24 (H) 10/09/2023    CREATININE 3.0 (H) 10/09/2023    GLUCOSE 259 (H) 10/09/2023    CALCIUM 9.2 10/09/2023    PROT 7.1 10/09/2023    LABALBU 2.6 (L) 10/09/2023    BILITOT 0.2 10/09/2023    ALKPHOS 189 (H) 10/09/2023    AST 10 10/09/2023    ALT 9 10/09/2023    LABGLOM 27 (L) 10/09/2023    GFRAA >60 05/27/2021     Lab Results   Component Value Date    APTT 41.3 (H) 10/08/2023      Lab Results   Component Value Date    INR 1.2 10/08/2023    INR 1.2 10/07/2023    INR 1.2 10/06/2023    PROTIME 13.1 (H) 10/08/2023    PROTIME 13.5 (H) 10/07/2023    PROTIME 13.1 (H) 10/06/2023        3. MRI ANKLE RIGHT WO CONTRAST   Final Result   1. Severe bone marrow signal alteration throughout the hindfoot and midfoot   as described. Although some of this could be due to Charcot arthropathy, the   appearance is concerning for multifocal osteomyelitis and septic arthritis   within the hindfoot and midfoot. 2. Nondisplaced pathologic fracture of the posteromedial calcaneus. 3. Fluid collection in the anterior soft tissues of the lower leg. 4. Subcutaneous soft tissue edema. 5. Effusions of the hindfoot and midfoot. 6. Extensive destructive changes in the midfoot. 7. Tendinosis and intrasubstance tearing of the anterior tibialis. 8. Mild distal Achilles tendinosis. 9. Mild posterior tibialis tendinosis. MRI FOOT RIGHT WO CONTRAST   Final Result   1.  Multifocal bone marrow edema throughout all of the bones of Poorly controlled diabetes mellitus (HCC)    Dietary noncompliance    Positive blood culture    Type 2 diabetes mellitus with Charcot's joint of right foot ([address])    Type 1 diabetes mellitus with diabetic foot infection ([address])    Diabetic ulcer of right foot associated with type 1 diabetes mellitus, with necrosis of muscle ([address])            Plan:           Discussed in detail with the patient, options, risks benefits and alternatives were explained to the patient in the presence of nurse Vin Oglesby, patient recommended, for now continued local podiatry care and with wound debridements as necessary, antibiotics, consideration for hyperbaric oxygen therapy pending bone biopsy results    Patient was informed, that he has overall good arterial flow, will not benefit from any vascular intervention at the present time as the flow is good for tissue healing based upon the pulse volume recordings of the ankles and metatarsals and satisfactory flow even to the toes noted    Certainly the situation worsens, the infection spreads beyond current area and if the foot becomes nonsalvageable, at that time consideration could be given for the possibility of a right below the knee amputation for which actually vascular service is consulted at this time but because of his young age, recommend all conservative measures to be tried first to salvage the foot so that patient can ambulate, importance of compliance was discussed    Patient also recommended hyperbaric oxygen therapy evaluation tomorrow, instructed nursing staff to notify HBOT department and leave a message for them    Thank you for letting us participate in the care of your patient      All the questions were answered.           Electronically signed by Jaime Wick MD on 10/9/2023 at 7:11 PM

## 2023-10-09 NOTE — PROGRESS NOTES
OCCUPATIONAL THERAPY TREATMENT NOTE    MITALI Rafaelorlando [address] [place] [address] [facility]       TMGS:[id number]  Patient Name: Wilfred Nuñez  MRN: [id number]  : 1990  Room: [address] [place] [address]       Evaluating OT: Caryl WITT, OTR/L, [id number]       Referring Provider: Ramon Merlos DO    Specific Provider Orders/Date: OT eval and treat (10/6/23)    Diagnosis: Dehydration [E86.0]  Septicemia ([address]) [A41.9]  ASHLEY (acute kidney injury) ([address]) [N17.9]  Sepsis ([address]) [A41.9]    Surgeries/Procedures: None this admission        Pt admitted with sepsis, chronic wounds, RLE foot deformity. Pertinent Medical History:       has a past medical history of Abscess of back, Bowel obstruction ([address]), Diabetes mellitus type 1 ([address]), HTN (hypertension), and Hyperlipidemia.            Precautions:  Fall Risk, NWB RLE, chronic wounds, agitation      Assessment of current deficits    [x] Functional mobility            [x]ADLs           [x] Strength                   [x]Cognition    [x] Functional transfers          [x] IADLs          [x] Safety Awareness   [x]Endurance    [x] Fine Coordination              [x] Balance      [] Vision/perception    [x]Sensation      []Gross Motor Coordination  [] ROM           [] Delirium                   [] Motor Control      OT PLAN OF CARE   OT POC based on physician orders, patient diagnosis and results of clinical assessment     Frequency/Duration 1-3 days/wk for 2 weeks PRN   Specific OT Treatment Interventions to include:   * Instruction/training on adapted ADL techniques and AE recommendations to increase functional independence within precautions       * Training on energy conservation strategies, correct breathing pattern and techniques to improve independence/tolerance for self-care routine  * Functional transfer/mobility training/DME recommendations for increased independence, safety, and fall prevention  * Patient/Family education to

## 2023-10-09 NOTE — PLAN OF CARE
Problem: Discharge Planning  Goal: Discharge to home or other facility with appropriate resources  Outcome: Progressing  Flowsheets (Taken 10/9/2023 0800)  Discharge to home or other facility with appropriate resources: Identify barriers to discharge with patient and caregiver     Problem: Pain  Goal: Verbalizes/displays adequate comfort level or baseline comfort level  Outcome: Progressing     Problem: Chronic Conditions and Co-morbidities  Goal: Patient's chronic conditions and co-morbidity symptoms are monitored and maintained or improved  Outcome: Progressing  Flowsheets (Taken 10/9/2023 0800)  Care Plan - Patient's Chronic Conditions and Co-Morbidity Symptoms are Monitored and Maintained or Improved: Monitor and assess patient's chronic conditions and comorbid symptoms for stability, deterioration, or improvement     Problem: Safety - Adult  Goal: Free from fall injury  Outcome: Progressing     Problem: Skin/Tissue Integrity  Goal: Absence of new skin breakdown  Description: 1. Monitor for areas of redness and/or skin breakdown  2. Assess vascular access sites hourly  3. Every 4-6 hours minimum:  Change oxygen saturation probe site  4. Every 4-6 hours:  If on nasal continuous positive airway pressure, respiratory therapy assess nares and determine need for appliance change or resting period.   Outcome: Progressing     Problem: Skin/Tissue Integrity - Adult  Goal: Skin integrity remains intact  Outcome: Progressing  Goal: Incisions, wounds, or drain sites healing without S/S of infection  Outcome: Progressing     Problem: Musculoskeletal - Adult  Goal: Return mobility to safest level of function  Outcome: Progressing     Problem: Infection - Adult  Goal: Absence of infection at discharge  Outcome: Progressing  Flowsheets (Taken 10/9/2023 0800)  Absence of infection at discharge: Assess and monitor for signs and symptoms of infection     Problem: Metabolic/Fluid and Electrolytes - Adult  Goal: Electrolytes

## 2023-10-09 NOTE — PROGRESS NOTES
Dr. Gaye Darby paged regarding wound vac papers needing signed.  Electronically signed by Chandler Dillon RN on 10/9/2023 at 1:43 PM

## 2023-10-09 NOTE — PROGRESS NOTES
Wound care: Wound vac right foot per podiatry, wound vac on dorsal aspect of foot per Dr. Raffi Castellon. Chart reviewed. Care plan & education updated, dietary consult.  Raleigh Schofield, RAMA

## 2023-10-09 NOTE — CARE COORDINATION
Transition of care update. Per IDR today, wound vac changed per podiatry yesterday. Wound care is following. Per podiatry note today, s/p incision and drainage to bone with bone biopsy on 10/07/23. Dressings post operatively remain clean, dry and intact. Wound vac on and running at 200 mm continuous. Marco Puente from [facility] dropped of financial aid form to cover in between [employer]. Per previous not, pt is Medicaid pending per PBO and qualifies for assist with meds at discharge. He also dropped off form to be signed by physician, and that he needs the wound measurements noted. Message sent to Podiatry. No preference for [facility] if needed, and a call to [facility] to follow for wound care and antibiotics that may be needed at discharge. Suri Lugodamirngoc from [facility] placed on a flex spot for Thursday, 10/12, and can adjust if needed. Met with patient in room to review financial forms for wound vac. Discharge plan is home with [facility], and family to transport when medically stable. CM/SW will follow. Electronically signed by Sean New RN on 10/9/2023 at 12:42 PM    Addendum: Form for [address] form filled out with patient, and faxed to Marco Puente at [employer]. Still awaiting other wound care form that needs signed by Dr. Chandler ALTAMIRANO    The Plan for Transition of Care is related to the following treatment goals: antibiotics and wound care    The Patient and/or patient was provided with a choice of provider and agrees   with the discharge plan. [x] Yes [] No    Freedom of choice list was provided with basic dialogue that supports the patient's individualized plan of care/goals, treatment preferences and shares the quality data associated with the providers.  [] Yes [] No

## 2023-10-10 ENCOUNTER — HOSPITAL ENCOUNTER (INPATIENT)
Dept: HYPERBARIC MEDICINE | Age: 33
Discharge: HOME | End: 2023-10-10
Payer: MEDICAID

## 2023-10-10 VITALS
TEMPERATURE: 98.4 F | DIASTOLIC BLOOD PRESSURE: 92 MMHG | HEART RATE: 96 BPM | SYSTOLIC BLOOD PRESSURE: 148 MMHG | RESPIRATION RATE: 18 BRPM

## 2023-10-10 LAB
ALBUMIN SERPL-MCNC: 2.6 G/DL (ref 3.5–5.2)
ALP SERPL-CCNC: 187 U/L (ref 40–129)
ALT SERPL-CCNC: 7 U/L (ref 0–40)
ANION GAP SERPL CALCULATED.3IONS-SCNC: 14 MMOL/L (ref 7–16)
AST SERPL-CCNC: 9 U/L (ref 0–39)
BASOPHILS # BLD: 0.08 K/UL (ref 0–0.2)
BASOPHILS NFR BLD: 1 % (ref 0–2)
BILIRUB SERPL-MCNC: <0.2 MG/DL (ref 0–1.2)
BUN SERPL-MCNC: 27 MG/DL (ref 6–20)
C3 SERPL-MCNC: 179 MG/DL (ref 90–180)
C4 SERPL-MCNC: 25 MG/DL (ref 10–40)
CALCIUM SERPL-MCNC: 9 MG/DL (ref 8.6–10.2)
CHLORIDE SERPL-SCNC: 98 MMOL/L (ref 98–107)
CO2 SERPL-SCNC: 20 MMOL/L (ref 22–29)
CREAT SERPL-MCNC: 3 MG/DL (ref 0.7–1.2)
CREAT UR-MCNC: 80.9 MG/DL (ref 40–278)
EOSINOPHIL # BLD: 0.55 K/UL (ref 0.05–0.5)
EOSINOPHILS RELATIVE PERCENT: 4 % (ref 0–6)
ERYTHROCYTE [DISTWIDTH] IN BLOOD BY AUTOMATED COUNT: 13.5 % (ref 11.5–15)
GFR SERPL CREATININE-BSD FRML MDRD: 27 ML/MIN/1.73M2
GLUCOSE BLD-MCNC: 187 MG/DL (ref 74–99)
GLUCOSE BLD-MCNC: 212 MG/DL (ref 74–99)
GLUCOSE BLD-MCNC: 287 MG/DL (ref 74–99)
GLUCOSE BLD-MCNC: 365 MG/DL (ref 74–99)
GLUCOSE SERPL-MCNC: 331 MG/DL (ref 74–99)
HCT VFR BLD AUTO: 28.5 % (ref 37–54)
HGB BLD-MCNC: 8.9 G/DL (ref 12.5–16.5)
IMM GRANULOCYTES # BLD AUTO: 0.19 K/UL (ref 0–0.58)
IMM GRANULOCYTES NFR BLD: 2 % (ref 0–5)
LYMPHOCYTES NFR BLD: 1.54 K/UL (ref 1.5–4)
LYMPHOCYTES RELATIVE PERCENT: 12 % (ref 20–42)
MCH RBC QN AUTO: 30 PG (ref 26–35)
MCHC RBC AUTO-ENTMCNC: 31.2 G/DL (ref 32–34.5)
MCV RBC AUTO: 96 FL (ref 80–99.9)
MICROORGANISM SPEC CULT: NO GROWTH
MICROORGANISM/AGENT SPEC: ABNORMAL
MONOCYTES NFR BLD: 1.27 K/UL (ref 0.1–0.95)
MONOCYTES NFR BLD: 10 % (ref 2–12)
NEUTROPHILS NFR BLD: 72 % (ref 43–80)
NEUTS SEG NFR BLD: 9.34 K/UL (ref 1.8–7.3)
PLATELET # BLD AUTO: 662 K/UL (ref 130–450)
PMV BLD AUTO: 9.3 FL (ref 7–12)
POTASSIUM SERPL-SCNC: 4.1 MMOL/L (ref 3.5–5)
PROT SERPL-MCNC: 6.7 G/DL (ref 6.4–8.3)
RBC # BLD AUTO: 2.97 M/UL (ref 3.8–5.8)
SODIUM SERPL-SCNC: 132 MMOL/L (ref 132–146)
SPECIMEN DESCRIPTION: ABNORMAL
SURGICAL PATHOLOGY REPORT: NORMAL
TOTAL PROTEIN, URINE: 216 MG/DL (ref 0–12)
URINE TOTAL PROTEIN CREATININE RATIO: 2.67 (ref 0–0.2)
WBC OTHER # BLD: 13 K/UL (ref 4.5–11.5)

## 2023-10-10 PROCEDURE — 6370000000 HC RX 637 (ALT 250 FOR IP)

## 2023-10-10 PROCEDURE — 84156 ASSAY OF PROTEIN URINE: CPT

## 2023-10-10 PROCEDURE — 76937 US GUIDE VASCULAR ACCESS: CPT

## 2023-10-10 PROCEDURE — 84165 PROTEIN E-PHORESIS SERUM: CPT

## 2023-10-10 PROCEDURE — 99214 OFFICE O/P EST MOD 30 MIN: CPT

## 2023-10-10 PROCEDURE — 85025 COMPLETE CBC W/AUTO DIFF WBC: CPT

## 2023-10-10 PROCEDURE — 2580000003 HC RX 258: Performed by: INTERNAL MEDICINE

## 2023-10-10 PROCEDURE — 82570 ASSAY OF URINE CREATININE: CPT

## 2023-10-10 PROCEDURE — 02HV33Z INSERTION OF INFUSION DEVICE INTO SUPERIOR VENA CAVA, PERCUTANEOUS APPROACH: ICD-10-PCS | Performed by: INTERNAL MEDICINE

## 2023-10-10 PROCEDURE — 6370000000 HC RX 637 (ALT 250 FOR IP): Performed by: INTERNAL MEDICINE

## 2023-10-10 PROCEDURE — 84166 PROTEIN E-PHORESIS/URINE/CSF: CPT

## 2023-10-10 PROCEDURE — 80053 COMPREHEN METABOLIC PANEL: CPT

## 2023-10-10 PROCEDURE — 82962 GLUCOSE BLOOD TEST: CPT

## 2023-10-10 PROCEDURE — 6360000002 HC RX W HCPCS

## 2023-10-10 PROCEDURE — 86039 ANTINUCLEAR ANTIBODIES (ANA): CPT

## 2023-10-10 PROCEDURE — 80074 ACUTE HEPATITIS PANEL: CPT

## 2023-10-10 PROCEDURE — 84155 ASSAY OF PROTEIN SERUM: CPT

## 2023-10-10 PROCEDURE — 2060000000 HC ICU INTERMEDIATE R&B

## 2023-10-10 PROCEDURE — 86038 ANTINUCLEAR ANTIBODIES: CPT

## 2023-10-10 PROCEDURE — 36569 INSJ PICC 5 YR+ W/O IMAGING: CPT

## 2023-10-10 PROCEDURE — 6360000002 HC RX W HCPCS: Performed by: INTERNAL MEDICINE

## 2023-10-10 PROCEDURE — 36415 COLL VENOUS BLD VENIPUNCTURE: CPT

## 2023-10-10 PROCEDURE — 86160 COMPLEMENT ANTIGEN: CPT

## 2023-10-10 PROCEDURE — C1751 CATH, INF, PER/CENT/MIDLINE: HCPCS

## 2023-10-10 RX ORDER — INSULIN LISPRO 100 [IU]/ML
0-18 INJECTION, SOLUTION INTRAVENOUS; SUBCUTANEOUS
Status: DISCONTINUED | OUTPATIENT
Start: 2023-10-10 | End: 2023-10-10

## 2023-10-10 RX ORDER — INSULIN GLARGINE 100 [IU]/ML
40 INJECTION, SOLUTION SUBCUTANEOUS NIGHTLY
Status: DISCONTINUED | OUTPATIENT
Start: 2023-10-10 | End: 2023-10-10

## 2023-10-10 RX ORDER — INSULIN GLARGINE 100 [IU]/ML
45 INJECTION, SOLUTION SUBCUTANEOUS NIGHTLY
Status: DISCONTINUED | OUTPATIENT
Start: 2023-10-10 | End: 2023-10-12 | Stop reason: HOSPADM

## 2023-10-10 RX ORDER — INSULIN LISPRO 100 [IU]/ML
15 INJECTION, SOLUTION INTRAVENOUS; SUBCUTANEOUS
Status: DISCONTINUED | OUTPATIENT
Start: 2023-10-10 | End: 2023-10-10 | Stop reason: SDUPTHER

## 2023-10-10 RX ORDER — INSULIN LISPRO 100 [IU]/ML
0-18 INJECTION, SOLUTION INTRAVENOUS; SUBCUTANEOUS
Status: DISCONTINUED | OUTPATIENT
Start: 2023-10-10 | End: 2023-10-12 | Stop reason: HOSPADM

## 2023-10-10 RX ADMIN — ATORVASTATIN CALCIUM 40 MG: 40 TABLET, FILM COATED ORAL at 09:14

## 2023-10-10 RX ADMIN — ENOXAPARIN SODIUM 30 MG: 100 INJECTION SUBCUTANEOUS at 20:52

## 2023-10-10 RX ADMIN — HYDRALAZINE HYDROCHLORIDE 10 MG: 20 INJECTION, SOLUTION INTRAMUSCULAR; INTRAVENOUS at 17:03

## 2023-10-10 RX ADMIN — SODIUM BICARBONATE 1300 MG: 650 TABLET ORAL at 20:52

## 2023-10-10 RX ADMIN — ACETAMINOPHEN 650 MG: 325 TABLET ORAL at 15:12

## 2023-10-10 RX ADMIN — INSULIN LISPRO 9 UNITS: 100 INJECTION, SOLUTION INTRAVENOUS; SUBCUTANEOUS at 11:51

## 2023-10-10 RX ADMIN — SODIUM BICARBONATE 1300 MG: 650 TABLET ORAL at 09:14

## 2023-10-10 RX ADMIN — INSULIN LISPRO 12 UNITS: 100 INJECTION, SOLUTION INTRAVENOUS; SUBCUTANEOUS at 09:30

## 2023-10-10 RX ADMIN — FERROUS SULFATE TAB 325 MG (65 MG ELEMENTAL FE) 325 MG: 325 (65 FE) TAB at 09:14

## 2023-10-10 RX ADMIN — INSULIN LISPRO 15 UNITS: 100 INJECTION, SOLUTION INTRAVENOUS; SUBCUTANEOUS at 11:52

## 2023-10-10 RX ADMIN — SODIUM CHLORIDE, PRESERVATIVE FREE 10 ML: 5 INJECTION INTRAVENOUS at 09:16

## 2023-10-10 RX ADMIN — FERROUS SULFATE TAB 325 MG (65 MG ELEMENTAL FE) 325 MG: 325 (65 FE) TAB at 17:02

## 2023-10-10 RX ADMIN — SODIUM CHLORIDE, PRESERVATIVE FREE 10 ML: 5 INJECTION INTRAVENOUS at 20:53

## 2023-10-10 RX ADMIN — LABETALOL HYDROCHLORIDE 10 MG: 5 INJECTION INTRAVENOUS at 15:49

## 2023-10-10 RX ADMIN — PIPERACILLIN AND TAZOBACTAM 3375 MG: 3; .375 INJECTION, POWDER, LYOPHILIZED, FOR SOLUTION INTRAVENOUS at 21:01

## 2023-10-10 RX ADMIN — INSULIN LISPRO 3 UNITS: 100 INJECTION, SOLUTION INTRAVENOUS; SUBCUTANEOUS at 20:53

## 2023-10-10 RX ADMIN — LEVOTHYROXINE SODIUM 125 MCG: 0.03 TABLET ORAL at 06:46

## 2023-10-10 RX ADMIN — LABETALOL HYDROCHLORIDE 10 MG: 5 INJECTION INTRAVENOUS at 09:20

## 2023-10-10 RX ADMIN — INSULIN LISPRO 10 UNITS: 100 INJECTION, SOLUTION INTRAVENOUS; SUBCUTANEOUS at 09:09

## 2023-10-10 RX ADMIN — AMLODIPINE BESYLATE 10 MG: 10 TABLET ORAL at 09:14

## 2023-10-10 RX ADMIN — LABETALOL HYDROCHLORIDE 10 MG: 5 INJECTION INTRAVENOUS at 11:51

## 2023-10-10 RX ADMIN — INSULIN LISPRO 15 UNITS: 100 INJECTION, SOLUTION INTRAVENOUS; SUBCUTANEOUS at 17:03

## 2023-10-10 RX ADMIN — HEPARIN 300 UNITS: 100 SYRINGE at 20:52

## 2023-10-10 RX ADMIN — INSULIN LISPRO 6 UNITS: 100 INJECTION, SOLUTION INTRAVENOUS; SUBCUTANEOUS at 17:02

## 2023-10-10 RX ADMIN — PIPERACILLIN AND TAZOBACTAM 3375 MG: 3; .375 INJECTION, POWDER, LYOPHILIZED, FOR SOLUTION INTRAVENOUS at 12:46

## 2023-10-10 RX ADMIN — INSULIN GLARGINE 45 UNITS: 100 INJECTION, SOLUTION SUBCUTANEOUS at 20:53

## 2023-10-10 RX ADMIN — ENOXAPARIN SODIUM 30 MG: 100 INJECTION SUBCUTANEOUS at 09:16

## 2023-10-10 ASSESSMENT — PAIN SCALES - GENERAL
PAINLEVEL_OUTOF10: 5
PAINLEVEL_OUTOF10: 0

## 2023-10-10 ASSESSMENT — PAIN DESCRIPTION - DESCRIPTORS: DESCRIPTORS: ACHING;THROBBING

## 2023-10-10 ASSESSMENT — PAIN DESCRIPTION - ORIENTATION: ORIENTATION: RIGHT

## 2023-10-10 ASSESSMENT — PAIN DESCRIPTION - LOCATION: LOCATION: FOOT

## 2023-10-10 NOTE — PROGRESS NOTES
ENDOCRINOLOGY PROGRESS NOTE      Date of admission: 10/2/2023  Date of service: 10/10/2023  Admitting physician: Woodrow Birmingham DO   Primary Care Physician: Jailene Swift MD  Consultant physician: René Gale MD     Reason for the consultation:  Uncontrolled DM    History of Present Illness:  Eloise Witt is a very pleasant 26 y.o. old male with PMH of DM1, HTN, HLD and other listed below admitted to [facility] on 10/2/2023 because of nausea, fever, and chills, endocrine service was consulted for diabetes management. Patient was initially admitted to the floor for management of sepsis 2/2 cellulitis however he clinically deteriorated with worsening ASHLEY and also entered into DKA. He was transferred to MICU and placed on an insulin drip. Subjective:   Patient was seen and examined this morning. No acute events overnight. Blood sugar above goal.  No hypoglycemia. Inpatient diet:   Carb Restricted diet     Point of care glucose monitoring   (Independently reviewed)   No results for input(s): \"GLUMET\" in the last 72 hours.       Scheduled Meds:   lidocaine PF  5 mL IntraDERmal Once    sodium chloride flush  5-40 mL IntraVENous 2 times per day    heparin flush  3 mL IntraVENous 2 times per day    insulin glargine  35 Units SubCUTAneous Nightly    insulin lispro  12 Units SubCUTAneous TID WC    insulin lispro  0-12 Units SubCUTAneous TID WC    epoetin dillon-epbx  10,000 Units SubCUTAneous Weekly    enoxaparin  30 mg SubCUTAneous BID    insulin lispro  0-4 Units SubCUTAneous Nightly    sodium bicarbonate  1,300 mg Oral BID    ferrous sulfate  325 mg Oral BID WC    levothyroxine  125 mcg Oral Daily    amLODIPine  10 mg Oral Daily    atorvastatin  40 mg Oral Daily    sodium chloride flush  5-40 mL IntraVENous 2 times per day       PRN Meds:   sodium chloride flush, 5-40 mL, PRN  sodium chloride, , PRN  heparin flush, 3 mL, PRN  traMADol, 50 mg, Q6H PRN  dextrose bolus, 125 mL, PRN   Or  dextrose bolus, 250 mL, diabetes and management intervention of soft tissue infection    Sepsis  Management per primary  team  Insulin requirements while septic are likely to differ from home regimen/discharge regimen    Interdisciplinary plan for communication with healthcare providers:   Consult recommendations were discussed with the Primary Service/Nursing staff      The above issues were reviewed with the patient who understood and agreed with the plan. Thank you for allowing us to participate in the care of this patient. Please do not hesitate to contact us with any additional questions. I saw the patient and discussed the management with the resident physician Dr. Polina Kwon MD. I reviewed and agree with the findings and plan as documented in the resident's note     Camellia Bosworth MD  Endocrinologist, [address] [facility]   [facility], [address] [place] [address]   Phone: [phone]  Fax: [phone]  --------------------------  An electronic signature was used to authenticate this note.  Javier Kent MD on 10/10/2023 at 5:08 PM

## 2023-10-10 NOTE — CONSULTS
Chief Complaint: Patient seen for evaluation of hyperbaric oxygen therapy, for diabetic foot ulcer with muscle necrosis and suspected osteomyelitis      HPI: This patient, who has type 1 diabetes mellitus insulin-dependent with diabetic Charcot foot, hypertension, hyperlipidemia, was hospitalized with diabetic foot ulcer with cellulitis, seen by podiatry service, underwent, incision drainage and debridement ulcer down to the fascia tendons and down to the bone and underwent bone biopsy of the second and fifth metatarsal and patient today was referred to hyperbaric oxygen therapy department for consideration patient for treatments for diabetic foot ulcer with muscle necrosis, with potential possibility of underlying osteomyelitis as well, pending bone biopsy results, seen by ID consultants also    Patient also had positive blood cultures on this admission    When patient came down to the hyperbaric organ therapy department, patient was resting comfortably, without any chest pain, shortness of breath, fever or chills      Patient denies any focal lateralizing neurological symptoms like loss of speech, vision or loss of function of extremity    Patient can walk a few blocks, a month ago prior to this admission and denies any symptoms of rest pain    Allergies   Allergen Reactions    Pineapple        No current facility-administered medications for this encounter. Current Outpatient Medications   Medication Sig Dispense Refill    piperacillin-tazobactam (ZOSYN) infusion Infuse 3.375 g intravenously in the morning and 3.375 g at noon and 3.375 g in the evening. Compound per protocol. . 385 g 0     Facility-Administered Medications Ordered in Other Encounters   Medication Dose Route Frequency Provider Last Rate Last Admin    insulin glargine (LANTUS) injection vial 40 Units  40 Units SubCUTAneous Nightly Bettye Boothe MD        insulin lispro (HUMALOG) injection vial 15 Units  15 Units

## 2023-10-10 NOTE — PROGRESS NOTES
Hyperbaric Medicine Department  Transcutaneous Oxygen Study (TCOM)    TODAY'S DATE:  10/10/2023      NAME: Roula Pickard  MEDICAL RECORD NUMBER:  [id number]  AGE: 26 y.o. GENDER: male  : 1990         Procedure: Following calibration of the Perim[employer] [id number],  Transcutaneous Oxygen studies were performed on the on right lower extremity at multiple levels. Room air testing was performed for 15 minutes. An oxygen challenge study was then performed with the patient breathing 100% oxygen at 15 lpm via non-rebreather mask for a period of 10 minutes. The patient tolerated procedure well. TCOM Site Photo:       Document Information    Wound Care Image:  Wound   TcpO2 lead placement RLE    10/10/2023 13:35   Attached To: Hospital Encounter on 10/10/23 with [facility]PER[facility]     Source Information    Gail Guerra LPN  [facility]           TCOM Test:   Media Information      Document Information    Wound Care Image:  Wound Care   tcpO2 results   10/10/2023 13:00   Attached To:    Hospital Encounter on 10/10/23 with [facility]     Source Information    Gail Guerra LPN  [facility]       Tissue pO2 measurement (in mmHg )   COMPONENT TIME SITE  1 SITE  2 SITE  3   x Baseline values: Patient breathing AIR @ 1 MARY @ 15 min 52.3 34 42   x Oxygen challenge: Patient breathing 100% O2 @ 1 MARY @ 10 min 171 220 177    Hyperbaric oxygen challenge  Patient breathing 100%O2 @ _______ MARY @ 30 min        @ 60 min        @ 90 min          *Electronically signed by Gail Guerra LPN on  at 5:29 PM EDT

## 2023-10-10 NOTE — PLAN OF CARE
Problem: Discharge Planning  Goal: Discharge to home or other facility with appropriate resources  Outcome: Progressing     Problem: Pain  Goal: Verbalizes/displays adequate comfort level or baseline comfort level  Outcome: Progressing     Problem: Chronic Conditions and Co-morbidities  Goal: Patient's chronic conditions and co-morbidity symptoms are monitored and maintained or improved  Outcome: Progressing     Problem: Safety - Adult  Goal: Free from fall injury  Outcome: Progressing     Problem: Skin/Tissue Integrity  Goal: Absence of new skin breakdown  Description: 1. Monitor for areas of redness and/or skin breakdown  2. Assess vascular access sites hourly  3. Every 4-6 hours minimum:  Change oxygen saturation probe site  4. Every 4-6 hours:  If on nasal continuous positive airway pressure, respiratory therapy assess nares and determine need for appliance change or resting period.   Outcome: Progressing     Problem: Skin/Tissue Integrity - Adult  Goal: Skin integrity remains intact  Outcome: Progressing  Flowsheets (Taken 10/10/2023 0800)  Skin Integrity Remains Intact: Monitor for areas of redness and/or skin breakdown  Goal: Incisions, wounds, or drain sites healing without S/S of infection  Outcome: Progressing  Flowsheets (Taken 10/10/2023 0800)  Incisions, Wounds, or Drain Sites Healing Without Sign and Symptoms of Infection: ADMISSION and DAILY: Assess and document risk factors for pressure ulcer development     Problem: Musculoskeletal - Adult  Goal: Return mobility to safest level of function  Outcome: Progressing  Flowsheets (Taken 10/10/2023 0800)  Return Mobility to Safest Level of Function: Assess patient stability and activity tolerance for standing, transferring and ambulating with or without assistive devices     Problem: Infection - Adult  Goal: Absence of infection at discharge  Outcome: Progressing     Problem: Metabolic/Fluid and Electrolytes - Adult  Goal: Electrolytes maintained

## 2023-10-10 NOTE — PROGRESS NOTES
Department of Podiatry  Progress Note    SUBJECTIVE:  Patient seen bedside s/p incision and drainage to bone with bone biopsy on 10/07/23. He is in no acute distress today. Today he relates he is ready to go home. Review of case management notes demonstrates [facility] can be set up by 10/12/2023. Review of ID notes demonstrates that patient is waiting for PICC line and final cultures. It was explained to patient that after PICC line is placed as long as infection remains under control he will be okay to discharge from a podiatry perspective as well. Patient denies any N/V/D/F/C/SOB/CP and has no other pedal complaints at this time.        OBJECTIVE:    Scheduled Meds:   insulin glargine  40 Units SubCUTAneous Nightly    insulin lispro  15 Units SubCUTAneous TID WC    lidocaine PF  5 mL IntraDERmal Once    sodium chloride flush  5-40 mL IntraVENous 2 times per day    heparin flush  3 mL IntraVENous 2 times per day    insulin lispro  0-12 Units SubCUTAneous TID WC    epoetin dillon-epbx  10,000 Units SubCUTAneous Weekly    enoxaparin  30 mg SubCUTAneous BID    insulin lispro  0-4 Units SubCUTAneous Nightly    sodium bicarbonate  1,300 mg Oral BID    ferrous sulfate  325 mg Oral BID WC    levothyroxine  125 mcg Oral Daily    amLODIPine  10 mg Oral Daily    atorvastatin  40 mg Oral Daily    sodium chloride flush  5-40 mL IntraVENous 2 times per day     Continuous Infusions:   sodium chloride      sodium chloride      dextrose       PRN Meds:.sodium chloride flush, sodium chloride, heparin flush, traMADol, dextrose bolus **OR** dextrose bolus, potassium chloride, magnesium sulfate, sodium phosphate 10 mmol in sodium chloride 0.9 % 250 mL IVPB **OR** sodium phosphate 15 mmol in sodium chloride 0.9 % 250 mL IVPB **OR** sodium phosphate 20 mmol in sodium chloride 0.9 % 250 mL IVPB, sodium chloride flush, sodium chloride, ondansetron **OR** ondansetron, polyethylene glycol, acetaminophen **OR** acetaminophen,

## 2023-10-10 NOTE — PROGRESS NOTES
Occupational Therapy  OT SESSION ATTEMPT     Date:10/10/2023  Patient Name: Augustin Mcgregor  MRN: [id number]  : 1990  Room: [address] [place], [place] [address]     Attempted OT session this date:    [] unavailable due to other medical staff currently with pt   [] on hold per nursing staff   [] on hold per nursing staff secondary to lab / radiology results    [] Patient Ebb Axe declined treatment this date due to   [x] Patient off unit at this time. [] Other:     Will reattempt OT session at a later time.       Tami Gonzalez [address] [place] [address], [address] [address]

## 2023-10-10 NOTE — PROGRESS NOTES
CHG double lumen power picc  Placement 10/10/2023    Product number: [id number]   Lot Number: [id number]      Ultrasound: yes   Right Basilic vein:                Upper Arm Circumference: (CM) 37    Size:(FR)/GUAGE 5.5/55 cm    Exposed Length: (CM) 3    Internal Length: (CM) 38   Cut: (CM) 14   Vein Measurement: 0.62 cm              Lidocaine Given: yes lidocaine 1%    Maciel Benjamin RN  10/10/2023  4:51 PM      CHG double lumen power picc inserted using the VPS guidance system. Tip placed at the lower 1/3 of the SVC/CAJ. Iam webb.

## 2023-10-10 NOTE — PROGRESS NOTES
Patient came back to floor from hyperbaric eval with wound vac off his foot. Wound care messaged via PS to see if they could come apply a new one. Podiatry is managing wound vac. RN spoke to Dr. Brannon Hernandez and she said a resident will be up to replace wound vac.

## 2023-10-10 NOTE — CARE COORDINATION
Transition of care update. Currently patient is on IV Zosyn per ID, and will be getting a PICC line today. Vascular and podiatry also following. Wound vac changed per podiatry on 10/8/23. Paperwork for wound vac, and financial assistance forms faxed to [employer] at [facility] [employer]. He verified they were received. Hard copy placed on soft chart. Patient has no insurance and is medicaid pending per PBO, and qualifies for assist with meds at discharge per previous notes. Discharge plan at this time is home with [facility], with a start of care for a Thursday flex spot. They can adjust date if needed. Discharge needs unclear at this time. Per vascular note yesterday, continue for now with local podiatry care, wound debridements as necessary, antibiotics, and consider hyperbaric oxygen therapy, pending bone biopsy results. Vascular noted that patient has overall good arterial flow, and will not benefit from any vascular intervention at the present time as the flow is good for tissue healing. CM/SW will follow.   Electronically signed by Nathaniel Zepeda RN on 10/10/2023 at 11:47 AM Laps, needles and instruments count was reported as correct.

## 2023-10-11 LAB
ALBUMIN SERPL-MCNC: 2.4 G/DL (ref 3.5–4.7)
ALBUMIN SERPL-MCNC: 2.7 G/DL (ref 3.5–5.2)
ALP SERPL-CCNC: 147 U/L (ref 40–129)
ALPHA1 GLOB SERPL ELPH-MCNC: 0.6 G/DL (ref 0.2–0.4)
ALPHA2 GLOB SERPL ELPH-MCNC: 1.2 G/DL (ref 0.5–1)
ALT SERPL-CCNC: 6 U/L (ref 0–40)
ANION GAP SERPL CALCULATED.3IONS-SCNC: 11 MMOL/L (ref 7–16)
AST SERPL-CCNC: 11 U/L (ref 0–39)
B-GLOBULIN SERPL ELPH-MCNC: 1 G/DL (ref 0.8–1.3)
BASOPHILS # BLD: 0.08 K/UL (ref 0–0.2)
BASOPHILS NFR BLD: 1 % (ref 0–2)
BILIRUB SERPL-MCNC: <0.2 MG/DL (ref 0–1.2)
BUN SERPL-MCNC: 23 MG/DL (ref 6–20)
CALCIUM SERPL-MCNC: 8.7 MG/DL (ref 8.6–10.2)
CHLORIDE SERPL-SCNC: 104 MMOL/L (ref 98–107)
CO2 SERPL-SCNC: 24 MMOL/L (ref 22–29)
CREAT SERPL-MCNC: 2.8 MG/DL (ref 0.7–1.2)
EOSINOPHIL # BLD: 0.55 K/UL (ref 0.05–0.5)
EOSINOPHILS RELATIVE PERCENT: 4 % (ref 0–6)
ERYTHROCYTE [DISTWIDTH] IN BLOOD BY AUTOMATED COUNT: 13.5 % (ref 11.5–15)
GAMMA GLOB SERPL ELPH-MCNC: 1.3 G/DL (ref 0.7–1.6)
GFR SERPL CREATININE-BSD FRML MDRD: 29 ML/MIN/1.73M2
GLUCOSE BLD-MCNC: 111 MG/DL (ref 74–99)
GLUCOSE BLD-MCNC: 132 MG/DL (ref 74–99)
GLUCOSE BLD-MCNC: 240 MG/DL (ref 74–99)
GLUCOSE BLD-MCNC: 255 MG/DL (ref 74–99)
GLUCOSE SERPL-MCNC: 110 MG/DL (ref 74–99)
HAV IGM SERPL QL IA: NONREACTIVE
HBV CORE IGM SERPL QL IA: NONREACTIVE
HBV SURFACE AG SERPL QL IA: NONREACTIVE
HCT VFR BLD AUTO: 27.1 % (ref 37–54)
HCV AB SERPL QL IA: NONREACTIVE
HGB BLD-MCNC: 8.5 G/DL (ref 12.5–16.5)
IMM GRANULOCYTES # BLD AUTO: 0.15 K/UL (ref 0–0.58)
IMM GRANULOCYTES NFR BLD: 1 % (ref 0–5)
LYMPHOCYTES NFR BLD: 1.51 K/UL (ref 1.5–4)
LYMPHOCYTES RELATIVE PERCENT: 11 % (ref 20–42)
MCH RBC QN AUTO: 29.5 PG (ref 26–35)
MCHC RBC AUTO-ENTMCNC: 31.4 G/DL (ref 32–34.5)
MCV RBC AUTO: 94.1 FL (ref 80–99.9)
MICROORGANISM SPEC CULT: NORMAL
MICROORGANISM SPEC CULT: NORMAL
MONOCYTES NFR BLD: 1.12 K/UL (ref 0.1–0.95)
MONOCYTES NFR BLD: 8 % (ref 2–12)
NEUTROPHILS NFR BLD: 75 % (ref 43–80)
NEUTS SEG NFR BLD: 9.98 K/UL (ref 1.8–7.3)
PATHOLOGIST: ABNORMAL
PLATELET # BLD AUTO: 684 K/UL (ref 130–450)
PMV BLD AUTO: 8.9 FL (ref 7–12)
POTASSIUM SERPL-SCNC: 3.3 MMOL/L (ref 3.5–5)
PROT PATTERN SERPL ELPH-IMP: ABNORMAL
PROT SERPL-MCNC: 6.5 G/DL (ref 6.4–8.3)
PROT SERPL-MCNC: 6.5 G/DL (ref 6.4–8.3)
RBC # BLD AUTO: 2.88 M/UL (ref 3.8–5.8)
SERVICE CMNT-IMP: NORMAL
SERVICE CMNT-IMP: NORMAL
SODIUM SERPL-SCNC: 139 MMOL/L (ref 132–146)
SPECIMEN DESCRIPTION: NORMAL
SPECIMEN DESCRIPTION: NORMAL
WBC OTHER # BLD: 13.4 K/UL (ref 4.5–11.5)

## 2023-10-11 PROCEDURE — 6370000000 HC RX 637 (ALT 250 FOR IP)

## 2023-10-11 PROCEDURE — 82962 GLUCOSE BLOOD TEST: CPT

## 2023-10-11 PROCEDURE — 2060000000 HC ICU INTERMEDIATE R&B

## 2023-10-11 PROCEDURE — 6360000002 HC RX W HCPCS

## 2023-10-11 PROCEDURE — 6360000002 HC RX W HCPCS: Performed by: INTERNAL MEDICINE

## 2023-10-11 PROCEDURE — 97530 THERAPEUTIC ACTIVITIES: CPT

## 2023-10-11 PROCEDURE — 97535 SELF CARE MNGMENT TRAINING: CPT

## 2023-10-11 PROCEDURE — 85025 COMPLETE CBC W/AUTO DIFF WBC: CPT

## 2023-10-11 PROCEDURE — 2580000003 HC RX 258: Performed by: INTERNAL MEDICINE

## 2023-10-11 PROCEDURE — 80053 COMPREHEN METABOLIC PANEL: CPT

## 2023-10-11 PROCEDURE — 6370000000 HC RX 637 (ALT 250 FOR IP): Performed by: INTERNAL MEDICINE

## 2023-10-11 PROCEDURE — 2580000003 HC RX 258

## 2023-10-11 RX ORDER — INSULIN LISPRO 100 [IU]/ML
INJECTION, SOLUTION INTRAVENOUS; SUBCUTANEOUS
Qty: 10 ADJUSTABLE DOSE PRE-FILLED PEN SYRINGE | Refills: 5 | Status: SHIPPED | OUTPATIENT
Start: 2023-10-11 | End: 2023-10-18

## 2023-10-11 RX ORDER — LEVOTHYROXINE SODIUM 0.12 MG/1
125 TABLET ORAL DAILY
Qty: 30 TABLET | Refills: 3 | Status: CANCELLED | OUTPATIENT
Start: 2023-10-11

## 2023-10-11 RX ORDER — SODIUM BICARBONATE 650 MG/1
1300 TABLET ORAL 2 TIMES DAILY
Qty: 30 TABLET | Refills: 0 | OUTPATIENT
Start: 2023-10-11

## 2023-10-11 RX ORDER — INSULIN GLARGINE 100 [IU]/ML
45 INJECTION, SOLUTION SUBCUTANEOUS NIGHTLY
Qty: 10 ADJUSTABLE DOSE PRE-FILLED PEN SYRINGE | Refills: 5 | Status: SHIPPED | OUTPATIENT
Start: 2023-10-11 | End: 2023-10-18

## 2023-10-11 RX ORDER — FERROUS SULFATE 325(65) MG
325 TABLET ORAL
Qty: 30 TABLET | Refills: 3 | Status: CANCELLED | OUTPATIENT
Start: 2023-10-11

## 2023-10-11 RX ORDER — PEN NEEDLE, DIABETIC 32 GX 1/4"
NEEDLE, DISPOSABLE MISCELLANEOUS
Qty: 250 EACH | Refills: 5 | Status: SHIPPED | OUTPATIENT
Start: 2023-10-11

## 2023-10-11 RX ORDER — INSULIN LISPRO 100 [IU]/ML
15 INJECTION, SOLUTION INTRAVENOUS; SUBCUTANEOUS
Status: DISCONTINUED | OUTPATIENT
Start: 2023-10-11 | End: 2023-10-12 | Stop reason: HOSPADM

## 2023-10-11 RX ORDER — TRAMADOL HYDROCHLORIDE 50 MG/1
50 TABLET ORAL EVERY 6 HOURS PRN
Qty: 30 TABLET | Refills: 0 | Status: CANCELLED | OUTPATIENT
Start: 2023-10-11 | End: 2023-10-21

## 2023-10-11 RX ORDER — AMLODIPINE BESYLATE 10 MG/1
10 TABLET ORAL DAILY
Qty: 90 TABLET | Refills: 1 | Status: CANCELLED | OUTPATIENT
Start: 2023-10-11

## 2023-10-11 RX ORDER — LISINOPRIL 20 MG/1
40 TABLET ORAL DAILY
Status: DISCONTINUED | OUTPATIENT
Start: 2023-10-11 | End: 2023-10-11

## 2023-10-11 RX ORDER — FERROUS SULFATE 325(65) MG
325 TABLET ORAL 2 TIMES DAILY WITH MEALS
Qty: 30 TABLET | Refills: 3 | OUTPATIENT
Start: 2023-10-11

## 2023-10-11 RX ORDER — SODIUM BICARBONATE 650 MG/1
1300 TABLET ORAL DAILY
Status: DISCONTINUED | OUTPATIENT
Start: 2023-10-12 | End: 2023-10-12 | Stop reason: HOSPADM

## 2023-10-11 RX ORDER — SODIUM BICARBONATE 650 MG/1
1300 TABLET ORAL 2 TIMES DAILY
Qty: 180 TABLET | Refills: 1 | Status: CANCELLED | OUTPATIENT
Start: 2023-10-11

## 2023-10-11 RX ORDER — LISINOPRIL 40 MG/1
40 TABLET ORAL DAILY
Qty: 90 TABLET | Refills: 1 | Status: CANCELLED | OUTPATIENT
Start: 2023-10-11

## 2023-10-11 RX ORDER — HYDRALAZINE HYDROCHLORIDE 25 MG/1
25 TABLET, FILM COATED ORAL EVERY 12 HOURS SCHEDULED
Status: DISCONTINUED | OUTPATIENT
Start: 2023-10-11 | End: 2023-10-11

## 2023-10-11 RX ORDER — LISINOPRIL 2.5 MG/1
2.5 TABLET ORAL DAILY
Status: DISCONTINUED | OUTPATIENT
Start: 2023-10-12 | End: 2023-10-12

## 2023-10-11 RX ORDER — POTASSIUM CHLORIDE 20 MEQ/1
20 TABLET, EXTENDED RELEASE ORAL ONCE
Status: COMPLETED | OUTPATIENT
Start: 2023-10-11 | End: 2023-10-11

## 2023-10-11 RX ORDER — ATORVASTATIN CALCIUM 40 MG/1
40 TABLET, FILM COATED ORAL DAILY
Qty: 90 TABLET | Refills: 1 | Status: CANCELLED | OUTPATIENT
Start: 2023-10-11

## 2023-10-11 RX ADMIN — INSULIN LISPRO 15 UNITS: 100 INJECTION, SOLUTION INTRAVENOUS; SUBCUTANEOUS at 12:26

## 2023-10-11 RX ADMIN — AMLODIPINE BESYLATE 10 MG: 10 TABLET ORAL at 08:58

## 2023-10-11 RX ADMIN — LISINOPRIL 40 MG: 20 TABLET ORAL at 11:17

## 2023-10-11 RX ADMIN — FERROUS SULFATE TAB 325 MG (65 MG ELEMENTAL FE) 325 MG: 325 (65 FE) TAB at 08:57

## 2023-10-11 RX ADMIN — SODIUM BICARBONATE 1300 MG: 650 TABLET ORAL at 08:58

## 2023-10-11 RX ADMIN — ENOXAPARIN SODIUM 30 MG: 100 INJECTION SUBCUTANEOUS at 08:58

## 2023-10-11 RX ADMIN — FERROUS SULFATE TAB 325 MG (65 MG ELEMENTAL FE) 325 MG: 325 (65 FE) TAB at 16:32

## 2023-10-11 RX ADMIN — INSULIN LISPRO 6 UNITS: 100 INJECTION, SOLUTION INTRAVENOUS; SUBCUTANEOUS at 16:33

## 2023-10-11 RX ADMIN — PIPERACILLIN AND TAZOBACTAM 3375 MG: 3; .375 INJECTION, POWDER, LYOPHILIZED, FOR SOLUTION INTRAVENOUS at 21:03

## 2023-10-11 RX ADMIN — HEPARIN 300 UNITS: 100 SYRINGE at 21:01

## 2023-10-11 RX ADMIN — PIPERACILLIN AND TAZOBACTAM 3375 MG: 3; .375 INJECTION, POWDER, LYOPHILIZED, FOR SOLUTION INTRAVENOUS at 12:30

## 2023-10-11 RX ADMIN — SODIUM CHLORIDE, PRESERVATIVE FREE 30 ML: 5 INJECTION INTRAVENOUS at 08:59

## 2023-10-11 RX ADMIN — SODIUM CHLORIDE, PRESERVATIVE FREE 10 ML: 5 INJECTION INTRAVENOUS at 21:01

## 2023-10-11 RX ADMIN — ENOXAPARIN SODIUM 30 MG: 100 INJECTION SUBCUTANEOUS at 21:01

## 2023-10-11 RX ADMIN — SODIUM CHLORIDE, PRESERVATIVE FREE 10 ML: 5 INJECTION INTRAVENOUS at 08:58

## 2023-10-11 RX ADMIN — ATORVASTATIN CALCIUM 40 MG: 40 TABLET, FILM COATED ORAL at 08:58

## 2023-10-11 RX ADMIN — PIPERACILLIN AND TAZOBACTAM 3375 MG: 3; .375 INJECTION, POWDER, LYOPHILIZED, FOR SOLUTION INTRAVENOUS at 06:19

## 2023-10-11 RX ADMIN — LABETALOL HYDROCHLORIDE 10 MG: 5 INJECTION INTRAVENOUS at 21:01

## 2023-10-11 RX ADMIN — POTASSIUM CHLORIDE 20 MEQ: 1500 TABLET, EXTENDED RELEASE ORAL at 11:17

## 2023-10-11 RX ADMIN — INSULIN LISPRO 6 UNITS: 100 INJECTION, SOLUTION INTRAVENOUS; SUBCUTANEOUS at 12:26

## 2023-10-11 RX ADMIN — INSULIN GLARGINE 45 UNITS: 100 INJECTION, SOLUTION SUBCUTANEOUS at 21:01

## 2023-10-11 RX ADMIN — LABETALOL HYDROCHLORIDE 10 MG: 5 INJECTION INTRAVENOUS at 11:17

## 2023-10-11 RX ADMIN — HEPARIN 300 UNITS: 100 SYRINGE at 08:57

## 2023-10-11 RX ADMIN — INSULIN LISPRO 15 UNITS: 100 INJECTION, SOLUTION INTRAVENOUS; SUBCUTANEOUS at 16:32

## 2023-10-11 RX ADMIN — LEVOTHYROXINE SODIUM 125 MCG: 0.03 TABLET ORAL at 06:20

## 2023-10-11 ASSESSMENT — PAIN SCALES - GENERAL
PAINLEVEL_OUTOF10: 0
PAINLEVEL_OUTOF10: 0

## 2023-10-11 NOTE — PROGRESS NOTES
fracture of the posteromedial calcaneus. 3. Fluid collection in the anterior soft tissues of the lower leg. 4. Subcutaneous soft tissue edema. 5. Effusions of the hindfoot and midfoot. 6. Extensive destructive changes in the midfoot. 7. Tendinosis and intrasubstance tearing of the anterior tibialis. 8. Mild distal Achilles tendinosis. 9. Mild posterior tibialis tendinosis. MRI FOOT RIGHT WO CONTRAST   Final Result   1. Multifocal bone marrow edema throughout all of the bones of the midfoot,   all of the metatarsals, and the bones of the small toe. Some of this is   probably related to changes of Charcot arthropathy given the location and   multi focality; however, osteomyelitis cannot be excluded. 2. Periosteal reaction and cortical thickening involving the 2nd metatarsal.   Small chronic ossicles near the 2nd MTP joint. 3. Charcot arthropathy of the midfoot with large amount of fluid insinuated   amongst the midfoot bones. Septic arthritis within the midfoot cannot be   excluded based on the appearance. 4. Diabetic myopathy. 5. Subcutaneous soft tissue edema. 6. 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 VL LOWER EXTREMITY ARTERIAL SEGMENTAL PRESSURES W PPG BILATERAL   Final Result      US DUP LOWER EXTREMITY RIGHT ANTON   Final Result   No evidence of DVT in the right lower extremity. CT ABDOMEN PELVIS WO CONTRAST Additional Contrast? None   Final Result   Appendix normal.  No mechanical obstructive process of bowel. No obstructing   uropathy      Wall thickening urinary bladder could represent long-standing or chronic   outlet obstructive findings less likely cystitis however correlation with   urinary studies. Mild prostatomegaly.          XR ANKLE RIGHT (MIN 3 VIEWS)   Final Result   Multifocal irregularities throughout the right foot as described above   concerning for Charcot foot configuration would be difficult to exclude a   subtle or acute on control, blood pressure and weight    Cellulitis of right leg  Management per ID  Discussed the effect of infection on the blood sugar control. Also discussed the importance of controlling diabetes and management intervention of soft tissue infection    Sepsis  Management per primary  team  Insulin requirements while septic are likely to differ from home regimen/discharge regimen    Interdisciplinary plan for communication with healthcare providers:   Consult recommendations were discussed with the Primary Service/Nursing staff      The above issues were reviewed with the patient who understood and agreed with the plan. Thank you for allowing us to participate in the care of this patient. Please do not hesitate to contact us with any additional questions. I saw the patient and discussed the management with the resident physician Dr. Mayank Davis MD. I reviewed and agree with the findings and plan as documented in the resident's note       Philippe Cartwright MD  Endocrinologist, [address] [facility]   [address] [place] [address]   Phone: [phone]  Fax: [phone]  --------------------------  An electronic signature was used to authenticate this note.  Lashanda Pacheco MD on 10/11/2023 at 10:12 PM

## 2023-10-11 NOTE — CARE COORDINATION
Transition of care update. Spoke with Last Acosta in Public benefits regarding pending Medicaid , and it is still pending. Started application on 75/6/27, and takes apx 45 days, and CM updated patient. He is HCAP eligible for financial assistance with meds at discharge. Therapy evaluated for discharge planning, and recommended a FWW and an extended tub bench. Orders entered, and Yuliya at [facility] notified that discharge is tomorrow. Home care orders for skilled nursing, wound vac, and PT/OT entered in epic. Per IDR today and attending, discharge home tomorrow after 6 am dose of IV zosyn. Aditi Cortez at [facility] notified of discharge for tomorrow, and that he will need the afternoon dose of antibiotics, and the wound vac care and placement. Please call [facility] when patient is discharged and leaves tomorrow. Per Getachew at [employer], wound vac to be delivered to his home today per patient request. Declan Dickerson from [facility] here to see patient. Evaluation was completed yesterday. Patient stated unsure if he wants to do this, and stated a doctor told him it wasn't needed. Declan Dickerson from [facility] will follow up with patient when he gets home. Antibiotic RX for Zosyn faxed to [facility]. Verified same wound vac orders with Dr. Antonino Chapa at Dr Dawkins's office. Patient was updated with nursing and resident in room. CM will follow.   Electronically signed by Manju Tapia RN on 10/11/2023 at 2:28 PM

## 2023-10-11 NOTE — PLAN OF CARE
Problem: Discharge Planning  Goal: Discharge to home or other facility with appropriate resources  10/11/2023 0159 by Melani Gabriel RN  Outcome: Progressing  Flowsheets (Taken 10/10/2023 2000)  Discharge to home or other facility with appropriate resources:   Identify barriers to discharge with patient and caregiver   Identify discharge learning needs (meds, wound care, etc)  10/10/2023 1448 by Eboni Archer RN  Outcome: Progressing     Problem: Pain  Goal: Verbalizes/displays adequate comfort level or baseline comfort level  10/11/2023 0159 by Melani Gabriel RN  Outcome: Progressing  Flowsheets (Taken 10/10/2023 2000)  Verbalizes/displays adequate comfort level or baseline comfort level:   Encourage patient to monitor pain and request assistance   Assess pain using appropriate pain scale   Administer analgesics based on type and severity of pain and evaluate response  10/10/2023 1448 by Eboni Archer RN  Outcome: Progressing     Problem: Chronic Conditions and Co-morbidities  Goal: Patient's chronic conditions and co-morbidity symptoms are monitored and maintained or improved  10/11/2023 0159 by Melani Gabriel RN  Outcome: Progressing  Flowsheets (Taken 10/10/2023 2000)  Care Plan - Patient's Chronic Conditions and Co-Morbidity Symptoms are Monitored and Maintained or Improved: Monitor and assess patient's chronic conditions and comorbid symptoms for stability, deterioration, or improvement  10/10/2023 1448 by Eboni Archer RN  Outcome: Progressing     Problem: Safety - Adult  Goal: Free from fall injury  10/11/2023 0159 by Melani Gabriel RN  Outcome: Progressing  10/10/2023 1448 by Eboni Archer RN  Outcome: Progressing     Problem: Skin/Tissue Integrity  Goal: Absence of new skin breakdown  Description: 1. Monitor for areas of redness and/or skin breakdown  2. Assess vascular access sites hourly  3. Every 4-6 hours minimum:  Change oxygen saturation probe site  4.   Every 4-6 hours:  If on nasal tolerance for standing, transferring and ambulating with or without assistive devices     Problem: Infection - Adult  Goal: Absence of infection at discharge  10/11/2023 0159 by Brayan Briones RN  Outcome: Progressing  Flowsheets (Taken 10/10/2023 2000)  Absence of infection at discharge:   Assess and monitor for signs and symptoms of infection   Monitor lab/diagnostic results   Monitor all insertion sites i.e., indwelling lines, tubes and drains  10/10/2023 1448 by Chinyere Oleary RN  Outcome: Progressing     Problem: Metabolic/Fluid and Electrolytes - Adult  Goal: Electrolytes maintained within normal limits  10/11/2023 0159 by Brayan Briones RN  Outcome: Progressing  Flowsheets (Taken 10/10/2023 2000)  Electrolytes maintained within normal limits: Monitor labs and assess patient for signs and symptoms of electrolyte imbalances  10/10/2023 1448 by Chinyere Oleary RN  Outcome: Progressing     Problem: Nutrition Deficit:  Goal: Optimize nutritional status  10/11/2023 0159 by Brayan Briones RN  Outcome: Progressing  10/10/2023 1448 by Chinyere Oleary RN  Outcome: Progressing     Problem: Safety - Medical Restraint  Goal: Remains free of injury from restraints (Restraint for Interference with Medical Device)  Description: INTERVENTIONS:  1. Determine that other, less restrictive measures have been tried or would not be effective before applying the restraint  2. Evaluate the patient's condition at the time of restraint application  3. Inform patient/family regarding the reason for restraint  4.  Q2H: Monitor safety, psychosocial status, comfort, nutrition and hydration  10/11/2023 0159 by Brayan Briones RN  Outcome: Progressing  10/10/2023 1448 by Chinyere Oleary RN  Outcome: Progressing     Problem: ABCDS Injury Assessment  Goal: Absence of physical injury  10/11/2023 0159 by Brayan Briones RN  Outcome: Progressing  10/10/2023 1448 by Chinyere Oleary RN  Outcome: Progressing

## 2023-10-11 NOTE — PROGRESS NOTES
Department of Podiatry  Progress Note    SUBJECTIVE:  Patient seen bedside s/p incision and drainage to bone with bone biopsy on 10/07/23. He is in no acute distress today. Today he relates he will be discharged today or later tomorrow. He is reluctant to do HBOT as he would like to give the wound vac a chance. He is concerned about finances and would like to pursue vac therapy first. He understands that HBOT is a good modality and agrees to keep his mind open about it if vac therapy fails. Patient denies any N/V/D/F/C/SOB/CP and has no other pedal complaints at this time.        OBJECTIVE:    Scheduled Meds:   insulin lispro  15 Units SubCUTAneous TID WC    [START ON 10/12/2023] lisinopril  2.5 mg Oral Daily    [START ON 10/12/2023] sodium bicarbonate  1,300 mg Oral Daily    piperacillin-tazobactam  3,375 mg IntraVENous Q8H    insulin glargine  45 Units SubCUTAneous Nightly    insulin lispro  0-18 Units SubCUTAneous 4x Daily AC & HS    lidocaine PF  5 mL IntraDERmal Once    sodium chloride flush  5-40 mL IntraVENous 2 times per day    heparin flush  3 mL IntraVENous 2 times per day    epoetin dillon-epbx  10,000 Units SubCUTAneous Weekly    enoxaparin  30 mg SubCUTAneous BID    ferrous sulfate  325 mg Oral BID WC    levothyroxine  125 mcg Oral Daily    amLODIPine  10 mg Oral Daily    atorvastatin  40 mg Oral Daily    sodium chloride flush  5-40 mL IntraVENous 2 times per day     Continuous Infusions:   sodium chloride      sodium chloride      dextrose       PRN Meds:.sodium chloride flush, sodium chloride, heparin flush, traMADol, dextrose bolus **OR** dextrose bolus, potassium chloride, magnesium sulfate, sodium phosphate 10 mmol in sodium chloride 0.9 % 250 mL IVPB **OR** sodium phosphate 15 mmol in sodium chloride 0.9 % 250 mL IVPB **OR** sodium phosphate 20 mmol in sodium chloride 0.9 % 250 mL IVPB, sodium chloride flush, sodium chloride, ondansetron **OR** ondansetron, polyethylene glycol, acetaminophen **OR** questions or concerns.

## 2023-10-11 NOTE — DISCHARGE SUMMARY
non weight bearing to the operative right lower extremity. Take all medications as prescribed  Continue to elevate the limb  Please follow up with Dr. Prateek Grady at [facility] at  12:30 on 10/17/2023.  [address] [facility]  Phone: Phone: [phone]    Internal medicine    Follow ups  Please follow up with the internal medicine clinic at [facility] appointment has been scheduled for October 18th at 10:00 am.   Please keep all other follow up appointments:  Future Appointments   Date Time Provider [address]   10/18/2023 10:00 AM URGENT CARE 1 [facility]   11/1/2023 12:30 PM Karissa Welsh MD [facility]    Podiatry follow up with Dr. Prateek Grady at [address] [facility] made for 10/17/23   Infectious disease appointment  ID Clinic on 11/16.   [address]    Changes in healthcare   Please take all medications as indicated  Diet: regular diet   Activity: Non weight bearing on right foot. New Medications started during this hospital stay  Continue epoetin alpha 10,000 units weekly  Continue sodium bicarbonate 1300 mg p.o. twice daily    Medications you should stop taking   Hydrochlorothiazide    Additional labs, testing or imaging needed after discharge   Continue to monitor renal function  Weekly wound vac changes-podiatry can manage  Consider starting metolazone    Even if you are feeling better and not having symptoms do not stop taking antibiotic earlier then prescribed Continue piperacillin-tazobactam 3.375g q8h for 6 weeks from 10/05-11/16. Please contact us if you have any concerns, wish to change or make an appointment:  Internal medicine clinic   Phone: [phone]  Fax: [phone]  [address]    Should you have further questions in regards to this visit, you can review your clinical note and after visit summary document on your [employer] account.      Other than any new prescriptions given to you today, the list of home medications on this After Visit Summary are based on information provided to us from you and your healthcare providers. This information, including the list, dose, and frequency of medications is only as accurate as the information you provided. If you have any questions or concerns about your home medications, please contact your Primary Care Physician for further clarification.      Natasha Grijalva MD  PGY- 1   1:48 PM 10/11/2023      Attending physician: Dr. Lashay Miles

## 2023-10-11 NOTE — PROGRESS NOTES
OCCUPATIONAL THERAPY TREATMENT NOTE    [place] [address] [facility], [facility]       OZGC:  Patient Name: David Gabriel  MRN: [id number]  : 1990  Room: [address] [place] [address]       Evaluating OT: Minal Wheatley OTD, OTR/L, [id number]       Referring Provider: Deniz Zarate DO    Specific Provider Orders/Date: OT eval and treat (10/6/23)    Diagnosis: Dehydration [E86.0]  Septicemia ([address]) [A41.9]  ASHLEY (acute kidney injury) ([address]) [N17.9]  Sepsis ([address]) [A41.9]    Surgeries/Procedures: None this admission        Pt admitted with sepsis, chronic wounds, RLE foot deformity. Pertinent Medical History:       has a past medical history of Abscess of back, Bowel obstruction ([address]), Diabetes mellitus type 1 ([address]), HTN (hypertension), and Hyperlipidemia.            Precautions:  Fall Risk, NWB RLE, chronic wounds, agitation      Assessment of current deficits    [x] Functional mobility            [x]ADLs           [x] Strength                   [x]Cognition    [x] Functional transfers          [x] IADLs          [x] Safety Awareness   [x]Endurance    [x] Fine Coordination              [x] Balance      [] Vision/perception    [x]Sensation      []Gross Motor Coordination  [] ROM           [] Delirium                   [] Motor Control      OT PLAN OF CARE   OT POC based on physician orders, patient diagnosis and results of clinical assessment     Frequency/Duration 1-3 days/wk for 2 weeks PRN   Specific OT Treatment Interventions to include:   * Instruction/training on adapted ADL techniques and AE recommendations to increase functional independence within precautions       * Training on energy conservation strategies, correct breathing pattern and techniques to improve independence/tolerance for self-care routine  * Functional transfer/mobility training/DME recommendations for increased independence, safety, and fall prevention  * Patient/Family education to

## 2023-10-11 NOTE — PROGRESS NOTES
Physical Therapy  Physical Therapy Treatment     Name: Vivian Carpenter  : 1990  MRN: [id number]      Date of Service: 10/11/2023    Evaluating PT:  Xavi Chavira PT, DPADRIAN  [id number]     Room #:  0207/9266-P  Diagnosis:  Dehydration [E86.0]  Septicemia ([address]) [A41.9]  ASHLEY (acute kidney injury) ([address]) [N17.9]  Sepsis ([address]) [A41.9]  PMHx/PSHx:   has a past medical history of Abscess of back, Bowel obstruction ([address]), Diabetes mellitus type 1 ([address]), Diabetic ulcer of right foot associated with type 1 diabetes mellitus, with necrosis of muscle ([address]), HTN (hypertension), Hyperlipidemia, Type 1 diabetes mellitus with diabetic foot infection ([address]), and Type 2 diabetes mellitus with Charcot's joint of right foot ([address]). Procedure/Surgery:  None  Precautions:  Falls, NWB RLE, O2  Equipment Needs:  FWW     SUBJECTIVE:    Pt lives with his parents & children in a 1 story home with no stairs and no rail to enter. Bedroom and bathroom are on the 1st level. Pt ambulated with no AD PTA. OBJECTIVE:   Initial Evaluation  Date: 10/6/23 Treatment  10/11/23 Short Term/ Long Term   Goals   AM-PAC 6 Clicks     Was pt agreeable to Eval/treatment? Yes Yes    Does pt have pain? Yes - 7/10 RLE No pain reported     Bed Mobility  Rolling: NT  Supine to sit: SBA  Sit to supine: NT  Scooting: SBA Rolling: NT  Supine to sit: SBA  Sit to supine: NT  Scooting: SBA Rolling: Independent  Supine to sit: Independent  Sit to supine: Independent  Scooting: Independent   Transfers Sit to stand: SBA  Stand to sit: SBA  Stand pivot: SBA with FWW Sit to stand: SBA  Stand to sit: SBA  Stand pivot: SBA with FWW Sit to stand: Independent  Stand to sit: Independent  Stand pivot:  Mod I with AAD   Ambulation    Few feet with FWW SBA 10 feet with FWW SBA >100 feet with AAD Mod I   Stair negotiation: ascended and descended  NT NT >3 steps with 1 rail Mod I   ROM BUE:  Per OT eval   RLE: NT  LLE: WFLs     Strength BUE:  Per OT eval   RLE: NT  LLE:

## 2023-10-12 VITALS
HEART RATE: 92 BPM | OXYGEN SATURATION: 97 % | DIASTOLIC BLOOD PRESSURE: 84 MMHG | WEIGHT: 270.5 LBS | TEMPERATURE: 98.3 F | BODY MASS INDEX: 40.07 KG/M2 | HEIGHT: 69 IN | SYSTOLIC BLOOD PRESSURE: 158 MMHG | RESPIRATION RATE: 18 BRPM

## 2023-10-12 LAB
ALBUMIN SERPL-MCNC: 2.7 G/DL (ref 3.5–5.2)
ALP SERPL-CCNC: 128 U/L (ref 40–129)
ALT SERPL-CCNC: 6 U/L (ref 0–40)
ANA SER QL IA: NEGATIVE
ANION GAP SERPL CALCULATED.3IONS-SCNC: 12 MMOL/L (ref 7–16)
AST SERPL-CCNC: 9 U/L (ref 0–39)
BASOPHILS # BLD: 0.07 K/UL (ref 0–0.2)
BASOPHILS NFR BLD: 1 % (ref 0–2)
BILIRUB SERPL-MCNC: <0.2 MG/DL (ref 0–1.2)
BUN SERPL-MCNC: 21 MG/DL (ref 6–20)
CALCIUM SERPL-MCNC: 8.4 MG/DL (ref 8.6–10.2)
CHLORIDE SERPL-SCNC: 103 MMOL/L (ref 98–107)
CO2 SERPL-SCNC: 22 MMOL/L (ref 22–29)
CREAT SERPL-MCNC: 3 MG/DL (ref 0.7–1.2)
EOSINOPHIL # BLD: 0.53 K/UL (ref 0.05–0.5)
EOSINOPHILS RELATIVE PERCENT: 4 % (ref 0–6)
ERYTHROCYTE [DISTWIDTH] IN BLOOD BY AUTOMATED COUNT: 13.5 % (ref 11.5–15)
GFR SERPL CREATININE-BSD FRML MDRD: 27 ML/MIN/1.73M2
GLUCOSE BLD-MCNC: 79 MG/DL (ref 74–99)
GLUCOSE SERPL-MCNC: 127 MG/DL (ref 74–99)
HCT VFR BLD AUTO: 25.8 % (ref 37–54)
HGB BLD-MCNC: 8 G/DL (ref 12.5–16.5)
IGA SERPL-MCNC: 300 MG/DL (ref 70–400)
IGG SERPL-MCNC: 1133 MG/DL (ref 700–1600)
IGM SERPL-MCNC: 72 MG/DL (ref 40–230)
IMM GRANULOCYTES # BLD AUTO: 0.16 K/UL (ref 0–0.58)
IMM GRANULOCYTES NFR BLD: 1 % (ref 0–5)
LYMPHOCYTES NFR BLD: 1.78 K/UL (ref 1.5–4)
LYMPHOCYTES RELATIVE PERCENT: 14 % (ref 20–42)
MCH RBC QN AUTO: 29.7 PG (ref 26–35)
MCHC RBC AUTO-ENTMCNC: 31 G/DL (ref 32–34.5)
MCV RBC AUTO: 95.9 FL (ref 80–99.9)
MICROORGANISM SPEC CULT: NORMAL
MICROORGANISM/AGENT SPEC: NORMAL
MONOCYTES NFR BLD: 1.02 K/UL (ref 0.1–0.95)
MONOCYTES NFR BLD: 8 % (ref 2–12)
NEUTROPHILS NFR BLD: 73 % (ref 43–80)
NEUTS SEG NFR BLD: 9.59 K/UL (ref 1.8–7.3)
PLATELET # BLD AUTO: 651 K/UL (ref 130–450)
PMV BLD AUTO: 8.7 FL (ref 7–12)
POTASSIUM SERPL-SCNC: 3.6 MMOL/L (ref 3.5–5)
PROT SERPL-MCNC: 6.4 G/DL (ref 6.4–8.3)
RBC # BLD AUTO: 2.69 M/UL (ref 3.8–5.8)
SODIUM SERPL-SCNC: 137 MMOL/L (ref 132–146)
SPECIMEN DESCRIPTION: NORMAL
WBC OTHER # BLD: 13.2 K/UL (ref 4.5–11.5)

## 2023-10-12 PROCEDURE — 85025 COMPLETE CBC W/AUTO DIFF WBC: CPT

## 2023-10-12 PROCEDURE — 82962 GLUCOSE BLOOD TEST: CPT

## 2023-10-12 PROCEDURE — 82784 ASSAY IGA/IGD/IGG/IGM EACH: CPT

## 2023-10-12 PROCEDURE — 6370000000 HC RX 637 (ALT 250 FOR IP)

## 2023-10-12 PROCEDURE — 82595 ASSAY OF CRYOGLOBULIN: CPT

## 2023-10-12 PROCEDURE — 6360000002 HC RX W HCPCS

## 2023-10-12 PROCEDURE — 6360000002 HC RX W HCPCS: Performed by: INTERNAL MEDICINE

## 2023-10-12 PROCEDURE — 80053 COMPREHEN METABOLIC PANEL: CPT

## 2023-10-12 RX ORDER — FERROUS SULFATE 325(65) MG
325 TABLET ORAL 2 TIMES DAILY WITH MEALS
Qty: 30 TABLET | Refills: 3 | Status: SHIPPED | OUTPATIENT
Start: 2023-10-12

## 2023-10-12 RX ORDER — LISINOPRIL 40 MG/1
40 TABLET ORAL DAILY
Qty: 90 TABLET | Refills: 1 | Status: SHIPPED | OUTPATIENT
Start: 2023-10-12

## 2023-10-12 RX ORDER — AMLODIPINE BESYLATE 10 MG/1
10 TABLET ORAL DAILY
Qty: 90 TABLET | Refills: 1 | Status: SHIPPED | OUTPATIENT
Start: 2023-10-12

## 2023-10-12 RX ORDER — ATORVASTATIN CALCIUM 40 MG/1
40 TABLET, FILM COATED ORAL DAILY
Qty: 90 TABLET | Refills: 1 | Status: SHIPPED | OUTPATIENT
Start: 2023-10-12

## 2023-10-12 RX ORDER — RIVAROXABAN 10 MG/1
10 TABLET, FILM COATED ORAL
Qty: 30 TABLET | Refills: 0 | Status: SHIPPED | OUTPATIENT
Start: 2023-10-12

## 2023-10-12 RX ORDER — SODIUM BICARBONATE 650 MG/1
1300 TABLET ORAL DAILY
Qty: 60 TABLET | Refills: 2 | Status: SHIPPED | OUTPATIENT
Start: 2023-10-12

## 2023-10-12 RX ORDER — LISINOPRIL 5 MG/1
5 TABLET ORAL DAILY
Status: DISCONTINUED | OUTPATIENT
Start: 2023-10-13 | End: 2023-10-12 | Stop reason: HOSPADM

## 2023-10-12 RX ORDER — TRAMADOL HYDROCHLORIDE 50 MG/1
50 TABLET ORAL EVERY 6 HOURS PRN
Qty: 10 TABLET | Refills: 0 | Status: SHIPPED | OUTPATIENT
Start: 2023-10-12 | End: 2023-10-22

## 2023-10-12 RX ORDER — LEVOTHYROXINE SODIUM 0.12 MG/1
125 TABLET ORAL DAILY
Qty: 30 TABLET | Refills: 3 | Status: SHIPPED | OUTPATIENT
Start: 2023-10-12

## 2023-10-12 RX ADMIN — FERROUS SULFATE TAB 325 MG (65 MG ELEMENTAL FE) 325 MG: 325 (65 FE) TAB at 08:34

## 2023-10-12 RX ADMIN — HEPARIN 300 UNITS: 100 SYRINGE at 08:34

## 2023-10-12 RX ADMIN — SODIUM BICARBONATE 1300 MG: 650 TABLET ORAL at 08:34

## 2023-10-12 RX ADMIN — LEVOTHYROXINE SODIUM 125 MCG: 0.03 TABLET ORAL at 06:36

## 2023-10-12 RX ADMIN — AMLODIPINE BESYLATE 10 MG: 10 TABLET ORAL at 08:35

## 2023-10-12 RX ADMIN — INSULIN LISPRO 15 UNITS: 100 INJECTION, SOLUTION INTRAVENOUS; SUBCUTANEOUS at 08:36

## 2023-10-12 RX ADMIN — ENOXAPARIN SODIUM 30 MG: 100 INJECTION SUBCUTANEOUS at 08:34

## 2023-10-12 RX ADMIN — ATORVASTATIN CALCIUM 40 MG: 40 TABLET, FILM COATED ORAL at 08:34

## 2023-10-12 RX ADMIN — LISINOPRIL 2.5 MG: 2.5 TABLET ORAL at 08:34

## 2023-10-12 ASSESSMENT — PAIN SCALES - GENERAL: PAINLEVEL_OUTOF10: 0

## 2023-10-12 NOTE — PROGRESS NOTES
ENDOCRINOLOGY PROGRESS NOTE      Date of admission: 10/2/2023  Date of service: 10/12/2023  Admitting physician: Robbie Lorenz DO   Primary Care Physician: Karla Michel MD  Consultant physician: Renetta Galicia MD     Reason for the consultation:  Uncontrolled DM    History of Present Illness:  Frederic Gustafson is a very pleasant 26 y.o. old male with PMH of DM1, HTN, HLD and other listed below admitted to [facility] on 10/2/2023 because of nausea, fever, and chills, endocrine service was consulted for diabetes management. Patient was initially admitted to the floor for management of sepsis 2/2 cellulitis however he clinically deteriorated with worsening ASHLEY and also entered into DKA. He was transferred to MICU and placed on an insulin drip. Subjective:   Patient was seen and examined this morning. No acute events overnight. Blood sugar at goal.  No episodes of hypoglycemia. Inpatient diet:   Carb Restricted diet     Point of care glucose monitoring   (Independently reviewed)   No results for input(s): \"GLUMET\" in the last 72 hours.       Scheduled Meds:   insulin lispro  15 Units SubCUTAneous TID WC    lisinopril  2.5 mg Oral Daily    sodium bicarbonate  1,300 mg Oral Daily    piperacillin-tazobactam  3,375 mg IntraVENous Q8H    insulin glargine  45 Units SubCUTAneous Nightly    insulin lispro  0-18 Units SubCUTAneous 4x Daily AC & HS    lidocaine PF  5 mL IntraDERmal Once    sodium chloride flush  5-40 mL IntraVENous 2 times per day    heparin flush  3 mL IntraVENous 2 times per day    epoetin dillon-epbx  10,000 Units SubCUTAneous Weekly    enoxaparin  30 mg SubCUTAneous BID    ferrous sulfate  325 mg Oral BID WC    levothyroxine  125 mcg Oral Daily    amLODIPine  10 mg Oral Daily    atorvastatin  40 mg Oral Daily    sodium chloride flush  5-40 mL IntraVENous 2 times per day       PRN Meds:   sodium chloride flush, 5-40 mL, PRN  sodium chloride, , PRN  heparin flush, 3 mL, PRN  traMADol, 50 mg, Q6H midfoot. 2. Nondisplaced pathologic fracture of the posteromedial calcaneus. 3. Fluid collection in the anterior soft tissues of the lower leg. 4. Subcutaneous soft tissue edema. 5. Effusions of the hindfoot and midfoot. 6. Extensive destructive changes in the midfoot. 7. Tendinosis and intrasubstance tearing of the anterior tibialis. 8. Mild distal Achilles tendinosis. 9. Mild posterior tibialis tendinosis. MRI FOOT RIGHT WO CONTRAST   Final Result   1. Multifocal bone marrow edema throughout all of the bones of the midfoot,   all of the metatarsals, and the bones of the small toe. Some of this is   probably related to changes of Charcot arthropathy given the location and   multi focality; however, osteomyelitis cannot be excluded. 2. Periosteal reaction and cortical thickening involving the 2nd metatarsal.   Small chronic ossicles near the 2nd MTP joint. 3. Charcot arthropathy of the midfoot with large amount of fluid insinuated   amongst the midfoot bones. Septic arthritis within the midfoot cannot be   excluded based on the appearance. 4. Diabetic myopathy. 5. Subcutaneous soft tissue edema. 6. 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 VL LOWER EXTREMITY ARTERIAL SEGMENTAL PRESSURES W PPG BILATERAL   Final Result      US DUP LOWER EXTREMITY RIGHT ANTON   Final Result   No evidence of DVT in the right lower extremity. CT ABDOMEN PELVIS WO CONTRAST Additional Contrast? None   Final Result   Appendix normal.  No mechanical obstructive process of bowel. No obstructing   uropathy      Wall thickening urinary bladder could represent long-standing or chronic   outlet obstructive findings less likely cystitis however correlation with   urinary studies. Mild prostatomegaly.          XR ANKLE RIGHT (MIN 3 VIEWS)   Final Result   Multifocal irregularities throughout the right foot as described above   concerning for Charcot foot configuration would be

## 2023-10-12 NOTE — CARE COORDINATION
10/12/23 CM Update:  Discharge order noted. Spoke with Leigh Lockett from [facility] orders on chart and [facility] today for 2nd dose of medication. [facility] has everything in place for d/c. Met with pt he is anxious to discharge home. CM let pt know that things are in place for IV anitbiotics and wound vac. BS RN notified Electronically signed by Merlin Buckles, RN CM on 10/12/2023 at 11:54 AM     12:37PM call from pharmacy pt is HCAP eligible Medication has already been delivered medication vouchers faxed to pharmacy at [phone].   Electronically signed by Merlin Buckles, RN CM on 10/12/2023 at 12:38 PM

## 2023-10-12 NOTE — PLAN OF CARE
Problem: Discharge Planning  Goal: Discharge to home or other facility with appropriate resources  Outcome: Progressing  Flowsheets  Taken 10/12/2023 0730 by Lesly Reed RN  Discharge to home or other facility with appropriate resources: Identify barriers to discharge with patient and caregiver  Taken 10/11/2023 2000 by Nathalie Tenorio RN  Discharge to home or other facility with appropriate resources:   Identify barriers to discharge with patient and caregiver   Identify discharge learning needs (meds, wound care, etc)

## 2023-10-12 NOTE — PROGRESS NOTES
Wound Vac was removed, wet to dry dressing has been applied. Wound vac was placed in a bag and returned to central supply.

## 2023-10-13 ENCOUNTER — TELEPHONE (OUTPATIENT)
Dept: INTERNAL MEDICINE | Age: 33
End: 2023-10-13

## 2023-10-13 DIAGNOSIS — N18.9 CHRONIC KIDNEY DISEASE, UNSPECIFIED CKD STAGE: ICD-10-CM

## 2023-10-13 DIAGNOSIS — D63.1 ANEMIA OF CHRONIC RENAL FAILURE, UNSPECIFIED CKD STAGE: ICD-10-CM

## 2023-10-13 DIAGNOSIS — N18.9 ANEMIA OF CHRONIC RENAL FAILURE, UNSPECIFIED CKD STAGE: ICD-10-CM

## 2023-10-13 LAB
P E INTERPRETATION, U: NORMAL
PATHOLOGIST: NORMAL
SPECIMEN TYPE: NORMAL

## 2023-10-13 NOTE — PROGRESS NOTES
CLINICAL PHARMACY NOTE: MEDS TO BEDS    Total # of Prescriptions Filled: 10   The following medications were delivered to the patient:  Lisinopril 40 mg  Atorvastatin 40mg  Levothyroxine 125 mcg   Sodium bicarb 650 mg  Humalog kwikpen 100/ml  Amlodipine 10 mg  Ferosul 325 mg  Tramadol 50 mg  Trueplus [id number]  Basaglar 100 unit/ml    Additional Documentation:

## 2023-10-16 LAB — CRYOGLOB SER-MCNC: NEGATIVE MG/DL

## 2023-10-17 LAB
MICROORGANISM SPEC CULT: NORMAL
MICROORGANISM/AGENT SPEC: NORMAL
SPECIMEN DESCRIPTION: NORMAL

## 2023-10-18 ENCOUNTER — OFFICE VISIT (OUTPATIENT)
Dept: INTERNAL MEDICINE | Age: 33
End: 2023-10-18
Payer: MEDICAID

## 2023-10-18 VITALS
SYSTOLIC BLOOD PRESSURE: 152 MMHG | RESPIRATION RATE: 20 BRPM | HEART RATE: 99 BPM | BODY MASS INDEX: 37.8 KG/M2 | HEIGHT: 69 IN | TEMPERATURE: 98.6 F | DIASTOLIC BLOOD PRESSURE: 86 MMHG | OXYGEN SATURATION: 96 % | WEIGHT: 255.2 LBS

## 2023-10-18 DIAGNOSIS — E10.319 TYPE 1 DIABETES MELLITUS WITH RETINOPATHY, MACULAR EDEMA PRESENCE UNSPECIFIED, UNSPECIFIED LATERALITY, UNSPECIFIED RETINOPATHY SEVERITY (HCC): Primary | ICD-10-CM

## 2023-10-18 PROCEDURE — 99212 OFFICE O/P EST SF 10 MIN: CPT

## 2023-10-18 PROCEDURE — 3052F HG A1C>EQUAL 8.0%<EQUAL 9.0%: CPT

## 2023-10-18 PROCEDURE — 99214 OFFICE O/P EST MOD 30 MIN: CPT

## 2023-10-18 PROCEDURE — 3074F SYST BP LT 130 MM HG: CPT

## 2023-10-18 PROCEDURE — 3078F DIAST BP <80 MM HG: CPT

## 2023-10-18 RX ORDER — INSULIN GLARGINE 100 [IU]/ML
35 INJECTION, SOLUTION SUBCUTANEOUS NIGHTLY
Qty: 10 ADJUSTABLE DOSE PRE-FILLED PEN SYRINGE | Refills: 5 | Status: SHIPPED
Start: 2023-10-18 | End: 2023-11-01 | Stop reason: SDUPTHER

## 2023-10-18 RX ORDER — INSULIN LISPRO 100 [IU]/ML
INJECTION, SOLUTION INTRAVENOUS; SUBCUTANEOUS
Qty: 10 ADJUSTABLE DOSE PRE-FILLED PEN SYRINGE | Refills: 5 | Status: SHIPPED
Start: 2023-10-18 | End: 2023-11-01 | Stop reason: SDUPTHER

## 2023-10-18 NOTE — PROGRESS NOTES
[facility]  Internal Medicine Residency Clinic    Attending Physician Statement  I have discussed the case, including pertinent history and exam findings with the resident physician. I have seen and examined the patient and the key elements of the encounter have been performed by me. I agree with the assessment, plan and orders as documented by the resident. I have reviewed all pertinent PMHx, PSHx, FamHx, SocialHx, medications, and allergies and updated history as appropriate. Patient here for routine follow up of medical problems. Right Foot Osteomyelitis   -appreciate aid of podiatry surgical debridement and management   -continue antibiotics per ID recs; following with ID and ortho  -Xarelto for 30 Days for DVT prophylaxis; patinet is picking it up today   -drain in place      IDDM2  -patinet states that his blood glucose is running in the 40s in the morning when he wakes up but is >200 after he eats but patinet is eating increased amounts of food to counteract his insulin   -plan to reduce his lantus to 35 U nightly and decrease his mealtime insulin to 12 U with meals plus sliding scale insulin and patinet will reduce the amount of food he is eating      CKD IV   -w/u for other causes of progression of renal disease negative; UPEP pending   -appreciate care of nephrology in renal dysfunction  -HTN management and control   -patient unable to get his retacrit and will follow with neprhology     [facility]  -joseph deferring vaccines and other screening testing at this time     Remainder of medical problems as per resident note.     Sonny Traore DO  10/18/2023 10:58 AM

## 2023-10-18 NOTE — PATIENT INSTRUCTIONS
Dear Mickie Covington,        Thank you for coming to your appointment today. I hope we have addressed all of your needs. Please make sure to do the following:  - Continue your medications as listed. - Referrals have been made to opthalmology:  If you do not hear from the office in 1 week, please call the number listed. - We will see each other again in 5 weeks    Call for a sooner appointment if you have any concerns. Have a great day!         Sincerely,  Carlos Das MD  10/18/2023  11:10 AM

## 2023-10-18 NOTE — PROGRESS NOTES
Post-Discharge Transitional Care  Follow Up      Lily Minor   YOB: 1990    Date of Office Visit:  10/18/2023  Date of Hospital Admission: 10/2/23  Date of Hospital Discharge: 10/12/23  Risk of hospital readmission (high >=14%. Medium >=10%) :Readmission Risk Score: 15.7      Care management risk score Rising risk (score 2-5) and Complex Care (Scores >=6): No Risk Score On File     Non face to face  following discharge, date last encounter closed (first attempt may have been earlier): *No documented post hospital discharge outreach found in the last 14 days    Call initiated 2 business days of discharge: *No response recorded in the last 14 days    ASSESSMENT/PLAN:   {There are no diagnoses linked to this encounter. (Refresh or delete this SmartLink)}    Medical Decision Making: {TCMPN4:90084}  No follow-ups on file. {Time Documentation Optional:[id number]}       Subjective:   HPI:  Follow up of Hospital problems/diagnosis(es): Patient is doing well, he is trying to stay off the foot. He is still on the zosyn. He saw ortho yesterday that recommended continuing the antibiotic and then possible surgery. Patient does not need any refills at this time. Continue xarelto for dvt prophylaxis     Glucose in  the morning is around 4. During the the day ranges around 100-190. Will change to glargine 35, meal time 12u     Has not been able to get the retacrit. Informed patient to contact nephrology to prescribe retacrit. BP elevated (152/68) Consider adding thiazide at next visit    Inpatient course: Discharge summary reviewed- see chart. Interval history/Current status:   Patient was admitted for right charcot foot, he developed DKA during his admission. Ortho did I&D. Patient got a PICC line and continuing Zosyn 3.375 g every 8 hours for 6 weeks from 10/05 - 11/16.       Patient Active Problem List   Diagnosis    Type 1 diabetes mellitus with ketoacidosis without coma ([address])    Diabetic nephropathy

## 2023-10-22 LAB
MICROORGANISM SPEC CULT: NORMAL
MICROORGANISM/AGENT SPEC: NORMAL
SPECIMEN DESCRIPTION: NORMAL

## 2023-10-24 ENCOUNTER — HOSPITAL ENCOUNTER (OUTPATIENT)
Dept: INFUSION THERAPY | Age: 33
Setting detail: INFUSION SERIES
Discharge: HOME | End: 2023-10-24

## 2023-10-24 VITALS
RESPIRATION RATE: 16 BRPM | DIASTOLIC BLOOD PRESSURE: 92 MMHG | HEART RATE: 96 BPM | OXYGEN SATURATION: 100 % | TEMPERATURE: 97.8 F | SYSTOLIC BLOOD PRESSURE: 167 MMHG

## 2023-10-24 DIAGNOSIS — N18.9 ANEMIA OF CHRONIC RENAL FAILURE, UNSPECIFIED CKD STAGE: ICD-10-CM

## 2023-10-24 DIAGNOSIS — N18.9 CHRONIC KIDNEY DISEASE, UNSPECIFIED CKD STAGE: Primary | ICD-10-CM

## 2023-10-24 DIAGNOSIS — D63.1 ANEMIA OF CHRONIC RENAL FAILURE, UNSPECIFIED CKD STAGE: ICD-10-CM

## 2023-10-24 LAB
MICROORGANISM SPEC CULT: NORMAL
MICROORGANISM/AGENT SPEC: NORMAL
SPECIMEN DESCRIPTION: NORMAL

## 2023-10-24 PROCEDURE — 96372 THER/PROPH/DIAG INJ SC/IM: CPT

## 2023-10-24 PROCEDURE — 6360000002 HC RX W HCPCS: Performed by: INTERNAL MEDICINE

## 2023-10-24 RX ADMIN — EPOETIN ALFA 20000 UNITS: 10000 SOLUTION INTRAVENOUS; SUBCUTANEOUS at 09:20

## 2023-10-24 NOTE — PROGRESS NOTES
Patient tolerated Retacrit well. Remained on unit for 20 minutes after treatment. Patient alert and oriented x3. No distress noted. Vital signs stable. Patient denies any new or worsening pain. Educated patient on possible side effects and treatment of medication. Patient verbalized understanding. Patient given discharge information. Patient denies any needs. All questions answered. D/C in stable condition.

## 2023-10-24 NOTE — DISCHARGE INSTRUCTIONS
Epoetin dillon-epbx Injection 53450 units/mL (1 mL) (EPOETIN DILLON - INJECTION)  For anemia. Brand Name(s): Retacrit  Generic Name: Epoetin dillon-epbx  Instructions  This medicine is injected into the skin. Ask your doctor, nurse, or pharmacist where on your body this medicine can be injected and how to inject it. If using the vial, do not remove the medicine from the vial until ready to use. The liquid should be clear and colorless. Check the medicine before each use. If the liquid medicine has any particles in it, appears discolored, or if the vial appears damaged, do not use it. Do not shake the medicine before using. Keep medicine in refrigerator. Do not freeze. If frozen, throw away. Protect medicine from light. Never use any medicine that has . Discard any remaining medicine after your dose is given. Please ask your doctor, nurse, or pharmacist how to discard unused medicines safely. Wash your hands before and after handling this medicine. Avoid injecting the medicine within 2 inches around the navel. Do not inject into skin that is red, swollen or itchy. Change the location of the injection each time. Choose a location at least 1 inch from the last injection. It is important that you keep taking each dose of this medicine on time even if you are feeling well. If you miss a dose, contact your doctor for instructions. Drug interactions can change how medicines work or increase risk for side effects. Tell your health care providers about all medicines taken. Include prescription and over-the-counter medicines, vitamins, and herbal medicines. Speak with your doctor or pharmacist before starting or stopping any medicine. Tell your doctor if symptoms do not get better or if they get worse. Keep all appointments for medical exams and tests while on this medicine. Cautions  During pregnancy, this medicine should be used only when clearly needed.  Talk to your doctor about the risks and information is subject to express Terms of Use. Care instructions adapted under license by [facility] If you have questions about a medical condition or this instruction, always ask your healthcare professional. [address] any warranty or liability for your use of this information.

## 2023-10-29 LAB
MICROORGANISM SPEC CULT: NORMAL
MICROORGANISM/AGENT SPEC: NORMAL
SPECIMEN DESCRIPTION: NORMAL

## 2023-10-31 LAB
MICROORGANISM SPEC CULT: NORMAL
MICROORGANISM/AGENT SPEC: NORMAL
SPECIMEN DESCRIPTION: NORMAL

## 2023-11-01 ENCOUNTER — OFFICE VISIT (OUTPATIENT)
Dept: ENDOCRINOLOGY | Age: 33
End: 2023-11-01

## 2023-11-01 VITALS
RESPIRATION RATE: 18 BRPM | SYSTOLIC BLOOD PRESSURE: 179 MMHG | DIASTOLIC BLOOD PRESSURE: 93 MMHG | HEART RATE: 108 BPM | HEIGHT: 69 IN | OXYGEN SATURATION: 99 % | WEIGHT: 254 LBS | BODY MASS INDEX: 37.62 KG/M2

## 2023-11-01 DIAGNOSIS — E10.65 TYPE 1 DIABETES MELLITUS WITH HYPERGLYCEMIA (HCC): Primary | ICD-10-CM

## 2023-11-01 DIAGNOSIS — E03.9 HYPOTHYROIDISM, UNSPECIFIED TYPE: ICD-10-CM

## 2023-11-01 DIAGNOSIS — Z91.119 DIETARY NONCOMPLIANCE: ICD-10-CM

## 2023-11-01 DIAGNOSIS — E78.5 HYPERLIPIDEMIA, UNSPECIFIED HYPERLIPIDEMIA TYPE: ICD-10-CM

## 2023-11-01 PROBLEM — E86.0 DEHYDRATION: Status: RESOLVED | Noted: 2023-10-02 | Resolved: 2023-11-01

## 2023-11-01 PROCEDURE — 3078F DIAST BP <80 MM HG: CPT | Performed by: INTERNAL MEDICINE

## 2023-11-01 PROCEDURE — 99214 OFFICE O/P EST MOD 30 MIN: CPT | Performed by: INTERNAL MEDICINE

## 2023-11-01 PROCEDURE — 3074F SYST BP LT 130 MM HG: CPT | Performed by: INTERNAL MEDICINE

## 2023-11-01 PROCEDURE — 3046F HEMOGLOBIN A1C LEVEL >9.0%: CPT | Performed by: INTERNAL MEDICINE

## 2023-11-01 RX ORDER — INSULIN LISPRO 100 [IU]/ML
INJECTION, SOLUTION INTRAVENOUS; SUBCUTANEOUS
Qty: 10 ADJUSTABLE DOSE PRE-FILLED PEN SYRINGE | Refills: 5
Start: 2023-11-01

## 2023-11-01 RX ORDER — INSULIN GLARGINE 100 [IU]/ML
32 INJECTION, SOLUTION SUBCUTANEOUS NIGHTLY
Qty: 10 ADJUSTABLE DOSE PRE-FILLED PEN SYRINGE | Refills: 5
Start: 2023-11-01

## 2023-11-01 NOTE — PROGRESS NOTES
[facility] Department of Endocrinology Diabetes and Metabolism   [address] [facility] [address]   Phone: [phone]  Fax: [phone]      Date of service: 11/1/2023  Primary Care Physician: Karla Michel MD  Consultant physician: Renetta Galicia MD     Reason for the consultation:  Uncontrolled DM    History of Present Illness: The history is provided by the patient. Accuracy of the patient data is excellent  Frederic Gustafson is a very pleasant 26 y.o. old male with PMH of DM1, HTN, HLD and other listed below seen today for a follow-up visit after recent hospitalization    The patient was diagnosed with type I DM. Prior to admission patient was on basaglar 25 u qhs, lispro 8 units 3 times daily with meals plus medium dose sliding scale ACHS . Since the recent hospitalization the patient has been compliant with following diabetic diet. He has been checking blood sugar 4 times a day ACHS and blood sugars much better controlled. The patient denies any significant hypoglycemic episodes. The patient reports microvascular complications in form of neuropathy.   The patient is due for his annual eye exam  Lab Results   Component Value Date/Time    LABA1C 10.8 10/03/2023 05:07 AM     Past medical history:   Past Medical History:   Diagnosis Date    Abscess of back 8/2011    Bowel obstruction ([address])     Diabetes mellitus type 1 ([address]) Diagnosed at 6years of age/ in 1998    Diabetic ulcer of right foot associated with type 1 diabetes mellitus, with necrosis of muscle ([address]) 10/9/2023    HTN (hypertension)     Hyperlipidemia     Type 1 diabetes mellitus with diabetic foot infection ([address]) 10/9/2023    Type 2 diabetes mellitus with Charcot's joint of right foot ([address]) 10/9/2023       Past surgical history:  Past Surgical History:   Procedure Laterality Date    FOOT SURGERY Right 10/7/2023    RIGHT FOOT BONE BIOPSY, INCISION AND DRAINAGE TO LEVEL OF BONE performed by Deondre Castillo

## 2023-11-01 NOTE — PATIENT INSTRUCTIONS
Recommendations for today's visit  Lantus 32 units nightly   Take Humalog 10 units three times a day plus sliding scale:  Blood sugar 150-200: add 2 Units of Humalog  Blood sugar 201-250 : Add 4 Units of Humalog  Blood Sugar 251 - 300: Add 6 Units of Humalog  Blood Sugar 301-350: Add 8  Units of Humalog  Blood Sugar 350-400: Add 10 Units of Humalog  Blood sugar  >400: Add 12 Units of Humalog     Check Blood sugar 4 times/day before meals and at bedtime and send us sugar log in a week      These are your blood sugar, blood pressure, cholesterol and A1c goals:  Blood sugar fastin mg/dl to 130 mg/dl  Blood sugar before meals: <150 mg/dl  Peak blood sugar lower than 180 mg/dl  A1c: between 6.5 - 7.5%    I you have any questions please call Dr. Zaida Luis office     René Gale MD  Endocrinologist, [address] [place] [address], [address] [place] [address]   Phone: [phone]  Fax: [phone]  Email: [email or web address]

## 2023-11-02 ENCOUNTER — TELEPHONE (OUTPATIENT)
Dept: INTERNAL MEDICINE | Age: 33
End: 2023-11-02

## 2023-11-02 NOTE — TELEPHONE ENCOUNTER
Per [facility] Patient has a blood pressure of 190/90. Patient states he took all meds, has no headache, dizziness, and no blurred vision.

## 2023-11-05 LAB
MICROORGANISM SPEC CULT: NORMAL
MICROORGANISM/AGENT SPEC: NORMAL
SPECIMEN DESCRIPTION: NORMAL

## 2023-11-06 ENCOUNTER — TELEPHONE (OUTPATIENT)
Dept: INTERNAL MEDICINE | Age: 33
End: 2023-11-06

## 2023-11-06 NOTE — TELEPHONE ENCOUNTER
Spoke with patient, he states he took his medications at 8 am and that person didn't ask him if he did or didn't. But states he has a cough and sometimes when he doesn't feel good it makes his bp go up. Per pt he does have a bp cuff there, but will have to ask another family where it is, and he will take bp daily, an hour after his meds, and write them down. Pt has appt 11/22/2023.  Please advise if any further instructions

## 2023-11-06 NOTE — TELEPHONE ENCOUNTER
Jana Lincoln , pt's  called in to state pt's BP was 188/92, he states pt has not taken his BP meds this morning. I informed him to please ask pt to take medication, pt takes lisinopril 40 mg daily and amlodipine 10mg daily , please advise if any further advise. Pt has appt 11/22/2023.

## 2023-11-07 ENCOUNTER — HOSPITAL ENCOUNTER (OUTPATIENT)
Dept: INFUSION THERAPY | Age: 33
Setting detail: INFUSION SERIES
Discharge: HOME | End: 2023-11-07

## 2023-11-07 VITALS
HEART RATE: 101 BPM | TEMPERATURE: 98.4 F | OXYGEN SATURATION: 98 % | SYSTOLIC BLOOD PRESSURE: 164 MMHG | DIASTOLIC BLOOD PRESSURE: 87 MMHG | RESPIRATION RATE: 16 BRPM

## 2023-11-07 DIAGNOSIS — N18.9 CHRONIC KIDNEY DISEASE, UNSPECIFIED CKD STAGE: Primary | ICD-10-CM

## 2023-11-07 DIAGNOSIS — D63.1 ANEMIA OF CHRONIC RENAL FAILURE, UNSPECIFIED CKD STAGE: ICD-10-CM

## 2023-11-07 DIAGNOSIS — N18.9 ANEMIA OF CHRONIC RENAL FAILURE, UNSPECIFIED CKD STAGE: ICD-10-CM

## 2023-11-07 LAB
MICROORGANISM SPEC CULT: NORMAL
MICROORGANISM/AGENT SPEC: NORMAL
SPECIMEN DESCRIPTION: NORMAL

## 2023-11-07 PROCEDURE — 6360000002 HC RX W HCPCS: Performed by: INTERNAL MEDICINE

## 2023-11-07 PROCEDURE — 96372 THER/PROPH/DIAG INJ SC/IM: CPT

## 2023-11-07 RX ADMIN — EPOETIN ALFA 20000 UNITS: 10000 SOLUTION INTRAVENOUS; SUBCUTANEOUS at 09:20

## 2023-11-07 NOTE — FLOWSHEET NOTE
Patient tolerated injection well. Patient alert and oriented x3. No distress noted. Vital signs stable. Patient denies any new or worsening pain. Educated patient on possible side effects and treatment of medication. Patient verbalized understanding. Offered patient education and/or discharge material. Patient declined. Patient denies any needs. All questions answered. D/C in stable condition.

## 2023-11-07 NOTE — TELEPHONE ENCOUNTER
Called pt to inform him to call office with BP if still high, if not to please keep a record of them until appt.

## 2023-11-14 LAB
MICROORGANISM SPEC CULT: NORMAL
MICROORGANISM/AGENT SPEC: NORMAL
SPECIMEN DESCRIPTION: NORMAL

## 2023-11-17 ENCOUNTER — TELEPHONE (OUTPATIENT)
Dept: INTERNAL MEDICINE | Age: 33
End: 2023-11-17

## 2023-11-17 NOTE — TELEPHONE ENCOUNTER
Spoke with patient who was quite jong. States he doesn't have the money to pay for it. He is waiting on insurance to pay for it. I informed patient that this is the message I have received from the attending provider and I have no other information for him. He stated ok and hung up. Fording this message to  Darien Moss to see if she can help.   Adolfo Hu LPN

## 2023-11-17 NOTE — TELEPHONE ENCOUNTER
Recommend patient come to HTN class and upon completion is eligible for free cuff.  Good rx coupons printed and total cost for amlodipine and lisinopril in $12    Electronically signed by Mary Crowell DO on 11/17/2023 at 2:04 PM

## 2023-11-17 NOTE — TELEPHONE ENCOUNTER
Andres Quesada MD  You 35 minutes ago (12:15 PM)       Rx lisinopril and amlodipine were both ordered on 10/12/23-- >90 day supply     Advise him to buy-- out of pocket if need with those costs   That is best we can offer for now

## 2023-11-17 NOTE — TELEPHONE ENCOUNTER
Detailed message left on voicemail stating we have found Good RX coupons saving him money for his prescriptions of Amlodipine and Lisinopril for a total of $11.64 at his local [facility] and I can fax the prescriptions and coupons for him. Also we have a high blood pressure class where he call get a free blood pressure cuff on December 6th at 1 pm if he is interested. Asked patient to please call me back.   Tiny Johnson LPN

## 2023-11-17 NOTE — TELEPHONE ENCOUNTER
Jero Nava called from home health. He is at patient's house for discharge visit. Patient's BP reading is 192/102. Jero Nava states patient has not picked up meds because he cannot afford to pick them up and Jero Nava wanted to know if there was anything we can do to help the patient. Ivy Prieto LPN was in the room and stated patient has PAP meds waiting for him. She checked and it was Synthroid and insulins. Spoke with Jesus Mascorro and the cost of Lisinopril is 15.98 and Amlodipine is 13.98. Please advise how to proceed.  Abhishek Kimble LPN

## 2023-11-20 ENCOUNTER — TELEPHONE (OUTPATIENT)
Dept: INTERNAL MEDICINE | Age: 33
End: 2023-11-20

## 2023-11-20 NOTE — TELEPHONE ENCOUNTER
LSW left message for pt to call LSW re: concern of not affording HTN medications.  LSW checked with public benefits staff and advised pt has not returned calls as pt needs to provided POI from July to October to completely process Medicaid application and [facility] if Medicaid is denied.    Pt returned call; advised of above; suggested to gather above info and meet with LSW as has injection in MDRU tomorrow;11.21.23 at 9AM;  advised to obtain letter of new job start date which was 2 weeks prior to date of admission.  Pt states he has been taking BP meds as had from previous refill and reports the name and dose did not change.   Will consider bridging 1 month supply of BP meds with follow thru of 11.21.23 appt stewart HONG

## 2023-11-21 ENCOUNTER — OFFICE VISIT (OUTPATIENT)
Dept: INTERNAL MEDICINE | Age: 33
End: 2023-11-21
Payer: MEDICAID

## 2023-11-21 ENCOUNTER — HOSPITAL ENCOUNTER (OUTPATIENT)
Dept: INFUSION THERAPY | Age: 33
Setting detail: INFUSION SERIES
Discharge: HOME | End: 2023-11-21

## 2023-11-21 ENCOUNTER — SOCIAL WORK (OUTPATIENT)
Dept: INTERNAL MEDICINE | Age: 33
End: 2023-11-21

## 2023-11-21 VITALS
SYSTOLIC BLOOD PRESSURE: 173 MMHG | HEART RATE: 94 BPM | DIASTOLIC BLOOD PRESSURE: 95 MMHG | RESPIRATION RATE: 16 BRPM | TEMPERATURE: 96.7 F | OXYGEN SATURATION: 100 %

## 2023-11-21 VITALS
SYSTOLIC BLOOD PRESSURE: 165 MMHG | TEMPERATURE: 96.9 F | HEIGHT: 69 IN | OXYGEN SATURATION: 99 % | DIASTOLIC BLOOD PRESSURE: 95 MMHG | RESPIRATION RATE: 18 BRPM | HEART RATE: 100 BPM | WEIGHT: 233 LBS | BODY MASS INDEX: 34.51 KG/M2

## 2023-11-21 DIAGNOSIS — Z59.9 FINANCIAL DIFFICULTIES: ICD-10-CM

## 2023-11-21 DIAGNOSIS — E03.9 ACQUIRED HYPOTHYROIDISM: ICD-10-CM

## 2023-11-21 DIAGNOSIS — N18.9 CHRONIC KIDNEY DISEASE, UNSPECIFIED CKD STAGE: Primary | ICD-10-CM

## 2023-11-21 DIAGNOSIS — D63.1 ANEMIA OF CHRONIC RENAL FAILURE, UNSPECIFIED CKD STAGE: ICD-10-CM

## 2023-11-21 DIAGNOSIS — N18.9 ANEMIA OF CHRONIC RENAL FAILURE, UNSPECIFIED CKD STAGE: ICD-10-CM

## 2023-11-21 DIAGNOSIS — I10 ESSENTIAL HYPERTENSION: Primary | ICD-10-CM

## 2023-11-21 LAB
ALBUMIN SERPL-MCNC: 3.7 G/DL (ref 3.5–5.2)
ALP SERPL-CCNC: 151 U/L (ref 40–129)
ALT SERPL-CCNC: 5 U/L (ref 0–40)
ANION GAP SERPL CALCULATED.3IONS-SCNC: 18 MMOL/L (ref 7–16)
AST SERPL-CCNC: 10 U/L (ref 0–39)
BASOPHILS # BLD: 0.09 K/UL (ref 0–0.2)
BASOPHILS NFR BLD: 1 % (ref 0–2)
BILIRUB SERPL-MCNC: 0.2 MG/DL (ref 0–1.2)
BUN SERPL-MCNC: 29 MG/DL (ref 6–20)
CALCIUM SERPL-MCNC: 9.7 MG/DL (ref 8.6–10.2)
CHLORIDE SERPL-SCNC: 103 MMOL/L (ref 98–107)
CO2 SERPL-SCNC: 20 MMOL/L (ref 22–29)
CREAT SERPL-MCNC: 3.4 MG/DL (ref 0.7–1.2)
CRP SERPL HS-MCNC: 89 MG/L (ref 0–5)
EOSINOPHIL # BLD: 0.21 K/UL (ref 0.05–0.5)
EOSINOPHILS RELATIVE PERCENT: 2 % (ref 0–6)
ERYTHROCYTE [DISTWIDTH] IN BLOOD BY AUTOMATED COUNT: 14.2 % (ref 11.5–15)
GFR SERPL CREATININE-BSD FRML MDRD: 23 ML/MIN/1.73M2
GLUCOSE SERPL-MCNC: 173 MG/DL (ref 74–99)
HCT VFR BLD AUTO: 32.7 % (ref 37–54)
HGB BLD-MCNC: 9.9 G/DL (ref 12.5–16.5)
IMM GRANULOCYTES # BLD AUTO: 0.05 K/UL (ref 0–0.58)
IMM GRANULOCYTES NFR BLD: 1 % (ref 0–5)
LYMPHOCYTES NFR BLD: 1.41 K/UL (ref 1.5–4)
LYMPHOCYTES RELATIVE PERCENT: 13 % (ref 20–42)
MCH RBC QN AUTO: 26 PG (ref 26–35)
MCHC RBC AUTO-ENTMCNC: 30.3 G/DL (ref 32–34.5)
MCV RBC AUTO: 85.8 FL (ref 80–99.9)
MICROORGANISM SPEC CULT: NORMAL
MICROORGANISM/AGENT SPEC: NORMAL
MONOCYTES NFR BLD: 0.83 K/UL (ref 0.1–0.95)
MONOCYTES NFR BLD: 8 % (ref 2–12)
NEUTROPHILS NFR BLD: 76 % (ref 43–80)
NEUTS SEG NFR BLD: 8.24 K/UL (ref 1.8–7.3)
PLATELET # BLD AUTO: 761 K/UL (ref 130–450)
PMV BLD AUTO: 9.4 FL (ref 7–12)
POTASSIUM SERPL-SCNC: 4.5 MMOL/L (ref 3.5–5)
PROT SERPL-MCNC: 8 G/DL (ref 6.4–8.3)
RBC # BLD AUTO: 3.81 M/UL (ref 3.8–5.8)
SODIUM SERPL-SCNC: 141 MMOL/L (ref 132–146)
SPECIMEN DESCRIPTION: NORMAL
WBC OTHER # BLD: 10.8 K/UL (ref 4.5–11.5)

## 2023-11-21 PROCEDURE — 3074F SYST BP LT 130 MM HG: CPT

## 2023-11-21 PROCEDURE — 6360000002 HC RX W HCPCS: Performed by: INTERNAL MEDICINE

## 2023-11-21 PROCEDURE — 96372 THER/PROPH/DIAG INJ SC/IM: CPT

## 2023-11-21 PROCEDURE — 3078F DIAST BP <80 MM HG: CPT | Performed by: INTERNAL MEDICINE

## 2023-11-21 PROCEDURE — 99213 OFFICE O/P EST LOW 20 MIN: CPT

## 2023-11-21 PROCEDURE — 99212 OFFICE O/P EST SF 10 MIN: CPT

## 2023-11-21 RX ORDER — METOPROLOL SUCCINATE 25 MG/1
25 TABLET, EXTENDED RELEASE ORAL DAILY
Qty: 30 TABLET | Refills: 3 | Status: SHIPPED | OUTPATIENT
Start: 2023-11-21

## 2023-11-21 RX ADMIN — EPOETIN ALFA 20000 UNITS: 10000 SOLUTION INTRAVENOUS; SUBCUTANEOUS at 09:11

## 2023-11-21 SDOH — ECONOMIC STABILITY - INCOME SECURITY: PROBLEM RELATED TO HOUSING AND ECONOMIC CIRCUMSTANCES, UNSPECIFIED: Z59.9

## 2023-11-21 ASSESSMENT — ENCOUNTER SYMPTOMS
COUGH: 0
SORE THROAT: 0
SHORTNESS OF BREATH: 0
ABDOMINAL PAIN: 0

## 2023-11-21 NOTE — PATIENT INSTRUCTIONS
Dear Ap Jacobs,        Thank you for coming to your appointment today. I hope we have addressed all of your needs. Please make sure to do the following:  - Continue your medications as listed. - We will see each other again in 5-6 weeks. Call for a sooner appointment if you have any concerns. Have a great day!         Sincerely,  Alexis Menendez MD  11/21/2023  2:37 PM

## 2023-11-21 NOTE — PROGRESS NOTES
Patient tolerated epogen injection well. Patient alert and oriented x3. No distress noted. Vital signs stable. Patient denies any new or worsening pain. Offered patient education and/or discharge material. Patient declined. Patient denies any needs. All questions answered. D/C in stable condition.

## 2023-11-21 NOTE — PROGRESS NOTES
Pt to LSW after EPO injection in MDRU; was accompanied by father to visit. Pt provided requested documentation which was forwarded to rik Norman of [employer] benefits for [employer] POI and Pt does have HFA letter with 100% discount from dates 8. 2.23 and ends 12-30-23. Noted pt cancelled 11.22.23 IM 5 week f/u appt via [address] [place] [address]. Pt willing to return to IM office for afternoon appt to review medications and possible assistance with voucher. Pt has appt with podiatrist   Pt has PAP Synthroid and and Humalog and Basaglar to . Pt had visit with Dr Dickson Turner on 11. 1.23 and desires for endo to handle all diabetes management. Contact made with hospital pharmacy and cost of lisinopril,amlodopine, atorvastatin and sodium bicarbonate is $60.92. Checking stewart Merlos of [employer] for bridge voucher approval to cover these costs    Pt returned to IM visit in afternoon of 11.21.23; discussed with Dr. Ordonez Listen and medication concurs with what pharmacy has ready and adding metroprol. PAP bridge voucher approved ffor 30 day fill and faxed to pharmacy and pt given copy  Pt received remaining PAP basaglar (1 Box) and humalog (3 boxes ) as well as 90 days of Synthroid.   Pt encouraged to call LSW as needed

## 2023-11-21 NOTE — PROGRESS NOTES
[facility]  Internal Medicine Residency Clinic    Attending Physician Statement  I have discussed the case, including pertinent history and exam findings with the resident physician. I have seen and examined the patient and the key elements of the encounter have been performed by me. I agree with the assessment, plan and orders as documented by the resident. I have reviewed the relevant PMHx, PSHx, FamHx, SocialHx, medications, and allergies and updated history as appropriate. Patient presents for routine follow up of medical problems. BP is not well controlled with /95, currently on maximum dose of lisinopril and amlodipine, and pt is DM foot and on boot, trial of betablocker of metoprolol 25 mg BID and continue monitor BP at home. Xarelto was started to prevent DVT for short period of time. Pt was seen by his IDDM for 25 years with endocrinology, and continue seeing eye doc for treatments. Needs close contact and see patient in 5-6 weeks. PAP medication applied and follow up and follow up A1C today. Remainder of medical problems as per resident note.     Piero Mcbride MD  11/21/2023 2:30 PM

## 2023-11-22 LAB
ERYTHROCYTE [SEDIMENTATION RATE] IN BLOOD BY WESTERGREN METHOD: 122 MM/HR (ref 0–15)
HBA1C MFR BLD: 8.5 % (ref 4–5.6)

## 2023-12-05 ENCOUNTER — HOSPITAL ENCOUNTER (OUTPATIENT)
Dept: INFUSION THERAPY | Age: 33
Setting detail: INFUSION SERIES
Discharge: HOME | End: 2023-12-05

## 2023-12-05 VITALS
SYSTOLIC BLOOD PRESSURE: 131 MMHG | DIASTOLIC BLOOD PRESSURE: 78 MMHG | OXYGEN SATURATION: 100 % | RESPIRATION RATE: 16 BRPM | HEART RATE: 91 BPM | TEMPERATURE: 98 F

## 2023-12-05 DIAGNOSIS — N18.9 ANEMIA OF CHRONIC RENAL FAILURE, UNSPECIFIED CKD STAGE: ICD-10-CM

## 2023-12-05 DIAGNOSIS — N18.9 CHRONIC KIDNEY DISEASE, UNSPECIFIED CKD STAGE: Primary | ICD-10-CM

## 2023-12-05 DIAGNOSIS — D63.1 ANEMIA OF CHRONIC RENAL FAILURE, UNSPECIFIED CKD STAGE: ICD-10-CM

## 2023-12-05 LAB
HCT VFR BLD AUTO: 30.8 % (ref 37–54)
HGB BLD-MCNC: 9.3 G/DL (ref 12.5–16.5)

## 2023-12-05 PROCEDURE — 96372 THER/PROPH/DIAG INJ SC/IM: CPT

## 2023-12-05 PROCEDURE — 6360000002 HC RX W HCPCS: Performed by: INTERNAL MEDICINE

## 2023-12-05 PROCEDURE — 85018 HEMOGLOBIN: CPT

## 2023-12-05 PROCEDURE — 85014 HEMATOCRIT: CPT

## 2023-12-05 PROCEDURE — 36415 COLL VENOUS BLD VENIPUNCTURE: CPT

## 2023-12-05 RX ADMIN — EPOETIN ALFA 20000 UNITS: 10000 SOLUTION INTRAVENOUS; SUBCUTANEOUS at 09:42

## 2023-12-05 ASSESSMENT — PAIN DESCRIPTION - LOCATION: LOCATION: FOOT

## 2023-12-05 ASSESSMENT — PAIN - FUNCTIONAL ASSESSMENT: PAIN_FUNCTIONAL_ASSESSMENT: PREVENTS OR INTERFERES WITH MANY ACTIVE NOT PASSIVE ACTIVITIES

## 2023-12-05 ASSESSMENT — PAIN DESCRIPTION - ORIENTATION: ORIENTATION: RIGHT

## 2023-12-05 ASSESSMENT — PAIN SCALES - GENERAL: PAINLEVEL_OUTOF10: 3

## 2023-12-05 ASSESSMENT — PAIN DESCRIPTION - FREQUENCY: FREQUENCY: INTERMITTENT

## 2023-12-05 ASSESSMENT — PAIN DESCRIPTION - PAIN TYPE: TYPE: ACUTE PAIN

## 2023-12-05 ASSESSMENT — PAIN DESCRIPTION - DESCRIPTORS: DESCRIPTORS: THROBBING

## 2023-12-05 ASSESSMENT — PAIN DESCRIPTION - ONSET: ONSET: ON-GOING

## 2023-12-05 ASSESSMENT — PAIN DESCRIPTION - DIRECTION: RADIATING_TOWARDS: RIGHT ANKLE

## 2023-12-05 NOTE — PROGRESS NOTES
Patient tolerated lab draw and epogen injection well. Patient alert and oriented x3. No distress noted. Vital signs stable. Patient denies any new or worsening pain. Offered patient education and/or discharge material. Patient declined. Patient denies any needs. All questions answered. D/C in stable condition.

## 2023-12-19 ENCOUNTER — HOSPITAL ENCOUNTER (OUTPATIENT)
Dept: INFUSION THERAPY | Age: 33
Setting detail: INFUSION SERIES
Discharge: HOME | End: 2023-12-19

## 2023-12-19 VITALS
DIASTOLIC BLOOD PRESSURE: 61 MMHG | HEART RATE: 92 BPM | RESPIRATION RATE: 16 BRPM | OXYGEN SATURATION: 100 % | SYSTOLIC BLOOD PRESSURE: 108 MMHG | TEMPERATURE: 98.1 F

## 2023-12-19 DIAGNOSIS — D63.1 ANEMIA OF CHRONIC RENAL FAILURE, UNSPECIFIED CKD STAGE: ICD-10-CM

## 2023-12-19 DIAGNOSIS — N18.9 ANEMIA OF CHRONIC RENAL FAILURE, UNSPECIFIED CKD STAGE: ICD-10-CM

## 2023-12-19 DIAGNOSIS — N18.9 CHRONIC KIDNEY DISEASE, UNSPECIFIED CKD STAGE: Primary | ICD-10-CM

## 2023-12-19 PROCEDURE — 6360000002 HC RX W HCPCS: Performed by: INTERNAL MEDICINE

## 2023-12-19 PROCEDURE — 96372 THER/PROPH/DIAG INJ SC/IM: CPT

## 2023-12-19 PROCEDURE — 36415 COLL VENOUS BLD VENIPUNCTURE: CPT

## 2023-12-19 RX ADMIN — EPOETIN ALFA 20000 UNITS: 10000 SOLUTION INTRAVENOUS; SUBCUTANEOUS at 09:59

## 2023-12-19 NOTE — FLOWSHEET NOTE
Patient tolerated injection well. Patient alert and oriented x3. No distress noted. Vital signs stable. Patient denies any new or worsening pain. Educated patient on possible side effects and treatment of medication. Patient verbalized understanding. Offered patient education and/or discharge material. Patient declined  . Patient denies any needs. All questions answered. D/C in stable condition.

## 2024-01-02 ENCOUNTER — TELEPHONE (OUTPATIENT)
Dept: INTERNAL MEDICINE | Age: 34
End: 2024-01-02

## 2024-01-02 ENCOUNTER — HOSPITAL ENCOUNTER (OUTPATIENT)
Age: 34
Discharge: HOME | End: 2024-01-02
Payer: COMMERCIAL

## 2024-01-02 DIAGNOSIS — E03.8 HYPOTHYROIDISM DUE TO HASHIMOTO'S THYROIDITIS: Primary | ICD-10-CM

## 2024-01-02 DIAGNOSIS — E11.65 POORLY CONTROLLED DIABETES MELLITUS (HCC): ICD-10-CM

## 2024-01-02 DIAGNOSIS — E06.3 HYPOTHYROIDISM DUE TO HASHIMOTO'S THYROIDITIS: Primary | ICD-10-CM

## 2024-01-02 LAB
HCT VFR BLD AUTO: 35.7 % (ref 37–54)
HGB BLD-MCNC: 10.9 G/DL (ref 12.5–16.5)

## 2024-01-02 PROCEDURE — 85018 HEMOGLOBIN: CPT

## 2024-01-02 PROCEDURE — 85014 HEMATOCRIT: CPT

## 2024-01-02 PROCEDURE — 36415 COLL VENOUS BLD VENIPUNCTURE: CPT

## 2024-01-02 RX ORDER — INSULIN LISPRO 100 [IU]/ML
INJECTION, SOLUTION INTRAVENOUS; SUBCUTANEOUS
Qty: 10 ADJUSTABLE DOSE PRE-FILLED PEN SYRINGE | Refills: 3 | Status: SHIPPED | OUTPATIENT
Start: 2024-01-02

## 2024-01-02 RX ORDER — LEVOTHYROXINE SODIUM 0.12 MG/1
125 TABLET ORAL DAILY
Qty: 90 TABLET | Refills: 0 | Status: SHIPPED | OUTPATIENT
Start: 2024-01-02

## 2024-01-02 RX ORDER — INSULIN GLARGINE 100 [IU]/ML
32 INJECTION, SOLUTION SUBCUTANEOUS NIGHTLY
Qty: 10 ADJUSTABLE DOSE PRE-FILLED PEN SYRINGE | Refills: 3 | Status: SHIPPED
Start: 2024-01-02 | End: 2024-01-03

## 2024-01-02 NOTE — TELEPHONE ENCOUNTER
Interval History:  Patient is more awake.  Less abdominal pain.  Abdominal film showed no obstruction.  The patient has had some colostomy output.    Discussed with colorectal surgery who will see her as they are familiar with her clinical situation    Patient will will have a Gastrografin small-bowel follow-through today, mainly for therapeutic purposes.  Discussed with Radiology    Medications:  Continuous Infusions:   dextrose 5% lactated ringers 100 mL/hr at 01/25/22 0659     Scheduled Meds:   famotidine  20 mg Oral Daily    methylnaltrexone  8 mg Subcutaneous Every other day    metoclopramide HCl  5 mg Oral QID (AC & HS)    mupirocin   Nasal BID    ondansetron  4 mg Intravenous Q6H    piperacillin-tazobactam (ZOSYN) IVPB  4.5 g Intravenous Q8H    polyethylene glycol  17 g Oral BID    senna  8.6 mg Oral Daily     PRN Meds:acetaminophen, albuterol-ipratropium, aluminum-magnesium hydroxide-simethicone, bisacodyL, dextrose 50%, dextrose 50%, diphenhydrAMINE, glucagon (human recombinant), glucose, glucose, HYDROmorphone, hyoscyamine, magnesium oxide, magnesium oxide, naloxone, ondansetron, phenazopyridine, potassium bicarbonate, potassium bicarbonate, potassium bicarbonate, potassium, sodium phosphates, potassium, sodium phosphates, potassium, sodium phosphates, prochlorperazine, promethazine (PHENERGAN) IVPB, simethicone, sodium chloride 0.9%, zolpidem     Review of patient's allergies indicates:  No Known Allergies  Objective:     Vital Signs (Most Recent):  Temp: 99.4 °F (37.4 °C) (01/25/22 0733)  Pulse: 88 (01/25/22 0733)  Resp: 17 (01/25/22 0733)  BP: (!) 87/53 (01/25/22 0733)  SpO2: 96 % (01/25/22 0733) Vital Signs (24h Range):  Temp:  [97.8 °F (36.6 °C)-100.1 °F (37.8 °C)] 99.4 °F (37.4 °C)  Pulse:  [] 88  Resp:  [16-18] 17  SpO2:  [93 %-96 %] 96 %  BP: ()/(53-69) 87/53     Weight: 42.9 kg (94 lb 9.2 oz)  Body mass index is 16.23 kg/m².    Intake/Output - Last 3 Shifts       01/23  RONIW confirmed with JOE ULLOA that pt has been approved for [place] Medicaid effective 10.1.23 and then effective [facility] effective 12.1.23   MITS report sent to pt's email with SECURE notation and pt acknowledged receiving. Pt advised to contact [place] Medicaid Consumer Hotline to advise of not receiving any insurance cards.  Phone message left stewart Smith of [facility] advising of pt's Medicaid approval    Pt requesting for prescriptions which he was obtaining from [facility] (levothyroxine, humalog and basaglar be sent to [facility] and routed to [facility] and advised of process of contacting [facility] to pull refills [place] [facility]     0700  01/24 0659 01/24 0700  01/25 0659 01/25 0700 01/26 0659    P.O. 460 236 0    I.V. (mL/kg) 1124.6 (27.3) 3971.1 (92.6)     IV Piggyback  692     Total Intake(mL/kg) 1584.6 (38.5) 4899.1 (114.2) 0 (0)    Urine (mL/kg/hr) 800 (0.8) 1150 (1.1) 200 (1.1)    Emesis/NG output 600      Drains 1100 1275 350    Stool 0 150     Total Output 2500 2575 550    Net -915.4 +2324.1 -550           Stool Occurrence 1 x            Physical Exam  Vitals and nursing note reviewed.   Constitutional:       Appearance: She is well-developed and well-nourished. She is ill-appearing.      Comments: More alert, cachectic   HENT:      Head: Normocephalic.   Eyes:      Extraocular Movements: EOM normal.      Pupils: Pupils are equal, round, and reactive to light.   Neck:      Thyroid: No thyromegaly.      Vascular: No JVD.      Trachea: No tracheal deviation.   Cardiovascular:      Rate and Rhythm: Normal rate and regular rhythm.      Heart sounds: Normal heart sounds.   Pulmonary:      Breath sounds: Normal breath sounds. No wheezing.   Abdominal:      General: Abdomen is flat. Bowel sounds are normal. There is no distension.      Palpations: Abdomen is soft. Abdomen is not rigid. There is no hepatosplenomegaly or mass.      Tenderness: There is abdominal tenderness (Mild). There is no guarding or rebound.      Comments: Areas of nodularity, colostomy, gastrostomy tube   Musculoskeletal:         General: No edema. Normal range of motion.   Lymphadenopathy:      Cervical: No cervical adenopathy.   Skin:     General: Skin is warm and dry.      Capillary Refill: Capillary refill takes 2 to 3 seconds.      Findings: No erythema or rash.   Neurological:      Mental Status: She is oriented to person, place, and time.   Psychiatric:         Mood and Affect: Mood and affect and mood normal.         Behavior: Behavior normal.         Significant Labs:  I have reviewed all pertinent lab results within the past 24 hours.  CBC:   Recent Labs   Lab  01/25/22  1033   WBC 13.13*   RBC 2.88*   HGB 7.3*   HCT 23.1*      MCV 80*   MCH 25.3*   MCHC 31.6*     BMP:   Recent Labs   Lab 01/24/22  0921   GLU 67*      K 3.8   *   CO2 15*   BUN 23*   CREATININE 1.2   CALCIUM 8.7       Significant Diagnostics:  I have reviewed all pertinent imaging results/findings within the past 24 hours.  Abdominal film     Reading Physician Reading Date Result Priority   Ascencion Mcgarry MD  [phone] 1/25/2022 Routine     Narrative & Impression  EXAMINATION:  XR ABDOMEN FLAT AND ERECT     CLINICAL HISTORY:  Follow-up for possible bowel obstruction verses severe constipation, colostomy;     TECHNIQUE:  Flat and erect AP views of the abdomen were performed.     COMPARISON:  January 23, 2022     FINDINGS:  Stable bilateral ureteral stents, left lower quadrant ostomy and PEG tube in place.  Similar amount of air in the stomach.  Interval decrease in the amount of air throughout large and small bowel.  Stable postoperative changes throughout the upper and lower abdomen and pelvis.  There are phleboliths versus ureteroliths some overlying the pelvis.  Osseous structures are unchanged with convex left curvature of the upper lumbar spine again seen.  Lung bases are unchanged.     Impression:     1.  Overall, there is been interval improvement.  Interval decrease in the amount of air in the small bowel without evidence for obstruction.        Electronically signed by: Ascencion Mcgarry MD  Date:                                            01/25/2022  Time:                                           08:56             Exam Ended: 01/25/22 08:42 Last Resulted: 01/25/22 08:56

## 2024-01-03 ENCOUNTER — HOSPITAL ENCOUNTER (OUTPATIENT)
Dept: INFUSION THERAPY | Age: 34
Setting detail: INFUSION SERIES
Discharge: HOME | End: 2024-01-03

## 2024-01-03 DIAGNOSIS — E03.8 HYPOTHYROIDISM DUE TO HASHIMOTO'S THYROIDITIS: ICD-10-CM

## 2024-01-03 DIAGNOSIS — E06.3 HYPOTHYROIDISM DUE TO HASHIMOTO'S THYROIDITIS: ICD-10-CM

## 2024-01-03 DIAGNOSIS — E11.65 POORLY CONTROLLED DIABETES MELLITUS (HCC): ICD-10-CM

## 2024-01-03 RX ORDER — INSULIN GLARGINE 100 [IU]/ML
32 INJECTION, SOLUTION SUBCUTANEOUS NIGHTLY
Qty: 5 ADJUSTABLE DOSE PRE-FILLED PEN SYRINGE | Refills: 3 | Status: SHIPPED
Start: 2024-01-03 | End: 2024-01-03

## 2024-01-03 RX ORDER — INSULIN GLARGINE 100 [IU]/ML
32 INJECTION, SOLUTION SUBCUTANEOUS NIGHTLY
Qty: 5 ADJUSTABLE DOSE PRE-FILLED PEN SYRINGE | Refills: 3 | Status: SHIPPED | OUTPATIENT
Start: 2024-01-03

## 2024-01-03 NOTE — TELEPHONE ENCOUNTER
Per [facility] pharmacist this patients insurance will no longer cover basaglar insulin but will cover lantus  requesting a new script be sent

## 2024-01-12 ENCOUNTER — HOSPITAL ENCOUNTER (OUTPATIENT)
Age: 34
Discharge: HOME | End: 2024-01-12
Payer: COMMERCIAL

## 2024-01-12 LAB
HCT VFR BLD AUTO: 31.5 % (ref 37–54)
HGB BLD-MCNC: 9.6 G/DL (ref 12.5–16.5)

## 2024-01-12 PROCEDURE — 85018 HEMOGLOBIN: CPT

## 2024-01-12 PROCEDURE — 85014 HEMATOCRIT: CPT

## 2024-01-12 PROCEDURE — 36415 COLL VENOUS BLD VENIPUNCTURE: CPT

## 2024-01-17 ENCOUNTER — HOSPITAL ENCOUNTER (OUTPATIENT)
Dept: INFUSION THERAPY | Age: 34
Setting detail: INFUSION SERIES
End: 2024-01-17

## 2024-02-01 ENCOUNTER — OFFICE VISIT (OUTPATIENT)
Dept: ENDOCRINOLOGY | Age: 34
End: 2024-02-01
Payer: COMMERCIAL

## 2024-02-01 VITALS
HEIGHT: 69 IN | SYSTOLIC BLOOD PRESSURE: 133 MMHG | HEART RATE: 82 BPM | DIASTOLIC BLOOD PRESSURE: 82 MMHG | WEIGHT: 214 LBS | BODY MASS INDEX: 31.7 KG/M2 | OXYGEN SATURATION: 99 %

## 2024-02-01 DIAGNOSIS — E03.9 HYPOTHYROIDISM, UNSPECIFIED TYPE: ICD-10-CM

## 2024-02-01 DIAGNOSIS — E78.5 HYPERLIPIDEMIA, UNSPECIFIED HYPERLIPIDEMIA TYPE: ICD-10-CM

## 2024-02-01 DIAGNOSIS — E10.65 TYPE 1 DIABETES MELLITUS WITH HYPERGLYCEMIA (HCC): Primary | ICD-10-CM

## 2024-02-01 LAB
ESTIMATED AVERAGE GLUCOSE: NORMAL
HBA1C MFR BLD: 10.8 %

## 2024-02-01 PROCEDURE — G8417 CALC BMI ABV UP PARAM F/U: HCPCS | Performed by: INTERNAL MEDICINE

## 2024-02-01 PROCEDURE — 2022F DILAT RTA XM EVC RTNOPTHY: CPT | Performed by: INTERNAL MEDICINE

## 2024-02-01 PROCEDURE — 3079F DIAST BP 80-89 MM HG: CPT | Performed by: INTERNAL MEDICINE

## 2024-02-01 PROCEDURE — 99214 OFFICE O/P EST MOD 30 MIN: CPT | Performed by: INTERNAL MEDICINE

## 2024-02-01 PROCEDURE — 3046F HEMOGLOBIN A1C LEVEL >9.0%: CPT | Performed by: INTERNAL MEDICINE

## 2024-02-01 PROCEDURE — 4004F PT TOBACCO SCREEN RCVD TLK: CPT | Performed by: INTERNAL MEDICINE

## 2024-02-01 PROCEDURE — 3075F SYST BP GE 130 - 139MM HG: CPT | Performed by: INTERNAL MEDICINE

## 2024-02-01 PROCEDURE — G8427 DOCREV CUR MEDS BY ELIG CLIN: HCPCS | Performed by: INTERNAL MEDICINE

## 2024-02-01 PROCEDURE — G8484 FLU IMMUNIZE NO ADMIN: HCPCS | Performed by: INTERNAL MEDICINE

## 2024-02-01 RX ORDER — ACYCLOVIR 400 MG/1
TABLET ORAL
Qty: 9 EACH | Refills: 3 | Status: SHIPPED | OUTPATIENT
Start: 2024-02-01

## 2024-02-01 RX ORDER — PEN NEEDLE, DIABETIC 32 GX 1/4"
NEEDLE, DISPOSABLE MISCELLANEOUS
Qty: 250 EACH | Refills: 5 | Status: SHIPPED | OUTPATIENT
Start: 2024-02-01

## 2024-02-01 NOTE — PROGRESS NOTES
MALBCR 1,379 10/03/2023 02:41 PM    MALBCR 2173.2 05/27/2021 09:48 AM    LABCREA 80.9 10/10/2023 02:15 PM     Lab Results   Component Value Date/Time    LABA1C 10.8 02/01/2024 12:00 AM    LABA1C 8.5 11/21/2023 01:00 PM    LABA1C 10.8 10/03/2023 05:07 AM     Lab Results   Component Value Date/Time    TRIG 120 04/11/2020 05:32 AM    HDL 44 04/11/2020 05:32 AM    LDLCALC 93 04/11/2020 05:32 AM    CHOL 161 04/11/2020 05:32 AM     No results found for: \"VITD25\"    ASSESSMENT & PLAN   Duane Brady, a 33 y.o.-old male seen today for inpatient diabetes management    Diabetes Mellitus type 2  Poorly controlled, patient admits to poor compliance with following diabetic diet and also misses insulin  He is currently on Lantus 32 units nightly, Humalog 10 units 3 times daily with meals plus medium dose sliding scale ACHS  The patient will meet with our insulin pump team today to see if he would like to insulin pump because I think he will do better on insulin pump and CGM than the shots  Continue glucose check with meals and at bedtime    Dietary noncompliance  Discussed with patient the importance of eating consistent carbohydrate meals, avoiding high glycemic index food. Also, discussed with patient the risk and negative consequences of dietary noncompliance on blood glucose control, blood pressure and weight    Primary hypothyroidism  Levothyroxine 125 mcg daily  Thyroid function test from October 2023 showed high TSH but patient admits missing doses at the time  We will obtain TFTs before next visit    Hyperlipidemia  Lipitor 40 mg daily    I personally reviewed external notes from PCP and other patient's care team providers, and personally interpreted labs associated with the above diagnosis. I also ordered labs to further assess and manage the above addressed medical conditions    The above issues were reviewed with the patient who understood and agreed with the plan.    Thank you for allowing us to participate in the

## 2024-02-02 ENCOUNTER — OFFICE VISIT (OUTPATIENT)
Dept: INTERNAL MEDICINE | Age: 34
End: 2024-02-02
Payer: COMMERCIAL

## 2024-02-02 VITALS
BODY MASS INDEX: 31.55 KG/M2 | RESPIRATION RATE: 18 BRPM | TEMPERATURE: 96.9 F | OXYGEN SATURATION: 98 % | DIASTOLIC BLOOD PRESSURE: 89 MMHG | HEART RATE: 79 BPM | SYSTOLIC BLOOD PRESSURE: 148 MMHG | WEIGHT: 213 LBS | HEIGHT: 69 IN

## 2024-02-02 DIAGNOSIS — E10.9 TYPE 1 DIABETES MELLITUS WITHOUT COMPLICATION (HCC): ICD-10-CM

## 2024-02-02 DIAGNOSIS — I10 ESSENTIAL HYPERTENSION: ICD-10-CM

## 2024-02-02 PROCEDURE — G8417 CALC BMI ABV UP PARAM F/U: HCPCS

## 2024-02-02 PROCEDURE — 3046F HEMOGLOBIN A1C LEVEL >9.0%: CPT | Performed by: INTERNAL MEDICINE

## 2024-02-02 PROCEDURE — G8427 DOCREV CUR MEDS BY ELIG CLIN: HCPCS

## 2024-02-02 PROCEDURE — 4004F PT TOBACCO SCREEN RCVD TLK: CPT

## 2024-02-02 PROCEDURE — 3079F DIAST BP 80-89 MM HG: CPT | Performed by: INTERNAL MEDICINE

## 2024-02-02 PROCEDURE — 99212 OFFICE O/P EST SF 10 MIN: CPT

## 2024-02-02 PROCEDURE — 3077F SYST BP >= 140 MM HG: CPT

## 2024-02-02 PROCEDURE — 2022F DILAT RTA XM EVC RTNOPTHY: CPT | Performed by: INTERNAL MEDICINE

## 2024-02-02 PROCEDURE — G8484 FLU IMMUNIZE NO ADMIN: HCPCS

## 2024-02-02 PROCEDURE — 99214 OFFICE O/P EST MOD 30 MIN: CPT

## 2024-02-02 RX ORDER — ATORVASTATIN CALCIUM 40 MG/1
40 TABLET, FILM COATED ORAL DAILY
Qty: 90 TABLET | Refills: 1 | Status: SHIPPED | OUTPATIENT
Start: 2024-02-02

## 2024-02-02 RX ORDER — METOPROLOL SUCCINATE 25 MG/1
25 TABLET, EXTENDED RELEASE ORAL DAILY
Qty: 30 TABLET | Refills: 3 | Status: SHIPPED | OUTPATIENT
Start: 2024-02-02

## 2024-02-02 RX ORDER — LISINOPRIL 40 MG/1
40 TABLET ORAL DAILY
Qty: 90 TABLET | Refills: 1 | Status: SHIPPED | OUTPATIENT
Start: 2024-02-02

## 2024-02-02 RX ORDER — AMLODIPINE BESYLATE 10 MG/1
10 TABLET ORAL DAILY
Qty: 90 TABLET | Refills: 1 | Status: SHIPPED | OUTPATIENT
Start: 2024-02-02

## 2024-02-02 RX ORDER — SODIUM BICARBONATE 650 MG/1
1300 TABLET ORAL DAILY
Qty: 60 TABLET | Refills: 2 | Status: SHIPPED | OUTPATIENT
Start: 2024-02-02

## 2024-02-02 RX ORDER — FERROUS SULFATE 325(65) MG
325 TABLET ORAL 2 TIMES DAILY WITH MEALS
Qty: 30 TABLET | Refills: 3 | Status: SHIPPED | OUTPATIENT
Start: 2024-02-02

## 2024-02-02 ASSESSMENT — PATIENT HEALTH QUESTIONNAIRE - PHQ9
SUM OF ALL RESPONSES TO PHQ9 QUESTIONS 1 & 2: 0
1. LITTLE INTEREST OR PLEASURE IN DOING THINGS: 0
2. FEELING DOWN, DEPRESSED OR HOPELESS: 0
SUM OF ALL RESPONSES TO PHQ QUESTIONS 1-9: 0

## 2024-02-02 NOTE — PROGRESS NOTES
[facility]  Internal Medicine Residency Program  ACC Note      SUBJECTIVE:  CC: had concerns including Follow-up (Pt states there are no new updates at this time.), Hypertension, Diabetes, and Hyperlipidemia.      HPI:  Duane Brady is a 33 y.o.male  has a past medical history of Abscess of back, Anemia, Bowel obstruction (HCC), Diabetes mellitus type 1 (HCC), Diabetic ulcer of right foot associated with type 1 diabetes mellitus, with necrosis of muscle (HCC), HTN (hypertension), Hyperlipidemia, Type 1 diabetes mellitus with diabetic foot infection (HCC), and Type 2 diabetes mellitus with Charcot's joint of right foot (HCC).  presenting to [facility] for follow up visit. Patient has ankle surgery March 22. Sees Dr. Welsh in June.     Preoperative Risk assessment using 2014 ACC/AHA guidelines     1) Emergent procedure  no    2) Active Cardiac Condition no (ACS,recent AMI,decompensated HF with NYHA IV, Arrhythmia, MI <3 weeks, severe valve disease, poorly controlled HTN)  3)Risk procedure  [] Low Risk procedure(0-1% of adverse cardiac event) endoscopy, superficial skin, Breast, or cataract ect)   [x]Intermediate Risk procedure(1-5% of adverse cardiac event ( carotid endarterectomy, intraperitoneal/intrathoracic surgery, orthopedic surgery,head and neck surgery,prostate surgery)  [] High Risk procedure(>5% of adverse cardiac event (vascular or aortic repair surgery)  4)RCRI (low risk :0-1= <1% of cardiac event)   [] Cardiovascular disease   [] Stroke   [] Heart failure   [x] DM requiring insulin    [x] Cr > 2.0  5) >4 MET's w/o symptoms yes    []Climbing 1-2 flight or stairs  []Walking a block at a brisk pace  []House chores  []Recreational activites: golf, bowling,dancing,double tennis..    6) will further testing impact decision?no    Bleeding risk by procedure     According to the 2014 ACC/AHA preoperative risk assessment Duane Brady is a intermediate risk for cardiac complications during for

## 2024-02-02 NOTE — PROGRESS NOTES
[facility]  Internal Medicine Residency Clinic  Attending Physician Statement:  Christopher Arroyo M.D., F.A.C.P.  Patient is seen for fu visit today.  -- acute and chronic problems addressed  I have seen/discussed the case, including pertinent history and exam findings with the resident, review of last visit medical records/labs/imaging if applicable-     Addressed when applicable- Health maintenance issues of vaccinations, social determinants/depression/CA screening, tobacco cessation etc...    I agree with the assessment, plan and orders as documented by the resident.    -Billiing assessed by medical complexity of case  My assessment of high points of today's visit as follows:    Attempting to get in to revision ankle surgery- surgical clearance  Sp diabetic ulceration- not on abx  Chronic use of boot    Intermediate risk surgery- low cardiac risk- but DM wound and ckd risk  METS 4?  Able to do couple months ago  ECHO 50%  But insulin DM and cr >2,  No respiratory issues    Dr Ireland-  Some surgical risk bc of uncontrolled BS  10.8 Aic   DM1 not well controlled  (worse from 8.5 prio)    Plan adjust diabetes regimen to Lantus 28 units daily, Humalog 10 units 3 times daily with meals plus medium dose sliding scale ACHS        thryoid- 125 QD-no recent change in med  BP (!) 148/89 (Site: Right Upper Arm, Position: Sitting, Cuff Size: Medium Adult)   Pulse 79   Temp 96.9 °F (36.1 °C) (Temporal)   Resp 18   Ht 1.753 m (5' 9\")   Wt 96.6 kg (213 lb)   SpO2 98%   BMI 31.45 kg/m²   Bp stable      Giving bicarb-- bc cr >3- non gap acidosis  Needs fu dr rea  Noting ACD  Hgb 9.6  <10 consider epo  If any lower- this would be surgical risk

## 2024-02-02 NOTE — PATIENT INSTRUCTIONS
Dear Duane Brady,        Thank you for coming to your appointment today. I hope we have addressed all of your needs.       Please make sure to do the following:    -Please continue your medications as prescribed    -Please call if you have any other concerns.    We will see each other in 1 months.     Have a great day!        Sincerely,  Adela Ramsay MD  2/2/2024  1:54 PM

## 2024-03-06 ENCOUNTER — OFFICE VISIT (OUTPATIENT)
Dept: INTERNAL MEDICINE | Age: 34
End: 2024-03-06
Payer: COMMERCIAL

## 2024-03-06 VITALS
BODY MASS INDEX: 31.31 KG/M2 | WEIGHT: 211.4 LBS | SYSTOLIC BLOOD PRESSURE: 128 MMHG | OXYGEN SATURATION: 99 % | TEMPERATURE: 97.3 F | HEIGHT: 69 IN | DIASTOLIC BLOOD PRESSURE: 75 MMHG | RESPIRATION RATE: 20 BRPM | HEART RATE: 81 BPM

## 2024-03-06 DIAGNOSIS — I10 PRIMARY HYPERTENSION: Chronic | ICD-10-CM

## 2024-03-06 DIAGNOSIS — E78.5 HYPERLIPIDEMIA, UNSPECIFIED HYPERLIPIDEMIA TYPE: Chronic | ICD-10-CM

## 2024-03-06 DIAGNOSIS — E03.8 OTHER SPECIFIED HYPOTHYROIDISM: ICD-10-CM

## 2024-03-06 DIAGNOSIS — Z01.818 PRE-OP EVALUATION: Primary | ICD-10-CM

## 2024-03-06 DIAGNOSIS — E11.610 TYPE 2 DIABETES MELLITUS WITH CHARCOT'S JOINT OF RIGHT FOOT (HCC): ICD-10-CM

## 2024-03-06 PROCEDURE — 93005 ELECTROCARDIOGRAM TRACING: CPT

## 2024-03-06 PROCEDURE — 99214 OFFICE O/P EST MOD 30 MIN: CPT

## 2024-03-06 PROCEDURE — 36415 COLL VENOUS BLD VENIPUNCTURE: CPT

## 2024-03-06 PROCEDURE — G8417 CALC BMI ABV UP PARAM F/U: HCPCS

## 2024-03-06 PROCEDURE — 93010 ELECTROCARDIOGRAM REPORT: CPT

## 2024-03-06 PROCEDURE — 99213 OFFICE O/P EST LOW 20 MIN: CPT

## 2024-03-06 PROCEDURE — 4004F PT TOBACCO SCREEN RCVD TLK: CPT

## 2024-03-06 PROCEDURE — 3046F HEMOGLOBIN A1C LEVEL >9.0%: CPT

## 2024-03-06 PROCEDURE — 2022F DILAT RTA XM EVC RTNOPTHY: CPT

## 2024-03-06 PROCEDURE — G8484 FLU IMMUNIZE NO ADMIN: HCPCS

## 2024-03-06 PROCEDURE — G8427 DOCREV CUR MEDS BY ELIG CLIN: HCPCS

## 2024-03-06 PROCEDURE — 3074F SYST BP LT 130 MM HG: CPT

## 2024-03-06 PROCEDURE — 3078F DIAST BP <80 MM HG: CPT

## 2024-03-06 SDOH — ECONOMIC STABILITY: FOOD INSECURITY: WITHIN THE PAST 12 MONTHS, YOU WORRIED THAT YOUR FOOD WOULD RUN OUT BEFORE YOU GOT MONEY TO BUY MORE.: NEVER TRUE

## 2024-03-06 SDOH — ECONOMIC STABILITY: FOOD INSECURITY: WITHIN THE PAST 12 MONTHS, THE FOOD YOU BOUGHT JUST DIDN'T LAST AND YOU DIDN'T HAVE MONEY TO GET MORE.: NEVER TRUE

## 2024-03-06 SDOH — ECONOMIC STABILITY: INCOME INSECURITY: HOW HARD IS IT FOR YOU TO PAY FOR THE VERY BASICS LIKE FOOD, HOUSING, MEDICAL CARE, AND HEATING?: NOT HARD AT ALL

## 2024-03-06 ASSESSMENT — ENCOUNTER SYMPTOMS
ABDOMINAL PAIN: 0
BLOOD IN STOOL: 0
CONSTIPATION: 0
SORE THROAT: 0
RHINORRHEA: 0
COUGH: 0
SHORTNESS OF BREATH: 0
CHEST TIGHTNESS: 0
DIARRHEA: 0

## 2024-03-06 NOTE — PATIENT INSTRUCTIONS
Dear Duane Brady,      Thank you for coming to your appointment today. I hope we have addressed all of your needs.       Please make sure to do the following:  - Continue your medications as listed.  - The night prior to your surgery, please take 24 units of lantus (instead of your usual 28 units).  - On the day of your surgery, do not take lisinopril but resume it the day after your surgery.  - We will see each other again in 3 months    Have a great day!        Sincerely,  Emerson Neil MD  3/6/2024  9:05 AM

## 2024-03-06 NOTE — PROGRESS NOTES
[facility]  Internal Medicine Residency Clinic    Attending Physician Statement  I have discussed the case, including pertinent history and exam findings with the resident physician.I have seen and examined the patient and the key elements of the encounter have been performed by me. I agree with the assessment, plan and orders as documented by the resident. I have reviewed all pertinent PMHx, PSHx, FamHx, SocialHx, medications, and allergies and updated history as appropriate.    Patient here for pre-operative assessment prior to planned podiatric surgery.  As noted, RCRI is 2 (DM on insulin and creatinine > 2), therefore patient has elevated risk. Patient states that prior to immobilization cast, he was able to work 10 hour days and able to go up 2 flights of steps and had very good \"energy\", therefore 4 mets or greater. Patient also had echocardiogram 10/23 which revealed normal LV function (EF 55-60%) and no structural abnormalities. Patient notes FBS recently have been 60-90 mg% and he has BP cuff at home but hasn't checked BP in a while but when he did check BP ranged from 109/60 to 120-130/70-80 consistently.  Will obtain pre-op ECG today. Would recommend decreasing pm Lantus dose slightly by 4 units, no short acting insulin, and reasonable to hold lisinopril in am of surgery and resume post-op day one. Continue amlodipine and metoprolol in am with sip water.       Remainder of medical problems as per resident note.     Peng Duran, DO  3/6/24    ECG reviewed, noted sinus rhythm, rsr' in V1 and LAE, which were noted on prior tracing 10.23. There were no significant ST/T wave changes.    
Patient lab work drawn in office. 1 lavender and 1 green topped tube sent to the lab via the tube system. Patient tolerated lab draw well. EKG  completed. Lab work and EKG will be faxed to [facility] for patient's upcoming procedure.   
unless otherwise directed: the night prior to surgery reduce Lantus dose to 24 units; the day of surgery hold lisinopril and resume the day after.    T1DM  HbA1c: 10.8% on 2024  Regimen: lantus 28 units, lispro 10 units premeals, lispro sliding scale  Morning B-90  Postprandial Bs  Hypoglycemia: daily  Microalb/Cr: 1,379    Hypothyroidism  TSH 14  On levothyroxine    HTN  Clinic BP: 128/75  Orthostatic hypotension symptoms: none  Regimen: amlodipine 10 mg, lisinopril 40 mg, metoprolol succinate 25 mg    HLD  On atorvastatin 40 mg    CKD  Cr 3.4, BUN 29  eGFR 23  Sodium bicarb 1,300 mg     ACD   Hb 9.6 on    Ferosul (iron supplementation) 325 mg BID      Review of Systems   Constitutional:  Negative for chills and fever.   HENT:  Negative for rhinorrhea and sore throat.    Eyes:  Negative for visual disturbance.   Respiratory:  Negative for cough, chest tightness and shortness of breath.    Cardiovascular:  Negative for chest pain and palpitations.   Gastrointestinal:  Negative for abdominal pain, blood in stool, constipation and diarrhea.   Genitourinary:  Negative for dysuria and hematuria.   Skin:  Negative for rash.   Neurological:  Negative for dizziness, light-headedness and numbness.           Current Outpatient Medications on File Prior to Visit   Medication Sig Dispense Refill    Misc. Devices KIT Please provide patient with blood pressure cuff covered by patient insurance. 1 kit 0    amLODIPine (NORVASC) 10 MG tablet Take 1 tablet by mouth daily 90 tablet 1    atorvastatin (LIPITOR) 40 MG tablet Take 1 tablet by mouth daily 90 tablet 1    ferrous sulfate (IRON 325) 325 (65 Fe) MG tablet Take 1 tablet by mouth 2 times daily (with meals) 30 tablet 3    lisinopril (PRINIVIL;ZESTRIL) 40 MG tablet Take 1 tablet by mouth daily 90 tablet 1    metoprolol succinate (TOPROL XL) 25 MG extended release tablet Take 1 tablet by mouth daily 30 tablet 3    sodium bicarbonate 650 MG tablet Take 2 tablets

## 2024-03-07 LAB
ABSOLUTE IMMATURE GRANULOCYTE: 0.03 K/UL (ref 0–0.58)
ALBUMIN SERPL-MCNC: 4.1 G/DL (ref 3.5–5.2)
ALP BLD-CCNC: 191 U/L (ref 40–129)
ALT SERPL-CCNC: 10 U/L (ref 0–40)
ANION GAP SERPL CALCULATED.3IONS-SCNC: 15 MMOL/L (ref 7–16)
AST SERPL-CCNC: 12 U/L (ref 0–39)
BASOPHILS ABSOLUTE: 0.09 K/UL (ref 0–0.2)
BASOPHILS RELATIVE PERCENT: 1 % (ref 0–2)
BILIRUB SERPL-MCNC: 0.2 MG/DL (ref 0–1.2)
BUN BLDV-MCNC: 44 MG/DL (ref 6–20)
CALCIUM SERPL-MCNC: 9.4 MG/DL (ref 8.6–10.2)
CHLORIDE BLD-SCNC: 104 MMOL/L (ref 98–107)
CO2: 20 MMOL/L (ref 22–29)
CREAT SERPL-MCNC: 3.8 MG/DL (ref 0.7–1.2)
EOSINOPHILS ABSOLUTE: 0.22 K/UL (ref 0.05–0.5)
EOSINOPHILS RELATIVE PERCENT: 2 % (ref 0–6)
GFR SERPL CREATININE-BSD FRML MDRD: 20 ML/MIN/1.73M2
GLUCOSE BLD-MCNC: 80 MG/DL (ref 74–99)
HCT VFR BLD CALC: 33 % (ref 37–54)
HEMOGLOBIN: 10.3 G/DL (ref 12.5–16.5)
IMMATURE GRANULOCYTES: 0 % (ref 0–5)
LYMPHOCYTES ABSOLUTE: 2.19 K/UL (ref 1.5–4)
LYMPHOCYTES RELATIVE PERCENT: 24 % (ref 20–42)
MCH RBC QN AUTO: 25.2 PG (ref 26–35)
MCHC RBC AUTO-ENTMCNC: 31.2 G/DL (ref 32–34.5)
MCV RBC AUTO: 80.7 FL (ref 80–99.9)
MONOCYTES ABSOLUTE: 0.85 K/UL (ref 0.1–0.95)
MONOCYTES RELATIVE PERCENT: 9 % (ref 2–12)
NEUTROPHILS ABSOLUTE: 5.95 K/UL (ref 1.8–7.3)
NEUTROPHILS RELATIVE PERCENT: 64 % (ref 43–80)
PDW BLD-RTO: 19 % (ref 11.5–15)
PLATELET # BLD: 433 K/UL (ref 130–450)
PMV BLD AUTO: 9.6 FL (ref 7–12)
POTASSIUM SERPL-SCNC: 4.8 MMOL/L (ref 3.5–5)
RBC # BLD: 4.09 M/UL (ref 3.8–5.8)
SODIUM BLD-SCNC: 139 MMOL/L (ref 132–146)
TOTAL PROTEIN: 7.9 G/DL (ref 6.4–8.3)
WBC # BLD: 9.3 K/UL (ref 4.5–11.5)

## 2024-03-08 ENCOUNTER — TELEPHONE (OUTPATIENT)
Dept: INTERNAL MEDICINE | Age: 34
End: 2024-03-08

## 2024-03-08 NOTE — TELEPHONE ENCOUNTER
Called the patient twice to inform him of his CBC and CMP results but he didn't . He already has an appointment with nephrology but I just wanted to stress the importance of establishing with nephrology given his worsening kidney function.

## 2024-03-15 NOTE — PROGRESS NOTES
[facility]   PRE-ADMISSION TESTING GENERAL INSTRUCTIONS  PAT Phone Number: [phone]      GENERAL INSTRUCTIONS:    [x] Antibacterial Soap Shower Night before and/or AM of surgery.     [x] Do not wear makeup, lotions, powders, deodorant.   [x] Nothing by mouth after midnight; including gum, candy, mints, or water.    [x] You may brush your teeth, gargle, but do NOT swallow water.   [x] No tobacco products, illegal drugs, marijuana or alcohol within 24 hours of your surgery.  [x] Jewelry or valuables should not be brought to the hospital. All body and/or tongue piercing's must be removed prior to arriving to hospital. No contact lens or hair pins.   [x] Arrange transportation with a responsible adult  to and from the hospital. Arrange for someone to be with you for the remainder of the day and for 24 hours after your procedure due to having had anesthesia.          -Who will be your  for transportation?  parents        -Who will be staying with you for 24 hrs after your procedure?  parents  [x] Bring insurance card and photo ID.  [x] Bring copy of living will or healthcare power of  papers to be placed in your electronic record.       PARKING INSTRUCTIONS:     [x] ARRIVAL DATE  Friday, 3/22 & TIME   12 pm:   [x]Surgery time may change, if it does we will call you the business day before your scheduled surgery.  [x] Enter into the [facility] Entrance. Two adults may accompany you.   [x] Parking lot '\"I\"  is located on [address] (the corner of [facility]).  To enter the lot,you will need to get a parking ticket at the gate .  To exit you will be given a free parking voucher.  You will need both the ticket and parking voucher to exit the parking lot. One car per patient is allowed to park in this lot.   Walk up the front walk to the [place] Entrance, the door will be locked an employee will greet you and let you in.        [x] To reach the

## 2024-03-22 ENCOUNTER — ANESTHESIA (OUTPATIENT)
Dept: OPERATING ROOM | Age: 34
End: 2024-03-22
Payer: COMMERCIAL

## 2024-03-22 ENCOUNTER — APPOINTMENT (OUTPATIENT)
Dept: GENERAL RADIOLOGY | Age: 34
DRG: 313 | End: 2024-03-22
Attending: PODIATRIST
Payer: COMMERCIAL

## 2024-03-22 ENCOUNTER — ANESTHESIA EVENT (OUTPATIENT)
Dept: OPERATING ROOM | Age: 34
End: 2024-03-22
Payer: COMMERCIAL

## 2024-03-22 ENCOUNTER — HOSPITAL ENCOUNTER (INPATIENT)
Age: 34
LOS: 1 days | Discharge: HOME | DRG: 313 | End: 2024-03-23
Attending: PODIATRIST | Admitting: INTERNAL MEDICINE
Payer: COMMERCIAL

## 2024-03-22 DIAGNOSIS — Z01.812 PRE-OPERATIVE LABORATORY EXAMINATION: Primary | ICD-10-CM

## 2024-03-22 DIAGNOSIS — N18.9 CHRONIC KIDNEY DISEASE, UNSPECIFIED CKD STAGE: ICD-10-CM

## 2024-03-22 DIAGNOSIS — D63.1 ANEMIA OF CHRONIC RENAL FAILURE, UNSPECIFIED CKD STAGE: ICD-10-CM

## 2024-03-22 DIAGNOSIS — G89.18 POST-OPERATIVE PAIN: ICD-10-CM

## 2024-03-22 DIAGNOSIS — Z87.81 S/P TIBIAL FRACTURE: ICD-10-CM

## 2024-03-22 DIAGNOSIS — N18.9 ANEMIA OF CHRONIC RENAL FAILURE, UNSPECIFIED CKD STAGE: ICD-10-CM

## 2024-03-22 LAB
ANION GAP SERPL CALCULATED.3IONS-SCNC: 13 MMOL/L (ref 7–16)
BUN SERPL-MCNC: 50 MG/DL (ref 6–20)
CALCIUM SERPL-MCNC: 8.9 MG/DL (ref 8.6–10.2)
CHLORIDE SERPL-SCNC: 105 MMOL/L (ref 98–107)
CO2 SERPL-SCNC: 21 MMOL/L (ref 22–29)
CREAT SERPL-MCNC: 4.1 MG/DL (ref 0.7–1.2)
ERYTHROCYTE [DISTWIDTH] IN BLOOD BY AUTOMATED COUNT: 18.9 % (ref 11.5–15)
GFR SERPL CREATININE-BSD FRML MDRD: 19 ML/MIN/1.73M2
GLUCOSE BLD-MCNC: 105 MG/DL (ref 74–99)
GLUCOSE BLD-MCNC: 144 MG/DL (ref 74–99)
GLUCOSE BLD-MCNC: 185 MG/DL (ref 74–99)
GLUCOSE BLD-MCNC: 194 MG/DL (ref 74–99)
GLUCOSE BLD-MCNC: 240 MG/DL (ref 74–99)
GLUCOSE SERPL-MCNC: 226 MG/DL (ref 74–99)
HCT VFR BLD AUTO: 33.5 % (ref 37–54)
HGB BLD-MCNC: 10.8 G/DL (ref 12.5–16.5)
MCH RBC QN AUTO: 26.3 PG (ref 26–35)
MCHC RBC AUTO-ENTMCNC: 32.2 G/DL (ref 32–34.5)
MCV RBC AUTO: 81.7 FL (ref 80–99.9)
PLATELET # BLD AUTO: 309 K/UL (ref 130–450)
PMV BLD AUTO: 9.4 FL (ref 7–12)
POTASSIUM SERPL-SCNC: 4.8 MMOL/L (ref 3.5–5)
RBC # BLD AUTO: 4.1 M/UL (ref 3.8–5.8)
SODIUM SERPL-SCNC: 139 MMOL/L (ref 132–146)
WBC OTHER # BLD: 5.2 K/UL (ref 4.5–11.5)

## 2024-03-22 PROCEDURE — 7100000001 HC PACU RECOVERY - ADDTL 15 MIN: Performed by: PODIATRIST

## 2024-03-22 PROCEDURE — 0L8N0ZZ DIVISION OF RIGHT LOWER LEG TENDON, OPEN APPROACH: ICD-10-PCS | Performed by: PODIATRIST

## 2024-03-22 PROCEDURE — 80048 BASIC METABOLIC PNL TOTAL CA: CPT

## 2024-03-22 PROCEDURE — 2709999900 HC NON-CHARGEABLE SUPPLY: Performed by: PODIATRIST

## 2024-03-22 PROCEDURE — 6370000000 HC RX 637 (ALT 250 FOR IP): Performed by: STUDENT IN AN ORGANIZED HEALTH CARE EDUCATION/TRAINING PROGRAM

## 2024-03-22 PROCEDURE — 3700000001 HC ADD 15 MINUTES (ANESTHESIA): Performed by: PODIATRIST

## 2024-03-22 PROCEDURE — 85027 COMPLETE CBC AUTOMATED: CPT

## 2024-03-22 PROCEDURE — 2060000000 HC ICU INTERMEDIATE R&B

## 2024-03-22 PROCEDURE — 7100000000 HC PACU RECOVERY - FIRST 15 MIN: Performed by: PODIATRIST

## 2024-03-22 PROCEDURE — C1769 GUIDE WIRE: HCPCS | Performed by: PODIATRIST

## 2024-03-22 PROCEDURE — 6360000002 HC RX W HCPCS

## 2024-03-22 PROCEDURE — 3700000000 HC ANESTHESIA ATTENDED CARE: Performed by: PODIATRIST

## 2024-03-22 PROCEDURE — 0QBJ0ZZ EXCISION OF RIGHT FIBULA, OPEN APPROACH: ICD-10-PCS | Performed by: PODIATRIST

## 2024-03-22 PROCEDURE — C1713 ANCHOR/SCREW BN/BN,TIS/BN: HCPCS | Performed by: PODIATRIST

## 2024-03-22 PROCEDURE — 2500000003 HC RX 250 WO HCPCS

## 2024-03-22 PROCEDURE — 0SGH0JZ FUSION OF RIGHT TARSAL JOINT WITH SYNTHETIC SUBSTITUTE, OPEN APPROACH: ICD-10-PCS | Performed by: PODIATRIST

## 2024-03-22 PROCEDURE — 2720000010 HC SURG SUPPLY STERILE: Performed by: PODIATRIST

## 2024-03-22 PROCEDURE — 3600000017 HC SURGERY HYBRID ADDL 15MIN: Performed by: PODIATRIST

## 2024-03-22 PROCEDURE — 6370000000 HC RX 637 (ALT 250 FOR IP)

## 2024-03-22 PROCEDURE — 82962 GLUCOSE BLOOD TEST: CPT

## 2024-03-22 PROCEDURE — 3600000007 HC SURGERY HYBRID BASE: Performed by: PODIATRIST

## 2024-03-22 PROCEDURE — 2580000003 HC RX 258

## 2024-03-22 PROCEDURE — 6360000002 HC RX W HCPCS: Performed by: PODIATRIST

## 2024-03-22 PROCEDURE — 99223 1ST HOSP IP/OBS HIGH 75: CPT | Performed by: INTERNAL MEDICINE

## 2024-03-22 DEVICE — GRAFT BNE SUB 15ML 1.7-10MM CANC CHIP MORSELIZED FRZ DRY: Type: IMPLANTABLE DEVICE | Site: LEG | Status: FUNCTIONAL

## 2024-03-22 DEVICE — IMPLANTABLE DEVICE: Type: IMPLANTABLE DEVICE | Site: LEG | Status: FUNCTIONAL

## 2024-03-22 DEVICE — SCREW BNE L56MM DIA4.5MM PROX CORT TIB S STL ST LOK FULL: Type: IMPLANTABLE DEVICE | Site: LEG | Status: FUNCTIONAL

## 2024-03-22 DEVICE — SCREW BNE L40MM DIA4.5MM PROX CORT TIB S STL ST LOK FULL: Type: IMPLANTABLE DEVICE | Site: LEG | Status: FUNCTIONAL

## 2024-03-22 DEVICE — SCREW BNE L18MM DIA5MM S STL ST LOK FULL THRD T25 STARDRV: Type: IMPLANTABLE DEVICE | Site: LEG | Status: FUNCTIONAL

## 2024-03-22 DEVICE — SCREW BNE L50MM DIA4.5MM PROX CORT TIB S STL ST LOK FULL: Type: IMPLANTABLE DEVICE | Site: LEG | Status: FUNCTIONAL

## 2024-03-22 DEVICE — SCREW BNE L40MM DIA5MM S STL ST LOK FULL THRD T25 STARDRV: Type: IMPLANTABLE DEVICE | Site: LEG | Status: FUNCTIONAL

## 2024-03-22 DEVICE — SCREW BNE L60MM DIA5MM S STL ST LOK FULL THRD T25 STARDRV: Type: IMPLANTABLE DEVICE | Site: LEG | Status: FUNCTIONAL

## 2024-03-22 RX ORDER — SODIUM BICARBONATE 650 MG/1
1300 TABLET ORAL DAILY
Status: DISCONTINUED | OUTPATIENT
Start: 2024-03-22 | End: 2024-03-23 | Stop reason: HOSPADM

## 2024-03-22 RX ORDER — SODIUM CHLORIDE 9 MG/ML
INJECTION, SOLUTION INTRAVENOUS PRN
Status: DISCONTINUED | OUTPATIENT
Start: 2024-03-22 | End: 2024-03-23 | Stop reason: HOSPADM

## 2024-03-22 RX ORDER — ACETAMINOPHEN 325 MG/1
650 TABLET ORAL EVERY 6 HOURS PRN
Status: DISCONTINUED | OUTPATIENT
Start: 2024-03-22 | End: 2024-03-23 | Stop reason: HOSPADM

## 2024-03-22 RX ORDER — ONDANSETRON 2 MG/ML
INJECTION INTRAMUSCULAR; INTRAVENOUS PRN
Status: DISCONTINUED | OUTPATIENT
Start: 2024-03-22 | End: 2024-03-22

## 2024-03-22 RX ORDER — HYDROMORPHONE HYDROCHLORIDE 2 MG/ML
INJECTION, SOLUTION INTRAMUSCULAR; INTRAVENOUS; SUBCUTANEOUS PRN
Status: DISCONTINUED | OUTPATIENT
Start: 2024-03-22 | End: 2024-03-22 | Stop reason: SDUPTHER

## 2024-03-22 RX ORDER — ATORVASTATIN CALCIUM 40 MG/1
40 TABLET, FILM COATED ORAL NIGHTLY
Status: DISCONTINUED | OUTPATIENT
Start: 2024-03-22 | End: 2024-03-23 | Stop reason: HOSPADM

## 2024-03-22 RX ORDER — HYDROMORPHONE HYDROCHLORIDE 1 MG/ML
0.25 INJECTION, SOLUTION INTRAMUSCULAR; INTRAVENOUS; SUBCUTANEOUS EVERY 5 MIN PRN
Status: DISCONTINUED | OUTPATIENT
Start: 2024-03-22 | End: 2024-03-22 | Stop reason: HOSPADM

## 2024-03-22 RX ORDER — SODIUM CHLORIDE 0.9 % (FLUSH) 0.9 %
5-40 SYRINGE (ML) INJECTION PRN
Status: DISCONTINUED | OUTPATIENT
Start: 2024-03-22 | End: 2024-03-23 | Stop reason: HOSPADM

## 2024-03-22 RX ORDER — LISINOPRIL 20 MG/1
40 TABLET ORAL NIGHTLY
Status: DISCONTINUED | OUTPATIENT
Start: 2024-03-22 | End: 2024-03-23 | Stop reason: HOSPADM

## 2024-03-22 RX ORDER — TRAMADOL HYDROCHLORIDE 50 MG/1
50 TABLET ORAL EVERY 6 HOURS PRN
Status: DISCONTINUED | OUTPATIENT
Start: 2024-03-22 | End: 2024-03-23 | Stop reason: HOSPADM

## 2024-03-22 RX ORDER — DEXAMETHASONE SODIUM PHOSPHATE 10 MG/ML
INJECTION INTRAMUSCULAR; INTRAVENOUS PRN
Status: DISCONTINUED | OUTPATIENT
Start: 2024-03-22 | End: 2024-03-22 | Stop reason: SDUPTHER

## 2024-03-22 RX ORDER — SODIUM CHLORIDE 0.9 % (FLUSH) 0.9 %
5-40 SYRINGE (ML) INJECTION EVERY 12 HOURS SCHEDULED
Status: DISCONTINUED | OUTPATIENT
Start: 2024-03-22 | End: 2024-03-23 | Stop reason: HOSPADM

## 2024-03-22 RX ORDER — ENOXAPARIN SODIUM 100 MG/ML
30 INJECTION SUBCUTANEOUS DAILY
Status: DISCONTINUED | OUTPATIENT
Start: 2024-03-22 | End: 2024-03-23 | Stop reason: HOSPADM

## 2024-03-22 RX ORDER — INSULIN GLARGINE 100 [IU]/ML
32 INJECTION, SOLUTION SUBCUTANEOUS NIGHTLY
Status: DISCONTINUED | OUTPATIENT
Start: 2024-03-22 | End: 2024-03-23 | Stop reason: HOSPADM

## 2024-03-22 RX ORDER — PHENYLEPHRINE HCL IN 0.9% NACL 1 MG/10 ML
SYRINGE (ML) INTRAVENOUS PRN
Status: DISCONTINUED | OUTPATIENT
Start: 2024-03-22 | End: 2024-03-22 | Stop reason: SDUPTHER

## 2024-03-22 RX ORDER — BUPIVACAINE HYDROCHLORIDE 5 MG/ML
INJECTION, SOLUTION EPIDURAL; INTRACAUDAL PRN
Status: DISCONTINUED | OUTPATIENT
Start: 2024-03-22 | End: 2024-03-22 | Stop reason: ALTCHOICE

## 2024-03-22 RX ORDER — ROCURONIUM BROMIDE 10 MG/ML
INJECTION, SOLUTION INTRAVENOUS PRN
Status: DISCONTINUED | OUTPATIENT
Start: 2024-03-22 | End: 2024-03-22 | Stop reason: SDUPTHER

## 2024-03-22 RX ORDER — INSULIN LISPRO 100 [IU]/ML
2 INJECTION, SOLUTION INTRAVENOUS; SUBCUTANEOUS
Status: DISCONTINUED | OUTPATIENT
Start: 2024-03-22 | End: 2024-03-22

## 2024-03-22 RX ORDER — METOPROLOL SUCCINATE 50 MG/1
25 TABLET, EXTENDED RELEASE ORAL DAILY
Status: DISCONTINUED | OUTPATIENT
Start: 2024-03-23 | End: 2024-03-23 | Stop reason: HOSPADM

## 2024-03-22 RX ORDER — SODIUM CHLORIDE 0.9 % (FLUSH) 0.9 %
5-40 SYRINGE (ML) INJECTION PRN
Status: DISCONTINUED | OUTPATIENT
Start: 2024-03-22 | End: 2024-03-22 | Stop reason: HOSPADM

## 2024-03-22 RX ORDER — ONDANSETRON 2 MG/ML
4 INJECTION INTRAMUSCULAR; INTRAVENOUS EVERY 6 HOURS PRN
Status: DISCONTINUED | OUTPATIENT
Start: 2024-03-22 | End: 2024-03-23 | Stop reason: HOSPADM

## 2024-03-22 RX ORDER — FENTANYL CITRATE 50 UG/ML
INJECTION, SOLUTION INTRAMUSCULAR; INTRAVENOUS PRN
Status: DISCONTINUED | OUTPATIENT
Start: 2024-03-22 | End: 2024-03-22 | Stop reason: SDUPTHER

## 2024-03-22 RX ORDER — PROCHLORPERAZINE EDISYLATE 5 MG/ML
5 INJECTION INTRAMUSCULAR; INTRAVENOUS
Status: DISCONTINUED | OUTPATIENT
Start: 2024-03-22 | End: 2024-03-22 | Stop reason: HOSPADM

## 2024-03-22 RX ORDER — LIDOCAINE HYDROCHLORIDE 20 MG/ML
INJECTION, SOLUTION INTRAVENOUS PRN
Status: DISCONTINUED | OUTPATIENT
Start: 2024-03-22 | End: 2024-03-22 | Stop reason: SDUPTHER

## 2024-03-22 RX ORDER — PROPOFOL 10 MG/ML
INJECTION, EMULSION INTRAVENOUS PRN
Status: DISCONTINUED | OUTPATIENT
Start: 2024-03-22 | End: 2024-03-22 | Stop reason: SDUPTHER

## 2024-03-22 RX ORDER — AMLODIPINE BESYLATE 5 MG/1
10 TABLET ORAL DAILY
Status: DISCONTINUED | OUTPATIENT
Start: 2024-03-23 | End: 2024-03-23 | Stop reason: HOSPADM

## 2024-03-22 RX ORDER — MIDAZOLAM HYDROCHLORIDE 1 MG/ML
INJECTION INTRAMUSCULAR; INTRAVENOUS PRN
Status: DISCONTINUED | OUTPATIENT
Start: 2024-03-22 | End: 2024-03-22 | Stop reason: SDUPTHER

## 2024-03-22 RX ORDER — SODIUM CHLORIDE 9 MG/ML
INJECTION, SOLUTION INTRAVENOUS PRN
Status: DISCONTINUED | OUTPATIENT
Start: 2024-03-22 | End: 2024-03-22 | Stop reason: HOSPADM

## 2024-03-22 RX ORDER — ACETAMINOPHEN 650 MG/1
650 SUPPOSITORY RECTAL EVERY 6 HOURS PRN
Status: DISCONTINUED | OUTPATIENT
Start: 2024-03-22 | End: 2024-03-23 | Stop reason: HOSPADM

## 2024-03-22 RX ORDER — NALOXONE HYDROCHLORIDE 0.4 MG/ML
INJECTION, SOLUTION INTRAMUSCULAR; INTRAVENOUS; SUBCUTANEOUS PRN
Status: DISCONTINUED | OUTPATIENT
Start: 2024-03-22 | End: 2024-03-22 | Stop reason: HOSPADM

## 2024-03-22 RX ORDER — DEXTROSE MONOHYDRATE 100 MG/ML
INJECTION, SOLUTION INTRAVENOUS CONTINUOUS PRN
Status: DISCONTINUED | OUTPATIENT
Start: 2024-03-22 | End: 2024-03-23 | Stop reason: HOSPADM

## 2024-03-22 RX ORDER — SODIUM CHLORIDE 9 MG/ML
INJECTION, SOLUTION INTRAVENOUS CONTINUOUS PRN
Status: DISCONTINUED | OUTPATIENT
Start: 2024-03-22 | End: 2024-03-22 | Stop reason: SDUPTHER

## 2024-03-22 RX ORDER — HYDROMORPHONE HYDROCHLORIDE 1 MG/ML
0.5 INJECTION, SOLUTION INTRAMUSCULAR; INTRAVENOUS; SUBCUTANEOUS EVERY 5 MIN PRN
Status: DISCONTINUED | OUTPATIENT
Start: 2024-03-22 | End: 2024-03-22 | Stop reason: HOSPADM

## 2024-03-22 RX ORDER — LEVOTHYROXINE SODIUM 0.12 MG/1
125 TABLET ORAL DAILY
Status: DISCONTINUED | OUTPATIENT
Start: 2024-03-23 | End: 2024-03-23 | Stop reason: HOSPADM

## 2024-03-22 RX ORDER — SODIUM CHLORIDE 0.9 % (FLUSH) 0.9 %
5-40 SYRINGE (ML) INJECTION EVERY 12 HOURS SCHEDULED
Status: DISCONTINUED | OUTPATIENT
Start: 2024-03-22 | End: 2024-03-22 | Stop reason: HOSPADM

## 2024-03-22 RX ORDER — DEXTROSE MONOHYDRATE 50 MG/ML
INJECTION, SOLUTION INTRAVENOUS CONTINUOUS PRN
Status: DISCONTINUED | OUTPATIENT
Start: 2024-03-22 | End: 2024-03-22 | Stop reason: SDUPTHER

## 2024-03-22 RX ORDER — POLYETHYLENE GLYCOL 3350 17 G/17G
17 POWDER, FOR SOLUTION ORAL DAILY PRN
Status: DISCONTINUED | OUTPATIENT
Start: 2024-03-22 | End: 2024-03-23 | Stop reason: HOSPADM

## 2024-03-22 RX ORDER — INSULIN LISPRO 100 [IU]/ML
2 INJECTION, SOLUTION INTRAVENOUS; SUBCUTANEOUS ONCE
Status: COMPLETED | OUTPATIENT
Start: 2024-03-22 | End: 2024-03-22

## 2024-03-22 RX ORDER — ONDANSETRON 4 MG/1
4 TABLET, ORALLY DISINTEGRATING ORAL EVERY 8 HOURS PRN
Status: DISCONTINUED | OUTPATIENT
Start: 2024-03-22 | End: 2024-03-23 | Stop reason: HOSPADM

## 2024-03-22 RX ADMIN — Medication 100 MCG: at 17:33

## 2024-03-22 RX ADMIN — DEXTROSE MONOHYDRATE: 50 INJECTION, SOLUTION INTRAVENOUS at 18:56

## 2024-03-22 RX ADMIN — ONDANSETRON 4 MG: 2 INJECTION INTRAMUSCULAR; INTRAVENOUS at 19:00

## 2024-03-22 RX ADMIN — INSULIN HUMAN 2 UNITS: 100 INJECTION, SOLUTION PARENTERAL at 17:11

## 2024-03-22 RX ADMIN — FENTANYL CITRATE 100 MCG: 50 INJECTION, SOLUTION INTRAMUSCULAR; INTRAVENOUS at 16:47

## 2024-03-22 RX ADMIN — SODIUM CHLORIDE: 9 INJECTION, SOLUTION INTRAVENOUS at 16:40

## 2024-03-22 RX ADMIN — ROCURONIUM BROMIDE 50 MG: 10 INJECTION, SOLUTION INTRAVENOUS at 16:47

## 2024-03-22 RX ADMIN — HYDROMORPHONE HYDROCHLORIDE 1 MG: 2 INJECTION, SOLUTION INTRAMUSCULAR; INTRAVENOUS; SUBCUTANEOUS at 19:00

## 2024-03-22 RX ADMIN — Medication 100 MCG: at 17:53

## 2024-03-22 RX ADMIN — Medication 100 MCG: at 17:41

## 2024-03-22 RX ADMIN — DEXAMETHASONE SODIUM PHOSPHATE 4 MG: 10 INJECTION INTRAMUSCULAR; INTRAVENOUS at 16:47

## 2024-03-22 RX ADMIN — SODIUM CHLORIDE: 9 INJECTION, SOLUTION INTRAVENOUS at 19:46

## 2024-03-22 RX ADMIN — PROPOFOL 150 MG: 10 INJECTION, EMULSION INTRAVENOUS at 16:47

## 2024-03-22 RX ADMIN — SUGAMMADEX 200 MG: 100 INJECTION, SOLUTION INTRAVENOUS at 19:35

## 2024-03-22 RX ADMIN — CEFAZOLIN 2000 MG: 2 INJECTION, POWDER, FOR SOLUTION INTRAMUSCULAR; INTRAVENOUS at 16:55

## 2024-03-22 RX ADMIN — LIDOCAINE HYDROCHLORIDE 100 MG: 20 INJECTION, SOLUTION INTRAVENOUS at 16:47

## 2024-03-22 RX ADMIN — MIDAZOLAM 2 MG: 1 INJECTION INTRAMUSCULAR; INTRAVENOUS at 16:40

## 2024-03-22 RX ADMIN — HYDROMORPHONE HYDROCHLORIDE 1 MG: 2 INJECTION, SOLUTION INTRAMUSCULAR; INTRAVENOUS; SUBCUTANEOUS at 18:23

## 2024-03-22 RX ADMIN — SODIUM CHLORIDE: 9 INJECTION, SOLUTION INTRAVENOUS at 17:05

## 2024-03-22 RX ADMIN — INSULIN LISPRO 2 UNITS: 100 INJECTION, SOLUTION INTRAVENOUS; SUBCUTANEOUS at 16:07

## 2024-03-22 ASSESSMENT — PAIN - FUNCTIONAL ASSESSMENT: PAIN_FUNCTIONAL_ASSESSMENT: 0-10

## 2024-03-22 ASSESSMENT — LIFESTYLE VARIABLES
HOW OFTEN DO YOU HAVE A DRINK CONTAINING ALCOHOL: NEVER
HOW MANY STANDARD DRINKS CONTAINING ALCOHOL DO YOU HAVE ON A TYPICAL DAY: PATIENT DOES NOT DRINK

## 2024-03-22 NOTE — PROGRESS NOTES
Order for hospitalist admission orders and admit order for med surge placed via perfect serve to Dr. Jackson

## 2024-03-22 NOTE — ANESTHESIA PRE PROCEDURE
Department of Anesthesiology  Preprocedure Note       Name:  Duane Brady   Age:  34 y.o.  :  1990                                          MRN:  [id number]         Date:  3/22/2024      Surgeon: Surgeon(s):  Russ Dawkins DPM    Procedure: Procedure(s):  RIGHT TIBIA FIBULAR FUSION/ GASTRONEMIUS RECESSION  RIGHT TIBIAL TALAR ARTHRODESIS/ LARGE GRAFT HARVEST    Medications prior to admission:   Prior to Admission medications    Medication Sig Start Date End Date Taking? Authorizing Provider   Misc. Devices KIT Please provide patient with blood pressure cuff covered by patient insurance. 24   Adela Ramsay MD   amLODIPine (NORVASC) 10 MG tablet Take 1 tablet by mouth daily  Patient taking differently: Take 1 tablet by mouth every morning 24   Adela Ramsay MD   atorvastatin (LIPITOR) 40 MG tablet Take 1 tablet by mouth daily 24   Adela Ramsay MD   ferrous sulfate (IRON 325) 325 (65 Fe) MG tablet Take 1 tablet by mouth 2 times daily (with meals) 24   Adela Ramsay MD   lisinopril (PRINIVIL;ZESTRIL) 40 MG tablet Take 1 tablet by mouth daily  Patient taking differently: Take 1 tablet by mouth every morning 24   Adela Ramsay MD   metoprolol succinate (TOPROL XL) 25 MG extended release tablet Take 1 tablet by mouth daily  Patient taking differently: Take 1 tablet by mouth every morning 24   Adela Ramsay MD   sodium bicarbonate 650 MG tablet Take 2 tablets by mouth daily 24   Adela Ramsay MD   Continuous Blood Gluc Sensor (DEXCOM G7 SENSOR) MISC Change sensor every 10 days 24   Jerrell Welsh MD   Insulin Pen Needle (BD PEN NEEDLE MICRO U/F) 32G X 6 MM MISC Uses with insulin 4 times a day 24   Jerrell Welsh MD   insulin glargine (LANTUS SOLOSTAR) 100 UNIT/ML injection pen Inject 32 Units into the skin nightly  Patient taking differently: Inject 28 Units into the skin nightly 1/3/24   Truong Monroy MD

## 2024-03-22 NOTE — BRIEF OP NOTE
Brief Postoperative Note      Patient: Duane Brady  YOB: 1990  MRN: [id number]    Date of Procedure: 3/22/2024    Pre-Op Diagnosis Codes:     * Contracture of right ankle [M24.571]     * Closed fracture of right ankle with delayed healing, subsequent encounter [S82.891G]    Post-Op Diagnosis: Same       Procedure(s):  RIGHT TIBIAL TALAR ARTHRODESIS/ LARGE GRAFT HARVEST, TIBIAL TALOR, TALOR CALCANEAL,TENDOACHILLES LENGTHENING    Surgeon(s):  Russ Dawkins DPM Abigail, Hannah C, DPM    Assistant:  Resident: Claudia Brennan DPM; Kristen Briggs DPM    Anesthesia: General    Estimated Blood Loss (mL): less than 100     Complications: None    Specimens:   * No specimens in log *    Implants:  Implant Name Type Inv. Item Serial No.  Lot No. LRB No. Used Action   GRAFT BNE SUB 15ML 1.7-10MM CANC CHIP MORSELIZED FRZ DRY - [id number]  GRAFT BNE SUB 15ML 1.7-10MM CANC CHIP MORSELIZED FRZ DRY [id number] MUSCULOSKELETAL TRANSPLANT [facility]-  Right 1 Implanted   SCREW BNE L40MM DIA4.5MM PROX SONU TIB S STL ST MARK FULL - [id number]  SCREW BNE L40MM DIA4.5MM PROX SONU TIB S STL ST MARK FULL  DEPUY [employer]THES [place]  Right 2 Implanted   SCREW BNE L48MM DIA4.5MM PROX SONU TIB S STL ST MARK FULL - [id number]  SCREW BNE L48MM DIA4.5MM PROX SONU TIB S STL ST MARK FULL  DEPUY SYNTHES [place]  Right 1 Implanted   SCREW BNE L50MM DIA4.5MM PROX SONU TIB S STL ST MARK FULL - [id number]  SCREW BNE L50MM DIA4.5MM PROX SONU TIB S STL ST MARK FULL  DEPUY [employer]NTHES [place]-[employer]  Right 1 Implanted   SCREW BNE L56MM DIA4.5MM PROX SONU TIB S STL ST MARK FULL - [id number]  SCREW BNE L56MM DIA4.5MM PROX SONU TIB S STL ST MARK FULL  DEPUY SYNTHES [place]-[employer]  Right 1 Implanted   SCREW BNE L18MM DIA5MM S STL ST MARK FULL THRD T25 STARDRV - [id number]  SCREW BNE L18MM DIA5MM S STL ST MARK FULL THRD T25 STARDRV  [employer] [facility] [place]-WD  Right 1 Implanted   SCREW BNE L40MM DIA5MM S STL ST MARK FULL THRD T25 STARDRV - [id number]  SCREW BNE  L40MM DIA5MM S STL ST MARK FULL THRD T25 STARDRV  DEPUY [employer] [place]-WD  Right 1 Implanted   SCREW BNE L60MM DIA5MM S STL ST MARK FULL THRD T25 STARDRV - [id number]  SCREW BNE L60MM DIA5MM S STL ST MARK FULL THRD T25 STARDRV  DEPUY [employer] [place]-WD  Right 1 Implanted   SCREW BNE L110MM OZO24QW CANC S STL ST SELF DRL CECELIA - [id number]  SCREW BNE L110MM [id number] CANC S STL ST SELF DRL CECELIA  DEPUY [employer] [place]-[employer]  Right 1 Implanted   SCREW BNE L75MM DIA7.3MM HD DIA8.2MM S STL ST SELF DRL CECELAI - [id number]  SCREW BNE L75MM DIA7.3MM HD DIA8.2MM S STL ST SELF DRL CECELIA  DEPU[employer] [place]-WD  Right 1 Implanted   PLATE BNE [id number] THK5.2MM 6 H BILAT S STL BROAD LIMIT - [id number]  PLATE BNE [id number] THK5.2MM 6 H BILAT S STL BROAD LIMIT  DEPUY SY[employer] [place]-[employer]  Right 1 Implanted   SCREW BNE CECELIA 7.3X50 MM VINCENT ARTC CONCL FT SS NS LCP - [id number]  SCREW BNE CECELIA 7.3X50 MM VINCENT ARTC CONCL FT SS NS LCP  DEPUY [employer] [place]-WD  Right 1 Implanted   SCREW BNE L110MM DIA7.3MM CANC S STL ST SELF DRL CECELIA - [id number]  SCREW BNE L110MM DIA7.3MM CANC S STL ST SELF DRL CECELIA  DEPUY [employer] [place]  Right 2 Implanted         Drains:   Closed/Suction Drain Distal;Inferior;Right Leg (Active)       Findings: Consistent with operative note       Electronically signed by Kristen Briggs DPM on 3/22/2024 at 8:00 PM

## 2024-03-22 NOTE — PROGRESS NOTES
No response via perfect serve with Dr. KIRBY unsure if message seen   Dr. Yadav now on and message sent via perfect serve re: admission order and admission orders post op for [facility] surge bed awaiting response .

## 2024-03-23 VITALS
HEART RATE: 77 BPM | WEIGHT: 211 LBS | BODY MASS INDEX: 31.25 KG/M2 | SYSTOLIC BLOOD PRESSURE: 130 MMHG | OXYGEN SATURATION: 95 % | DIASTOLIC BLOOD PRESSURE: 71 MMHG | RESPIRATION RATE: 16 BRPM | TEMPERATURE: 97.7 F | HEIGHT: 69 IN

## 2024-03-23 LAB
GLUCOSE BLD-MCNC: 204 MG/DL (ref 74–99)
GLUCOSE BLD-MCNC: 206 MG/DL (ref 74–99)
GLUCOSE BLD-MCNC: 297 MG/DL (ref 74–99)

## 2024-03-23 PROCEDURE — 6360000002 HC RX W HCPCS

## 2024-03-23 PROCEDURE — 99239 HOSP IP/OBS DSCHRG MGMT >30: CPT | Performed by: STUDENT IN AN ORGANIZED HEALTH CARE EDUCATION/TRAINING PROGRAM

## 2024-03-23 PROCEDURE — 2580000003 HC RX 258

## 2024-03-23 PROCEDURE — 82962 GLUCOSE BLOOD TEST: CPT

## 2024-03-23 PROCEDURE — 97530 THERAPEUTIC ACTIVITIES: CPT

## 2024-03-23 PROCEDURE — 97161 PT EVAL LOW COMPLEX 20 MIN: CPT

## 2024-03-23 PROCEDURE — 6370000000 HC RX 637 (ALT 250 FOR IP)

## 2024-03-23 RX ORDER — INSULIN LISPRO 100 [IU]/ML
0-4 INJECTION, SOLUTION INTRAVENOUS; SUBCUTANEOUS NIGHTLY
Status: DISCONTINUED | OUTPATIENT
Start: 2024-03-23 | End: 2024-03-23 | Stop reason: HOSPADM

## 2024-03-23 RX ORDER — INSULIN LISPRO 100 [IU]/ML
0-4 INJECTION, SOLUTION INTRAVENOUS; SUBCUTANEOUS
Status: DISCONTINUED | OUTPATIENT
Start: 2024-03-23 | End: 2024-03-23 | Stop reason: HOSPADM

## 2024-03-23 RX ORDER — DOXYCYCLINE HYCLATE 100 MG
100 TABLET ORAL 2 TIMES DAILY
Qty: 20 TABLET | Refills: 0 | Status: SHIPPED | OUTPATIENT
Start: 2024-03-23 | End: 2024-04-02

## 2024-03-23 RX ORDER — TRAMADOL HYDROCHLORIDE 50 MG/1
50 TABLET ORAL EVERY 6 HOURS PRN
Qty: 28 TABLET | Refills: 0 | Status: SHIPPED | OUTPATIENT
Start: 2024-03-23 | End: 2024-03-30

## 2024-03-23 RX ORDER — DEXTROSE MONOHYDRATE 100 MG/ML
INJECTION, SOLUTION INTRAVENOUS CONTINUOUS PRN
Status: DISCONTINUED | OUTPATIENT
Start: 2024-03-23 | End: 2024-03-23 | Stop reason: HOSPADM

## 2024-03-23 RX ORDER — RIVAROXABAN 10 MG/1
10 TABLET, FILM COATED ORAL
Qty: 30 TABLET | Refills: 1 | Status: SHIPPED | OUTPATIENT
Start: 2024-03-23

## 2024-03-23 RX ORDER — POLYETHYLENE GLYCOL 3350 17 G/17G
17 POWDER, FOR SOLUTION ORAL DAILY PRN
Qty: 527 G | Refills: 1 | Status: SHIPPED | OUTPATIENT
Start: 2024-03-23 | End: 2024-04-03

## 2024-03-23 RX ADMIN — ENOXAPARIN SODIUM 30 MG: 100 INJECTION SUBCUTANEOUS at 09:58

## 2024-03-23 RX ADMIN — TRAMADOL HYDROCHLORIDE 50 MG: 50 TABLET ORAL at 06:31

## 2024-03-23 RX ADMIN — ACETAMINOPHEN 650 MG: 325 TABLET ORAL at 05:00

## 2024-03-23 RX ADMIN — TRAMADOL HYDROCHLORIDE 50 MG: 50 TABLET ORAL at 00:43

## 2024-03-23 RX ADMIN — INSULIN GLARGINE 28 UNITS: 100 INJECTION, SOLUTION SUBCUTANEOUS at 00:44

## 2024-03-23 RX ADMIN — LISINOPRIL 40 MG: 20 TABLET ORAL at 00:43

## 2024-03-23 RX ADMIN — AMLODIPINE BESYLATE 10 MG: 5 TABLET ORAL at 09:57

## 2024-03-23 RX ADMIN — ATORVASTATIN CALCIUM 40 MG: 40 TABLET, FILM COATED ORAL at 00:44

## 2024-03-23 RX ADMIN — ONDANSETRON 4 MG: 2 INJECTION INTRAMUSCULAR; INTRAVENOUS at 04:31

## 2024-03-23 RX ADMIN — SODIUM BICARBONATE 1300 MG: 650 TABLET ORAL at 09:57

## 2024-03-23 RX ADMIN — METOPROLOL SUCCINATE 25 MG: 50 TABLET, EXTENDED RELEASE ORAL at 09:57

## 2024-03-23 RX ADMIN — LEVOTHYROXINE SODIUM 125 MCG: 0.12 TABLET ORAL at 06:31

## 2024-03-23 RX ADMIN — SODIUM CHLORIDE, PRESERVATIVE FREE 10 ML: 5 INJECTION INTRAVENOUS at 09:58

## 2024-03-23 ASSESSMENT — PAIN SCALES - GENERAL
PAINLEVEL_OUTOF10: 5
PAINLEVEL_OUTOF10: 6
PAINLEVEL_OUTOF10: 2

## 2024-03-23 ASSESSMENT — PAIN DESCRIPTION - ORIENTATION
ORIENTATION: RIGHT
ORIENTATION: RIGHT

## 2024-03-23 ASSESSMENT — PAIN DESCRIPTION - DESCRIPTORS
DESCRIPTORS: PRESSURE
DESCRIPTORS: PRESSURE

## 2024-03-23 ASSESSMENT — PAIN DESCRIPTION - LOCATION
LOCATION: ANKLE
LOCATION: ANKLE;LEG

## 2024-03-23 NOTE — CONSULTS
Department of Podiatry   Consult Note        Reason for Consult:  S/P right TTC fusion/ Admit for surgical observation       HISTORY OF PRESENT ILLNESS:       Patient is a 34 y.o. male with significant past medical history of Diabetic ulcer of right foot, hyperlipidemia, and hypertension. Patient underwent right TTC fusion 3/22/24. Patient was admitted for observation and further management. Cast was applied to the right lower extremity with drain. Dressing is to stay intact and to be managed by podiatry.  Patient denies any N/V/D/F/C/SOB/CP and has no other pedal complaints at this time.     Past Medical History:        Diagnosis Date    Abscess of back 08/2011    Anemia     Bowel obstruction (HCC)     Diabetes mellitus type 1 (HCC) Diagnosed at 8 years of age/ in 1998    Diabetic ulcer of right foot associated with type 1 diabetes mellitus, with necrosis of muscle (HCC) 10/09/2023    HTN (hypertension)     Hyperlipidemia     Type 1 diabetes mellitus with diabetic foot infection (HCC) 10/09/2023    Type 2 diabetes mellitus with Charcot's joint of right foot (HCC) 10/09/2023    Patient denies states is Type I       Past Surgical History:        Procedure Laterality Date    FOOT SURGERY Right 10/7/2023    RIGHT FOOT BONE BIOPSY, INCISION AND DRAINAGE TO LEVEL OF BONE performed by Caryl Hernandez DPM at [facility] OR       Medications Prior to Admission:    Medications Prior to Admission: Misc. Devices KIT, Please provide patient with blood pressure cuff covered by patient insurance.  amLODIPine (NORVASC) 10 MG tablet, Take 1 tablet by mouth daily (Patient taking differently: Take 1 tablet by mouth every morning)  atorvastatin (LIPITOR) 40 MG tablet, Take 1 tablet by mouth daily  ferrous sulfate (IRON 325) 325 (65 Fe) MG tablet, Take 1 tablet by mouth 2 times daily (with meals)  lisinopril (PRINIVIL;ZESTRIL) 40 MG tablet, Take 1 tablet by mouth daily (Patient taking differently: Take 1 tablet by mouth every  morning)  metoprolol succinate (TOPROL XL) 25 MG extended release tablet, Take 1 tablet by mouth daily (Patient taking differently: Take 1 tablet by mouth every morning)  sodium bicarbonate 650 MG tablet, Take 2 tablets by mouth daily  Continuous Blood Gluc Sensor (DEXCOM G7 SENSOR) MISC, Change sensor every 10 days  Insulin Pen Needle (BD PEN NEEDLE MICRO U/F) 32G X 6 MM MISC, Uses with insulin 4 times a day  insulin glargine (LANTUS SOLOSTAR) 100 UNIT/ML injection pen, Inject 32 Units into the skin nightly (Patient taking differently: Inject 28 Units into the skin nightly)  levothyroxine (SYNTHROID) 125 MCG tablet, Take 1 tablet by mouth Daily  insulin lispro, 1 Unit Dial, (HUMALOG KWIKPEN) 100 UNIT/ML SOPN, Inject 10 units with meals + following sliding scale. -200 add 2U, -250 add 4U, -300 add 6U, -350 add 8U, -400 add 10U, BS over 400 add 12U. MAX 75u/day    Allergies:  Pineapple    Social History:   TOBACCO:   reports that he has never smoked. He has been exposed to tobacco smoke. His smokeless tobacco use includes chew.  ETOH:   reports current alcohol use of about 3.0 standard drinks of alcohol per week.  DRUGS:   Social History     Substance and Sexual Activity   Drug Use No       Family History:       Problem Relation Age of Onset    Diabetes Mother          REVIEW OF SYSTEMS:    All pertinent positives and negatives as noted in HPI       LOWER EXTREMITY EXAMINATION     Dressing, Cast and Drain applied to the right lower extremity. Dressing is to stay intact and to be managed by podiatry.       CONSULTS:  IP CONSULT TO HOSPITALIST    MEDICATION:  Scheduled Meds:   amLODIPine  10 mg Oral Daily    atorvastatin  40 mg Oral Daily    insulin glargine  32 Units SubCUTAneous Nightly    [START ON 3/23/2024] levothyroxine  125 mcg Oral Daily    lisinopril  40 mg Oral Daily    metoprolol succinate  25 mg Oral Daily    sodium bicarbonate  1,300 mg Oral Daily    sodium chloride flush

## 2024-03-23 NOTE — PROGRESS NOTES
Physical Therapy  Initial Assessment     Name: Duane Brady  : 1990  MRN: [id number]      Date of Service: 3/23/2024    Evaluating PT: Hira Miles, PT, DPT [id number]      Room #:  8508/8508-B  Diagnosis:  Contracture of right ankle [M24.571]  Closed fracture of right ankle with delayed healing, subsequent encounter [S82.891G]  S/p tibial fracture [Z87.81]  PMHx/PSHx:   has a past medical history of Abscess of back, Anemia, Bowel obstruction (HCC), Diabetes mellitus type 1 (HCC), Diabetic ulcer of right foot associated with type 1 diabetes mellitus, with necrosis of muscle (HCC), HTN (hypertension), Hyperlipidemia, Type 1 diabetes mellitus with diabetic foot infection (HCC), and Type 2 diabetes mellitus with Charcot's joint of right foot (HCC).  Procedure/Surgery:  Right tibial talar arthrodesis/large graft harvest, tibial talar, talar calcaneal, and tendoachilles lengthening 3/22  Precautions:  Fall risk, NWB R LE, ZULEYMA drain    SUBJECTIVE:    Pt lives with parents and kids in a single story house with small threshold to enter. Pt ambulated without AD prior to admission. Pt owns walker and knee scooter.    OBJECTIVE:   Initial Evaluation  Date: 3/23/24 Treatment Date: Short Term/ Long Term   Goals   AM-PAC 6 Clicks      Was pt agreeable to Eval/treatment? Yes     Does pt have pain? No complaints of pain at rest; R LE pain with movement and with foot in dependent position     Bed Mobility  Rolling: NT  Supine to sit: SBA  Sit to supine: SBA  Scooting: SBA to EOB  Rolling: Independent   Supine to sit: Independent   Sit to supine: Independent   Scooting: Independent    Transfers Sit to stand: SBA  Stand to sit: SBA  Stand pivot: NT  Sit to stand: Independent   Stand to sit: Independent   Stand pivot: Mod Independent with WW   Ambulation   Sidestepped 3 feet with WW with SBA  1 hop forward/backward with WW with SBA  (Limited by nausea and dizziness)  >50 feet with WW Mod Independent    Stair negotiation:

## 2024-03-23 NOTE — OP NOTE
[facility]              [address] [place] [address]                            OPERATIVE REPORT      PATIENT NAME: GONZALES HOWARD                 : 1990  MED REC NO: [id number]                        ROOM: 8508  ACCOUNT NO: [id number]                       ADMIT DATE: 2024  PROVIDER: Russ Dawkins DPM      DATE OF PROCEDURE:  2024    SURGEON:  Russ Dawkins DPM    INDICATION:  The patient is seen for right deformity of his hindfoot, ankle, as well as his midfoot.  He has had a fracture dislocation and Charcot arthropathy of his right with a  *** separate surgery performed at [facility] for reconstructive surgery and salvage type surgery of his right lower extremity.  With this understanding, he is aware of pros and cons, risks, benefits, overcorrection, undercorrection, recurrence, numbness, infection, worsening, limb loss, DVT, PE, anything can happen, nothing should happen.  With this understanding, he agreed to consent, site marked, and preoperative management understanding the pros, cons, risks, and benefits of his lower extremity.    LOWER EXTREMITY PHYSICAL EXAM:  VASCULAR:  Intact.  Pedal pulses 2/4 at DP and PT.  Good capillary refill time.  There is mild diffuse edema about the right lower extremity secondary to all the trauma and destruction.  NEUROLOGIC:  He has complete loss of protective sensation in the right lower extremity.  DERMATOLOGIC:  His skin  ***intact.  There is no evidence of wounds or infection.  The skin turgor is well maintained.  MUSCULOSKELETAL:  There is gross instability of the ankle.  Gross instability of the midtarsal joint.  There is a lack of stability throughout his hindfoot, ankle, and midfoot.  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  ORTHOPEDIC:  He has fracture dislocation at the ankle, fracture dislocation at the subtalar joint, and malalignment and fracture

## 2024-03-23 NOTE — DISCHARGE SUMMARY
hospitalization or inpatient management.  They are stable for discharge with outpatient follow-up.     I have spoken with the patient and discussed the results of the current hospitalization, in addition to providing specific details for the plan of care and counseling regarding the diagnosis and prognosis.  The plan has been discussed in detail and they are aware of the specific conditions for emergent return, as well as the importance of follow-up.  Their questions are answered at this time and they are agreeable with the plan for discharge to home.    Continued appropriate risk factor modification of blood pressure, diabetes and serum lipids will remain essential to reducing risk of future atherosclerotic development    Activity: activity as tolerated    Physical exam:  General appearance: No apparent distress, appears stated age and cooperative.  HEENT: Conjunctivae/corneas clear. Mucous membranes moist.  Neck: Supple. No JVD.  Respiratory:  Clear to auscultation bilaterally.  Normal respiratory effort.   Cardiovascular:  RRR. S1, S2 without MRG.  PV: Pulses palpable. No edema.   Abdomen: Soft, non-tender, non-distended. +BS  Musculoskeletal: No obvious deformities. Left foot covered with dressing C/D/I  Skin: Normal skin color.  No rashes or lesions. Good turgor.   Neurologic:  Grossly non-focal. Awake, alert, following commands.   Psychiatric: Alert and oriented, thought content appropriate, normal insight and judgement    Significant labs:  CBC:   Recent Labs     03/22/24  1330   WBC 5.2   RBC 4.10   HGB 10.8*   HCT 33.5*   MCV 81.7   RDW 18.9*        BMP:   Recent Labs     03/22/24  1330      K 4.8      CO2 21*   BUN 50*   CREATININE 4.1*     LFT:  No results for input(s): \"PROT\", \"ALB\", \"ALKPHOS\", \"ALT\", \"AST\", \"BILITOT\", \"AMYLASE\", \"LIPASE\" in the last 72 hours.  PT/INR: No results for input(s): \"INR\", \"APTT\" in the last 72 hours.  BNP: No results for input(s): \"BNP\" in the last 72  hours.  Hgb A1C:   Lab Results   Component Value Date    LABA1C 10.8 02/01/2024     Folate and B12: No results found for: \"ZIRXDMLO94\", No results found for: \"FOLATE\"  Thyroid Studies:   Lab Results   Component Value Date    TSH 14.15 (H) 10/02/2023    Z6AWBUB 74.2 04/09/2012    W0TEFEH 5.8 06/22/2019    THYROIDAB SEE BELOW 04/09/2012       Urinalysis:    Lab Results   Component Value Date/Time    NITRU NEGATIVE 10/03/2023 02:41 PM    WBCUA 0 TO 5 10/03/2023 02:41 PM    WBCUA NONE 05/18/2012 03:00 AM    BACTERIA TRACE 10/03/2023 02:41 PM    RBCUA 3 to 5 10/03/2023 02:41 PM    RBCUA NONE 07/08/2013 01:00 AM    BLOODU TRACE-INTACT 04/09/2020 11:45 AM    SPECGRAV 1.020 10/03/2023 02:41 PM    GLUCOSEU >=1000 10/03/2023 02:41 PM    GLUCOSEU >=1000 05/18/2012 03:00 AM       Imaging:  No results found.    Discharge Medications:      Medication List        START taking these medications      doxycycline hyclate 100 MG tablet  Commonly known as: VIBRA-TABS  Take 1 tablet by mouth 2 times daily for 10 days     polyethylene glycol 17 g packet  Commonly known as: GLYCOLAX  Take 1 packet by mouth daily as needed for Constipation     traMADol 50 MG tablet  Commonly known as: Ultram  Take 1 tablet by mouth every 6 hours as needed for Pain for up to 7 days. Intended supply: 7 days. Take lowest dose possible to manage pain Max Daily Amount: 200 mg     Xarelto 10 MG Tabs tablet  Generic drug: rivaroxaban  Take 1 tablet by mouth daily (with breakfast)            CHANGE how you take these medications      amLODIPine 10 MG tablet  Commonly known as: NORVASC  Take 1 tablet by mouth daily  What changed: when to take this     Lantus SoloStar 100 UNIT/ML injection pen  Generic drug: insulin glargine  Inject 32 Units into the skin nightly  What changed: how much to take     lisinopril 40 MG tablet  Commonly known as: PRINIVIL;ZESTRIL  Take 1 tablet by mouth daily  What changed: when to take this     metoprolol succinate 25 MG extended  recognition software. Every effort was made to ensure accuracy; however, inadvertent computerized transcription errors may be present.

## 2024-03-23 NOTE — DISCHARGE INSTRUCTIONS
Dressings to remain clean, dry intact, and managed by podiatry.  Remain non weight bearing to the operative limb  Take all medications as prescribed  Keep leg elevated.  Follow up with Dr. Munguia at [facility] within one week of discharge.

## 2024-03-23 NOTE — H&P
[facility] Hospitalist Group History and Physical      CHIEF COMPLAINT: Right ankle contracture reconstruction surgery.    History of Present Illness: This is 34-year-old male with past medical history of hypertension, diabetes, diabetic foot ulcer, right ankle contracture and fracture came here for reconstructive surgery for right ankle contracture fracture.  Patient had surgery done today by podiatry but patient admitted under hospitalist service due to multiple medical conditions during my encounter he was doing okay pain is tolerable.  He denies any chest pain, abdominal pain or shortness of breath.    Informant(s) for H&P: Patient    REVIEW OF SYSTEMS:  A comprehensive review of systems was negative except for: what is in the HPI      PMH:  Past Medical History:   Diagnosis Date    Abscess of back 08/2011    Anemia     Bowel obstruction (HCC)     Diabetes mellitus type 1 (HCC) Diagnosed at 8 years of age/ in 1998    Diabetic ulcer of right foot associated with type 1 diabetes mellitus, with necrosis of muscle (HCC) 10/09/2023    HTN (hypertension)     Hyperlipidemia     Type 1 diabetes mellitus with diabetic foot infection (HCC) 10/09/2023    Type 2 diabetes mellitus with Charcot's joint of right foot (HCC) 10/09/2023    Patient denies states is Type I       Surgical History:  Past Surgical History:   Procedure Laterality Date    FOOT SURGERY Right 10/7/2023    RIGHT FOOT BONE BIOPSY, INCISION AND DRAINAGE TO LEVEL OF BONE performed by Caryl Hernandez DPM at [facility] [place]       Medications Prior to Admission:    Prior to Admission medications    Medication Sig Start Date End Date Taking? Authorizing Provider   Misc. Devices KIT Please provide patient with blood pressure cuff covered by patient insurance. 2/2/24   Adela Ramsay MD   amLODIPine (NORVASC) 10 MG tablet Take 1 tablet by mouth daily  Patient taking differently: Take 1 tablet by mouth every morning 2/2/24   Adela Ramsay MD    atorvastatin (LIPITOR) 40 MG tablet Take 1 tablet by mouth daily 2/2/24   Adela Ramsay MD   ferrous sulfate (IRON 325) 325 (65 Fe) MG tablet Take 1 tablet by mouth 2 times daily (with meals) 2/2/24   Adela Ramsay MD   lisinopril (PRINIVIL;ZESTRIL) 40 MG tablet Take 1 tablet by mouth daily  Patient taking differently: Take 1 tablet by mouth every morning 2/2/24   Adela Ramsay MD   metoprolol succinate (TOPROL XL) 25 MG extended release tablet Take 1 tablet by mouth daily  Patient taking differently: Take 1 tablet by mouth every morning 2/2/24   Adela Ramsay MD   sodium bicarbonate 650 MG tablet Take 2 tablets by mouth daily 2/2/24   Adela Ramsay MD   Continuous Blood Gluc Sensor (DEXCOM G7 SENSOR) MISC Change sensor every 10 days 2/1/24   Jerrell Welsh MD   Insulin Pen Needle (BD PEN NEEDLE MICRO U/F) 32G X 6 MM MISC Uses with insulin 4 times a day 2/1/24   Jerrell Welsh MD   insulin glargine (LANTUS SOLOSTAR) 100 UNIT/ML injection pen Inject 32 Units into the skin nightly  Patient taking differently: Inject 28 Units into the skin nightly 1/3/24   Truong Monroy MD   levothyroxine (SYNTHROID) 125 MCG tablet Take 1 tablet by mouth Daily 1/2/24   Marquita Vargas MD   insulin lispro, 1 Unit Dial, (HUMALOG KWIKPEN) 100 UNIT/ML SOPN Inject 10 units with meals + following sliding scale. -200 add 2U, -250 add 4U, -300 add 6U, -350 add 8U, -400 add 10U, BS over 400 add 12U. MAX 75u/day 1/2/24   Marquita Vargas MD       Allergies:    Pineapple    Social History:    reports that he has never smoked. He has been exposed to tobacco smoke. His smokeless tobacco use includes chew. He reports current alcohol use of about 3.0 standard drinks of alcohol per week. He reports that he does not use drugs.    Family History:   family history includes Diabetes in his mother.       PHYSICAL EXAM:  Vitals:  BP (!) 153/91   Pulse 75   Temp

## 2024-03-23 NOTE — ANESTHESIA POSTPROCEDURE EVALUATION
Department of Anesthesiology  Postprocedure Note    Patient: Duane Brady  MRN: [id number]  YOB: 1990  Date of evaluation: 3/22/2024    Procedure Summary       Date: 03/22/24 Room / Location: [address]    Anesthesia Start: 1640 Anesthesia Stop: 2021    Procedure: RIGHT TIBIAL TALAR ARTHRODESIS/ LARGE GRAFT HARVEST, TIBIAL TALOR, TALOR CALCANEAL,TENDOACHILLES LENGTHENING (Right: Leg Lower) Diagnosis:       Contracture of right ankle      Closed fracture of right ankle with delayed healing, subsequent encounter      (Contracture of right ankle [M24.571])      (Closed fracture of right ankle with delayed healing, subsequent encounter [S82.891G])    Surgeons: Russ Dawkins DPM Responsible Provider: Sharlene Merida MD    Anesthesia Type: general ASA Status: 3            Anesthesia Type: No value filed.    Junie Phase I: Junie Score: 8    Junie Phase II:      Anesthesia Post Evaluation    Patient location during evaluation: PACU  Patient participation: complete - patient participated  Level of consciousness: awake  Airway patency: patent  Nausea & Vomiting: no nausea and no vomiting  Cardiovascular status: hemodynamically stable  Respiratory status: acceptable  Hydration status: euvolemic  Pain management: adequate    No notable events documented.

## 2024-03-23 NOTE — PROGRESS NOTES
Patient transferred to floor on bed, 2lnc and telemetry monitor in stable condition with ancillary staff.

## 2024-03-23 NOTE — PROGRESS NOTES
Floor Called, nurse to nurse given. Patients test results review, VS reported to receiving nurse. Any and all important information regarding patient disclosed.

## 2024-03-23 NOTE — PROGRESS NOTES
4 Eyes Skin Assessment     NAME:  Duane Brady  YOB: 1990  MEDICAL RECORD NUMBER:  [id number]    The patient is being assessed for  Admission    I agree that at least one RN has performed a thorough Head to Toe Skin Assessment on the patient. ALL assessment sites listed below have been assessed.      Areas assessed by both nurses:    Head, Face, Ears, Shoulders, Back, Chest, Arms, Elbows, Hands, Sacrum. Buttock, Coccyx, Ischium, Legs. Feet and Heels, and Under Medical Devices         Does the Patient have a Wound? Yes wound(s) were present on assessment. LDA wound assessment was Initiated and completed by RN  Surgery on Right foot.  GONZALO       Lev Prevention initiated by RN: No  Wound Care Orders initiated by RN: No    Pressure Injury (Stage 3,4, Unstageable, DTI, NWPT, and Complex wounds) if present, place Wound referral order by RN under : No    New Ostomies, if present place, Ostomy referral order under : No     Nurse 1 eSignature: Electronically signed by Sierra Dudley RN on 3/23/24 at 1:02 AM EDT    **SHARE this note so that the co-signing nurse can place an eSignature**    Nurse 2 eSignature: Electronically signed by Sandhya Liu RN on 3/23/24 at 1:04 AM EDT

## 2024-03-25 ENCOUNTER — TELEPHONE (OUTPATIENT)
Dept: INTERNAL MEDICINE | Age: 34
End: 2024-03-25

## 2024-03-25 NOTE — TELEPHONE ENCOUNTER
Last Appointment:  3/6/2024  Future Appointments   Date Time Provider Department Center   5/13/2024  8:00 AM [facility]   6/4/2024  8:30 AM Daphne Thomas APRN - NP BD ENDO [facility]    VM message left for patient to call office to schedule HFU appt and to see how he is doing since discharged from hospital. Phone number left for patient to call.

## 2024-03-26 NOTE — PROGRESS NOTES
Physician Progress Note      PATIENT:               GONZALES HOWARD  CSN #:                  [id number]  :                       1990  ADMIT DATE:       3/22/2024 12:10 PM  DISCH DATE:        3/23/2024 2:37 PM  RESPONDING  PROVIDER #:        Luciano Gonzalez MD          QUERY TEXT:    Patient was admitted with Right ankle contracture.  Pt noted to have   documented Uncontrolled diabetes in DS note on . Please document in   progress notes and discharge summary further specificity regarding the control   status of DM:      The medical record reflects the following:  Risk Factors: DM type I, Diabetic Foot Ulcer, Age 34 M    Clinical Indicators:  DS noted \"Uncontrolled diabetes\".    Labs noted - Glucose 226, 206, 204, 297.    Treatment: Glucose chewable tablet 16 g, Serial Labs, Blood glucose.  Options provided:  -- Uncontrolled DM with hyperglycemia  -- Other - I will add my own diagnosis  -- Disagree - Not applicable / Not valid  -- Disagree - Clinically unable to determine / Unknown  -- Refer to Clinical Documentation Reviewer    PROVIDER RESPONSE TEXT:    This patient has uncontrolled DM with hyperglycemia.    Query created by: Burak Forbes on 3/25/2024 7:16 AM      Electronically signed by:  Luciano Gonzalez MD 3/26/2024 7:13 AM

## 2024-04-03 ENCOUNTER — OFFICE VISIT (OUTPATIENT)
Dept: INTERNAL MEDICINE | Age: 34
End: 2024-04-03
Payer: COMMERCIAL

## 2024-04-03 VITALS
RESPIRATION RATE: 18 BRPM | TEMPERATURE: 97.4 F | BODY MASS INDEX: 31.1 KG/M2 | HEIGHT: 69 IN | HEART RATE: 88 BPM | SYSTOLIC BLOOD PRESSURE: 130 MMHG | OXYGEN SATURATION: 100 % | WEIGHT: 210 LBS | DIASTOLIC BLOOD PRESSURE: 80 MMHG

## 2024-04-03 DIAGNOSIS — N18.4 STAGE 4 CHRONIC KIDNEY DISEASE (HCC): ICD-10-CM

## 2024-04-03 DIAGNOSIS — E06.3 HYPOTHYROIDISM DUE TO HASHIMOTO'S THYROIDITIS: ICD-10-CM

## 2024-04-03 DIAGNOSIS — E10.21 DIABETIC NEPHROPATHY ASSOCIATED WITH TYPE 1 DIABETES MELLITUS (HCC): Chronic | ICD-10-CM

## 2024-04-03 DIAGNOSIS — I10 PRIMARY HYPERTENSION: Primary | Chronic | ICD-10-CM

## 2024-04-03 DIAGNOSIS — E03.8 HYPOTHYROIDISM DUE TO HASHIMOTO'S THYROIDITIS: ICD-10-CM

## 2024-04-03 DIAGNOSIS — Z01.818 PRE-OP EXAM: ICD-10-CM

## 2024-04-03 PROCEDURE — 93010 ELECTROCARDIOGRAM REPORT: CPT | Performed by: STUDENT IN AN ORGANIZED HEALTH CARE EDUCATION/TRAINING PROGRAM

## 2024-04-03 PROCEDURE — 3046F HEMOGLOBIN A1C LEVEL >9.0%: CPT | Performed by: STUDENT IN AN ORGANIZED HEALTH CARE EDUCATION/TRAINING PROGRAM

## 2024-04-03 PROCEDURE — 2022F DILAT RTA XM EVC RTNOPTHY: CPT | Performed by: STUDENT IN AN ORGANIZED HEALTH CARE EDUCATION/TRAINING PROGRAM

## 2024-04-03 PROCEDURE — 99213 OFFICE O/P EST LOW 20 MIN: CPT | Performed by: STUDENT IN AN ORGANIZED HEALTH CARE EDUCATION/TRAINING PROGRAM

## 2024-04-03 PROCEDURE — 3079F DIAST BP 80-89 MM HG: CPT | Performed by: STUDENT IN AN ORGANIZED HEALTH CARE EDUCATION/TRAINING PROGRAM

## 2024-04-03 PROCEDURE — G8417 CALC BMI ABV UP PARAM F/U: HCPCS | Performed by: STUDENT IN AN ORGANIZED HEALTH CARE EDUCATION/TRAINING PROGRAM

## 2024-04-03 PROCEDURE — 1111F DSCHRG MED/CURRENT MED MERGE: CPT | Performed by: STUDENT IN AN ORGANIZED HEALTH CARE EDUCATION/TRAINING PROGRAM

## 2024-04-03 PROCEDURE — G8427 DOCREV CUR MEDS BY ELIG CLIN: HCPCS | Performed by: STUDENT IN AN ORGANIZED HEALTH CARE EDUCATION/TRAINING PROGRAM

## 2024-04-03 PROCEDURE — 93005 ELECTROCARDIOGRAM TRACING: CPT | Performed by: STUDENT IN AN ORGANIZED HEALTH CARE EDUCATION/TRAINING PROGRAM

## 2024-04-03 PROCEDURE — 4004F PT TOBACCO SCREEN RCVD TLK: CPT | Performed by: STUDENT IN AN ORGANIZED HEALTH CARE EDUCATION/TRAINING PROGRAM

## 2024-04-03 PROCEDURE — 99212 OFFICE O/P EST SF 10 MIN: CPT | Performed by: STUDENT IN AN ORGANIZED HEALTH CARE EDUCATION/TRAINING PROGRAM

## 2024-04-03 PROCEDURE — 3075F SYST BP GE 130 - 139MM HG: CPT | Performed by: STUDENT IN AN ORGANIZED HEALTH CARE EDUCATION/TRAINING PROGRAM

## 2024-04-03 RX ORDER — SODIUM BICARBONATE 650 MG/1
1300 TABLET ORAL DAILY
Qty: 180 TABLET | Refills: 1 | Status: SHIPPED | OUTPATIENT
Start: 2024-04-03

## 2024-04-03 RX ORDER — LEVOTHYROXINE SODIUM 0.12 MG/1
125 TABLET ORAL DAILY
Qty: 90 TABLET | Refills: 1 | Status: SHIPPED | OUTPATIENT
Start: 2024-04-03

## 2024-04-03 RX ORDER — RIVAROXABAN 10 MG/1
10 TABLET, FILM COATED ORAL
Qty: 45 TABLET | Refills: 1 | Status: CANCELLED | OUTPATIENT
Start: 2024-04-03

## 2024-04-03 NOTE — PROGRESS NOTES
Spoke with Alyssa at Dr. Dawkins's office regarding Xarelto.  Patient is to hold Xarelto 2 days before surgery, last dose will be 4/7

## 2024-04-03 NOTE — PROGRESS NOTES
[facility]  Internal Medicine Residency Program  ACC Note      SUBJECTIVE:  CC: had concerns including Pre-op Exam (Scheduled for O.R. 4/10/2024   has a cast to right lower leg ,toes edematous , warm to touch and slightly movable) and Diabetes (  BS 's).    HPI:  Duane Brady is a 34 y.o.male with PMH of DM, HTN, JOSÉ MIGUEL, tibial fx presenting to [facility] for pre-op clearance for podiatric surgery.    Patient underwent podiatric surgery to treat right foot and ankle pain with Dr. Dawkins. Procecures performed included achilles tendon lengthening, fibular graft, tibiotalar arthrodesis, and talocalcaneal arthrodesis. Patient was evaluted by our office for pre-op clearance on 3/6/24 by Dr. Duran and Dr. Neil. This included labwork and EKG. I explained to the patient and his father that we do not typically need to repeat blood work or EKG for surgical clearance if procedures occur within the same 3 months. However, patient and his father state they were explicitly told via phone that repeat EKG and repeat blood work is required. They said they received this phone call on Tuesday. Patient has been taking xarelto for VTE prophylaxis in the setting of these surgeries.     Per patient, he has a follow up appointment with Dr. Dawkins on 4/9/24 for repeat imaging of the right foot and ankle. Decision on further operations/revision will be made at that appointment. They state there is no current OR booked date at this time.     Today, he has a hard cast in place from the foot to just below the knee on his right leg. Toes are notably swollen, but patient is not bothered by this. He is not having much pain but endorses some mild discomfort with tightness of the cast around his foot. He is walking with a walker and states he is compliant with non-weight bearing ambulation.     Patient and father voiced concern regarding glycolax being on his med list. It appears this was added during

## 2024-04-03 NOTE — PROGRESS NOTES
[facility]  Internal Medicine Residency Clinic    Attending Physician Statement  I have discussed the case, including pertinent history and exam findings with the resident physician.  I agree with the assessment, plan and orders as documented by the resident. I have reviewed all pertinent PMHx, PSHx, FamHx, SocialHx, medications, and allergies and updated history as appropriate.    Patient here for follow up after recent surgery for Achilles tendon lengthening, fibular graft, tibiotalar and talocalcaneal arthrodesis of right foot.  Patient states that he was told that he may need additional surgery based on f/u imaging and states that he was told he needs labs and ECG. Has pending CBC and BMP so will obtain. Should also f/u BUN/Creatinine given CKD.  Question need for repeat ECG since one obtained 3/06/24 before the recent surgery as noted above.        Remainder of medical problems as per resident note.    Peng Duran, DO  4/3/24

## 2024-04-04 ENCOUNTER — HOSPITAL ENCOUNTER (OUTPATIENT)
Age: 34
Discharge: HOME | End: 2024-04-04
Payer: COMMERCIAL

## 2024-04-04 ENCOUNTER — TELEPHONE (OUTPATIENT)
Dept: INTERNAL MEDICINE | Age: 34
End: 2024-04-04

## 2024-04-04 DIAGNOSIS — N18.9 CHRONIC KIDNEY DISEASE, UNSPECIFIED CKD STAGE: ICD-10-CM

## 2024-04-04 DIAGNOSIS — D63.1 ANEMIA OF CHRONIC RENAL FAILURE, UNSPECIFIED CKD STAGE: ICD-10-CM

## 2024-04-04 DIAGNOSIS — N18.9 ANEMIA OF CHRONIC RENAL FAILURE, UNSPECIFIED CKD STAGE: ICD-10-CM

## 2024-04-04 LAB
ANION GAP SERPL CALCULATED.3IONS-SCNC: 12 MMOL/L (ref 7–16)
BASOPHILS # BLD: 0.12 K/UL (ref 0–0.2)
BASOPHILS NFR BLD: 1 % (ref 0–2)
BUN SERPL-MCNC: 29 MG/DL (ref 6–20)
CALCIUM SERPL-MCNC: 9.5 MG/DL (ref 8.6–10.2)
CHLORIDE SERPL-SCNC: 108 MMOL/L (ref 98–107)
CO2 SERPL-SCNC: 23 MMOL/L (ref 22–29)
CREAT SERPL-MCNC: 3.7 MG/DL (ref 0.7–1.2)
EOSINOPHIL # BLD: 0.22 K/UL (ref 0.05–0.5)
EOSINOPHILS RELATIVE PERCENT: 2 % (ref 0–6)
ERYTHROCYTE [DISTWIDTH] IN BLOOD BY AUTOMATED COUNT: 18.6 % (ref 11.5–15)
GFR SERPL CREATININE-BSD FRML MDRD: 21 ML/MIN/1.73M2
GLUCOSE SERPL-MCNC: 127 MG/DL (ref 74–99)
HCT VFR BLD AUTO: 33.2 % (ref 37–54)
HGB BLD-MCNC: 10.4 G/DL (ref 12.5–16.5)
IMM GRANULOCYTES # BLD AUTO: 0.03 K/UL (ref 0–0.58)
IMM GRANULOCYTES NFR BLD: 0 % (ref 0–5)
LYMPHOCYTES NFR BLD: 3.32 K/UL (ref 1.5–4)
LYMPHOCYTES RELATIVE PERCENT: 25 % (ref 20–42)
MCH RBC QN AUTO: 26.2 PG (ref 26–35)
MCHC RBC AUTO-ENTMCNC: 31.3 G/DL (ref 32–34.5)
MCV RBC AUTO: 83.6 FL (ref 80–99.9)
MONOCYTES NFR BLD: 0.64 K/UL (ref 0.1–0.95)
MONOCYTES NFR BLD: 5 % (ref 2–12)
NEUTROPHILS NFR BLD: 67 % (ref 43–80)
NEUTS SEG NFR BLD: 8.88 K/UL (ref 1.8–7.3)
PLATELET # BLD AUTO: 552 K/UL (ref 130–450)
PMV BLD AUTO: 9.2 FL (ref 7–12)
POTASSIUM SERPL-SCNC: 4.8 MMOL/L (ref 3.5–5)
RBC # BLD AUTO: 3.97 M/UL (ref 3.8–5.8)
SODIUM SERPL-SCNC: 143 MMOL/L (ref 132–146)
WBC OTHER # BLD: 13.2 K/UL (ref 4.5–11.5)

## 2024-04-04 PROCEDURE — 80048 BASIC METABOLIC PNL TOTAL CA: CPT

## 2024-04-04 PROCEDURE — 85025 COMPLETE CBC W/AUTO DIFF WBC: CPT

## 2024-04-04 PROCEDURE — 36415 COLL VENOUS BLD VENIPUNCTURE: CPT

## 2024-04-04 ASSESSMENT — ENCOUNTER SYMPTOMS
NAUSEA: 0
SHORTNESS OF BREATH: 0
VOMITING: 0
SINUS PAIN: 0
ABDOMINAL PAIN: 0
COUGH: 0
BACK PAIN: 0
BLOOD IN STOOL: 0
DIARRHEA: 0

## 2024-04-04 NOTE — TELEPHONE ENCOUNTER
Patient is scheduled for surgery on 4/10/24, patient saw  4/3/24, patient needs H&P and do not see any documentation in the notes that addresses the review of symptoms. Please annamarei Pre admission testing ask for Lauren at [phone] ext [phone]

## 2024-04-04 NOTE — PROGRESS NOTES
[facility]   PRE-ADMISSION TESTING GENERAL INSTRUCTIONS  PAT Phone Number: [phone]      GENERAL INSTRUCTIONS:    [x] Antibacterial Soap Shower Night before and/or AM of surgery.     [x] Do not wear makeup, lotions, powders, deodorant.   [x] Nothing by mouth after midnight; including gum, candy, mints, or water.    [x] You may brush your teeth, gargle, but do NOT swallow water.   [x] No tobacco products, illegal drugs, marijuana or alcohol within 24 hours of your surgery.  [x] Jewelry or valuables should not be brought to the hospital. All body and/or tongue piercing's must be removed prior to arriving to hospital. No contact lens or hair pins.   [x] Arrange transportation with a responsible adult  to and from the hospital. Arrange for someone to be with you for the remainder of the day and for 24 hours after your procedure due to having had anesthesia.          -Who will be your  for transportation?  parents        -Who will be staying with you for 24 hrs after your procedure?  parents  [x] Bring insurance card and photo ID.    PARKING INSTRUCTIONS:     [x] ARRIVAL DATE  4/10 & TIME   5 am:   [x]Surgery time may change, if it does we will call you the business day before your scheduled surgery.  [x] Enter into the [facility] Entrance. Two adults may accompany you.   [x] Parking lot '\"I\"  is located on [address] (the corner of [address] and [address]).  To enter the lot,you will need to get a parking ticket at the gate .  To exit you will be given a free parking voucher.  You will need both the ticket and parking voucher to exit the parking lot. One car per patient is allowed to park in this lot.   Walk up the front walk to the [address] Entrance, the door will be locked an employee will greet you and let you in.      EDUCATION INSTRUCTIONS:          [x] [facility] Pamphlet placed in chart.      [x] Fluoroscopy-Xray used in surgery

## 2024-04-04 NOTE — PROGRESS NOTES
Left message with Chava, at [facility] Internal medicine patient is scheduled for surgery on 4/10.  Patient was seen by SANDRA Mathew MD, patient needs a current H&P for this surgery, progress note from 4/3 does not address the review of systems, please update. Please call if any questions.

## 2024-04-09 ENCOUNTER — ANESTHESIA EVENT (OUTPATIENT)
Dept: OPERATING ROOM | Age: 34
End: 2024-04-09
Payer: COMMERCIAL

## 2024-04-09 ASSESSMENT — LIFESTYLE VARIABLES: SMOKING_STATUS: 0

## 2024-04-09 NOTE — ANESTHESIA PRE PROCEDURE
Department of Anesthesiology  Preprocedure Note       Name:  Duane Brady   Age:  34 y.o.  :  1990                                          MRN:  [id number]         Date:  2024      Surgeon: Surgeon(s):  Russ Dawkins DPM    Procedure: Procedure(s):  RIGHT FOOT LAPIDUS MID FOOT FUSION/ TALONAVICULAR FUSION/ DOUBLE CALCANEAL OSTEOTOMY    Medications prior to admission:   Prior to Admission medications    Medication Sig Start Date End Date Taking? Authorizing Provider   levothyroxine (SYNTHROID) 125 MCG tablet Take 1 tablet by mouth Daily 4/3/24   Peng Duran,    sodium bicarbonate 650 MG tablet Take 2 tablets by mouth daily 4/3/24   Peng Duran,    rivaroxaban (XARELTO) 10 MG TABS tablet Take 1 tablet by mouth daily (with breakfast) 3/23/24   Zachary Yen DPM   Misc. Devices KIT Please provide patient with blood pressure cuff covered by patient insurance. 24   Adela Ramsay MD   amLODIPine (NORVASC) 10 MG tablet Take 1 tablet by mouth daily  Patient taking differently: Take 1 tablet by mouth every morning 24   Adela Ramsay MD   atorvastatin (LIPITOR) 40 MG tablet Take 1 tablet by mouth daily 24   Adela Ramsay MD   ferrous sulfate (IRON 325) 325 (65 Fe) MG tablet Take 1 tablet by mouth 2 times daily (with meals) 24   Adela Ramsay MD   lisinopril (PRINIVIL;ZESTRIL) 40 MG tablet Take 1 tablet by mouth daily  Patient taking differently: Take 1 tablet by mouth every morning 24   Adela Ramsay MD   metoprolol succinate (TOPROL XL) 25 MG extended release tablet Take 1 tablet by mouth daily  Patient taking differently: Take 1 tablet by mouth every morning 24   Adela Ramsay MD   Continuous Blood Gluc Sensor (DEXCOM G7 SENSOR) MISC Change sensor every 10 days 24   Jerrell Welsh MD   Insulin Pen Needle (BD PEN NEEDLE MICRO U/F) 32G X 6 MM MISC Uses with insulin 4 times a day 24   Jerrell Welsh MD

## 2024-04-10 ENCOUNTER — HOSPITAL ENCOUNTER (OUTPATIENT)
Dept: GENERAL RADIOLOGY | Age: 34
Discharge: HOME | End: 2024-04-12

## 2024-04-10 ENCOUNTER — HOSPITAL ENCOUNTER (OUTPATIENT)
Age: 34
Setting detail: OUTPATIENT SURGERY
Discharge: HOME | End: 2024-04-10
Attending: PODIATRIST | Admitting: PODIATRIST
Payer: COMMERCIAL

## 2024-04-10 ENCOUNTER — ANESTHESIA (OUTPATIENT)
Dept: OPERATING ROOM | Age: 34
End: 2024-04-10
Payer: COMMERCIAL

## 2024-04-10 VITALS
BODY MASS INDEX: 31.1 KG/M2 | HEART RATE: 77 BPM | RESPIRATION RATE: 16 BRPM | HEIGHT: 69 IN | DIASTOLIC BLOOD PRESSURE: 94 MMHG | TEMPERATURE: 97.1 F | WEIGHT: 210 LBS | OXYGEN SATURATION: 100 % | SYSTOLIC BLOOD PRESSURE: 153 MMHG

## 2024-04-10 DIAGNOSIS — M19.071 ARTHRITIS OF RIGHT ANKLE: ICD-10-CM

## 2024-04-10 DIAGNOSIS — R52 PAIN: ICD-10-CM

## 2024-04-10 DIAGNOSIS — Z01.812 PRE-OPERATIVE LABORATORY EXAMINATION: Primary | ICD-10-CM

## 2024-04-10 DIAGNOSIS — M14.671 CHARCOT'S JOINT OF RIGHT ANKLE: ICD-10-CM

## 2024-04-10 LAB
ANION GAP SERPL CALCULATED.3IONS-SCNC: 15 MMOL/L (ref 7–16)
BUN SERPL-MCNC: 31 MG/DL (ref 6–20)
CALCIUM SERPL-MCNC: 9.4 MG/DL (ref 8.6–10.2)
CHLORIDE SERPL-SCNC: 100 MMOL/L (ref 98–107)
CO2 SERPL-SCNC: 22 MMOL/L (ref 22–29)
CREAT SERPL-MCNC: 3.4 MG/DL (ref 0.7–1.2)
ERYTHROCYTE [DISTWIDTH] IN BLOOD BY AUTOMATED COUNT: 17.2 % (ref 11.5–15)
GFR SERPL CREATININE-BSD FRML MDRD: 23 ML/MIN/1.73M2
GLUCOSE BLD-MCNC: 209 MG/DL (ref 74–99)
GLUCOSE BLD-MCNC: 226 MG/DL (ref 74–99)
GLUCOSE BLD-MCNC: 273 MG/DL (ref 74–99)
GLUCOSE BLD-MCNC: 294 MG/DL (ref 74–99)
GLUCOSE SERPL-MCNC: 281 MG/DL (ref 74–99)
HCT VFR BLD AUTO: 31.9 % (ref 37–54)
HGB BLD-MCNC: 10 G/DL (ref 12.5–16.5)
MCH RBC QN AUTO: 26.9 PG (ref 26–35)
MCHC RBC AUTO-ENTMCNC: 31.3 G/DL (ref 32–34.5)
MCV RBC AUTO: 85.8 FL (ref 80–99.9)
PLATELET # BLD AUTO: 380 K/UL (ref 130–450)
PMV BLD AUTO: 9.7 FL (ref 7–12)
POTASSIUM SERPL-SCNC: 4.5 MMOL/L (ref 3.5–5)
RBC # BLD AUTO: 3.72 M/UL (ref 3.8–5.8)
SODIUM SERPL-SCNC: 137 MMOL/L (ref 132–146)
WBC OTHER # BLD: 8.6 K/UL (ref 4.5–11.5)

## 2024-04-10 PROCEDURE — 3600000012 HC SURGERY LEVEL 2 ADDTL 15MIN: Performed by: PODIATRIST

## 2024-04-10 PROCEDURE — 88305 TISSUE EXAM BY PATHOLOGIST: CPT

## 2024-04-10 PROCEDURE — 6360000002 HC RX W HCPCS: Performed by: PODIATRIST

## 2024-04-10 PROCEDURE — 7100000011 HC PHASE II RECOVERY - ADDTL 15 MIN: Performed by: PODIATRIST

## 2024-04-10 PROCEDURE — 80048 BASIC METABOLIC PNL TOTAL CA: CPT

## 2024-04-10 PROCEDURE — 87205 SMEAR GRAM STAIN: CPT

## 2024-04-10 PROCEDURE — 2580000003 HC RX 258

## 2024-04-10 PROCEDURE — 87075 CULTR BACTERIA EXCEPT BLOOD: CPT

## 2024-04-10 PROCEDURE — 88311 DECALCIFY TISSUE: CPT

## 2024-04-10 PROCEDURE — C1713 ANCHOR/SCREW BN/BN,TIS/BN: HCPCS | Performed by: PODIATRIST

## 2024-04-10 PROCEDURE — 2500000003 HC RX 250 WO HCPCS

## 2024-04-10 PROCEDURE — 3700000001 HC ADD 15 MINUTES (ANESTHESIA): Performed by: PODIATRIST

## 2024-04-10 PROCEDURE — 6370000000 HC RX 637 (ALT 250 FOR IP)

## 2024-04-10 PROCEDURE — 2720000010 HC SURG SUPPLY STERILE: Performed by: PODIATRIST

## 2024-04-10 PROCEDURE — 3600000002 HC SURGERY LEVEL 2 BASE: Performed by: PODIATRIST

## 2024-04-10 PROCEDURE — 87116 MYCOBACTERIA CULTURE: CPT

## 2024-04-10 PROCEDURE — 87015 SPECIMEN INFECT AGNT CONCNTJ: CPT

## 2024-04-10 PROCEDURE — 7100000000 HC PACU RECOVERY - FIRST 15 MIN: Performed by: PODIATRIST

## 2024-04-10 PROCEDURE — 2709999900 HC NON-CHARGEABLE SUPPLY: Performed by: PODIATRIST

## 2024-04-10 PROCEDURE — 7100000001 HC PACU RECOVERY - ADDTL 15 MIN: Performed by: PODIATRIST

## 2024-04-10 PROCEDURE — 3700000000 HC ANESTHESIA ATTENDED CARE: Performed by: PODIATRIST

## 2024-04-10 PROCEDURE — 87206 SMEAR FLUORESCENT/ACID STAI: CPT

## 2024-04-10 PROCEDURE — 2580000003 HC RX 258: Performed by: PODIATRIST

## 2024-04-10 PROCEDURE — 85027 COMPLETE CBC AUTOMATED: CPT

## 2024-04-10 PROCEDURE — 87070 CULTURE OTHR SPECIMN AEROBIC: CPT

## 2024-04-10 PROCEDURE — 87102 FUNGUS ISOLATION CULTURE: CPT

## 2024-04-10 PROCEDURE — 82962 GLUCOSE BLOOD TEST: CPT

## 2024-04-10 PROCEDURE — C1729 CATH, DRAINAGE: HCPCS | Performed by: PODIATRIST

## 2024-04-10 PROCEDURE — 7100000010 HC PHASE II RECOVERY - FIRST 15 MIN: Performed by: PODIATRIST

## 2024-04-10 PROCEDURE — 6360000002 HC RX W HCPCS

## 2024-04-10 DEVICE — PLATE BNE L W10.1XL98MM THK3.5MM 7 H BILAT S STL STR RIG: Type: IMPLANTABLE DEVICE | Site: FOOT | Status: FUNCTIONAL

## 2024-04-10 DEVICE — IMPLANTABLE DEVICE: Type: IMPLANTABLE DEVICE | Site: FOOT | Status: FUNCTIONAL

## 2024-04-10 DEVICE — SCREW BNE L60MM DIA4MM CORT S STL ST NONCANNULATED: Type: IMPLANTABLE DEVICE | Site: FOOT | Status: FUNCTIONAL

## 2024-04-10 DEVICE — SCREW BNE L42MM DIA3.5MM CORT S STL ST LOK FULL THRD: Type: IMPLANTABLE DEVICE | Site: FOOT | Status: FUNCTIONAL

## 2024-04-10 DEVICE — SCREW BNE L36MM DIA3.5MM CORT S STL ST LOK FULL THRD: Type: IMPLANTABLE DEVICE | Site: FOOT | Status: FUNCTIONAL

## 2024-04-10 DEVICE — GRAFT BNE SUB 15ML 1.7-10MM CANC CHIP MORSELIZED FRZ DRY: Type: IMPLANTABLE DEVICE | Site: FOOT | Status: FUNCTIONAL

## 2024-04-10 DEVICE — SCREW BNE L58MM DIA4MM CORT S STL ST NONCANNULATED: Type: IMPLANTABLE DEVICE | Site: FOOT | Status: FUNCTIONAL

## 2024-04-10 DEVICE — SCREW BNE L34MM DIA3.5MM CORT S STL ST LOK FULL THRD: Type: IMPLANTABLE DEVICE | Site: FOOT | Status: FUNCTIONAL

## 2024-04-10 DEVICE — SCREW BNE L32MM DIA3.5MM CORT S STL ST LOK FULL THRD: Type: IMPLANTABLE DEVICE | Site: FOOT | Status: FUNCTIONAL

## 2024-04-10 DEVICE — WASHER ORTH DIA7MM FOR CANN SCR: Type: IMPLANTABLE DEVICE | Site: FOOT | Status: FUNCTIONAL

## 2024-04-10 DEVICE — SCREW BNE L28MM DIA3.5MM CORT S STL ST LOK FULL THRD: Type: IMPLANTABLE DEVICE | Site: FOOT | Status: FUNCTIONAL

## 2024-04-10 DEVICE — GRAFT BNE L50MM W20-24MM THICKNESS 5-30MM ILIUM TRICORT: Type: IMPLANTABLE DEVICE | Site: FOOT | Status: FUNCTIONAL

## 2024-04-10 DEVICE — SCREW BNE L38MM DIA3.5MM CORT S STL ST LOK FULL THRD: Type: IMPLANTABLE DEVICE | Site: FOOT | Status: FUNCTIONAL

## 2024-04-10 RX ORDER — TRAMADOL HYDROCHLORIDE 50 MG/1
50 TABLET ORAL EVERY 6 HOURS PRN
Qty: 12 TABLET | Refills: 0 | Status: SHIPPED | OUTPATIENT
Start: 2024-04-10 | End: 2024-04-10 | Stop reason: HOSPADM

## 2024-04-10 RX ORDER — ONDANSETRON 2 MG/ML
INJECTION INTRAMUSCULAR; INTRAVENOUS PRN
Status: DISCONTINUED | OUTPATIENT
Start: 2024-04-10 | End: 2024-04-10 | Stop reason: SDUPTHER

## 2024-04-10 RX ORDER — ONDANSETRON 2 MG/ML
4 INJECTION INTRAMUSCULAR; INTRAVENOUS
Status: DISCONTINUED | OUTPATIENT
Start: 2024-04-10 | End: 2024-04-10 | Stop reason: HOSPADM

## 2024-04-10 RX ORDER — MIDAZOLAM HYDROCHLORIDE 1 MG/ML
INJECTION INTRAMUSCULAR; INTRAVENOUS PRN
Status: DISCONTINUED | OUTPATIENT
Start: 2024-04-10 | End: 2024-04-10 | Stop reason: SDUPTHER

## 2024-04-10 RX ORDER — RIVAROXABAN 10 MG/1
10 TABLET, FILM COATED ORAL
Qty: 30 TABLET | Refills: 1 | Status: SHIPPED | OUTPATIENT
Start: 2024-04-10 | End: 2024-04-10 | Stop reason: HOSPADM

## 2024-04-10 RX ORDER — DEXAMETHASONE SODIUM PHOSPHATE 10 MG/ML
INJECTION INTRAMUSCULAR; INTRAVENOUS PRN
Status: DISCONTINUED | OUTPATIENT
Start: 2024-04-10 | End: 2024-04-10 | Stop reason: SDUPTHER

## 2024-04-10 RX ORDER — MIDAZOLAM HYDROCHLORIDE 2 MG/2ML
2 INJECTION, SOLUTION INTRAMUSCULAR; INTRAVENOUS
Status: DISCONTINUED | OUTPATIENT
Start: 2024-04-10 | End: 2024-04-10 | Stop reason: HOSPADM

## 2024-04-10 RX ORDER — TRAMADOL HYDROCHLORIDE 50 MG/1
50 TABLET ORAL EVERY 6 HOURS PRN
Qty: 12 TABLET | Refills: 0 | Status: SHIPPED | OUTPATIENT
Start: 2024-04-10 | End: 2024-04-13

## 2024-04-10 RX ORDER — LIDOCAINE HYDROCHLORIDE 20 MG/ML
INJECTION, SOLUTION INTRAVENOUS PRN
Status: DISCONTINUED | OUTPATIENT
Start: 2024-04-10 | End: 2024-04-10 | Stop reason: SDUPTHER

## 2024-04-10 RX ORDER — ROCURONIUM BROMIDE 10 MG/ML
INJECTION, SOLUTION INTRAVENOUS PRN
Status: DISCONTINUED | OUTPATIENT
Start: 2024-04-10 | End: 2024-04-10 | Stop reason: SDUPTHER

## 2024-04-10 RX ORDER — ACETAMINOPHEN 650 MG
TABLET, EXTENDED RELEASE ORAL PRN
Status: DISCONTINUED | OUTPATIENT
Start: 2024-04-10 | End: 2024-04-10 | Stop reason: ALTCHOICE

## 2024-04-10 RX ORDER — BUPIVACAINE HYDROCHLORIDE 5 MG/ML
INJECTION, SOLUTION EPIDURAL; INTRACAUDAL PRN
Status: DISCONTINUED | OUTPATIENT
Start: 2024-04-10 | End: 2024-04-10 | Stop reason: ALTCHOICE

## 2024-04-10 RX ORDER — SODIUM CHLORIDE 0.9 % (FLUSH) 0.9 %
5-40 SYRINGE (ML) INJECTION EVERY 12 HOURS SCHEDULED
Status: DISCONTINUED | OUTPATIENT
Start: 2024-04-10 | End: 2024-04-10 | Stop reason: HOSPADM

## 2024-04-10 RX ORDER — HYDROMORPHONE HYDROCHLORIDE 1 MG/ML
0.25 INJECTION, SOLUTION INTRAMUSCULAR; INTRAVENOUS; SUBCUTANEOUS EVERY 5 MIN PRN
Status: DISCONTINUED | OUTPATIENT
Start: 2024-04-10 | End: 2024-04-10 | Stop reason: HOSPADM

## 2024-04-10 RX ORDER — DOXYCYCLINE HYCLATE 100 MG
100 TABLET ORAL 2 TIMES DAILY
Qty: 14 TABLET | Refills: 0 | Status: SHIPPED | OUTPATIENT
Start: 2024-04-10 | End: 2024-04-17

## 2024-04-10 RX ORDER — PROPOFOL 10 MG/ML
INJECTION, EMULSION INTRAVENOUS PRN
Status: DISCONTINUED | OUTPATIENT
Start: 2024-04-10 | End: 2024-04-10 | Stop reason: SDUPTHER

## 2024-04-10 RX ORDER — IPRATROPIUM BROMIDE AND ALBUTEROL SULFATE 2.5; .5 MG/3ML; MG/3ML
1 SOLUTION RESPIRATORY (INHALATION)
Status: DISCONTINUED | OUTPATIENT
Start: 2024-04-10 | End: 2024-04-10 | Stop reason: HOSPADM

## 2024-04-10 RX ORDER — DOXYCYCLINE HYCLATE 100 MG
100 TABLET ORAL 2 TIMES DAILY
Qty: 20 TABLET | Refills: 0 | Status: SHIPPED | OUTPATIENT
Start: 2024-04-10 | End: 2024-04-10 | Stop reason: HOSPADM

## 2024-04-10 RX ORDER — SODIUM CHLORIDE 9 MG/ML
INJECTION, SOLUTION INTRAVENOUS CONTINUOUS PRN
Status: DISCONTINUED | OUTPATIENT
Start: 2024-04-10 | End: 2024-04-10 | Stop reason: SDUPTHER

## 2024-04-10 RX ORDER — HYDRALAZINE HYDROCHLORIDE 20 MG/ML
10 INJECTION INTRAMUSCULAR; INTRAVENOUS
Status: DISCONTINUED | OUTPATIENT
Start: 2024-04-10 | End: 2024-04-10 | Stop reason: HOSPADM

## 2024-04-10 RX ORDER — LABETALOL HYDROCHLORIDE 5 MG/ML
10 INJECTION, SOLUTION INTRAVENOUS
Status: DISCONTINUED | OUTPATIENT
Start: 2024-04-10 | End: 2024-04-10 | Stop reason: HOSPADM

## 2024-04-10 RX ORDER — SODIUM CHLORIDE 0.9 % (FLUSH) 0.9 %
5-40 SYRINGE (ML) INJECTION PRN
Status: DISCONTINUED | OUTPATIENT
Start: 2024-04-10 | End: 2024-04-10 | Stop reason: HOSPADM

## 2024-04-10 RX ORDER — NALOXONE HYDROCHLORIDE 0.4 MG/ML
INJECTION, SOLUTION INTRAMUSCULAR; INTRAVENOUS; SUBCUTANEOUS PRN
Status: DISCONTINUED | OUTPATIENT
Start: 2024-04-10 | End: 2024-04-10 | Stop reason: HOSPADM

## 2024-04-10 RX ORDER — RIVAROXABAN 10 MG/1
10 TABLET, FILM COATED ORAL
Qty: 30 TABLET | Refills: 1 | Status: SHIPPED | OUTPATIENT
Start: 2024-04-10

## 2024-04-10 RX ORDER — HYDROMORPHONE HYDROCHLORIDE 1 MG/ML
0.5 INJECTION, SOLUTION INTRAMUSCULAR; INTRAVENOUS; SUBCUTANEOUS EVERY 5 MIN PRN
Status: DISCONTINUED | OUTPATIENT
Start: 2024-04-10 | End: 2024-04-10 | Stop reason: HOSPADM

## 2024-04-10 RX ORDER — SODIUM CHLORIDE 9 MG/ML
INJECTION, SOLUTION INTRAVENOUS PRN
Status: DISCONTINUED | OUTPATIENT
Start: 2024-04-10 | End: 2024-04-10 | Stop reason: HOSPADM

## 2024-04-10 RX ORDER — FENTANYL CITRATE 50 UG/ML
INJECTION, SOLUTION INTRAMUSCULAR; INTRAVENOUS PRN
Status: DISCONTINUED | OUTPATIENT
Start: 2024-04-10 | End: 2024-04-10 | Stop reason: SDUPTHER

## 2024-04-10 RX ADMIN — CEFAZOLIN 1000 MG: 2 INJECTION, POWDER, FOR SOLUTION INTRAMUSCULAR; INTRAVENOUS at 08:44

## 2024-04-10 RX ADMIN — CEFAZOLIN 2000 MG: 2 INJECTION, POWDER, FOR SOLUTION INTRAMUSCULAR; INTRAVENOUS at 07:15

## 2024-04-10 RX ADMIN — PROPOFOL 40 MG: 10 INJECTION, EMULSION INTRAVENOUS at 08:35

## 2024-04-10 RX ADMIN — DEXAMETHASONE SODIUM PHOSPHATE 10 MG: 10 INJECTION INTRAMUSCULAR; INTRAVENOUS at 07:15

## 2024-04-10 RX ADMIN — INSULIN HUMAN 6 UNITS: 100 INJECTION, SOLUTION PARENTERAL at 05:47

## 2024-04-10 RX ADMIN — ROCURONIUM BROMIDE 40 MG: 10 INJECTION INTRAVENOUS at 07:08

## 2024-04-10 RX ADMIN — LIDOCAINE HYDROCHLORIDE 100 MG: 20 INJECTION, SOLUTION INTRAVENOUS at 07:07

## 2024-04-10 RX ADMIN — ONDANSETRON 4 MG: 2 INJECTION INTRAMUSCULAR; INTRAVENOUS at 08:42

## 2024-04-10 RX ADMIN — FENTANYL CITRATE 100 MCG: 0.05 INJECTION, SOLUTION INTRAMUSCULAR; INTRAVENOUS at 07:07

## 2024-04-10 RX ADMIN — SUGAMMADEX 200 MG: 100 INJECTION, SOLUTION INTRAVENOUS at 08:59

## 2024-04-10 RX ADMIN — PROPOFOL 200 MG: 10 INJECTION, EMULSION INTRAVENOUS at 07:07

## 2024-04-10 RX ADMIN — MIDAZOLAM 2 MG: 1 INJECTION INTRAMUSCULAR; INTRAVENOUS at 07:00

## 2024-04-10 RX ADMIN — SODIUM CHLORIDE: 9 INJECTION, SOLUTION INTRAVENOUS at 07:00

## 2024-04-10 RX ADMIN — FENTANYL CITRATE 50 MCG: 0.05 INJECTION, SOLUTION INTRAMUSCULAR; INTRAVENOUS at 08:48

## 2024-04-10 ASSESSMENT — PAIN - FUNCTIONAL ASSESSMENT: PAIN_FUNCTIONAL_ASSESSMENT: NONE - DENIES PAIN

## 2024-04-10 NOTE — ANESTHESIA POSTPROCEDURE EVALUATION
Department of Anesthesiology  Postprocedure Note    Patient: Duane Brady  MRN: [id number]  YOB: 1990  Date of evaluation: 4/10/2024    Procedure Summary       Date: 04/10/24 Room / Location: [address]    Anesthesia Start: 0700 Anesthesia Stop: 0921    Procedure: RIGHT FOOT LAPIDUS, MID FOOT FUSION, TALONAVICULAR FUSION (Right: Foot) Diagnosis:       Arthritis of right ankle      Charcot's joint of right ankle      (Arthritis of right ankle [M19.071])      (Charcot's joint of right ankle [M14.671])    Surgeons: Russ Dawkins DPM Responsible Provider: Moise Malone MD    Anesthesia Type: general ASA Status: 3            Anesthesia Type: No value filed.    Junie Phase I: Junie Score: 10    Junie Phase II:      Anesthesia Post Evaluation    Patient location during evaluation: PACU  Patient participation: complete - patient participated  Level of consciousness: awake  Airway patency: patent  Nausea & Vomiting: no nausea and no vomiting  Cardiovascular status: hemodynamically stable  Respiratory status: acceptable  Hydration status: stable  Pain management: adequate    No notable events documented.

## 2024-04-10 NOTE — OP NOTE
Lapidus bunionectomy and fusion at the tarsometatarsal 1.    DESCRIPTION OF PROCEDURE:  The patient was seen in the preop holding area. Appropriate site marking was performed.  He agreed to consent, site marked, and preoperative management.  He was brought into the OR, placed on well-padded OR table, where anesthesia was achieved.  Once the anesthesia was achieved, appropriate time-out was performed, everyone in the room agreed.  At this time, his right foot and leg were prepped and draped in the usual sterile fashion.  Hemostasis was accomplished. The mid thigh tourniquet was inflated to 300 mmHg.  Attention was directed to the mid foot talonavicular joint where incision was deepened in the same plane using sharp and blunt dissection avoiding neurovascular structures and carried down to the deep area.  At this time, the fibrous tissue was removed. At this point in time four screws were used to  _____ bone and joint.  After debridement of the articular surfaces of the TM joint.  This was fixated with several 4.0 _____ screws. Next, attention was directed to the midfoot.  At this time, bone was resected at the midfoot at the talonavicular cuneiform area and at this point in time, a fixation and bone debridement was performed.  Bone was sent for Gram stain, aerobic, anaerobic, fungal abscess, as well as biopsy.  At that time it was temporarily fixated with  ______ bone and cortical  ______ and temporarily fixated with K-wires.     Lapidus bunionectomy: At this time the TMT-1 was taken down  _______ position, reduced, and held into neutral position.  At this time, the Synthes 8-hole Recon plate was inserted into the plantar aspect of the foot.  This was fixed with the combination of lag screws and locking screws.   *** fixated and independent screws were placed from the midfoot to the hindfoot and ankle and midfoot to the forefoot.  Allogenic bone was packed very tightly into the surgical site as well as cortical  with the combination of lag screws and locking screws.   This was  fixated with independent screws were placed from the midfoot to the hindfoot and ankle and midfoot to the forefoot.  Allogenic bone was packed very tightly into the surgical site as well as cortical cancellous graft was packed very tightly.     The deep tissues were closed using 0 Vicryl.  Skin was closed using 2-0 nylon.  He was anesthetized with 30 mL of 0.5% plain Marcaine in both surgical sites. The surgical wounds were dressed with Betadine-soaked Adaptic, 4x4s Westley in a circumferential fashion.     An univalve cast was applied with a posterior splint and a sugar-tong splint was applied to the right lower extremity.  He tolerated the procedure and anesthesia well left the OR with vital signs stable and vascular status intact.          JANINA CURIEL DPM      D:  04/10/2024 09:43:47     T:  04/10/2024 12:43:32     SEVERO/PAUL  Job #:  [id number]     Doc#:  [id number]

## 2024-04-10 NOTE — BRIEF OP NOTE
Brief Postoperative Note      Patient: Duane Brady  YOB: 1990  MRN: [id number]    Date of Procedure: 4/10/2024    Pre-Op Diagnosis Codes:     * Arthritis of right ankle [M19.071]     * Charcot's joint of right ankle [M14.671]    Post-Op Diagnosis: Same       Procedure(s):  RIGHT FOOT LAPIDUS, MID FOOT FUSION, TALONAVICULAR FUSION    Surgeon(s):  Russ Dawkins DPM Abigail, Hannah C, DPM    Assistant:  Resident: Claudia Brennan DPM; Kristen Briggs DPM    Anesthesia: General    Estimated Blood Loss (mL): less than 50     Complications: None    Specimens:   ID Type Source Tests Collected by Time Destination   1 : RIGHT MID-FOOT BONE Bone Bone CULTURE, ANAEROBIC, CULTURE, FUNGUS, GRAM STAIN, CULTURE, SURGICAL, CULTURE WITH SMEAR, ACID FAST BACILLIUS Russ Dawkins DPM 4/10/2024 0740    A : RIGHT MID-FOOT BONE Bone Bone SURGICAL PATHOLOGY Russ Dawkins DPM 4/10/2024 0740        Implants:  Implant Name Type Inv. Item Serial No.  Lot No. LRB No. Used Action   GRAFT BNE L50MM [id number] THICKNESS 5-30MM ILIUM TRICORT - [id number]  GRAFT BNE L50MM [id number] THICKNESS 5-30MM ILIUM TRICORT [id number] [facility] TRANSPLANT [employer]  Right 1 Implanted   GRAFT BNE SUB 15ML 1.7-10MM CANC CHIP MORSELIZED FRZ DRY - [id number]  GRAFT BNE SUB 15ML 1.7-10MM CANC CHIP MORSELIZED FRZ DRY [id number] [facility] TRANSPLANT [employer]  Right 1 Implanted   SCREW BNE L36MM DIA3.5MM SONU S STL ST MARK FULL THRD - [id number]  SCREW BNE L36MM DIA3.5MM SONU S STL ST MARK FULL THRD  [employer]  Right 2 Implanted   SCREW BNE L42MM DIA3.5MM SONU S STL ST MARK FULL THRD - [id number]  SCREW BNE L42MM DIA3.5MM SONU S STL ST MARK FULL THRD  DEPUY [employer]  Right 1 Implanted   SCREW BNE L38MM DIA3.5MM SONU S STL ST MARK FULL THRD - [id number]  SCREW BNE L38MM DIA3.5MM SONU S STL ST MARK FULL THRD  [facility] [place]  Right 1 Implanted   SCREW BNE L90MM DIA4MM SONU

## 2024-04-10 NOTE — DISCHARGE INSTRUCTIONS
Patient is non-weightbearing to operative extremity. Dressing is to be kept Clean, dry and intact. Patient to follow up within 1 week with Dr. Dawkins. Patient to take medication as prescribed and indicated.

## 2024-04-11 LAB
MICROORGANISM SPEC CULT: NO GROWTH
MICROORGANISM/AGENT SPEC: NORMAL
SERVICE CMNT-IMP: NORMAL
SPECIMEN DESCRIPTION: NORMAL

## 2024-04-12 LAB
MICROORGANISM SPEC CULT: NORMAL
SERVICE CMNT-IMP: NORMAL
SPECIMEN DESCRIPTION: NORMAL

## 2024-04-13 LAB
MICROORGANISM SPEC CULT: NORMAL
MICROORGANISM SPEC CULT: NORMAL
MICROORGANISM/AGENT SPEC: NORMAL
MICROORGANISM/AGENT SPEC: NORMAL
SERVICE CMNT-IMP: NORMAL
SERVICE CMNT-IMP: NORMAL
SPECIMEN DESCRIPTION: NORMAL
SPECIMEN DESCRIPTION: NORMAL

## 2024-04-15 LAB
MICROORGANISM SPEC CULT: NORMAL
SERVICE CMNT-IMP: NORMAL
SPECIMEN DESCRIPTION: NORMAL
SURGICAL PATHOLOGY REPORT: NORMAL

## 2024-04-19 LAB
MICROORGANISM SPEC CULT: NORMAL
MICROORGANISM/AGENT SPEC: NORMAL
SERVICE CMNT-IMP: NORMAL
SPECIMEN DESCRIPTION: NORMAL

## 2024-04-29 RX ORDER — FERROUS SULFATE 325(65) MG
325 TABLET ORAL 2 TIMES DAILY WITH MEALS
Qty: 30 TABLET | Refills: 3 | OUTPATIENT
Start: 2024-04-29

## 2024-05-06 RX ORDER — SODIUM BICARBONATE 650 MG/1
1300 TABLET ORAL DAILY
Qty: 180 TABLET | Refills: 1 | OUTPATIENT
Start: 2024-05-06

## 2024-05-06 RX ORDER — FERROUS SULFATE 325(65) MG
325 TABLET ORAL 2 TIMES DAILY WITH MEALS
Qty: 60 TABLET | Refills: 0 | Status: SHIPPED | OUTPATIENT
Start: 2024-05-06

## 2024-05-06 NOTE — TELEPHONE ENCOUNTER
Last Appointment:  2/2/2024  Future Appointments   Date Time Provider Department Center   5/13/2024  8:00 AM Adela Ramsay MD ACC IM [facility]   6/4/2024  8:30 AM Daphne Thomas APRN - NP [facility] ENDO [facility]      Pt takes 2 a day

## 2024-05-10 RX ORDER — SODIUM BICARBONATE 650 MG/1
1300 TABLET ORAL DAILY
Qty: 180 TABLET | Refills: 1 | OUTPATIENT
Start: 2024-05-10

## 2024-05-10 RX ORDER — FERROUS SULFATE 325(65) MG
325 TABLET ORAL 2 TIMES DAILY WITH MEALS
Qty: 60 TABLET | Refills: 0 | OUTPATIENT
Start: 2024-05-10

## 2024-05-13 ENCOUNTER — OFFICE VISIT (OUTPATIENT)
Dept: INTERNAL MEDICINE | Age: 34
End: 2024-05-13
Payer: COMMERCIAL

## 2024-05-13 VITALS
TEMPERATURE: 97.6 F | HEIGHT: 69 IN | BODY MASS INDEX: 31.1 KG/M2 | WEIGHT: 210 LBS | DIASTOLIC BLOOD PRESSURE: 100 MMHG | HEART RATE: 80 BPM | RESPIRATION RATE: 16 BRPM | SYSTOLIC BLOOD PRESSURE: 160 MMHG | OXYGEN SATURATION: 99 %

## 2024-05-13 DIAGNOSIS — E11.65 POORLY CONTROLLED DIABETES MELLITUS (HCC): ICD-10-CM

## 2024-05-13 DIAGNOSIS — D50.9 IRON DEFICIENCY ANEMIA, UNSPECIFIED IRON DEFICIENCY ANEMIA TYPE: Primary | ICD-10-CM

## 2024-05-13 DIAGNOSIS — E03.8 HYPOTHYROIDISM DUE TO HASHIMOTO'S THYROIDITIS: ICD-10-CM

## 2024-05-13 DIAGNOSIS — E06.3 HYPOTHYROIDISM DUE TO HASHIMOTO'S THYROIDITIS: ICD-10-CM

## 2024-05-13 DIAGNOSIS — I10 ESSENTIAL HYPERTENSION: ICD-10-CM

## 2024-05-13 LAB — HBA1C MFR BLD: 6.7 %

## 2024-05-13 PROCEDURE — G8417 CALC BMI ABV UP PARAM F/U: HCPCS

## 2024-05-13 PROCEDURE — 3044F HG A1C LEVEL LT 7.0%: CPT

## 2024-05-13 PROCEDURE — 3077F SYST BP >= 140 MM HG: CPT

## 2024-05-13 PROCEDURE — 3080F DIAST BP >= 90 MM HG: CPT

## 2024-05-13 PROCEDURE — 99212 OFFICE O/P EST SF 10 MIN: CPT

## 2024-05-13 PROCEDURE — 4004F PT TOBACCO SCREEN RCVD TLK: CPT

## 2024-05-13 PROCEDURE — 2022F DILAT RTA XM EVC RTNOPTHY: CPT

## 2024-05-13 PROCEDURE — 99213 OFFICE O/P EST LOW 20 MIN: CPT

## 2024-05-13 PROCEDURE — G8427 DOCREV CUR MEDS BY ELIG CLIN: HCPCS

## 2024-05-13 PROCEDURE — 83036 HEMOGLOBIN GLYCOSYLATED A1C: CPT

## 2024-05-13 RX ORDER — INSULIN GLARGINE 100 [IU]/ML
32 INJECTION, SOLUTION SUBCUTANEOUS NIGHTLY
Qty: 5 ADJUSTABLE DOSE PRE-FILLED PEN SYRINGE | Refills: 3 | Status: SHIPPED | OUTPATIENT
Start: 2024-05-13

## 2024-05-13 RX ORDER — LEVOTHYROXINE SODIUM 0.12 MG/1
125 TABLET ORAL DAILY
Qty: 90 TABLET | Refills: 1 | Status: SHIPPED | OUTPATIENT
Start: 2024-05-13

## 2024-05-13 RX ORDER — METOPROLOL SUCCINATE 25 MG/1
50 TABLET, EXTENDED RELEASE ORAL EVERY MORNING
Qty: 90 TABLET | Refills: 3 | Status: SHIPPED
Start: 2024-05-13 | End: 2024-05-19 | Stop reason: SDUPTHER

## 2024-05-13 RX ORDER — INSULIN LISPRO 100 [IU]/ML
INJECTION, SOLUTION INTRAVENOUS; SUBCUTANEOUS
Qty: 10 ADJUSTABLE DOSE PRE-FILLED PEN SYRINGE | Refills: 3 | Status: SHIPPED | OUTPATIENT
Start: 2024-05-13

## 2024-05-13 NOTE — PROGRESS NOTES
[facility]  Internal Medicine Residency Program  ACC Note    SUBJECTIVE:  CC: had concerns including Diabetes (BS at present 129   BS 43 - 220) and Medication Refill.    HPI:  Duane Brady is a 34 y.o.male with presenting to [facility] for blood pressure check and to provide him with PAP medication. Patient had surgery on his right foot reconstruction done on 4/10. He sees the podiatrist back tomorrow to remove the stitches and cast. He says his pain is fairly well controlled. He does check his sugars at home. Will repeat A1c today. Refilled insulin today. He does not check his blood pressure at home, he says he is compliant with medication will increase Toprol to 50 mg today.     Type 1 Diabetes  -A1c done today 6.5   - glucose average 100s   -am average ranges 150-300  -sliding scale once he hits 150.   -6 months ago last eye exam  -Insulin refilled- Pt takes Insulin 32 u nightly with a sliding scale     Hx of charcot foot Patient recently had right foot reconstruction-    1. Talonavicular joint osteoarthritis.  2. Midfoot Charcot arthropathy.  3. Mild HTV deformity of the right.    HTN  -BP elevated today 160/100  -pt takes amlodipine 10mg, lisinopril 40 mg and toprol xl 25mg  - will increase toprol xl to 50mg    Hypothyroid   -10/2/23 TSH 14.15  - will repeat TSH and free T4    Iron deficiency anemia  - will repeat Iron panel            Diagnosis Date    Abscess of back 08/2011    Anemia     Bowel obstruction (HCC)     Diabetes mellitus type 1 (HCC) Diagnosed at 8 years of age/ in 1998    Diabetic ulcer of right foot associated with type 1 diabetes mellitus, with necrosis of muscle (HCC) 10/09/2023    HTN (hypertension)     Hyperlipidemia     Type 1 diabetes mellitus with diabetic foot infection (HCC) 10/09/2023    Type 2 diabetes mellitus with Charcot's joint of right foot (HCC) 10/09/2023    Patient denies states is Type I        Review of Systems   Constitutional:  Negative for fatigue and

## 2024-05-13 NOTE — PROGRESS NOTES
[facility]  Internal Medicine Residency Clinic    Attending Physician Statement  I have discussed the case, including pertinent history and exam findings with the resident physician.  I agree with the assessment, plan and orders as documented by the resident. I have reviewed all pertinent PMHx, PSHx, FamHx, SocialHx, medications, and allergies and updated history as appropriate.    Patient here for routine follow up of medical problems.     Htn  -uncontrolled on multiple visits   -planning to increase toprol to 50 mg daily; continue amlodipine and lisinopril at max dosages   -if still uncontrolled in future then plan to start HCTZ    IDDM2  -A1c in Feb was 10.8 but has improved to 6.5  --300 on average   -plan to conintue current regimen   -screening labs and testing     Foot Surgery  -ligamentous and tendon injury and repair   -folllwong with podiatry  -aggressive glucose and blood pressure control     Hypothyroidism  -elevated TSH at last visit in October  -plan to recheck per Dr. Welsh's recs and titrate as needed     Remainder of medical problems as per resident note.    Mandeep Valencia Jr, DO  5/13/24

## 2024-05-13 NOTE — PATIENT INSTRUCTIONS
Dear Duane Brady,        Thank you for coming to your appointment today. I hope we have addressed all of your needs.       Please make sure to do the following:  - Continue your medications as listed.  - Get labs drawn before our next follow up. We will call you with the results   please call the number listed.  - We will see each other again in 3 months     Call for a sooner appointment if you have any concerns.     Have a great day!        Sincerely,  Adela Ramsay MD  5/13/2024  9:13 AM

## 2024-05-19 DIAGNOSIS — I10 ESSENTIAL HYPERTENSION: ICD-10-CM

## 2024-05-20 RX ORDER — METOPROLOL SUCCINATE 25 MG/1
50 TABLET, EXTENDED RELEASE ORAL EVERY MORNING
Qty: 180 TABLET | Refills: 1 | Status: SHIPPED | OUTPATIENT
Start: 2024-05-20

## 2024-05-20 NOTE — TELEPHONE ENCOUNTER
Pt was increases to 50 mg at last appt.   Last Appointment:  5/13/2024  Future Appointments   Date Time Provider Department Center   6/4/2024  8:30 AM [facility]

## 2024-05-20 NOTE — TELEPHONE ENCOUNTER
Called [facility] pharmacy and they confirm the order is for 25 toprol xl twice making it 50. Tried to call patient to inform him to take 2 tablets to make it 50mg daily. Will try again.    Electronically signed by Vick Andrew MD on 5/20/2024 at 3:40 PM

## 2024-06-03 ENCOUNTER — HOSPITAL ENCOUNTER (OUTPATIENT)
Age: 34
Discharge: HOME | End: 2024-06-03
Payer: COMMERCIAL

## 2024-06-03 DIAGNOSIS — D50.9 IRON DEFICIENCY ANEMIA, UNSPECIFIED IRON DEFICIENCY ANEMIA TYPE: ICD-10-CM

## 2024-06-03 DIAGNOSIS — E03.8 HYPOTHYROIDISM DUE TO HASHIMOTO'S THYROIDITIS: ICD-10-CM

## 2024-06-03 DIAGNOSIS — E06.3 HYPOTHYROIDISM DUE TO HASHIMOTO'S THYROIDITIS: ICD-10-CM

## 2024-06-03 LAB
FERRITIN SERPL-MCNC: 243 NG/ML
IRON SATN MFR SERPL: 38 % (ref 20–55)
IRON SERPL-MCNC: 78 UG/DL (ref 59–158)
T4 FREE SERPL-MCNC: 1.2 NG/DL (ref 0.9–1.7)
TIBC SERPL-MCNC: 208 UG/DL (ref 250–450)
TSH SERPL DL<=0.05 MIU/L-ACNC: 12.72 UIU/ML (ref 0.27–4.2)

## 2024-06-03 PROCEDURE — 84443 ASSAY THYROID STIM HORMONE: CPT

## 2024-06-03 PROCEDURE — 83550 IRON BINDING TEST: CPT

## 2024-06-03 PROCEDURE — 82728 ASSAY OF FERRITIN: CPT

## 2024-06-03 PROCEDURE — 83540 ASSAY OF IRON: CPT

## 2024-06-03 PROCEDURE — 36415 COLL VENOUS BLD VENIPUNCTURE: CPT

## 2024-06-03 PROCEDURE — 84439 ASSAY OF FREE THYROXINE: CPT

## 2024-06-04 ENCOUNTER — OFFICE VISIT (OUTPATIENT)
Dept: ENDOCRINOLOGY | Age: 34
End: 2024-06-04
Payer: COMMERCIAL

## 2024-06-04 DIAGNOSIS — N18.4 CKD (CHRONIC KIDNEY DISEASE) STAGE 4, GFR 15-29 ML/MIN (HCC): ICD-10-CM

## 2024-06-04 DIAGNOSIS — R80.9 ALBUMINURIA: ICD-10-CM

## 2024-06-04 DIAGNOSIS — E11.65 POORLY CONTROLLED DIABETES MELLITUS (HCC): ICD-10-CM

## 2024-06-04 DIAGNOSIS — E78.5 HYPERLIPIDEMIA, UNSPECIFIED HYPERLIPIDEMIA TYPE: ICD-10-CM

## 2024-06-04 DIAGNOSIS — E66.09 CLASS 1 OBESITY DUE TO EXCESS CALORIES WITH SERIOUS COMORBIDITY AND BODY MASS INDEX (BMI) OF 31.0 TO 31.9 IN ADULT: Chronic | ICD-10-CM

## 2024-06-04 DIAGNOSIS — Z91.119 DIETARY NONCOMPLIANCE: ICD-10-CM

## 2024-06-04 DIAGNOSIS — E03.9 HYPOTHYROIDISM, UNSPECIFIED TYPE: ICD-10-CM

## 2024-06-04 DIAGNOSIS — E10.65 TYPE 1 DIABETES MELLITUS WITH HYPERGLYCEMIA (HCC): Primary | ICD-10-CM

## 2024-06-04 PROCEDURE — 4004F PT TOBACCO SCREEN RCVD TLK: CPT | Performed by: NURSE PRACTITIONER

## 2024-06-04 PROCEDURE — 99214 OFFICE O/P EST MOD 30 MIN: CPT | Performed by: NURSE PRACTITIONER

## 2024-06-04 PROCEDURE — G8417 CALC BMI ABV UP PARAM F/U: HCPCS | Performed by: NURSE PRACTITIONER

## 2024-06-04 PROCEDURE — 95251 CONT GLUC MNTR ANALYSIS I&R: CPT | Performed by: NURSE PRACTITIONER

## 2024-06-04 PROCEDURE — G8427 DOCREV CUR MEDS BY ELIG CLIN: HCPCS | Performed by: NURSE PRACTITIONER

## 2024-06-04 PROCEDURE — 2022F DILAT RTA XM EVC RTNOPTHY: CPT | Performed by: NURSE PRACTITIONER

## 2024-06-04 PROCEDURE — 3044F HG A1C LEVEL LT 7.0%: CPT | Performed by: NURSE PRACTITIONER

## 2024-06-04 RX ORDER — INSULIN GLARGINE 100 [IU]/ML
INJECTION, SOLUTION SUBCUTANEOUS
Qty: 9 ADJUSTABLE DOSE PRE-FILLED PEN SYRINGE | Refills: 3
Start: 2024-06-04

## 2024-06-04 RX ORDER — INSULIN LISPRO 100 [IU]/ML
INJECTION, SOLUTION INTRAVENOUS; SUBCUTANEOUS
Qty: 10 ADJUSTABLE DOSE PRE-FILLED PEN SYRINGE | Refills: 3 | Status: SHIPPED | OUTPATIENT
Start: 2024-06-04

## 2024-06-04 RX ORDER — LEVOTHYROXINE SODIUM 0.15 MG/1
150 TABLET ORAL DAILY
Qty: 30 TABLET | Refills: 1 | Status: SHIPPED | OUTPATIENT
Start: 2024-06-04

## 2024-06-04 NOTE — PROGRESS NOTES
[facility]  [facility] Department of Endocrinology Diabetes and Metabolism   [address], [place] [address]  Phone: [phone]  Fax: [phone]      Date of service: 6/4/2024  Primary Care Physician: Adela Ramsay MD  Provider: JUANA Montoya NP      Reason for the consultation:  Uncontrolled DM    History of Present Illness:  The history is provided by the patient. Accuracy of the patient data is excellent  Duane Brady is a very pleasant 34 y.o. old male with PMH of DM1, HTN, HLD and other listed below seen today for a follow-up visit for diabetes management.    The patient was diagnosed with type I DM.Dx at age 8.  Prior to admission patient was on basaglar 28 u qhs, lispro 10 units 3 times daily with meals plus medium dose sliding scale ACHS .      He has been checking his BS via Dexcom  TIR  58%  Hypoerglycemia 38%  Loes 4%  AVG   GMI 7.2%      Lab Results   Component Value Date/Time    LABA1C 6.7 05/13/2024 09:10 AM     Patient reported hypoglycemic episodes early AM 3am-5am    The patient hasn't been mindful of what has been eating and wasn't following diabetes diet    He eats one a meal a day   I reviewed current medications and the patient has no issues with diabetes medications  Duane Brady is up to date with eye exam and denied any history of diabetic retinopathy   Last eye exam was  12/2023 , + h/o diabetic retinopathy  The patient  performs  own feet care  Microvascular complications:  + Retinopathy, Nephropathy or Neuropathy   Macrovascular complications: no CAD, PVD, or Stroke    Past medical history:   Past Medical History:   Diagnosis Date    Abscess of back 08/2011    Anemia     Bowel obstruction (HCC)     Diabetes mellitus type 1 (HCC) Diagnosed at 8 years of age/ in 1998    Diabetic ulcer of right foot associated with type 1 diabetes mellitus, with necrosis of muscle (HCC) 10/09/2023    HTN (hypertension)     Hyperlipidemia

## 2024-06-10 RX ORDER — FERROUS SULFATE 325(65) MG
325 TABLET ORAL 2 TIMES DAILY WITH MEALS
Qty: 60 TABLET | Refills: 2 | Status: SHIPPED | OUTPATIENT
Start: 2024-06-10

## 2024-06-10 NOTE — TELEPHONE ENCOUNTER
Last Appointment:  5/13/24  Future Appointments   Date Time Provider Department Center   10/2/2024 12:00 PM Daphne Thomas, JUANA - NP [facility] END[facility]      Iron filled 5/13/24 #60 no refills (30 days = 6/12/24)  Next appt due in August

## 2024-08-06 DIAGNOSIS — I10 ESSENTIAL HYPERTENSION: ICD-10-CM

## 2024-08-06 DIAGNOSIS — E10.9 TYPE 1 DIABETES MELLITUS WITHOUT COMPLICATION (HCC): ICD-10-CM

## 2024-08-06 DIAGNOSIS — E10.65 TYPE 1 DIABETES MELLITUS WITH HYPERGLYCEMIA (HCC): ICD-10-CM

## 2024-08-06 RX ORDER — ATORVASTATIN CALCIUM 40 MG/1
40 TABLET, FILM COATED ORAL DAILY
Qty: 90 TABLET | Refills: 1 | Status: SHIPPED | OUTPATIENT
Start: 2024-08-06

## 2024-08-06 RX ORDER — AMLODIPINE BESYLATE 10 MG/1
10 TABLET ORAL DAILY
Qty: 90 TABLET | Refills: 1 | Status: SHIPPED
Start: 2024-08-06 | End: 2024-09-11 | Stop reason: SDUPTHER

## 2024-08-06 RX ORDER — LEVOTHYROXINE SODIUM 0.15 MG/1
150 TABLET ORAL DAILY
Qty: 30 TABLET | Refills: 2 | Status: SHIPPED | OUTPATIENT
Start: 2024-08-06

## 2024-08-06 NOTE — TELEPHONE ENCOUNTER
Last Appointment:  5/13/2024  Future Appointments   Date Time Provider Department Center   9/11/2024  8:00 AM Leola Paniagua MD ACC IM [facility]   10/2/2024 12:00 PM Daphne Thomas, JUANA - NP [facility] ENDO [facility]

## 2024-08-23 ENCOUNTER — HOSPITAL ENCOUNTER (OUTPATIENT)
Age: 34
Discharge: HOME | End: 2024-08-23
Payer: COMMERCIAL

## 2024-08-23 DIAGNOSIS — E03.9 HYPOTHYROIDISM, UNSPECIFIED TYPE: ICD-10-CM

## 2024-08-23 DIAGNOSIS — I10 HYPERTENSION, UNSPECIFIED TYPE: ICD-10-CM

## 2024-08-23 DIAGNOSIS — N18.4 CHRONIC KIDNEY DISEASE, STAGE IV (SEVERE) (HCC): ICD-10-CM

## 2024-08-23 DIAGNOSIS — N04.9 CONGENITAL NEPHROTIC SYNDROME: ICD-10-CM

## 2024-08-23 LAB
ANION GAP SERPL CALCULATED.3IONS-SCNC: 12 MMOL/L (ref 7–16)
BASOPHILS # BLD: 0.12 K/UL (ref 0–0.2)
BASOPHILS NFR BLD: 2 % (ref 0–2)
BUN SERPL-MCNC: 41 MG/DL (ref 6–20)
CALCIUM SERPL-MCNC: 9.1 MG/DL (ref 8.6–10.2)
CHLORIDE SERPL-SCNC: 106 MMOL/L (ref 98–107)
CO2 SERPL-SCNC: 23 MMOL/L (ref 22–29)
CREAT SERPL-MCNC: 4.1 MG/DL (ref 0.7–1.2)
EOSINOPHIL # BLD: 0.41 K/UL (ref 0.05–0.5)
EOSINOPHILS RELATIVE PERCENT: 5 % (ref 0–6)
ERYTHROCYTE [DISTWIDTH] IN BLOOD BY AUTOMATED COUNT: 13.7 % (ref 11.5–15)
GFR, ESTIMATED: 18 ML/MIN/1.73M2
GLUCOSE SERPL-MCNC: 121 MG/DL (ref 74–99)
HCT VFR BLD AUTO: 32.5 % (ref 37–54)
HGB BLD-MCNC: 10.3 G/DL (ref 12.5–16.5)
IMM GRANULOCYTES # BLD AUTO: <0.03 K/UL (ref 0–0.58)
IMM GRANULOCYTES NFR BLD: 0 % (ref 0–5)
INR PPP: 1
LYMPHOCYTES NFR BLD: 2.25 K/UL (ref 1.5–4)
LYMPHOCYTES RELATIVE PERCENT: 30 % (ref 20–42)
MCH RBC QN AUTO: 28.8 PG (ref 26–35)
MCHC RBC AUTO-ENTMCNC: 31.7 G/DL (ref 32–34.5)
MCV RBC AUTO: 90.8 FL (ref 80–99.9)
MONOCYTES NFR BLD: 0.65 K/UL (ref 0.1–0.95)
MONOCYTES NFR BLD: 9 % (ref 2–12)
NEUTROPHILS NFR BLD: 54 % (ref 43–80)
NEUTS SEG NFR BLD: 4.1 K/UL (ref 1.8–7.3)
PLATELET # BLD AUTO: 367 K/UL (ref 130–450)
PMV BLD AUTO: 9.2 FL (ref 7–12)
POTASSIUM SERPL-SCNC: 5.5 MMOL/L (ref 3.5–5)
PROTHROMBIN TIME: 10.5 SEC (ref 9.3–12.4)
RBC # BLD AUTO: 3.58 M/UL (ref 3.8–5.8)
SODIUM SERPL-SCNC: 141 MMOL/L (ref 132–146)
TSH SERPL DL<=0.05 MIU/L-ACNC: 7.43 UIU/ML (ref 0.27–4.2)
WBC OTHER # BLD: 7.6 K/UL (ref 4.5–11.5)

## 2024-08-23 PROCEDURE — 80048 BASIC METABOLIC PNL TOTAL CA: CPT

## 2024-08-23 PROCEDURE — 85025 COMPLETE CBC W/AUTO DIFF WBC: CPT

## 2024-08-23 PROCEDURE — 36415 COLL VENOUS BLD VENIPUNCTURE: CPT

## 2024-08-23 PROCEDURE — 84439 ASSAY OF FREE THYROXINE: CPT

## 2024-08-23 PROCEDURE — 84443 ASSAY THYROID STIM HORMONE: CPT

## 2024-08-23 PROCEDURE — 85610 PROTHROMBIN TIME: CPT

## 2024-08-24 LAB — T4 FREE SERPL-MCNC: 1.4 NG/DL (ref 0.9–1.7)

## 2024-08-26 ENCOUNTER — TELEPHONE (OUTPATIENT)
Dept: ENDOCRINOLOGY | Age: 34
End: 2024-08-26

## 2024-08-26 ENCOUNTER — HOSPITAL ENCOUNTER (OUTPATIENT)
Dept: CT IMAGING | Age: 34
Discharge: HOME | End: 2024-08-28
Payer: COMMERCIAL

## 2024-08-26 VITALS
SYSTOLIC BLOOD PRESSURE: 135 MMHG | HEART RATE: 80 BPM | DIASTOLIC BLOOD PRESSURE: 65 MMHG | RESPIRATION RATE: 16 BRPM | TEMPERATURE: 97.5 F | OXYGEN SATURATION: 98 %

## 2024-08-26 DIAGNOSIS — N18.4 CHRONIC KIDNEY DISEASE, STAGE IV (SEVERE) (HCC): ICD-10-CM

## 2024-08-26 DIAGNOSIS — E03.9 HYPOTHYROIDISM, UNSPECIFIED TYPE: Primary | ICD-10-CM

## 2024-08-26 LAB — GLUCOSE BLD-MCNC: 165 MG/DL (ref 74–99)

## 2024-08-26 PROCEDURE — 82962 GLUCOSE BLOOD TEST: CPT

## 2024-08-26 PROCEDURE — 6360000002 HC RX W HCPCS: Performed by: RADIOLOGY

## 2024-08-26 PROCEDURE — 2709999900 CT BIOPSY RENAL

## 2024-08-26 PROCEDURE — 7100000011 HC PHASE II RECOVERY - ADDTL 15 MIN: Performed by: RADIOLOGY

## 2024-08-26 PROCEDURE — 2500000003 HC RX 250 WO HCPCS: Performed by: RADIOLOGY

## 2024-08-26 PROCEDURE — 99153 MOD SED SAME PHYS/QHP EA: CPT | Performed by: RADIOLOGY

## 2024-08-26 PROCEDURE — 36415 COLL VENOUS BLD VENIPUNCTURE: CPT | Performed by: RADIOLOGY

## 2024-08-26 PROCEDURE — 7100000010 HC PHASE II RECOVERY - FIRST 15 MIN: Performed by: RADIOLOGY

## 2024-08-26 PROCEDURE — 77012 CT SCAN FOR NEEDLE BIOPSY: CPT

## 2024-08-26 RX ORDER — FENTANYL CITRATE 50 UG/ML
INJECTION, SOLUTION INTRAMUSCULAR; INTRAVENOUS PRN
Status: COMPLETED | OUTPATIENT
Start: 2024-08-26 | End: 2024-08-26

## 2024-08-26 RX ORDER — HYDRALAZINE HYDROCHLORIDE 20 MG/ML
INJECTION INTRAMUSCULAR; INTRAVENOUS PRN
Status: COMPLETED | OUTPATIENT
Start: 2024-08-26 | End: 2024-08-26

## 2024-08-26 RX ORDER — MIDAZOLAM HYDROCHLORIDE 2 MG/2ML
INJECTION, SOLUTION INTRAMUSCULAR; INTRAVENOUS PRN
Status: COMPLETED | OUTPATIENT
Start: 2024-08-26 | End: 2024-08-26

## 2024-08-26 RX ORDER — LEVOTHYROXINE SODIUM 175 UG/1
175 TABLET ORAL DAILY
Qty: 30 TABLET | Refills: 5 | Status: SHIPPED | OUTPATIENT
Start: 2024-08-26

## 2024-08-26 RX ORDER — HYDRALAZINE HYDROCHLORIDE 20 MG/ML
10 INJECTION INTRAMUSCULAR; INTRAVENOUS ONCE
Status: COMPLETED | OUTPATIENT
Start: 2024-08-26 | End: 2024-08-26

## 2024-08-26 RX ADMIN — MIDAZOLAM HYDROCHLORIDE 1 MG: 1 INJECTION, SOLUTION INTRAMUSCULAR; INTRAVENOUS at 09:35

## 2024-08-26 RX ADMIN — FENTANYL CITRATE 50 MCG: 50 INJECTION, SOLUTION INTRAMUSCULAR; INTRAVENOUS at 09:42

## 2024-08-26 RX ADMIN — HYDRALAZINE HYDROCHLORIDE 10 MG: 20 INJECTION INTRAMUSCULAR; INTRAVENOUS at 08:59

## 2024-08-26 RX ADMIN — THROMBIN HUMAN 1 KIT: 2000 POWDER, FOR SOLUTION TOPICAL at 09:47

## 2024-08-26 RX ADMIN — HYDRALAZINE HYDROCHLORIDE 5 MG: 20 INJECTION INTRAMUSCULAR; INTRAVENOUS at 09:47

## 2024-08-26 RX ADMIN — MIDAZOLAM HYDROCHLORIDE 1 MG: 1 INJECTION, SOLUTION INTRAMUSCULAR; INTRAVENOUS at 09:42

## 2024-08-26 RX ADMIN — FENTANYL CITRATE 50 MCG: 50 INJECTION, SOLUTION INTRAMUSCULAR; INTRAVENOUS at 09:35

## 2024-08-26 NOTE — BRIEF OP NOTE
Brief Postoperative Note  ______________________________________________________________       [facility] BRIGHT CT SCAN    Patient Name: Duane Brady   YOB: 1990  Medical Record Number: [id number]  Date of Procedure: 8/26/24  Room/Bed: Room/bed info not found    Pre-operative Diagnosis and Procedure: Random Kid Bx    Post-operative Diagnosis: Same    Consent: INFORMED CONSENT WAS OBTAINED BY patient, RISK AND BENEFITS WERE DISCUSSED.     Anesthesia: Local anesthesia with approximately 10 mL of 1% Lidocaine with epinephrine administered subcutaneously administered subcutaneously. intravenous sedation.    Estimated Blood Loss: < 10 cc    Performed by: Liliam Melvin MD.    Complications: none    Specimen obtained: 18g cores x3    Findings: none    Electronically signed by Liliam Melvin MD   DD: 8/26/24  3:11 PM

## 2024-08-26 NOTE — TELEPHONE ENCOUNTER
TSH is improving form 12-> 7, goal 4. IS he missing doses of Synthroid?  If not, I iwll increase his dose

## 2024-08-26 NOTE — PROCEDURES
PROCEDURE NOTE  Date: 8/26/2024   Name: Duane Brady  YOB: 1990    Procedures: Renal Biopsy  Patient arrived to Radiology department for Renal Biopsy. Allergies, home medications, H&P and fasting instructions reviewed with patient. Vital signs taken. 20g IV placed, IV flushed and prn adapter attached. Procedural instructions given, questions answered, understanding expressed and consent signed. Patient given fluoroscopy education, no questions at this time.

## 2024-08-26 NOTE — PRE SEDATION
PRE-SEDATION ASSESSMENT NOTE    Patient Name: Duane Brady   Medical Record Number: [id number]  Date: 8/26/24   Time: 3:10 PM EDT   Room/Bed: Room/bed info not found  Admitting Diagnosis: <principal problem not specified>    1. HISTORY & PHYSICAL EXAMINATION:  Comments: none    Vitals:    08/26/24 0958   BP: 135/65   Pulse: 80   Resp: 16   Temp:    SpO2: 98%       Allergies: Pineapple    2. Heart and Lungs immediately prior to procedure demonstrate no contraindications to proceed    Drug: unknown  Tobacco: unknown    3. PAST ANESTHESIA EXPERIENCE:  unknown.     4. AIRWAY/TEETH/HEAD & NECK(Mallampati Classification):  II (soft palate, uvula, fauces visible)    5: NORMAL RANGE OF MOTION OF NECK: Yes and No    6. PATIENT WILL LIKELY TOLERATE PLAN OF MODERATE SEDATION    7. ASA 2.    Electronically signed by Liliam Melvin MD

## 2024-08-28 LAB — SURGICAL PATHOLOGY REPORT: NORMAL

## 2024-09-11 ENCOUNTER — OFFICE VISIT (OUTPATIENT)
Dept: INTERNAL MEDICINE | Age: 34
End: 2024-09-11
Payer: COMMERCIAL

## 2024-09-11 VITALS
HEIGHT: 69 IN | BODY MASS INDEX: 31.7 KG/M2 | HEART RATE: 78 BPM | DIASTOLIC BLOOD PRESSURE: 99 MMHG | RESPIRATION RATE: 16 BRPM | SYSTOLIC BLOOD PRESSURE: 177 MMHG | TEMPERATURE: 97.2 F | WEIGHT: 214 LBS | OXYGEN SATURATION: 99 %

## 2024-09-11 DIAGNOSIS — E11.610 TYPE 2 DIABETES MELLITUS WITH CHARCOT'S JOINT OF RIGHT FOOT (HCC): ICD-10-CM

## 2024-09-11 DIAGNOSIS — E10.10 TYPE 1 DIABETES MELLITUS WITH KETOACIDOSIS WITHOUT COMA (HCC): ICD-10-CM

## 2024-09-11 DIAGNOSIS — Z01.84 IMMUNITY TO VARICELLA DETERMINED BY SEROLOGIC TEST: Primary | ICD-10-CM

## 2024-09-11 DIAGNOSIS — E78.5 HYPERLIPIDEMIA, UNSPECIFIED HYPERLIPIDEMIA TYPE: Chronic | ICD-10-CM

## 2024-09-11 DIAGNOSIS — I10 ESSENTIAL HYPERTENSION: ICD-10-CM

## 2024-09-11 PROBLEM — E10.610: Status: ACTIVE | Noted: 2023-10-09

## 2024-09-11 PROBLEM — Z91.119 DIETARY NONCOMPLIANCE: Status: RESOLVED | Noted: 2023-10-04 | Resolved: 2024-09-11

## 2024-09-11 PROBLEM — L03.115 CELLULITIS OF RIGHT LOWER EXTREMITY: Status: RESOLVED | Noted: 2023-10-02 | Resolved: 2024-09-11

## 2024-09-11 PROBLEM — Z87.81 S/P TIBIAL FRACTURE: Status: RESOLVED | Noted: 2024-03-22 | Resolved: 2024-09-11

## 2024-09-11 PROBLEM — R78.81 POSITIVE BLOOD CULTURE: Status: RESOLVED | Noted: 2023-10-06 | Resolved: 2024-09-11

## 2024-09-11 PROCEDURE — G8427 DOCREV CUR MEDS BY ELIG CLIN: HCPCS

## 2024-09-11 PROCEDURE — 3044F HG A1C LEVEL LT 7.0%: CPT

## 2024-09-11 PROCEDURE — 3077F SYST BP >= 140 MM HG: CPT

## 2024-09-11 PROCEDURE — G8417 CALC BMI ABV UP PARAM F/U: HCPCS

## 2024-09-11 PROCEDURE — 99214 OFFICE O/P EST MOD 30 MIN: CPT

## 2024-09-11 PROCEDURE — 2022F DILAT RTA XM EVC RTNOPTHY: CPT

## 2024-09-11 PROCEDURE — 3080F DIAST BP >= 90 MM HG: CPT

## 2024-09-11 PROCEDURE — 4004F PT TOBACCO SCREEN RCVD TLK: CPT

## 2024-09-11 RX ORDER — LABETALOL 100 MG/1
100 TABLET, FILM COATED ORAL 2 TIMES DAILY
Qty: 60 TABLET | Refills: 3 | Status: SHIPPED | OUTPATIENT
Start: 2024-09-11

## 2024-09-11 RX ORDER — FERROUS SULFATE 325(65) MG
325 TABLET ORAL 2 TIMES DAILY WITH MEALS
Qty: 60 TABLET | Refills: 2 | Status: SHIPPED | OUTPATIENT
Start: 2024-09-11

## 2024-09-11 RX ORDER — AMLODIPINE BESYLATE 10 MG/1
10 TABLET ORAL DAILY
Qty: 90 TABLET | Refills: 1 | Status: SHIPPED | OUTPATIENT
Start: 2024-09-11

## 2024-09-12 PROBLEM — E10.10 TYPE 1 DIABETES MELLITUS WITH KETOACIDOSIS WITHOUT COMA (HCC): Status: ACTIVE | Noted: 2023-10-09

## 2024-09-18 ENCOUNTER — DOCUMENTATION (OUTPATIENT)
Dept: TRANSPLANT | Facility: HOSPITAL | Age: 34
End: 2024-09-18
Payer: COMMERCIAL

## 2024-09-18 VITALS — WEIGHT: 220.46 LBS | BODY MASS INDEX: 32.65 KG/M2 | HEIGHT: 69 IN

## 2024-09-20 ENCOUNTER — TELEPHONE (OUTPATIENT)
Dept: TRANSPLANT | Facility: HOSPITAL | Age: 34
End: 2024-09-20
Payer: COMMERCIAL

## 2024-10-02 ENCOUNTER — OFFICE VISIT (OUTPATIENT)
Dept: ENDOCRINOLOGY | Age: 34
End: 2024-10-02

## 2024-10-02 VITALS
WEIGHT: 220 LBS | SYSTOLIC BLOOD PRESSURE: 124 MMHG | HEIGHT: 69 IN | BODY MASS INDEX: 32.58 KG/M2 | DIASTOLIC BLOOD PRESSURE: 80 MMHG

## 2024-10-02 DIAGNOSIS — E03.9 HYPOTHYROIDISM, UNSPECIFIED TYPE: ICD-10-CM

## 2024-10-02 DIAGNOSIS — E78.5 HYPERLIPIDEMIA, UNSPECIFIED HYPERLIPIDEMIA TYPE: ICD-10-CM

## 2024-10-02 DIAGNOSIS — E66.811 CLASS 1 OBESITY DUE TO EXCESS CALORIES WITH SERIOUS COMORBIDITY AND BODY MASS INDEX (BMI) OF 31.0 TO 31.9 IN ADULT: ICD-10-CM

## 2024-10-02 DIAGNOSIS — E11.65 POORLY CONTROLLED DIABETES MELLITUS (HCC): ICD-10-CM

## 2024-10-02 DIAGNOSIS — E10.65 TYPE 1 DIABETES MELLITUS WITH HYPERGLYCEMIA (HCC): Primary | ICD-10-CM

## 2024-10-02 DIAGNOSIS — N18.4 CKD (CHRONIC KIDNEY DISEASE) STAGE 4, GFR 15-29 ML/MIN (HCC): ICD-10-CM

## 2024-10-02 DIAGNOSIS — E66.09 CLASS 1 OBESITY DUE TO EXCESS CALORIES WITH SERIOUS COMORBIDITY AND BODY MASS INDEX (BMI) OF 31.0 TO 31.9 IN ADULT: ICD-10-CM

## 2024-10-02 LAB — HBA1C MFR BLD: 6.7 %

## 2024-10-02 RX ORDER — INSULIN PMP CART,AUT,G6/7,CNTR
1 EACH SUBCUTANEOUS ONCE
Qty: 1 KIT | Refills: 0 | Status: SHIPPED | OUTPATIENT
Start: 2024-10-02 | End: 2024-10-02

## 2024-10-02 RX ORDER — INSULIN GLARGINE 100 [IU]/ML
INJECTION, SOLUTION SUBCUTANEOUS
Qty: 9 ADJUSTABLE DOSE PRE-FILLED PEN SYRINGE | Refills: 3 | Status: SHIPPED | OUTPATIENT
Start: 2024-10-02

## 2024-10-02 RX ORDER — INSULIN PMP CART,AUT,G6/7,CNTR
EACH SUBCUTANEOUS
Qty: 10 EACH | Refills: 11 | Status: SHIPPED | OUTPATIENT
Start: 2024-10-02

## 2024-10-02 NOTE — PROGRESS NOTES
CGM download. Full CGM was scanned under media.     Primary hypothyroidism  Levothyroxine 175 mcg daily  Reports compliance with medication  We will obtain TFTs in 1 month    Hyperlipidemia  Lipitor 40 mg daily    Obesity  Discussed lifestyle changes including diet and exercise with patient in depth. Also, discussed with patient cardiovascular risk associated with obesity     CKD Stage 5  Refer to Nephrology  AVOID NSAIDS  Transplant referral  Will be looking into PD/HD      I personally reviewed external notes from PCP and other patient's care team providers, and personally interpreted labs associated with the above diagnosis. I also ordered labs to further assess and manage the above addressed medical conditions    The above issues were reviewed with the patient who understood and agreed with the plan.    Thank you for allowing us to participate in the care of this patient. Please do not hesitate to contact us with any additional questions.      Diagnosis Orders   1. Type 1 diabetes mellitus with hyperglycemia (HCC)  POCT glycosylated hemoglobin (Hb A1C)    GLUCOSE MONITOR, 72 HOUR, PHYS INTERP    Insulin Disposable Pump (OMNIPOD 5 G6 INTRO, GEN 5,) KIT    Insulin Disposable Pump (OMNIPOD 5 G6 PODS, GEN 5,) MISC      2. Hypothyroidism, unspecified type  TSH    T4, Free      3. Hyperlipidemia, unspecified hyperlipidemia type        4. Class 1 obesity due to excess calories with serious comorbidity and body mass index (BMI) of 31.0 to 31.9 in adult        5. CKD (chronic kidney disease) stage 4, GFR 15-29 ml/min ([facility])            JUANA Montoya NP    [facility]   [address] [place] [address]., [address], [place] [address]  Phone: [phone]  Fax: [phone]  --------------------------------------------  An electronic signature was used to authenticate this note. JUANA Montoya NP  on 10/2/2024 at 12:32 PM

## 2024-10-08 ENCOUNTER — TELEPHONE (OUTPATIENT)
Dept: ENDOCRINOLOGY | Age: 34
End: 2024-10-08

## 2024-10-08 DIAGNOSIS — E11.65 POORLY CONTROLLED DIABETES MELLITUS (HCC): ICD-10-CM

## 2024-10-08 DIAGNOSIS — E10.65 TYPE 1 DIABETES MELLITUS WITH HYPERGLYCEMIA (HCC): ICD-10-CM

## 2024-10-08 RX ORDER — INSULIN GLARGINE 100 [IU]/ML
INJECTION, SOLUTION SUBCUTANEOUS
Qty: 9 ADJUSTABLE DOSE PRE-FILLED PEN SYRINGE | Refills: 3 | Status: SHIPPED | OUTPATIENT
Start: 2024-10-08

## 2024-10-08 RX ORDER — INSULIN LISPRO 100 [IU]/ML
INJECTION, SOLUTION INTRAVENOUS; SUBCUTANEOUS
Qty: 10 ADJUSTABLE DOSE PRE-FILLED PEN SYRINGE | Refills: 3 | Status: SHIPPED | OUTPATIENT
Start: 2024-10-08

## 2024-10-08 RX ORDER — ACYCLOVIR 400 MG/1
TABLET ORAL
Qty: 9 EACH | Refills: 3 | Status: SHIPPED | OUTPATIENT
Start: 2024-10-08

## 2024-10-08 RX ORDER — INSULIN PMP CART,AUT,G6/7,CNTR
EACH SUBCUTANEOUS
Qty: 10 EACH | Refills: 11 | Status: SHIPPED | OUTPATIENT
Start: 2024-10-08

## 2024-10-08 RX ORDER — INSULIN PMP CART,AUT,G6/7,CNTR
1 EACH SUBCUTANEOUS ONCE
Qty: 1 KIT | Refills: 0 | Status: SHIPPED | OUTPATIENT
Start: 2024-10-08 | End: 2024-10-08

## 2024-10-08 RX ORDER — PEN NEEDLE, DIABETIC 32 GX 1/4"
NEEDLE, DISPOSABLE MISCELLANEOUS
Qty: 250 EACH | Refills: 5 | Status: SHIPPED | OUTPATIENT
Start: 2024-10-08

## 2024-11-15 RX ORDER — SODIUM BICARBONATE 650 MG/1
1300 TABLET ORAL DAILY
Qty: 180 TABLET | Refills: 1 | Status: SHIPPED | OUTPATIENT
Start: 2024-11-15

## 2024-11-21 ENCOUNTER — TELEPHONE (OUTPATIENT)
Dept: ENDOCRINOLOGY | Age: 34
End: 2024-11-21

## 2024-11-21 NOTE — TELEPHONE ENCOUNTER
Contacted by [employer] staff.  They state the patient has a Dexcom G7 with G6 compatible pods.  States that he declined training

## 2024-11-22 RX ORDER — SODIUM CHLORIDE 9 MG/ML
5-250 INJECTION, SOLUTION INTRAVENOUS PRN
OUTPATIENT
Start: 2024-11-22

## 2024-11-22 RX ORDER — SODIUM CHLORIDE 9 MG/ML
INJECTION, SOLUTION INTRAVENOUS CONTINUOUS
OUTPATIENT
Start: 2024-11-22

## 2024-11-22 RX ORDER — EPINEPHRINE 1 MG/ML
0.3 INJECTION, SOLUTION, CONCENTRATE INTRAVENOUS PRN
OUTPATIENT
Start: 2024-11-22

## 2024-11-22 RX ORDER — ONDANSETRON 2 MG/ML
8 INJECTION INTRAMUSCULAR; INTRAVENOUS
OUTPATIENT
Start: 2024-11-22

## 2024-11-22 RX ORDER — DIPHENHYDRAMINE HYDROCHLORIDE 50 MG/ML
50 INJECTION INTRAMUSCULAR; INTRAVENOUS
OUTPATIENT
Start: 2024-11-22

## 2024-11-22 RX ORDER — SODIUM CHLORIDE 0.9 % (FLUSH) 0.9 %
5-40 SYRINGE (ML) INJECTION PRN
OUTPATIENT
Start: 2024-11-22

## 2024-11-22 RX ORDER — HYDROCORTISONE SODIUM SUCCINATE 100 MG/2ML
100 INJECTION INTRAMUSCULAR; INTRAVENOUS
OUTPATIENT
Start: 2024-11-22

## 2024-11-22 RX ORDER — ACETAMINOPHEN 325 MG/1
650 TABLET ORAL
OUTPATIENT
Start: 2024-11-22

## 2024-11-22 RX ORDER — ALBUTEROL SULFATE 90 UG/1
4 INHALANT RESPIRATORY (INHALATION) PRN
OUTPATIENT
Start: 2024-11-22

## 2024-11-22 RX ORDER — HEPARIN 100 UNIT/ML
500 SYRINGE INTRAVENOUS PRN
OUTPATIENT
Start: 2024-11-22

## 2024-11-27 NOTE — PROGRESS NOTES
[facility]   PRE-ADMISSION TESTING GENERAL INSTRUCTIONS  PAT Phone Number: [phone]      GENERAL INSTRUCTIONS:    [x] Antibacterial Soap Shower Night before AND the Morning of procedure.  [x] Do not wear lotions, powders, deodorant the morning of surgery.  [x] No solid food after midnight. You may have SIPS of clear liquids up until 2 hours before your arrival time to the hospital.   [x] You may brush your teeth, gargle, but do not swallow water.   [x] No tobacco products, illegal drugs, or alcohol within 24 hours of your surgery.  [x] Jewelry or valuables should not be brought to the hospital. All body and/or tongue piercing's must be removed prior to arriving to hospital. No contact lens or hair pins.   [x] Arrange transportation with a responsible adult  to and from the hospital. Arrange for someone to be with you for the remainder of the day and for 24 hours after your procedure due to having had anesthesia.          -Who will be your  for transportation?  Parents         -Who will be staying with you for 24 hrs after your procedure? Parents   [x] Bring insurance card and photo ID.  [] Bring copy of living will or healthcare power of  papers to be placed in your electronic record.  [] Urine Pregnancy test will be preformed the day of surgery. A specimen sample may be brought from home.  [] Transfusion (Green) Bracelet: Please bring with you to hospital, day of surgery.     PARKING INSTRUCTIONS:     [x] ARRIVAL DATE & TIME: 12/5  @  0800   [x] Times are subject to change. We will contact you the business day before surgery if that were to occur.  [x] Enter into the [facility] Entrance. Two people may accompany you. Masks are not required.  [x] Parking Lot \"I\" is where you will park. It is located on the corner of [address] and [address]. The entrance is on [address].   Only one vehicle - per patient, is permitted in parking lot.   Upon

## 2024-12-03 ENCOUNTER — ANESTHESIA EVENT (OUTPATIENT)
Dept: OPERATING ROOM | Age: 34
End: 2024-12-03
Payer: COMMERCIAL

## 2024-12-04 ENCOUNTER — PREP FOR PROCEDURE (OUTPATIENT)
Dept: SURGERY | Age: 34
End: 2024-12-04

## 2024-12-04 RX ORDER — SODIUM CHLORIDE 0.9 % (FLUSH) 0.9 %
5-40 SYRINGE (ML) INJECTION EVERY 12 HOURS SCHEDULED
Status: CANCELLED | OUTPATIENT
Start: 2024-12-04

## 2024-12-04 RX ORDER — SODIUM CHLORIDE 9 MG/ML
INJECTION, SOLUTION INTRAVENOUS CONTINUOUS
Status: CANCELLED | OUTPATIENT
Start: 2024-12-04

## 2024-12-04 RX ORDER — SODIUM CHLORIDE 9 MG/ML
INJECTION, SOLUTION INTRAVENOUS PRN
Status: CANCELLED | OUTPATIENT
Start: 2024-12-04

## 2024-12-04 RX ORDER — SODIUM CHLORIDE 0.9 % (FLUSH) 0.9 %
5-40 SYRINGE (ML) INJECTION PRN
Status: CANCELLED | OUTPATIENT
Start: 2024-12-04

## 2024-12-05 ENCOUNTER — APPOINTMENT (OUTPATIENT)
Dept: GENERAL RADIOLOGY | Age: 34
End: 2024-12-05
Attending: SURGERY
Payer: COMMERCIAL

## 2024-12-05 ENCOUNTER — HOSPITAL ENCOUNTER (OUTPATIENT)
Age: 34
Setting detail: OUTPATIENT SURGERY
Discharge: HOME | End: 2024-12-05
Attending: SURGERY | Admitting: SURGERY
Payer: COMMERCIAL

## 2024-12-05 ENCOUNTER — ANESTHESIA (OUTPATIENT)
Dept: OPERATING ROOM | Age: 34
End: 2024-12-05
Payer: COMMERCIAL

## 2024-12-05 VITALS
TEMPERATURE: 97.6 F | DIASTOLIC BLOOD PRESSURE: 86 MMHG | OXYGEN SATURATION: 97 % | HEIGHT: 69 IN | SYSTOLIC BLOOD PRESSURE: 156 MMHG | HEART RATE: 77 BPM | RESPIRATION RATE: 20 BRPM | WEIGHT: 220 LBS | BODY MASS INDEX: 32.58 KG/M2

## 2024-12-05 LAB
ANION GAP SERPL CALCULATED.3IONS-SCNC: 14 MMOL/L (ref 7–16)
BASOPHILS # BLD: 0.1 K/UL (ref 0–0.2)
BASOPHILS NFR BLD: 1 % (ref 0–2)
BUN SERPL-MCNC: 75 MG/DL (ref 6–20)
CALCIUM SERPL-MCNC: 8.9 MG/DL (ref 8.6–10.2)
CHLORIDE SERPL-SCNC: 112 MMOL/L (ref 98–107)
CO2 SERPL-SCNC: 16 MMOL/L (ref 22–29)
CREAT SERPL-MCNC: 5.9 MG/DL (ref 0.7–1.2)
EOSINOPHIL # BLD: 0.33 K/UL (ref 0.05–0.5)
EOSINOPHILS RELATIVE PERCENT: 4 % (ref 0–6)
ERYTHROCYTE [DISTWIDTH] IN BLOOD BY AUTOMATED COUNT: 13.4 % (ref 11.5–15)
GFR, ESTIMATED: 12 ML/MIN/1.73M2
GLUCOSE BLD-MCNC: 116 MG/DL (ref 74–99)
GLUCOSE SERPL-MCNC: 123 MG/DL (ref 74–99)
HCT VFR BLD AUTO: 29 % (ref 37–54)
HGB BLD-MCNC: 9.2 G/DL (ref 12.5–16.5)
IMM GRANULOCYTES # BLD AUTO: <0.03 K/UL (ref 0–0.58)
IMM GRANULOCYTES NFR BLD: 0 % (ref 0–5)
LYMPHOCYTES NFR BLD: 1.55 K/UL (ref 1.5–4)
LYMPHOCYTES RELATIVE PERCENT: 18 % (ref 20–42)
MCH RBC QN AUTO: 29.5 PG (ref 26–35)
MCHC RBC AUTO-ENTMCNC: 31.7 G/DL (ref 32–34.5)
MCV RBC AUTO: 92.9 FL (ref 80–99.9)
MONOCYTES NFR BLD: 0.87 K/UL (ref 0.1–0.95)
MONOCYTES NFR BLD: 10 % (ref 2–12)
NEUTROPHILS NFR BLD: 66 % (ref 43–80)
NEUTS SEG NFR BLD: 5.7 K/UL (ref 1.8–7.3)
PLATELET # BLD AUTO: 285 K/UL (ref 130–450)
PMV BLD AUTO: 10.1 FL (ref 7–12)
POTASSIUM SERPL-SCNC: 5.5 MMOL/L (ref 3.5–5)
RBC # BLD AUTO: 3.12 M/UL (ref 3.8–5.8)
SODIUM SERPL-SCNC: 142 MMOL/L (ref 132–146)
WBC OTHER # BLD: 8.6 K/UL (ref 4.5–11.5)

## 2024-12-05 PROCEDURE — 2580000003 HC RX 258: Performed by: SURGERY

## 2024-12-05 PROCEDURE — 80048 BASIC METABOLIC PNL TOTAL CA: CPT

## 2024-12-05 PROCEDURE — 82962 GLUCOSE BLOOD TEST: CPT

## 2024-12-05 PROCEDURE — 71045 X-RAY EXAM CHEST 1 VIEW: CPT

## 2024-12-05 PROCEDURE — 85025 COMPLETE CBC W/AUTO DIFF WBC: CPT

## 2024-12-05 RX ORDER — SODIUM CHLORIDE 9 MG/ML
INJECTION, SOLUTION INTRAVENOUS PRN
Status: DISCONTINUED | OUTPATIENT
Start: 2024-12-05 | End: 2024-12-05 | Stop reason: HOSPADM

## 2024-12-05 RX ORDER — SODIUM CHLORIDE 0.9 % (FLUSH) 0.9 %
5-40 SYRINGE (ML) INJECTION EVERY 12 HOURS SCHEDULED
Status: DISCONTINUED | OUTPATIENT
Start: 2024-12-05 | End: 2024-12-05 | Stop reason: HOSPADM

## 2024-12-05 RX ORDER — SODIUM CHLORIDE 9 MG/ML
INJECTION, SOLUTION INTRAVENOUS CONTINUOUS
Status: DISCONTINUED | OUTPATIENT
Start: 2024-12-05 | End: 2024-12-05 | Stop reason: HOSPADM

## 2024-12-05 RX ORDER — SODIUM CHLORIDE 0.9 % (FLUSH) 0.9 %
5-40 SYRINGE (ML) INJECTION PRN
Status: DISCONTINUED | OUTPATIENT
Start: 2024-12-05 | End: 2024-12-05 | Stop reason: HOSPADM

## 2024-12-05 RX ADMIN — SODIUM CHLORIDE: 9 INJECTION, SOLUTION INTRAVENOUS at 08:51

## 2024-12-05 ASSESSMENT — PAIN - FUNCTIONAL ASSESSMENT: PAIN_FUNCTIONAL_ASSESSMENT: 0-10

## 2024-12-05 NOTE — PROGRESS NOTES
Dr. Barcenas here to see and speak to patient regarding anesthesia.   She wants the patient to remove his tongue ring.   He is very upset and irritated.  He wants his Iv out and he wants to go home.   Dr. Titus Cole called and updated to situation.   Upon entering the room he is more irritated about the \"incompetence of the hospital staff and the fact that he was not told to remove his tongue ring.   He states he will never step foot in the hospital again.   Iv discontinued and dressing applied.   Patient walked out on his own to fathers car

## 2024-12-13 ENCOUNTER — HOSPITAL ENCOUNTER (OUTPATIENT)
Dept: INFUSION THERAPY | Age: 34
Setting detail: INFUSION SERIES
Discharge: HOME | End: 2024-12-13
Payer: COMMERCIAL

## 2024-12-13 VITALS
SYSTOLIC BLOOD PRESSURE: 139 MMHG | DIASTOLIC BLOOD PRESSURE: 81 MMHG | HEART RATE: 71 BPM | TEMPERATURE: 97.6 F | OXYGEN SATURATION: 100 % | RESPIRATION RATE: 12 BRPM

## 2024-12-13 DIAGNOSIS — D63.1 ANEMIA OF CHRONIC RENAL FAILURE, UNSPECIFIED CKD STAGE: Primary | ICD-10-CM

## 2024-12-13 DIAGNOSIS — N18.9 CHRONIC KIDNEY DISEASE, UNSPECIFIED CKD STAGE: ICD-10-CM

## 2024-12-13 DIAGNOSIS — N18.9 ANEMIA OF CHRONIC RENAL FAILURE, UNSPECIFIED CKD STAGE: Primary | ICD-10-CM

## 2024-12-13 PROCEDURE — 2580000003 HC RX 258: Performed by: INTERNAL MEDICINE

## 2024-12-13 PROCEDURE — 96366 THER/PROPH/DIAG IV INF ADDON: CPT

## 2024-12-13 PROCEDURE — 6360000002 HC RX W HCPCS: Performed by: INTERNAL MEDICINE

## 2024-12-13 PROCEDURE — 96365 THER/PROPH/DIAG IV INF INIT: CPT

## 2024-12-13 RX ORDER — EPINEPHRINE 1 MG/ML
0.3 INJECTION, SOLUTION, CONCENTRATE INTRAVENOUS PRN
OUTPATIENT
Start: 2024-12-29

## 2024-12-13 RX ORDER — SODIUM CHLORIDE 0.9 % (FLUSH) 0.9 %
5-40 SYRINGE (ML) INJECTION PRN
Status: DISCONTINUED | OUTPATIENT
Start: 2024-12-13 | End: 2024-12-14 | Stop reason: HOSPADM

## 2024-12-13 RX ORDER — SODIUM CHLORIDE 9 MG/ML
5-250 INJECTION, SOLUTION INTRAVENOUS PRN
OUTPATIENT
Start: 2024-12-29

## 2024-12-13 RX ORDER — HYDROCORTISONE SODIUM SUCCINATE 100 MG/2ML
100 INJECTION INTRAMUSCULAR; INTRAVENOUS
OUTPATIENT
Start: 2024-12-29

## 2024-12-13 RX ORDER — SODIUM CHLORIDE 0.9 % (FLUSH) 0.9 %
5-40 SYRINGE (ML) INJECTION PRN
OUTPATIENT
Start: 2024-12-29

## 2024-12-13 RX ORDER — ONDANSETRON 2 MG/ML
8 INJECTION INTRAMUSCULAR; INTRAVENOUS
OUTPATIENT
Start: 2024-12-29

## 2024-12-13 RX ORDER — ACETAMINOPHEN 325 MG/1
650 TABLET ORAL
OUTPATIENT
Start: 2024-12-29

## 2024-12-13 RX ORDER — ERGOCALCIFEROL 1.25 MG/1
50000 CAPSULE ORAL WEEKLY
COMMUNITY
Start: 2024-11-15 | End: 2025-11-15

## 2024-12-13 RX ORDER — SODIUM CHLORIDE 9 MG/ML
INJECTION, SOLUTION INTRAVENOUS CONTINUOUS
OUTPATIENT
Start: 2024-12-29

## 2024-12-13 RX ORDER — SODIUM CHLORIDE 9 MG/ML
5-250 INJECTION, SOLUTION INTRAVENOUS PRN
Status: DISCONTINUED | OUTPATIENT
Start: 2024-12-13 | End: 2024-12-14 | Stop reason: HOSPADM

## 2024-12-13 RX ORDER — DIPHENHYDRAMINE HYDROCHLORIDE 50 MG/ML
50 INJECTION INTRAMUSCULAR; INTRAVENOUS
OUTPATIENT
Start: 2024-12-29

## 2024-12-13 RX ORDER — ALBUTEROL SULFATE 90 UG/1
4 INHALANT RESPIRATORY (INHALATION) PRN
OUTPATIENT
Start: 2024-12-29

## 2024-12-13 RX ORDER — HEPARIN 100 UNIT/ML
500 SYRINGE INTRAVENOUS PRN
OUTPATIENT
Start: 2024-12-29

## 2024-12-13 RX ADMIN — SODIUM CHLORIDE 300 MG: 9 INJECTION, SOLUTION INTRAVENOUS at 08:18

## 2024-12-13 RX ADMIN — SODIUM CHLORIDE 30 ML: 9 INJECTION, SOLUTION INTRAVENOUS at 09:55

## 2024-12-13 RX ADMIN — SODIUM CHLORIDE, PRESERVATIVE FREE 10 ML: 5 INJECTION INTRAVENOUS at 08:18

## 2024-12-13 NOTE — PROGRESS NOTES
Patient tolerated first venofer infusion well. Remained on unit for about 25 minutes after treatment-he could not stay any monger as he had another doctor appointment to get to. Patient alert and oriented x3. No distress noted. Vital signs stable. Patient denies any new or worsening pain. Educated patient on possible side effects and treatment of medication. Patient verbalized understanding. Offered patient education and/or discharge material. Patient provided d/c instructions and education handout on medication. Patient denies any needs. All questions answered. D/C in stable condition.

## 2024-12-16 RX ORDER — FERROUS SULFATE 325(65) MG
325 TABLET ORAL 2 TIMES DAILY WITH MEALS
Qty: 60 TABLET | Refills: 2 | OUTPATIENT
Start: 2024-12-16

## 2024-12-16 NOTE — TELEPHONE ENCOUNTER
Last Appointment:  9/11/2024  Future Appointments   Date Time Provider Department Center   1/10/2025  9:00 AM [facility]   1/23/2025 12:00 PM Daphne Thomas, JUANA - NP [facility]   2/7/2025  9:00 AM [facility]      Was to follow up 3 week but didn't

## 2025-01-10 ENCOUNTER — HOSPITAL ENCOUNTER (OUTPATIENT)
Dept: INFUSION THERAPY | Age: 35
Setting detail: INFUSION SERIES
Discharge: HOME | End: 2025-01-10
Payer: COMMERCIAL

## 2025-01-10 VITALS
DIASTOLIC BLOOD PRESSURE: 88 MMHG | RESPIRATION RATE: 16 BRPM | HEART RATE: 91 BPM | OXYGEN SATURATION: 100 % | SYSTOLIC BLOOD PRESSURE: 183 MMHG | TEMPERATURE: 97.8 F

## 2025-01-10 DIAGNOSIS — N18.9 CHRONIC KIDNEY DISEASE, UNSPECIFIED CKD STAGE: ICD-10-CM

## 2025-01-10 DIAGNOSIS — N18.9 ANEMIA OF CHRONIC RENAL FAILURE, UNSPECIFIED CKD STAGE: Primary | ICD-10-CM

## 2025-01-10 DIAGNOSIS — D63.1 ANEMIA OF CHRONIC RENAL FAILURE, UNSPECIFIED CKD STAGE: Primary | ICD-10-CM

## 2025-01-10 PROCEDURE — 2500000003 HC RX 250 WO HCPCS

## 2025-01-10 PROCEDURE — 96365 THER/PROPH/DIAG IV INF INIT: CPT

## 2025-01-10 PROCEDURE — 2580000003 HC RX 258: Performed by: INTERNAL MEDICINE

## 2025-01-10 PROCEDURE — 96366 THER/PROPH/DIAG IV INF ADDON: CPT

## 2025-01-10 PROCEDURE — 6360000002 HC RX W HCPCS: Performed by: INTERNAL MEDICINE

## 2025-01-10 RX ORDER — SODIUM CHLORIDE 9 MG/ML
INJECTION, SOLUTION INTRAVENOUS CONTINUOUS
OUTPATIENT
Start: 2025-02-07

## 2025-01-10 RX ORDER — SODIUM CHLORIDE 9 MG/ML
5-250 INJECTION, SOLUTION INTRAVENOUS PRN
OUTPATIENT
Start: 2025-02-07

## 2025-01-10 RX ORDER — ONDANSETRON 2 MG/ML
8 INJECTION INTRAMUSCULAR; INTRAVENOUS
OUTPATIENT
Start: 2025-02-07

## 2025-01-10 RX ORDER — SODIUM CHLORIDE 0.9 % (FLUSH) 0.9 %
5-40 SYRINGE (ML) INJECTION PRN
OUTPATIENT
Start: 2025-02-07

## 2025-01-10 RX ORDER — SODIUM CHLORIDE 9 MG/ML
5-250 INJECTION, SOLUTION INTRAVENOUS PRN
Status: DISCONTINUED | OUTPATIENT
Start: 2025-01-10 | End: 2025-01-11 | Stop reason: HOSPADM

## 2025-01-10 RX ORDER — SODIUM CHLORIDE 0.9 % (FLUSH) 0.9 %
SYRINGE (ML) INJECTION
Status: COMPLETED
Start: 2025-01-10 | End: 2025-01-10

## 2025-01-10 RX ORDER — DIPHENHYDRAMINE HYDROCHLORIDE 50 MG/ML
50 INJECTION INTRAMUSCULAR; INTRAVENOUS
OUTPATIENT
Start: 2025-02-07

## 2025-01-10 RX ORDER — ALBUTEROL SULFATE 90 UG/1
4 INHALANT RESPIRATORY (INHALATION) PRN
OUTPATIENT
Start: 2025-02-07

## 2025-01-10 RX ORDER — EPINEPHRINE 1 MG/ML
0.3 INJECTION, SOLUTION, CONCENTRATE INTRAVENOUS PRN
OUTPATIENT
Start: 2025-02-07

## 2025-01-10 RX ORDER — ACETAMINOPHEN 325 MG/1
650 TABLET ORAL
OUTPATIENT
Start: 2025-02-07

## 2025-01-10 RX ORDER — SODIUM CHLORIDE 0.9 % (FLUSH) 0.9 %
5-40 SYRINGE (ML) INJECTION PRN
Status: DISCONTINUED | OUTPATIENT
Start: 2025-01-10 | End: 2025-01-11 | Stop reason: HOSPADM

## 2025-01-10 RX ORDER — HEPARIN 100 UNIT/ML
500 SYRINGE INTRAVENOUS PRN
OUTPATIENT
Start: 2025-02-07

## 2025-01-10 RX ORDER — HYDROCORTISONE SODIUM SUCCINATE 100 MG/2ML
100 INJECTION INTRAMUSCULAR; INTRAVENOUS
OUTPATIENT
Start: 2025-02-07

## 2025-01-10 RX ADMIN — IRON SUCROSE 300 MG: 20 INJECTION, SOLUTION INTRAVENOUS at 09:23

## 2025-01-10 RX ADMIN — SODIUM CHLORIDE, PRESERVATIVE FREE 10 ML: 5 INJECTION INTRAVENOUS at 09:10

## 2025-01-10 RX ADMIN — SODIUM CHLORIDE 30 ML: 9 INJECTION, SOLUTION INTRAVENOUS at 11:02

## 2025-01-10 RX ADMIN — Medication 10 ML: at 09:10

## 2025-01-10 NOTE — FLOWSHEET NOTE
Patient tolerated infusion well. Remained on unit for 20 minutes after treatment. Patient alert and oriented x3. No distress noted. Vital signs stable. Patient denies any new or worsening pain. Educated patient on possible side effects and treatment of medication.     Patient verbalized understanding. Offered patient education and/or discharge material. Patient  declined. Patient denies any needs. All questions answered. D/C in stable condition.

## 2025-01-17 ENCOUNTER — ANESTHESIA EVENT (OUTPATIENT)
Dept: OPERATING ROOM | Age: 35
End: 2025-01-17
Payer: COMMERCIAL

## 2025-01-17 ENCOUNTER — HOSPITAL ENCOUNTER (INPATIENT)
Age: 35
LOS: 2 days | Discharge: HOME | DRG: 444 | End: 2025-01-19
Attending: EMERGENCY MEDICINE | Admitting: STUDENT IN AN ORGANIZED HEALTH CARE EDUCATION/TRAINING PROGRAM
Payer: COMMERCIAL

## 2025-01-17 DIAGNOSIS — N17.9 ACUTE KIDNEY INJURY (NONTRAUMATIC) (HCC): ICD-10-CM

## 2025-01-17 DIAGNOSIS — G89.18 POST-OPERATIVE PAIN: ICD-10-CM

## 2025-01-17 DIAGNOSIS — E87.5 HYPERKALEMIA: Primary | ICD-10-CM

## 2025-01-17 PROBLEM — D63.1 ANEMIA IN STAGE 5 CHRONIC KIDNEY DISEASE, NOT ON CHRONIC DIALYSIS (HCC): Chronic | Status: ACTIVE | Noted: 2025-01-17

## 2025-01-17 PROBLEM — N18.5 ANEMIA IN STAGE 5 CHRONIC KIDNEY DISEASE, NOT ON CHRONIC DIALYSIS (HCC): Chronic | Status: ACTIVE | Noted: 2025-01-17

## 2025-01-17 PROBLEM — N18.5 ACUTE KIDNEY INJURY SUPERIMPOSED ON STAGE 5 CHRONIC KIDNEY DISEASE, NOT ON CHRONIC DIALYSIS (HCC): Status: ACTIVE | Noted: 2025-01-17

## 2025-01-17 PROBLEM — E83.39 HYPERPHOSPHATEMIA: Status: ACTIVE | Noted: 2025-01-17

## 2025-01-17 PROBLEM — E78.5 HLD (HYPERLIPIDEMIA): Status: ACTIVE | Noted: 2018-06-10

## 2025-01-17 PROBLEM — R74.8 ELEVATED ALKALINE PHOSPHATASE LEVEL: Status: ACTIVE | Noted: 2025-01-17

## 2025-01-17 PROBLEM — E87.22 CHRONIC METABOLIC ACIDOSIS: Chronic | Status: ACTIVE | Noted: 2025-01-17

## 2025-01-17 LAB
ALBUMIN SERPL-MCNC: 3.7 G/DL (ref 3.5–5.2)
ALP SERPL-CCNC: 154 U/L (ref 40–129)
ALT SERPL-CCNC: 19 U/L (ref 0–40)
ANION GAP SERPL CALCULATED.3IONS-SCNC: 12 MMOL/L (ref 7–16)
ANION GAP SERPL CALCULATED.3IONS-SCNC: 15 MMOL/L (ref 7–16)
AST SERPL-CCNC: 20 U/L (ref 0–39)
BASOPHILS # BLD: 0.1 K/UL (ref 0–0.2)
BASOPHILS NFR BLD: 1 % (ref 0–2)
BILIRUB SERPL-MCNC: 0.3 MG/DL (ref 0–1.2)
BILIRUB UR QL STRIP: NEGATIVE
BUN SERPL-MCNC: 61 MG/DL (ref 6–20)
BUN SERPL-MCNC: 62 MG/DL (ref 6–20)
CALCIUM SERPL-MCNC: 8.4 MG/DL (ref 8.6–10.2)
CALCIUM SERPL-MCNC: 8.5 MG/DL (ref 8.6–10.2)
CHLORIDE SERPL-SCNC: 107 MMOL/L (ref 98–107)
CHLORIDE SERPL-SCNC: 108 MMOL/L (ref 98–107)
CLARITY UR: CLEAR
CO2 SERPL-SCNC: 19 MMOL/L (ref 22–29)
CO2 SERPL-SCNC: 19 MMOL/L (ref 22–29)
COLOR UR: YELLOW
CREAT SERPL-MCNC: 6.2 MG/DL (ref 0.7–1.2)
CREAT SERPL-MCNC: 6.3 MG/DL (ref 0.7–1.2)
CREAT UR-MCNC: 34.9 MG/DL (ref 40–278)
EOSINOPHIL # BLD: 0.24 K/UL (ref 0.05–0.5)
EOSINOPHILS RELATIVE PERCENT: 3 % (ref 0–6)
ERYTHROCYTE [DISTWIDTH] IN BLOOD BY AUTOMATED COUNT: 13.7 % (ref 11.5–15)
GFR, ESTIMATED: 11 ML/MIN/1.73M2
GFR, ESTIMATED: 11 ML/MIN/1.73M2
GLUCOSE BLD-MCNC: 107 MG/DL (ref 74–99)
GLUCOSE BLD-MCNC: 150 MG/DL (ref 74–99)
GLUCOSE BLD-MCNC: 178 MG/DL (ref 74–99)
GLUCOSE BLD-MCNC: 56 MG/DL (ref 74–99)
GLUCOSE BLD-MCNC: 78 MG/DL (ref 74–99)
GLUCOSE BLD-MCNC: 87 MG/DL (ref 74–99)
GLUCOSE SERPL-MCNC: 135 MG/DL (ref 74–99)
GLUCOSE SERPL-MCNC: 91 MG/DL (ref 74–99)
GLUCOSE UR STRIP-MCNC: 250 MG/DL
HCT VFR BLD AUTO: 31.3 % (ref 37–54)
HGB BLD-MCNC: 10 G/DL (ref 12.5–16.5)
HGB UR QL STRIP.AUTO: ABNORMAL
IMM GRANULOCYTES # BLD AUTO: <0.03 K/UL (ref 0–0.58)
IMM GRANULOCYTES NFR BLD: 0 % (ref 0–5)
KETONES UR STRIP-MCNC: NEGATIVE MG/DL
LEUKOCYTE ESTERASE UR QL STRIP: NEGATIVE
LYMPHOCYTES NFR BLD: 1.29 K/UL (ref 1.5–4)
LYMPHOCYTES RELATIVE PERCENT: 17 % (ref 20–42)
MAGNESIUM SERPL-MCNC: 2.5 MG/DL (ref 1.6–2.6)
MCH RBC QN AUTO: 29.3 PG (ref 26–35)
MCHC RBC AUTO-ENTMCNC: 31.9 G/DL (ref 32–34.5)
MCV RBC AUTO: 91.8 FL (ref 80–99.9)
MONOCYTES NFR BLD: 0.76 K/UL (ref 0.1–0.95)
MONOCYTES NFR BLD: 10 % (ref 2–12)
NEUTROPHILS NFR BLD: 69 % (ref 43–80)
NEUTS SEG NFR BLD: 5.24 K/UL (ref 1.8–7.3)
NITRITE UR QL STRIP: NEGATIVE
OSMOLALITY UR: 355 MOSM/KG (ref 300–900)
PH UR STRIP: 6.5 [PH] (ref 5–9)
PHOSPHATE SERPL-MCNC: 6.1 MG/DL (ref 2.5–4.5)
PLATELET # BLD AUTO: 322 K/UL (ref 130–450)
PMV BLD AUTO: 9.4 FL (ref 7–12)
POTASSIUM SERPL-SCNC: 4.7 MMOL/L (ref 3.5–5)
POTASSIUM SERPL-SCNC: 6.5 MMOL/L (ref 3.5–5)
PROT SERPL-MCNC: 7.2 G/DL (ref 6.4–8.3)
PROT UR STRIP-MCNC: >=300 MG/DL
RBC # BLD AUTO: 3.41 M/UL (ref 3.8–5.8)
RBC #/AREA URNS HPF: ABNORMAL /HPF
SODIUM SERPL-SCNC: 138 MMOL/L (ref 132–146)
SODIUM SERPL-SCNC: 142 MMOL/L (ref 132–146)
SODIUM UR-SCNC: 107 MMOL/L
SP GR UR STRIP: 1.02 (ref 1–1.03)
UROBILINOGEN UR STRIP-ACNC: 0.2 EU/DL (ref 0–1)
WBC #/AREA URNS HPF: ABNORMAL /HPF
WBC OTHER # BLD: 7.6 K/UL (ref 4.5–11.5)

## 2025-01-17 PROCEDURE — 80053 COMPREHEN METABOLIC PANEL: CPT

## 2025-01-17 PROCEDURE — 6360000002 HC RX W HCPCS: Performed by: EMERGENCY MEDICINE

## 2025-01-17 PROCEDURE — 93005 ELECTROCARDIOGRAM TRACING: CPT | Performed by: EMERGENCY MEDICINE

## 2025-01-17 PROCEDURE — 99222 1ST HOSP IP/OBS MODERATE 55: CPT | Performed by: STUDENT IN AN ORGANIZED HEALTH CARE EDUCATION/TRAINING PROGRAM

## 2025-01-17 PROCEDURE — 83935 ASSAY OF URINE OSMOLALITY: CPT

## 2025-01-17 PROCEDURE — 84300 ASSAY OF URINE SODIUM: CPT

## 2025-01-17 PROCEDURE — 83735 ASSAY OF MAGNESIUM: CPT

## 2025-01-17 PROCEDURE — 99285 EMERGENCY DEPT VISIT HI MDM: CPT

## 2025-01-17 PROCEDURE — 81001 URINALYSIS AUTO W/SCOPE: CPT

## 2025-01-17 PROCEDURE — 2580000003 HC RX 258: Performed by: EMERGENCY MEDICINE

## 2025-01-17 PROCEDURE — 6360000002 HC RX W HCPCS

## 2025-01-17 PROCEDURE — 80048 BASIC METABOLIC PNL TOTAL CA: CPT

## 2025-01-17 PROCEDURE — 2500000003 HC RX 250 WO HCPCS

## 2025-01-17 PROCEDURE — 6370000000 HC RX 637 (ALT 250 FOR IP)

## 2025-01-17 PROCEDURE — 82570 ASSAY OF URINE CREATININE: CPT

## 2025-01-17 PROCEDURE — 6370000000 HC RX 637 (ALT 250 FOR IP): Performed by: INTERNAL MEDICINE

## 2025-01-17 PROCEDURE — 84100 ASSAY OF PHOSPHORUS: CPT

## 2025-01-17 PROCEDURE — 85025 COMPLETE CBC W/AUTO DIFF WBC: CPT

## 2025-01-17 PROCEDURE — 2500000003 HC RX 250 WO HCPCS: Performed by: INTERNAL MEDICINE

## 2025-01-17 PROCEDURE — 2580000003 HC RX 258

## 2025-01-17 PROCEDURE — 2060000000 HC ICU INTERMEDIATE R&B

## 2025-01-17 PROCEDURE — 82962 GLUCOSE BLOOD TEST: CPT

## 2025-01-17 PROCEDURE — 96374 THER/PROPH/DIAG INJ IV PUSH: CPT

## 2025-01-17 RX ORDER — CALCIUM GLUCONATE 94 MG/ML
1000 INJECTION, SOLUTION INTRAVENOUS ONCE
Status: COMPLETED | OUTPATIENT
Start: 2025-01-17 | End: 2025-01-17

## 2025-01-17 RX ORDER — INSULIN GLARGINE 100 [IU]/ML
18 INJECTION, SOLUTION SUBCUTANEOUS ONCE
COMMUNITY
End: 2025-01-23

## 2025-01-17 RX ORDER — INSULIN LISPRO 100 [IU]/ML
0-4 INJECTION, SOLUTION INTRAVENOUS; SUBCUTANEOUS
Status: DISCONTINUED | OUTPATIENT
Start: 2025-01-17 | End: 2025-01-18

## 2025-01-17 RX ORDER — AMLODIPINE BESYLATE 10 MG/1
10 TABLET ORAL DAILY
Status: DISCONTINUED | OUTPATIENT
Start: 2025-01-17 | End: 2025-01-19 | Stop reason: HOSPADM

## 2025-01-17 RX ORDER — DEXTROSE MONOHYDRATE 100 MG/ML
INJECTION, SOLUTION INTRAVENOUS CONTINUOUS PRN
Status: DISCONTINUED | OUTPATIENT
Start: 2025-01-17 | End: 2025-01-19 | Stop reason: HOSPADM

## 2025-01-17 RX ORDER — ONDANSETRON 4 MG/1
4 TABLET, FILM COATED ORAL DAILY PRN
Qty: 12 TABLET | Refills: 0 | Status: SHIPPED | OUTPATIENT
Start: 2025-01-17 | End: 2025-01-29

## 2025-01-17 RX ORDER — ACETAMINOPHEN 650 MG/1
650 SUPPOSITORY RECTAL EVERY 6 HOURS PRN
Status: DISCONTINUED | OUTPATIENT
Start: 2025-01-17 | End: 2025-01-19 | Stop reason: HOSPADM

## 2025-01-17 RX ORDER — ACETAMINOPHEN 325 MG/1
650 TABLET ORAL EVERY 6 HOURS PRN
Status: DISCONTINUED | OUTPATIENT
Start: 2025-01-17 | End: 2025-01-19 | Stop reason: HOSPADM

## 2025-01-17 RX ORDER — METOLAZONE 5 MG/1
5 TABLET ORAL DAILY
Status: DISCONTINUED | OUTPATIENT
Start: 2025-01-17 | End: 2025-01-19 | Stop reason: HOSPADM

## 2025-01-17 RX ORDER — FUROSEMIDE 10 MG/ML
40 INJECTION INTRAMUSCULAR; INTRAVENOUS ONCE
Status: COMPLETED | OUTPATIENT
Start: 2025-01-17 | End: 2025-01-17

## 2025-01-17 RX ORDER — ATORVASTATIN CALCIUM 40 MG/1
40 TABLET, FILM COATED ORAL EVERY MORNING
Status: DISCONTINUED | OUTPATIENT
Start: 2025-01-17 | End: 2025-01-19 | Stop reason: HOSPADM

## 2025-01-17 RX ORDER — ERGOCALCIFEROL 1.25 MG/1
50000 CAPSULE ORAL WEEKLY
COMMUNITY

## 2025-01-17 RX ORDER — SODIUM CHLORIDE 0.9 % (FLUSH) 0.9 %
5-40 SYRINGE (ML) INJECTION EVERY 12 HOURS SCHEDULED
Status: DISCONTINUED | OUTPATIENT
Start: 2025-01-17 | End: 2025-01-19 | Stop reason: HOSPADM

## 2025-01-17 RX ORDER — OXYCODONE AND ACETAMINOPHEN 5; 325 MG/1; MG/1
1 TABLET ORAL EVERY 6 HOURS PRN
Qty: 12 TABLET | Refills: 0 | Status: SHIPPED | OUTPATIENT
Start: 2025-01-17 | End: 2025-01-20

## 2025-01-17 RX ORDER — CALCIUM GLUCONATE 94 MG/ML
1000 INJECTION, SOLUTION INTRAVENOUS ONCE
Status: DISCONTINUED | OUTPATIENT
Start: 2025-01-17 | End: 2025-01-19 | Stop reason: HOSPADM

## 2025-01-17 RX ORDER — 0.9 % SODIUM CHLORIDE 0.9 %
1000 INTRAVENOUS SOLUTION INTRAVENOUS ONCE
Status: COMPLETED | OUTPATIENT
Start: 2025-01-17 | End: 2025-01-17

## 2025-01-17 RX ORDER — CLONIDINE HYDROCHLORIDE 0.1 MG/1
0.1 TABLET ORAL 2 TIMES DAILY
Status: DISCONTINUED | OUTPATIENT
Start: 2025-01-17 | End: 2025-01-19 | Stop reason: HOSPADM

## 2025-01-17 RX ORDER — SODIUM CHLORIDE 0.9 % (FLUSH) 0.9 %
5-40 SYRINGE (ML) INJECTION PRN
Status: DISCONTINUED | OUTPATIENT
Start: 2025-01-17 | End: 2025-01-19 | Stop reason: HOSPADM

## 2025-01-17 RX ORDER — BUMETANIDE 1 MG/1
2 TABLET ORAL DAILY
Status: DISCONTINUED | OUTPATIENT
Start: 2025-01-17 | End: 2025-01-17

## 2025-01-17 RX ORDER — SODIUM BICARBONATE 650 MG/1
1300 TABLET ORAL 3 TIMES DAILY
Status: DISCONTINUED | OUTPATIENT
Start: 2025-01-17 | End: 2025-01-19 | Stop reason: HOSPADM

## 2025-01-17 RX ORDER — CLONIDINE HYDROCHLORIDE 0.1 MG/1
0.1 TABLET ORAL 2 TIMES DAILY
COMMUNITY

## 2025-01-17 RX ORDER — ONDANSETRON 2 MG/ML
4 INJECTION INTRAMUSCULAR; INTRAVENOUS EVERY 6 HOURS PRN
Status: DISCONTINUED | OUTPATIENT
Start: 2025-01-17 | End: 2025-01-19 | Stop reason: HOSPADM

## 2025-01-17 RX ORDER — DOCUSATE SODIUM 100 MG/1
100 CAPSULE, LIQUID FILLED ORAL 2 TIMES DAILY
Qty: 50 CAPSULE | Refills: 0 | Status: SHIPPED | OUTPATIENT
Start: 2025-01-17

## 2025-01-17 RX ORDER — SODIUM CHLORIDE 9 MG/ML
INJECTION, SOLUTION INTRAVENOUS PRN
Status: DISCONTINUED | OUTPATIENT
Start: 2025-01-17 | End: 2025-01-19 | Stop reason: HOSPADM

## 2025-01-17 RX ORDER — CARVEDILOL 6.25 MG/1
6.25 TABLET ORAL 2 TIMES DAILY
Status: DISCONTINUED | OUTPATIENT
Start: 2025-01-17 | End: 2025-01-19 | Stop reason: HOSPADM

## 2025-01-17 RX ORDER — GLUCAGON 1 MG/ML
1 KIT INJECTION PRN
Status: DISCONTINUED | OUTPATIENT
Start: 2025-01-17 | End: 2025-01-19 | Stop reason: HOSPADM

## 2025-01-17 RX ORDER — ONDANSETRON 4 MG/1
4 TABLET, ORALLY DISINTEGRATING ORAL EVERY 8 HOURS PRN
Status: DISCONTINUED | OUTPATIENT
Start: 2025-01-17 | End: 2025-01-19 | Stop reason: HOSPADM

## 2025-01-17 RX ORDER — SODIUM BICARBONATE 650 MG/1
1300 TABLET ORAL DAILY
Status: DISCONTINUED | OUTPATIENT
Start: 2025-01-17 | End: 2025-01-17

## 2025-01-17 RX ORDER — ATORVASTATIN CALCIUM 40 MG/1
40 TABLET, FILM COATED ORAL EVERY MORNING
COMMUNITY

## 2025-01-17 RX ORDER — BUMETANIDE 1 MG/1
2 TABLET ORAL 2 TIMES DAILY
Status: DISCONTINUED | OUTPATIENT
Start: 2025-01-17 | End: 2025-01-19 | Stop reason: HOSPADM

## 2025-01-17 RX ADMIN — DEXTROSE 250 ML: 10 SOLUTION INTRAVENOUS at 12:47

## 2025-01-17 RX ADMIN — CLONIDINE HYDROCHLORIDE 0.1 MG: 0.1 TABLET ORAL at 14:35

## 2025-01-17 RX ADMIN — CALCIUM GLUCONATE 1000 MG: 98 INJECTION, SOLUTION INTRAVENOUS at 10:00

## 2025-01-17 RX ADMIN — CLONIDINE HYDROCHLORIDE 0.1 MG: 0.1 TABLET ORAL at 20:21

## 2025-01-17 RX ADMIN — FUROSEMIDE 40 MG: 10 INJECTION, SOLUTION INTRAMUSCULAR; INTRAVENOUS at 12:46

## 2025-01-17 RX ADMIN — BUMETANIDE 2 MG: 1 TABLET ORAL at 18:23

## 2025-01-17 RX ADMIN — SODIUM BICARBONATE 1300 MG: 650 TABLET ORAL at 20:21

## 2025-01-17 RX ADMIN — SODIUM BICARBONATE 1300 MG: 650 TABLET ORAL at 14:35

## 2025-01-17 RX ADMIN — SODIUM BICARBONATE 50 MEQ: 84 INJECTION, SOLUTION INTRAVENOUS at 12:46

## 2025-01-17 RX ADMIN — Medication: at 16:25

## 2025-01-17 RX ADMIN — AMLODIPINE BESYLATE 10 MG: 10 TABLET ORAL at 14:35

## 2025-01-17 RX ADMIN — ATORVASTATIN CALCIUM 40 MG: 40 TABLET, FILM COATED ORAL at 14:35

## 2025-01-17 RX ADMIN — SODIUM CHLORIDE, PRESERVATIVE FREE 10 ML: 5 INJECTION INTRAVENOUS at 20:21

## 2025-01-17 RX ADMIN — SODIUM CHLORIDE 1000 ML: 9 INJECTION, SOLUTION INTRAVENOUS at 09:51

## 2025-01-17 RX ADMIN — SODIUM ZIRCONIUM CYCLOSILICATE 10 G: 10 POWDER, FOR SUSPENSION ORAL at 20:21

## 2025-01-17 RX ADMIN — METOLAZONE 5 MG: 5 TABLET ORAL at 16:40

## 2025-01-17 RX ADMIN — CARVEDILOL 6.25 MG: 6.25 TABLET, FILM COATED ORAL at 14:35

## 2025-01-17 RX ADMIN — SODIUM ZIRCONIUM CYCLOSILICATE 10 G: 10 POWDER, FOR SUSPENSION ORAL at 18:24

## 2025-01-17 RX ADMIN — BUMETANIDE 2 MG: 1 TABLET ORAL at 14:35

## 2025-01-17 RX ADMIN — LEVOTHYROXINE SODIUM 175 MCG: 100 TABLET ORAL at 14:36

## 2025-01-17 RX ADMIN — CARVEDILOL 6.25 MG: 6.25 TABLET, FILM COATED ORAL at 20:21

## 2025-01-17 RX ADMIN — SODIUM CHLORIDE, PRESERVATIVE FREE 10 ML: 5 INJECTION INTRAVENOUS at 16:29

## 2025-01-17 RX ADMIN — INSULIN HUMAN 10 UNITS: 100 INJECTION, SOLUTION PARENTERAL at 12:48

## 2025-01-17 ASSESSMENT — LIFESTYLE VARIABLES
HOW MANY STANDARD DRINKS CONTAINING ALCOHOL DO YOU HAVE ON A TYPICAL DAY: PATIENT DOES NOT DRINK
HOW OFTEN DO YOU HAVE A DRINK CONTAINING ALCOHOL: NEVER

## 2025-01-17 ASSESSMENT — PAIN SCALES - GENERAL: PAINLEVEL_OUTOF10: 0

## 2025-01-17 NOTE — ED NOTES
ED to Inpatient Handoff Report    Notified Lauren that electronic handoff available and patient ready for transport to room 631.    Safety Risks: None identified    Patient in Restraints: no    Constant Observer or Patient : no    Telemetry Monitoring Ordered :Yes           Order to transfer to unit without monitor:NO    Last MEWS: 1 Time completed: 1246    Deterioration Index Score:   Predictive Model Details          25 (Normal)  Factor Value    Calculated 1/17/2025 13:07 41% Potassium abnormal (6.5 mmol/L)    Deterioration Index Model 20% Age 34 years old     17% Systolic 166     10% Respiratory rate 18     4% BUN abnormal (62 mg/dL)     3% Hematocrit abnormal (31.3 %)     2% Pulse oximetry 99 %     1% Pulse 87     1% Sodium 138 mmol/L     0% WBC count 7.6 k/uL     0% Temperature 98.5 °F (36.9 °C)        Vitals:    01/17/25 0858 01/17/25 0944 01/17/25 0959 01/17/25 1246   BP:   (!) 199/94 (!) 166/92   Pulse:   84 87   Resp:   16 18   Temp: 98.5 °F (36.9 °C)   98.5 °F (36.9 °C)   TempSrc:    Oral   SpO2:   100% 99%   Weight:  104.3 kg (230 lb)     Height:  1.753 m (5' 9\")           Opportunity for questions and clarification was provided.

## 2025-01-17 NOTE — DISCHARGE INSTRUCTIONS
SURGERY DISCHARGE INSTRUCTIONS    You may be drowsy or lightheaded after receiving sedation or anesthesia.    A responsible person should be with you for the next 24 hours.      DIET: Advance your diet as tolerated. Start with light diet and progress to your normal diet as you feel like eating. If you experience nausea or repeated episodes of vomiting which persist beyond 12-24 hours, notify your doctor.    ACTIVITY: Increase activity gradually. No driving for 1 day. No driving while on prescription pain medication. No heavy lifting for 4 weeks - nothing over 10 lbs, or heavier than a milk jug.    SHOWER/BATHING: Okay to shower in 24 hours after procedure. No tub bathing, swimming or soaking for 2 weeks.    WOUND CARE: You have a clean dressing applied. You may remove this dressing in 24 hours. You have Dermabond dressing (skin glue) applied to your incisions. You do not need to apply anything over them. The glue will gradually work itself off over the next few weeks. Avoid directly applying lotions, creams or oils to your incision. Keep incisions clean and dry. Always ensure you and your care giver clean hands before and after caring for the wound. You may place ice on incisions to decrease the pain and bruising.    MEDICATIONS: Take as prescribed. You may take over the counter tylenol for pain as directed, limit total amount of acetaminophen (tylenol) to 3 grams per 24-hour period. Okay to resume anticoagulant medication after 24hrs. You may experience constipation while taking pain medication, you may take over the counter stool softeners (Docusate/Colace or Senokot-S) as directed.         SPECIAL INSTRUCTIONS:   Call physician if the following or any other problems occur:  Call the office if you have a fever over >101F, or if your incision becomes red, tender, or drains more than a small amount of clear fluid  Fever over 101°    Redness, swelling, hardness or warmth at the operative site  Unrelieved

## 2025-01-17 NOTE — CONSULTS
GENERAL SURGERY  CONSULT NOTE    Patient's Name/Date of Birth: Duane Brady / 1990    Date: January 17, 2025     PCP: Adela Ramsay MD     Chief Complaint:   Chief Complaint   Patient presents with    Abnormal Lab     Told by dr tomlin to come to the er for admission due to kidney failure.        Physician Consulted: Dr. Eason   Reason for Consult: PD catheter     HPI  Duane Brady is a 34 y.o. male with a history of T1DM, HTN, CKD who is admitted for abnormal outpatient lab work. General surgery was consulted for PD cathter insertion. The patient presented with K 6.5, Cr 6.3, previously 5.6 1 month prior in December. He reports he was supposed to see Dr. Eason for PD catheter insertion but has not yet. He is not currently on hemodialysis and has reportedly refused HD line placement in the past. He still does make urine. ED ordered IV lasix and Insulin for hyperkalemia. Denies any prior abdominal surgical history. No issues with constipation. No active abdominal infection.       Past Medical History:   Diagnosis Date    Abscess of back 08/2011    Anemia     Bowel obstruction (HCC)     Diabetes mellitus type 1 (HCC) Diagnosed at 8 years of age/ in 1998    Diabetic ulcer of right foot associated with type 1 diabetes mellitus, with necrosis of muscle (HCC) 10/09/2023    HTN (hypertension)     Hyperlipidemia     Type 1 diabetes mellitus with diabetic foot infection (HCC) 10/09/2023    Type 2 diabetes mellitus with Charcot's joint of right foot (HCC) 10/09/2023    Patient denies states is Type I       Past Surgical History:   Procedure Laterality Date    ANKLE SURGERY Right 3/22/2024    RIGHT TIBIAL TALAR ARTHRODESIS/ LARGE GRAFT HARVEST, TIBIAL TALOR, TALOR CALCANEAL,TENDOACHILLES LENGTHENING performed by Russ Dawkins DPM at [facility] [place] OR    CT BIOPSY RENAL  8/26/2024    CT BIOPSY RENAL 8/26/2024 Liliam Melvin MD [facility] CT    FOOT SURGERY Right 10/7/2023    RIGHT FOOT BONE BIOPSY, INCISION AND DRAINAGE TO

## 2025-01-17 NOTE — ED PROVIDER NOTES
138/82   Pulse 78   Temp 97.5 °F (36.4 °C)   Resp 18   Ht 1.753 m (5' 9\")   Wt 105.5 kg (232 lb 9.6 oz)   SpO2 95%   BMI 34.35 kg/m²   Oxygen Saturation Interpretation: Normal      ---------------------------------------------------PHYSICAL EXAM--------------------------------------      Constitutional/General: Alert and oriented x3, well appearing, non toxic in NAD  Head: Normocephalic and atraumatic  Eyes: PERRL, EOMI  Mouth: Oropharynx clear, handling secretions, no trismus  Neck: Supple, full ROM,   Pulmonary: Lungs clear to auscultation bilaterally, no wheezes, rales, or rhonchi. Not in respiratory distress  Cardiovascular:  Regular rate and rhythm, no murmurs, gallops, or rubs. 2+ distal pulses  Abdomen: Soft, non tender, non distended,   Extremities: Moves all extremities x 4. Warm and well perfused  Skin: warm and dry without rash  Neurologic: GCS 15,  Psych: Normal Affect    ------------------------------ ED COURSE/MEDICAL DECISION MAKING----------------------  Medications   calcium gluconate 10 % injection 1,000 mg ( IntraVENous Canceled Entry 1/17/25 1439)   sodium zirconium cyclosilicate (LOKELMA) oral suspension 10 g (10 g Oral Given 1/18/25 1445)   glucose chewable tablet 16 g (has no administration in time range)   dextrose bolus 10% 125 mL (has no administration in time range)     Or   dextrose bolus 10% 250 mL (has no administration in time range)   glucagon injection 1 mg (has no administration in time range)   dextrose 10 % infusion (has no administration in time range)   sodium chloride flush 0.9 % injection 5-40 mL ( IntraVENous Not Given 1/18/25 1043)   sodium chloride flush 0.9 % injection 5-40 mL (has no administration in time range)   0.9 % sodium chloride infusion ( IntraVENous Anesthesia Volume Adjustment 1/18/25 1664)   ondansetron (ZOFRAN-ODT) disintegrating tablet 4 mg (has no administration in time range)     Or   ondansetron (ZOFRAN) injection 4 mg (has no administration in

## 2025-01-17 NOTE — CONSULTS
Department of Internal Medicine  Nephrology Nurse Practitioner Consult Note      Reason for Consult:  start dialysis   Requesting Physician:  Hira Blunt DO     CHIEF COMPLAINT:  PD catheter placement    History Obtained From:  patient, electronic medical record    HISTORY OF PRESENT ILLNESS:  Briefly Mr. Brady is a 34-year-old man known to our practice, who follows regularly with Dr. Mason, with history of CKD stage V, with large proteinuria, 2/2 diabetic nephropathy, baseline creatinine 5.9 mg/dL, type I DM, HTN, right foot osteomyelitis, probably neurogenic bladder, hyperlipidemia, who was admitted on 1/17/2025 after he was referred to the ER by Dr. Mason after he was found to have further progression of his CKD with a creatinine level up to 6.54 and potassium level 6.1 mEq/L, for initiation of renal replacement therapy, peritoneal dialysis.  In the ER his creatinine level was 6.3 mg/dL, his potassium level was 6.5 mEq/L.     Past Medical History:        Diagnosis Date    Abscess of back 08/2011    Anemia     Bowel obstruction (HCC)     Diabetes mellitus type 1 (HCC) Diagnosed at 8 years of age/ in 1998    Diabetic ulcer of right foot associated with type 1 diabetes mellitus, with necrosis of muscle (HCC) 10/09/2023    HTN (hypertension)     Hyperlipidemia     Type 1 diabetes mellitus with diabetic foot infection (HCC) 10/09/2023    Type 2 diabetes mellitus with Charcot's joint of right foot (HCC) 10/09/2023    Patient denies states is Type I     Past Surgical History:        Procedure Laterality Date    ANKLE SURGERY Right 3/22/2024    RIGHT TIBIAL TALAR ARTHRODESIS/ LARGE GRAFT HARVEST, TIBIAL TALOR, TALOR CALCANEAL,TENDOACHILLES LENGTHENING performed by uRss Dawkins DPM at [facility] [place] [place]    CT BIOPSY RENAL  8/26/2024    CT BIOPSY RENAL 8/26/2024 Liliam Melvin MD [facility] [place]    FOOT SURGERY Right 10/7/2023    RIGHT FOOT BONE BIOPSY, INCISION AND DRAINAGE TO LEVEL OF BONE performed by Caryl Hernandez DPM at

## 2025-01-18 ENCOUNTER — ANESTHESIA (OUTPATIENT)
Dept: OPERATING ROOM | Age: 35
End: 2025-01-18
Payer: COMMERCIAL

## 2025-01-18 LAB
ALBUMIN SERPL-MCNC: 3.3 G/DL (ref 3.5–5.2)
ALP SERPL-CCNC: 142 U/L (ref 40–129)
ALT SERPL-CCNC: 14 U/L (ref 0–40)
ANION GAP SERPL CALCULATED.3IONS-SCNC: 13 MMOL/L (ref 7–16)
AST SERPL-CCNC: 15 U/L (ref 0–39)
BASOPHILS # BLD: 0.09 K/UL (ref 0–0.2)
BASOPHILS NFR BLD: 1 % (ref 0–2)
BILIRUB SERPL-MCNC: 0.3 MG/DL (ref 0–1.2)
BUN SERPL-MCNC: 59 MG/DL (ref 6–20)
CALCIUM SERPL-MCNC: 8.3 MG/DL (ref 8.6–10.2)
CHLORIDE SERPL-SCNC: 105 MMOL/L (ref 98–107)
CO2 SERPL-SCNC: 21 MMOL/L (ref 22–29)
CREAT SERPL-MCNC: 6.3 MG/DL (ref 0.7–1.2)
EKG ATRIAL RATE: 85 BPM
EKG P AXIS: 52 DEGREES
EKG P-R INTERVAL: 174 MS
EKG Q-T INTERVAL: 366 MS
EKG QRS DURATION: 96 MS
EKG QTC CALCULATION (BAZETT): 435 MS
EKG R AXIS: 54 DEGREES
EKG T AXIS: 55 DEGREES
EKG VENTRICULAR RATE: 85 BPM
EOSINOPHIL # BLD: 0.26 K/UL (ref 0.05–0.5)
EOSINOPHILS RELATIVE PERCENT: 3 % (ref 0–6)
ERYTHROCYTE [DISTWIDTH] IN BLOOD BY AUTOMATED COUNT: 13.6 % (ref 11.5–15)
GFR, ESTIMATED: 11 ML/MIN/1.73M2
GLUCOSE BLD-MCNC: 176 MG/DL (ref 74–99)
GLUCOSE BLD-MCNC: 184 MG/DL (ref 74–99)
GLUCOSE BLD-MCNC: 316 MG/DL (ref 74–99)
GLUCOSE BLD-MCNC: 342 MG/DL (ref 74–99)
GLUCOSE BLD-MCNC: 347 MG/DL (ref 74–99)
GLUCOSE SERPL-MCNC: 163 MG/DL (ref 74–99)
HCT VFR BLD AUTO: 31.3 % (ref 37–54)
HGB BLD-MCNC: 10 G/DL (ref 12.5–16.5)
IMM GRANULOCYTES # BLD AUTO: <0.03 K/UL (ref 0–0.58)
IMM GRANULOCYTES NFR BLD: 0 % (ref 0–5)
LYMPHOCYTES NFR BLD: 2.15 K/UL (ref 1.5–4)
LYMPHOCYTES RELATIVE PERCENT: 28 % (ref 20–42)
MCH RBC QN AUTO: 29.6 PG (ref 26–35)
MCHC RBC AUTO-ENTMCNC: 31.9 G/DL (ref 32–34.5)
MCV RBC AUTO: 92.6 FL (ref 80–99.9)
MONOCYTES NFR BLD: 0.66 K/UL (ref 0.1–0.95)
MONOCYTES NFR BLD: 9 % (ref 2–12)
NEUTROPHILS NFR BLD: 59 % (ref 43–80)
NEUTS SEG NFR BLD: 4.55 K/UL (ref 1.8–7.3)
PLATELET # BLD AUTO: 293 K/UL (ref 130–450)
PMV BLD AUTO: 9.7 FL (ref 7–12)
POTASSIUM SERPL-SCNC: 4.2 MMOL/L (ref 3.5–5)
PROT SERPL-MCNC: 6.6 G/DL (ref 6.4–8.3)
RBC # BLD AUTO: 3.38 M/UL (ref 3.8–5.8)
SODIUM SERPL-SCNC: 139 MMOL/L (ref 132–146)
WBC OTHER # BLD: 7.7 K/UL (ref 4.5–11.5)

## 2025-01-18 PROCEDURE — 2500000003 HC RX 250 WO HCPCS: Performed by: NURSE ANESTHETIST, CERTIFIED REGISTERED

## 2025-01-18 PROCEDURE — 2500000003 HC RX 250 WO HCPCS

## 2025-01-18 PROCEDURE — 2709999900 HC NON-CHARGEABLE SUPPLY: Performed by: SURGERY

## 2025-01-18 PROCEDURE — 93010 ELECTROCARDIOGRAM REPORT: CPT | Performed by: INTERNAL MEDICINE

## 2025-01-18 PROCEDURE — 6370000000 HC RX 637 (ALT 250 FOR IP): Performed by: INTERNAL MEDICINE

## 2025-01-18 PROCEDURE — 7100000001 HC PACU RECOVERY - ADDTL 15 MIN: Performed by: SURGERY

## 2025-01-18 PROCEDURE — 3600000003 HC SURGERY LEVEL 3 BASE: Performed by: SURGERY

## 2025-01-18 PROCEDURE — 2500000003 HC RX 250 WO HCPCS: Performed by: INTERNAL MEDICINE

## 2025-01-18 PROCEDURE — 2580000003 HC RX 258

## 2025-01-18 PROCEDURE — 80053 COMPREHEN METABOLIC PANEL: CPT

## 2025-01-18 PROCEDURE — 85025 COMPLETE CBC W/AUTO DIFF WBC: CPT

## 2025-01-18 PROCEDURE — 3E1M39Z IRRIGATION OF PERITONEAL CAVITY USING DIALYSATE, PERCUTANEOUS APPROACH: ICD-10-PCS | Performed by: SURGERY

## 2025-01-18 PROCEDURE — 0WHG43Z INSERTION OF INFUSION DEVICE INTO PERITONEAL CAVITY, PERCUTANEOUS ENDOSCOPIC APPROACH: ICD-10-PCS | Performed by: SURGERY

## 2025-01-18 PROCEDURE — 6370000000 HC RX 637 (ALT 250 FOR IP)

## 2025-01-18 PROCEDURE — 82962 GLUCOSE BLOOD TEST: CPT

## 2025-01-18 PROCEDURE — C1750 CATH, HEMODIALYSIS,LONG-TERM: HCPCS | Performed by: SURGERY

## 2025-01-18 PROCEDURE — 6360000002 HC RX W HCPCS: Performed by: SURGERY

## 2025-01-18 PROCEDURE — 6360000002 HC RX W HCPCS

## 2025-01-18 PROCEDURE — 3700000001 HC ADD 15 MINUTES (ANESTHESIA): Performed by: SURGERY

## 2025-01-18 PROCEDURE — 3600000013 HC SURGERY LEVEL 3 ADDTL 15MIN: Performed by: SURGERY

## 2025-01-18 PROCEDURE — 3700000000 HC ANESTHESIA ATTENDED CARE: Performed by: SURGERY

## 2025-01-18 PROCEDURE — 2060000000 HC ICU INTERMEDIATE R&B

## 2025-01-18 PROCEDURE — 7100000000 HC PACU RECOVERY - FIRST 15 MIN: Performed by: SURGERY

## 2025-01-18 PROCEDURE — 6370000000 HC RX 637 (ALT 250 FOR IP): Performed by: STUDENT IN AN ORGANIZED HEALTH CARE EDUCATION/TRAINING PROGRAM

## 2025-01-18 PROCEDURE — 6370000000 HC RX 637 (ALT 250 FOR IP): Performed by: SURGERY

## 2025-01-18 PROCEDURE — 99233 SBSQ HOSP IP/OBS HIGH 50: CPT | Performed by: STUDENT IN AN ORGANIZED HEALTH CARE EDUCATION/TRAINING PROGRAM

## 2025-01-18 RX ORDER — LABETALOL HYDROCHLORIDE 5 MG/ML
INJECTION, SOLUTION INTRAVENOUS
Status: DISCONTINUED | OUTPATIENT
Start: 2025-01-18 | End: 2025-01-18 | Stop reason: SDUPTHER

## 2025-01-18 RX ORDER — PROPOFOL 10 MG/ML
INJECTION, EMULSION INTRAVENOUS
Status: DISCONTINUED | OUTPATIENT
Start: 2025-01-18 | End: 2025-01-18 | Stop reason: SDUPTHER

## 2025-01-18 RX ORDER — INSULIN LISPRO 100 [IU]/ML
10 INJECTION, SOLUTION INTRAVENOUS; SUBCUTANEOUS
Status: DISCONTINUED | OUTPATIENT
Start: 2025-01-18 | End: 2025-01-19 | Stop reason: HOSPADM

## 2025-01-18 RX ORDER — CEFAZOLIN SODIUM 1 G/3ML
INJECTION, POWDER, FOR SOLUTION INTRAMUSCULAR; INTRAVENOUS
Status: DISCONTINUED | OUTPATIENT
Start: 2025-01-18 | End: 2025-01-18 | Stop reason: SDUPTHER

## 2025-01-18 RX ORDER — MIDAZOLAM HYDROCHLORIDE 2 MG/2ML
2 INJECTION, SOLUTION INTRAMUSCULAR; INTRAVENOUS
Status: DISCONTINUED | OUTPATIENT
Start: 2025-01-18 | End: 2025-01-18 | Stop reason: HOSPADM

## 2025-01-18 RX ORDER — LIDOCAINE HYDROCHLORIDE AND EPINEPHRINE 10; 10 MG/ML; UG/ML
INJECTION, SOLUTION INFILTRATION; PERINEURAL PRN
Status: DISCONTINUED | OUTPATIENT
Start: 2025-01-18 | End: 2025-01-18 | Stop reason: ALTCHOICE

## 2025-01-18 RX ORDER — IPRATROPIUM BROMIDE AND ALBUTEROL SULFATE 2.5; .5 MG/3ML; MG/3ML
1 SOLUTION RESPIRATORY (INHALATION)
Status: DISCONTINUED | OUTPATIENT
Start: 2025-01-18 | End: 2025-01-18 | Stop reason: HOSPADM

## 2025-01-18 RX ORDER — NALOXONE HYDROCHLORIDE 0.4 MG/ML
INJECTION, SOLUTION INTRAMUSCULAR; INTRAVENOUS; SUBCUTANEOUS PRN
Status: DISCONTINUED | OUTPATIENT
Start: 2025-01-18 | End: 2025-01-18 | Stop reason: HOSPADM

## 2025-01-18 RX ORDER — LIDOCAINE HYDROCHLORIDE 20 MG/ML
INJECTION, SOLUTION EPIDURAL; INFILTRATION; INTRACAUDAL; PERINEURAL
Status: DISCONTINUED | OUTPATIENT
Start: 2025-01-18 | End: 2025-01-18 | Stop reason: SDUPTHER

## 2025-01-18 RX ORDER — SODIUM CHLORIDE 9 MG/ML
INJECTION, SOLUTION INTRAVENOUS PRN
Status: DISCONTINUED | OUTPATIENT
Start: 2025-01-18 | End: 2025-01-18 | Stop reason: HOSPADM

## 2025-01-18 RX ORDER — INSULIN GLARGINE 100 [IU]/ML
25 INJECTION, SOLUTION SUBCUTANEOUS NIGHTLY
Status: DISCONTINUED | OUTPATIENT
Start: 2025-01-18 | End: 2025-01-19 | Stop reason: HOSPADM

## 2025-01-18 RX ORDER — DEXAMETHASONE SODIUM PHOSPHATE 4 MG/ML
INJECTION, SOLUTION INTRA-ARTICULAR; INTRALESIONAL; INTRAMUSCULAR; INTRAVENOUS; SOFT TISSUE
Status: DISCONTINUED | OUTPATIENT
Start: 2025-01-18 | End: 2025-01-18 | Stop reason: SDUPTHER

## 2025-01-18 RX ORDER — INSULIN LISPRO 100 [IU]/ML
0-8 INJECTION, SOLUTION INTRAVENOUS; SUBCUTANEOUS
Status: DISCONTINUED | OUTPATIENT
Start: 2025-01-18 | End: 2025-01-19 | Stop reason: HOSPADM

## 2025-01-18 RX ORDER — FENTANYL CITRATE 50 UG/ML
INJECTION, SOLUTION INTRAMUSCULAR; INTRAVENOUS
Status: DISCONTINUED | OUTPATIENT
Start: 2025-01-18 | End: 2025-01-18 | Stop reason: SDUPTHER

## 2025-01-18 RX ORDER — LABETALOL HYDROCHLORIDE 5 MG/ML
10 INJECTION, SOLUTION INTRAVENOUS
Status: DISCONTINUED | OUTPATIENT
Start: 2025-01-18 | End: 2025-01-18 | Stop reason: HOSPADM

## 2025-01-18 RX ORDER — ROCURONIUM BROMIDE 10 MG/ML
INJECTION, SOLUTION INTRAVENOUS
Status: DISCONTINUED | OUTPATIENT
Start: 2025-01-18 | End: 2025-01-18 | Stop reason: SDUPTHER

## 2025-01-18 RX ORDER — ONDANSETRON 2 MG/ML
4 INJECTION INTRAMUSCULAR; INTRAVENOUS
Status: DISCONTINUED | OUTPATIENT
Start: 2025-01-18 | End: 2025-01-18 | Stop reason: HOSPADM

## 2025-01-18 RX ORDER — ONDANSETRON 2 MG/ML
INJECTION INTRAMUSCULAR; INTRAVENOUS
Status: DISCONTINUED | OUTPATIENT
Start: 2025-01-18 | End: 2025-01-18 | Stop reason: SDUPTHER

## 2025-01-18 RX ORDER — MIDAZOLAM HYDROCHLORIDE 1 MG/ML
INJECTION, SOLUTION INTRAMUSCULAR; INTRAVENOUS
Status: DISCONTINUED | OUTPATIENT
Start: 2025-01-18 | End: 2025-01-18 | Stop reason: SDUPTHER

## 2025-01-18 RX ORDER — OXYCODONE AND ACETAMINOPHEN 5; 325 MG/1; MG/1
1 TABLET ORAL EVERY 4 HOURS PRN
Status: DISCONTINUED | OUTPATIENT
Start: 2025-01-18 | End: 2025-01-19 | Stop reason: HOSPADM

## 2025-01-18 RX ORDER — HYDRALAZINE HYDROCHLORIDE 20 MG/ML
10 INJECTION INTRAMUSCULAR; INTRAVENOUS
Status: DISCONTINUED | OUTPATIENT
Start: 2025-01-18 | End: 2025-01-18 | Stop reason: HOSPADM

## 2025-01-18 RX ORDER — SODIUM CHLORIDE 0.9 % (FLUSH) 0.9 %
5-40 SYRINGE (ML) INJECTION EVERY 12 HOURS SCHEDULED
Status: DISCONTINUED | OUTPATIENT
Start: 2025-01-18 | End: 2025-01-18 | Stop reason: HOSPADM

## 2025-01-18 RX ORDER — SODIUM CHLORIDE 0.9 % (FLUSH) 0.9 %
5-40 SYRINGE (ML) INJECTION PRN
Status: DISCONTINUED | OUTPATIENT
Start: 2025-01-18 | End: 2025-01-18 | Stop reason: HOSPADM

## 2025-01-18 RX ADMIN — SODIUM ZIRCONIUM CYCLOSILICATE 10 G: 10 POWDER, FOR SUSPENSION ORAL at 14:45

## 2025-01-18 RX ADMIN — ONDANSETRON 4 MG: 2 INJECTION INTRAMUSCULAR; INTRAVENOUS at 08:30

## 2025-01-18 RX ADMIN — ROCURONIUM BROMIDE 40 MG: 10 INJECTION, SOLUTION INTRAVENOUS at 08:20

## 2025-01-18 RX ADMIN — LABETALOL HYDROCHLORIDE 5 MG: 5 INJECTION INTRAVENOUS at 08:52

## 2025-01-18 RX ADMIN — FENTANYL CITRATE 100 MCG: 50 INJECTION, SOLUTION INTRAMUSCULAR; INTRAVENOUS at 08:20

## 2025-01-18 RX ADMIN — AMLODIPINE BESYLATE 10 MG: 10 TABLET ORAL at 10:36

## 2025-01-18 RX ADMIN — LIDOCAINE HYDROCHLORIDE 80 MG: 20 INJECTION, SOLUTION EPIDURAL; INFILTRATION; INTRACAUDAL; PERINEURAL at 08:20

## 2025-01-18 RX ADMIN — INSULIN LISPRO 1 UNITS: 100 INJECTION, SOLUTION INTRAVENOUS; SUBCUTANEOUS at 11:44

## 2025-01-18 RX ADMIN — MIDAZOLAM 2 MG: 1 INJECTION INTRAMUSCULAR; INTRAVENOUS at 08:17

## 2025-01-18 RX ADMIN — BUMETANIDE 2 MG: 1 TABLET ORAL at 10:36

## 2025-01-18 RX ADMIN — PROPOFOL 200 MG: 10 INJECTION, EMULSION INTRAVENOUS at 08:20

## 2025-01-18 RX ADMIN — INSULIN LISPRO 6 UNITS: 100 INJECTION, SOLUTION INTRAVENOUS; SUBCUTANEOUS at 21:14

## 2025-01-18 RX ADMIN — SUGAMMADEX 300 MG: 100 INJECTION, SOLUTION INTRAVENOUS at 09:07

## 2025-01-18 RX ADMIN — CARVEDILOL 6.25 MG: 6.25 TABLET, FILM COATED ORAL at 21:20

## 2025-01-18 RX ADMIN — LEVOTHYROXINE SODIUM 175 MCG: 100 TABLET ORAL at 06:50

## 2025-01-18 RX ADMIN — ATORVASTATIN CALCIUM 40 MG: 40 TABLET, FILM COATED ORAL at 10:37

## 2025-01-18 RX ADMIN — LABETALOL HYDROCHLORIDE 10 MG: 5 INJECTION INTRAVENOUS at 08:45

## 2025-01-18 RX ADMIN — CARVEDILOL 6.25 MG: 6.25 TABLET, FILM COATED ORAL at 10:36

## 2025-01-18 RX ADMIN — SODIUM ZIRCONIUM CYCLOSILICATE 10 G: 10 POWDER, FOR SUSPENSION ORAL at 10:37

## 2025-01-18 RX ADMIN — INSULIN GLARGINE 25 UNITS: 100 INJECTION, SOLUTION SUBCUTANEOUS at 21:16

## 2025-01-18 RX ADMIN — SODIUM BICARBONATE 1300 MG: 650 TABLET ORAL at 10:36

## 2025-01-18 RX ADMIN — BUMETANIDE 2 MG: 1 TABLET ORAL at 18:17

## 2025-01-18 RX ADMIN — CLONIDINE HYDROCHLORIDE 0.1 MG: 0.1 TABLET ORAL at 21:20

## 2025-01-18 RX ADMIN — Medication 0.5 MG: at 09:23

## 2025-01-18 RX ADMIN — SODIUM ZIRCONIUM CYCLOSILICATE 10 G: 10 POWDER, FOR SUSPENSION ORAL at 21:17

## 2025-01-18 RX ADMIN — SODIUM CHLORIDE: 9 INJECTION, SOLUTION INTRAVENOUS at 07:15

## 2025-01-18 RX ADMIN — Medication: at 13:13

## 2025-01-18 RX ADMIN — DEXAMETHASONE SODIUM PHOSPHATE 8 MG: 4 INJECTION, SOLUTION INTRAMUSCULAR; INTRAVENOUS at 08:30

## 2025-01-18 RX ADMIN — SODIUM BICARBONATE 1300 MG: 650 TABLET ORAL at 21:17

## 2025-01-18 RX ADMIN — INSULIN LISPRO 3 UNITS: 100 INJECTION, SOLUTION INTRAVENOUS; SUBCUTANEOUS at 15:58

## 2025-01-18 RX ADMIN — CEFAZOLIN 2 G: 1 INJECTION, POWDER, FOR SOLUTION INTRAMUSCULAR; INTRAVENOUS at 08:32

## 2025-01-18 RX ADMIN — HYDROMORPHONE HYDROCHLORIDE 0.5 MG: 1 INJECTION, SOLUTION INTRAMUSCULAR; INTRAVENOUS; SUBCUTANEOUS at 09:23

## 2025-01-18 RX ADMIN — METOLAZONE 5 MG: 5 TABLET ORAL at 10:50

## 2025-01-18 RX ADMIN — INSULIN LISPRO 8 UNITS: 100 INJECTION, SOLUTION INTRAVENOUS; SUBCUTANEOUS at 18:09

## 2025-01-18 RX ADMIN — CLONIDINE HYDROCHLORIDE 0.1 MG: 0.1 TABLET ORAL at 10:36

## 2025-01-18 RX ADMIN — SODIUM BICARBONATE 1300 MG: 650 TABLET ORAL at 14:44

## 2025-01-18 ASSESSMENT — PAIN DESCRIPTION - LOCATION
LOCATION: SHOULDER
LOCATION: SHOULDER

## 2025-01-18 ASSESSMENT — PAIN SCALES - GENERAL
PAINLEVEL_OUTOF10: 0
PAINLEVEL_OUTOF10: 8
PAINLEVEL_OUTOF10: 0
PAINLEVEL_OUTOF10: 2

## 2025-01-18 ASSESSMENT — PAIN DESCRIPTION - ORIENTATION
ORIENTATION: LEFT;RIGHT
ORIENTATION: RIGHT;LEFT

## 2025-01-18 ASSESSMENT — PAIN DESCRIPTION - DESCRIPTORS
DESCRIPTORS: ACHING
DESCRIPTORS: ACHING;DISCOMFORT

## 2025-01-18 NOTE — PLAN OF CARE
Problem: Chronic Conditions and Co-morbidities  Goal: Patient's chronic conditions and co-morbidity symptoms are monitored and maintained or improved  1/18/2025 1219 by Obed Lugo RN  Outcome: Progressing  1/17/2025 2337 by Dot Castellanos RN  Outcome: Progressing  Flowsheets (Taken 1/17/2025 2337)  Care Plan - Patient's Chronic Conditions and Co-Morbidity Symptoms are Monitored and Maintained or Improved:   Monitor and assess patient's chronic conditions and comorbid symptoms for stability, deterioration, or improvement   Collaborate with multidisciplinary team to address chronic and comorbid conditions and prevent exacerbation or deterioration   Update acute care plan with appropriate goals if chronic or comorbid symptoms are exacerbated and prevent overall improvement and discharge     Problem: Pain  Goal: Verbalizes/displays adequate comfort level or baseline comfort level  1/18/2025 1219 by Obed Lugo RN  Outcome: Progressing  1/17/2025 2337 by Dot Castellanos RN  Outcome: Progressing  Flowsheets (Taken 1/17/2025 2337)  Verbalizes/displays adequate comfort level or baseline comfort level:   Encourage patient to monitor pain and request assistance   Assess pain using appropriate pain scale   Implement non-pharmacological measures as appropriate and evaluate response  1/17/2025 2227 by Sujata Hernández RN  Outcome: Progressing     Problem: Safety - Adult  Goal: Free from fall injury  1/18/2025 1219 by Obed Lugo RN  Outcome: Progressing  1/17/2025 2337 by Dot Castellanos RN  Outcome: Progressing  Flowsheets (Taken 1/17/2025 2337)  Free From Fall Injury: Instruct family/caregiver on patient safety     Problem: ABCDS Injury Assessment  Goal: Absence of physical injury  1/18/2025 1219 by Obed Lugo RN  Outcome: Progressing  1/17/2025 2337 by Dot Castellanos RN  Outcome: Progressing  Flowsheets (Taken 1/17/2025 2337)  Absence of Physical Injury: Implement safety measures based on patient

## 2025-01-18 NOTE — ANESTHESIA POSTPROCEDURE EVALUATION
Department of Anesthesiology  Postprocedure Note    Patient: Duane Brady  MRN: [id number]  YOB: 1990  Date of evaluation: 1/18/2025    Procedure Summary       Date: 01/18/25 Room / Location: [address]    Anesthesia Start: 0810 Anesthesia Stop:     Procedure: LAPAROSCOPIC PERITONEAL DIALYSIS CATHETER INSERTION (Abdomen) Diagnosis:       Chronic kidney disease, unspecified CKD stage      (Chronic kidney disease, unspecified CKD stage [N18.9])    Surgeons: Levi Eason MD Responsible Provider: Kay Chu DO    Anesthesia Type: general ASA Status: 4            Anesthesia Type: No value filed.    Junie Phase I:      Junie Phase II:      Anesthesia Post Evaluation    Patient location during evaluation: PACU  Patient participation: complete - patient participated  Level of consciousness: awake  Airway patency: patent  Nausea & Vomiting: no nausea and no vomiting  Cardiovascular status: hemodynamically stable  Respiratory status: acceptable  Hydration status: euvolemic  Pain management: adequate        No notable events documented.

## 2025-01-18 NOTE — PLAN OF CARE
Problem: Chronic Conditions and Co-morbidities  Goal: Patient's chronic conditions and co-morbidity symptoms are monitored and maintained or improved  1/17/2025 2337 by Dot Castellanos RN  Outcome: Progressing  Flowsheets (Taken 1/17/2025 2337)  Care Plan - Patient's Chronic Conditions and Co-Morbidity Symptoms are Monitored and Maintained or Improved:   Monitor and assess patient's chronic conditions and comorbid symptoms for stability, deterioration, or improvement   Collaborate with multidisciplinary team to address chronic and comorbid conditions and prevent exacerbation or deterioration   Update acute care plan with appropriate goals if chronic or comorbid symptoms are exacerbated and prevent overall improvement and discharge     Problem: Pain  Goal: Verbalizes/displays adequate comfort level or baseline comfort level  1/17/2025 2337 by Dot Castellanos RN  Outcome: Progressing  Flowsheets (Taken 1/17/2025 2337)  Verbalizes/displays adequate comfort level or baseline comfort level:   Encourage patient to monitor pain and request assistance   Assess pain using appropriate pain scale   Implement non-pharmacological measures as appropriate and evaluate response     Problem: Safety - Adult  Goal: Free from fall injury  Outcome: Progressing  Flowsheets (Taken 1/17/2025 2337)  Free From Fall Injury: Instruct family/caregiver on patient safety     Problem: ABCDS Injury Assessment  Goal: Absence of physical injury  Outcome: Progressing  Flowsheets (Taken 1/17/2025 2337)  Absence of Physical Injury: Implement safety measures based on patient assessment

## 2025-01-18 NOTE — OP NOTE
Operative Note      Patient: Duane Brady  YOB: 1990  MRN: [id number]    Date of Procedure: 1/18/2025    Pre-Op Diagnosis Codes:      * Chronic kidney disease, unspecified CKD stage [N18.9]    Post-Op Diagnosis: same       Procedure(s):  LAPAROSCOPIC PERITONEAL DIALYSIS CATHETER INSERTION    Surgeon(s):  Levi Eason MD    Assistant:   Surgical Assistant: Lang Ge RN    Anesthesia: General    Estimated Blood Loss (mL): 5    Complications: none    Specimens:   * No specimens in log *    Implants:  * No implants in log *      Drains: * No LDAs found *    Findings:  Infection Present At Time Of Surgery (PATOS) (choose all levels that have infection present):  No infection present      Indications:    34-year-old male with history of chronic renal insufficiency in need of dialysis.  Nephrology requested peritoneal dialysis catheter insertion. Risks, benefits, alternatives (and risks of alternatives) of surgery were discussed with the patient, which include but are not limited to, bleeding, infection, hernia formation, need for further operations for catheter malfunction, conversion to open, bowel injury, need for switch to hemodialysis, risk of anesthesia, blood clots, pneumonia, heart attack and stroke. Patient understands these risk and agrees to proceed.      Details of Procedure:    Patient was taken to the operating room and placed in the supine position. General anesthesia was induced. Abdomen was prepped and draped in the sterile fashion. 5 mm incision was made at Vuong's point and Veress needle was inserted. After confirmation with drop test, abdomen was insufflate to 15 mmHg of CO2 gas.  A 5 mm trocar was placed using the optiview lense. Laparoscope was reinserted, there was no injuries identfied after intial trocar/Veress placement. The following trocars were placed under direct vision: 5 mm left lower quadrant. Patient was placed in Trendelenburg position. The pelvis was inspected

## 2025-01-18 NOTE — ANESTHESIA PRE PROCEDURE
(TYLENOL) tablet 650 mg  650 mg Oral Q6H PRN Mariola Gonzales APRN - CNP        Or    acetaminophen (TYLENOL) suppository 650 mg  650 mg Rectal Q6H PRN Mariola Gonzales APRN - CNP        atorvastatin (LIPITOR) tablet 40 mg  40 mg Oral QAM Mariola Gonzales APRN - CNP   40 mg at 01/17/25 1435    cloNIDine (CATAPRES) tablet 0.1 mg  0.1 mg Oral BID Mariola Gonzales APRN - CNP   0.1 mg at 01/17/25 2021    amLODIPine (NORVASC) tablet 10 mg  10 mg Oral Daily Mariola Gonzales APRN - CNP   10 mg at 01/17/25 1435    levothyroxine (SYNTHROID) tablet 175 mcg  175 mcg Oral Daily Mariola Gonzales APRN - CNP   175 mcg at 01/18/25 0650    carvedilol (COREG) tablet 6.25 mg  6.25 mg Oral BID Mariola Gonzales APRN - CNP   6.25 mg at 01/17/25 2021    metOLazone (ZAROXOLYN) tablet 5 mg  5 mg Oral Daily Mariola Gonzales APRN - CNP   5 mg at 01/17/25 1640    insulin lispro (HUMALOG,ADMELOG) injection vial 0-4 Units  0-4 Units SubCUTAneous 4x Daily WC Mariola Gonzales APRN - CNP        ceFAZolin (ANCEF) 2,000 mg in sterile water 20 mL IV syringe  2,000 mg IntraVENous See Admin Instructions Levi Eason MD        bumetanide (BUMEX) tablet 2 mg  2 mg Oral BID Domenic Best MD   2 mg at 01/17/25 1823    sodium bicarbonate tablet 1,300 mg  1,300 mg Oral TID Domenic Best MD   1,300 mg at 01/17/25 2021    sodium bicarbonate 150 mEq in sterile water 1,000 mL infusion   IntraVENous Continuous Domenic Best MD 50 mL/hr at 01/17/25 1625 NoRateChange at 01/18/25 0658       Allergies:    Allergies   Allergen Reactions    Pineapple Anaphylaxis       Problem List:    Patient Active Problem List   Diagnosis Code    Type 1 diabetes mellitus with ketoacidosis without coma (HCC) E10.10    Diabetic nephropathy (HCC) E11.21    Hypothyroidism E03.9    HTN (hypertension) I10    HLD (hyperlipidemia) E78.5    C/f of JOSÉ MIGUEL (obstructive sleep apnea) G47.33    Obesity E66.9    DM type 1 (diabetes

## 2025-01-19 VITALS
HEIGHT: 69 IN | RESPIRATION RATE: 20 BRPM | DIASTOLIC BLOOD PRESSURE: 82 MMHG | TEMPERATURE: 98.4 F | SYSTOLIC BLOOD PRESSURE: 152 MMHG | WEIGHT: 220.7 LBS | HEART RATE: 86 BPM | BODY MASS INDEX: 32.69 KG/M2 | OXYGEN SATURATION: 94 %

## 2025-01-19 LAB
ALBUMIN SERPL-MCNC: 3.4 G/DL (ref 3.5–5.2)
ALP SERPL-CCNC: 141 U/L (ref 40–129)
ALT SERPL-CCNC: 10 U/L (ref 0–40)
ANION GAP SERPL CALCULATED.3IONS-SCNC: 18 MMOL/L (ref 7–16)
AST SERPL-CCNC: 13 U/L (ref 0–39)
BASOPHILS # BLD: 0.03 K/UL (ref 0–0.2)
BASOPHILS NFR BLD: 0 % (ref 0–2)
BILIRUB SERPL-MCNC: 0.3 MG/DL (ref 0–1.2)
BUN SERPL-MCNC: 66 MG/DL (ref 6–20)
CALCIUM SERPL-MCNC: 8.2 MG/DL (ref 8.6–10.2)
CHLORIDE SERPL-SCNC: 94 MMOL/L (ref 98–107)
CO2 SERPL-SCNC: 24 MMOL/L (ref 22–29)
CREAT SERPL-MCNC: 6.5 MG/DL (ref 0.7–1.2)
EOSINOPHIL # BLD: 0 K/UL (ref 0.05–0.5)
EOSINOPHILS RELATIVE PERCENT: 0 % (ref 0–6)
ERYTHROCYTE [DISTWIDTH] IN BLOOD BY AUTOMATED COUNT: 13.2 % (ref 11.5–15)
GFR, ESTIMATED: 11 ML/MIN/1.73M2
GLUCOSE BLD-MCNC: 214 MG/DL (ref 74–99)
GLUCOSE BLD-MCNC: 230 MG/DL (ref 74–99)
GLUCOSE BLD-MCNC: 232 MG/DL (ref 74–99)
GLUCOSE SERPL-MCNC: 237 MG/DL (ref 74–99)
HCT VFR BLD AUTO: 29.7 % (ref 37–54)
HGB BLD-MCNC: 9.6 G/DL (ref 12.5–16.5)
IMM GRANULOCYTES # BLD AUTO: 0.04 K/UL (ref 0–0.58)
IMM GRANULOCYTES NFR BLD: 0 % (ref 0–5)
LYMPHOCYTES NFR BLD: 1.02 K/UL (ref 1.5–4)
LYMPHOCYTES RELATIVE PERCENT: 7 % (ref 20–42)
MAGNESIUM SERPL-MCNC: 2.2 MG/DL (ref 1.6–2.6)
MCH RBC QN AUTO: 29.1 PG (ref 26–35)
MCHC RBC AUTO-ENTMCNC: 32.3 G/DL (ref 32–34.5)
MCV RBC AUTO: 90 FL (ref 80–99.9)
MONOCYTES NFR BLD: 0.72 K/UL (ref 0.1–0.95)
MONOCYTES NFR BLD: 5 % (ref 2–12)
NEUTROPHILS NFR BLD: 87 % (ref 43–80)
NEUTS SEG NFR BLD: 12.01 K/UL (ref 1.8–7.3)
PLATELET # BLD AUTO: 299 K/UL (ref 130–450)
PMV BLD AUTO: 10 FL (ref 7–12)
POTASSIUM SERPL-SCNC: 4.3 MMOL/L (ref 3.5–5)
PROT SERPL-MCNC: 6.6 G/DL (ref 6.4–8.3)
RBC # BLD AUTO: 3.3 M/UL (ref 3.8–5.8)
SODIUM SERPL-SCNC: 136 MMOL/L (ref 132–146)
WBC OTHER # BLD: 13.8 K/UL (ref 4.5–11.5)

## 2025-01-19 PROCEDURE — 99233 SBSQ HOSP IP/OBS HIGH 50: CPT | Performed by: STUDENT IN AN ORGANIZED HEALTH CARE EDUCATION/TRAINING PROGRAM

## 2025-01-19 PROCEDURE — 82962 GLUCOSE BLOOD TEST: CPT

## 2025-01-19 PROCEDURE — 87340 HEPATITIS B SURFACE AG IA: CPT

## 2025-01-19 PROCEDURE — 83735 ASSAY OF MAGNESIUM: CPT

## 2025-01-19 PROCEDURE — 85025 COMPLETE CBC W/AUTO DIFF WBC: CPT

## 2025-01-19 PROCEDURE — 2500000003 HC RX 250 WO HCPCS: Performed by: SURGERY

## 2025-01-19 PROCEDURE — 6370000000 HC RX 637 (ALT 250 FOR IP): Performed by: SURGERY

## 2025-01-19 PROCEDURE — 90945 DIALYSIS ONE EVALUATION: CPT

## 2025-01-19 PROCEDURE — 86317 IMMUNOASSAY INFECTIOUS AGENT: CPT

## 2025-01-19 PROCEDURE — 6370000000 HC RX 637 (ALT 250 FOR IP): Performed by: STUDENT IN AN ORGANIZED HEALTH CARE EDUCATION/TRAINING PROGRAM

## 2025-01-19 PROCEDURE — 80053 COMPREHEN METABOLIC PANEL: CPT

## 2025-01-19 RX ADMIN — INSULIN LISPRO 2 UNITS: 100 INJECTION, SOLUTION INTRAVENOUS; SUBCUTANEOUS at 08:12

## 2025-01-19 RX ADMIN — SODIUM CHLORIDE, PRESERVATIVE FREE 10 ML: 5 INJECTION INTRAVENOUS at 08:11

## 2025-01-19 RX ADMIN — BUMETANIDE 2 MG: 1 TABLET ORAL at 08:11

## 2025-01-19 RX ADMIN — AMLODIPINE BESYLATE 10 MG: 10 TABLET ORAL at 08:11

## 2025-01-19 RX ADMIN — CARVEDILOL 6.25 MG: 6.25 TABLET, FILM COATED ORAL at 08:11

## 2025-01-19 RX ADMIN — LEVOTHYROXINE SODIUM 175 MCG: 100 TABLET ORAL at 05:00

## 2025-01-19 RX ADMIN — SODIUM ZIRCONIUM CYCLOSILICATE 10 G: 10 POWDER, FOR SUSPENSION ORAL at 08:16

## 2025-01-19 RX ADMIN — ATORVASTATIN CALCIUM 40 MG: 40 TABLET, FILM COATED ORAL at 08:11

## 2025-01-19 RX ADMIN — Medication: at 08:09

## 2025-01-19 RX ADMIN — METOLAZONE 5 MG: 5 TABLET ORAL at 10:53

## 2025-01-19 RX ADMIN — CLONIDINE HYDROCHLORIDE 0.1 MG: 0.1 TABLET ORAL at 08:11

## 2025-01-19 RX ADMIN — SODIUM BICARBONATE 1300 MG: 650 TABLET ORAL at 08:11

## 2025-01-19 RX ADMIN — INSULIN LISPRO 2 UNITS: 100 INJECTION, SOLUTION INTRAVENOUS; SUBCUTANEOUS at 11:00

## 2025-01-19 ASSESSMENT — PAIN SCALES - GENERAL: PAINLEVEL_OUTOF10: 3

## 2025-01-19 ASSESSMENT — PAIN DESCRIPTION - LOCATION: LOCATION: ABDOMEN

## 2025-01-19 NOTE — PLAN OF CARE
Problem: Chronic Conditions and Co-morbidities  Goal: Patient's chronic conditions and co-morbidity symptoms are monitored and maintained or improved  Outcome: Progressing     Problem: Pain  Goal: Verbalizes/displays adequate comfort level or baseline comfort level  1/19/2025 0919 by Tanya Olmstead, RN  Outcome: Progressing  1/18/2025 2151 by Sujata Hernández RN  Outcome: Progressing     Problem: Safety - Adult  Goal: Free from fall injury  Outcome: Progressing     Problem: ABCDS Injury Assessment  Goal: Absence of physical injury  Outcome: Progressing     Problem: Discharge Planning  Goal: Discharge to home or other facility with appropriate resources  Outcome: Progressing

## 2025-01-20 LAB
HBV SURFACE AB SERPL IA-ACNC: <3.1 MIU/ML (ref 0–9.99)
HBV SURFACE AG SERPL QL IA: NONREACTIVE

## 2025-01-20 NOTE — FLOWSHEET NOTE
01/19/25 1850   Daily Charge   $ Daily PD Charge $ Yes   Initiation of PD Therapy   Dialysis Nurse Intiation of PD Therapy Yes   Cycler   Verification of Prescription CCPD   Informed Consent  Yes   Total Volume Programmed (mL) 4000 mL   Therapy Time (Hours:Minutes) 6   Cycler Type [facility]   Fill Time (min) 20 Minutes   Fill Volume 1000 mL   Last Fill Volume (mL) 0 mL   Bag Volume 4 mL   Number of Bags Used 1   Dianeal Solution Other (Comment)  (Dextrose 2.5% in 5000 mL)   [facility]   PD Output (mL) 406 mL   Post-Treatment (Cycler)   Tolerated Treatment Well     PD completed. Patient tolerated procedure well. Clear yellow effluent noted. Minimal amount of abdominal discomfort reported.  mL.

## 2025-01-20 NOTE — PROGRESS NOTES
[facility] Hospitalist Progress Note    Admitting Date and Time: 1/17/2025  8:58 AM  Admit Dx: Acute kidney injury (nontraumatic) (HCC) [N17.9]  Acute kidney injury superimposed on stage 5 chronic kidney disease, not on chronic dialysis (HCC) [N17.9, N18.5]    Subjective:  Patient is being followed for Acute kidney injury (nontraumatic) (HCC) [N17.9]  Acute kidney injury superimposed on stage 5 chronic kidney disease, not on chronic dialysis (HCC) [N17.9, N18.5]   Patient was seen examined    ROS: denies fever, chills, cp, sob, n/v, HA unless stated above.      insulin lispro  0-8 Units SubCUTAneous 4x Daily AC & HS    insulin glargine  25 Units SubCUTAneous Nightly    insulin lispro  10 Units SubCUTAneous TID WC    calcium gluconate  1,000 mg IntraVENous Once    sodium chloride flush  5-40 mL IntraVENous 2 times per day    atorvastatin  40 mg Oral QAM    cloNIDine  0.1 mg Oral BID    amLODIPine  10 mg Oral Daily    levothyroxine  175 mcg Oral Daily    carvedilol  6.25 mg Oral BID    metOLazone  5 mg Oral Daily    ceFAZolin  2,000 mg IntraVENous See Admin Instructions    bumetanide  2 mg Oral BID    sodium bicarbonate  1,300 mg Oral TID     oxyCODONE-acetaminophen, 1 tablet, Q4H PRN  glucose, 4 tablet, PRN  dextrose bolus, 125 mL, PRN   Or  dextrose bolus, 250 mL, PRN  glucagon (rDNA), 1 mg, PRN  dextrose, , Continuous PRN  sodium chloride flush, 5-40 mL, PRN  sodium chloride, , PRN  ondansetron, 4 mg, Q8H PRN   Or  ondansetron, 4 mg, Q6H PRN  acetaminophen, 650 mg, Q6H PRN   Or  acetaminophen, 650 mg, Q6H PRN         Objective:    BP (!) 152/82   Pulse 86   Temp 98.4 °F (36.9 °C) (Oral)   Resp 20   Ht 1.753 m (5' 9\")   Wt 100.1 kg (220 lb 11.2 oz)   SpO2 94%   BMI 32.59 kg/m²     General Appearance: alert and oriented to person, place and time and in no acute distress  Skin: warm and dry  Head: normocephalic and atraumatic  Eyes: pupils equal, round, and reactive to light, extraocular eye movements 
4 Eyes Skin Assessment     NAME:  Duane Brady  YOB: 1990  MEDICAL RECORD NUMBER:  [id number]    The patient is being assessed for  Admission    I agree that at least one RN has performed a thorough Head to Toe Skin Assessment on the patient. ALL assessment sites listed below have been assessed.      Areas assessed by both nurses:    Head, Face, Ears, Shoulders, Back, Chest, Arms, Elbows, Hands, Sacrum. Buttock, Coccyx, Ischium, Legs. Feet and Heels, and Under Medical Devices         Does the Patient have a Wound? No noted wound(s)       Lev Prevention initiated by RN: No  Wound Care Orders initiated by RN: No    Pressure Injury (Stage 3,4, Unstageable, DTI, NWPT, and Complex wounds) if present, place Wound referral order by RN under : No    New Ostomies, if present place, Ostomy referral order under : No     Nurse 1 eSignature: Electronically signed by Mercedes Solitario RN on 1/17/25 at 2:47 PM EST    **SHARE this note so that the co-signing nurse can place an eSignature**    Nurse 2 eSignature: Electronically signed by Eriberto Miles RN on 1/17/25 at 4:01 PM EST  
Call received from Dr. Best. Jarrett to discharge from nephrology standpoint. Patient will follow up with [facility]. Notified floor RAMA Richard.   
Chart reviewed, full consultation to follow up    Briefly Mr. Brady is a 34-year-old man known to our practice, who follows regularly with Dr. Mason, with history of CKD stage V, with large proteinuria, 2/2 diabetic nephropathy, baseline creatinine 5.9 mg/dL, type I DM, HTN, right foot osteomyelitis, probably neurogenic bladder, hyperlipidemia, who was admitted on 1/17/2025 after he was referred to the ER by Dr. Mason after he was found to have further progression of his CKD with a creatinine level up to 6.54 and potassium level 6.1 mEq/L, for initiation of renal replacement therapy, peritoneal dialysis.  In the ER his creatinine level was 6.3 mg/dL, his potassium level was 6.5 mEq/L.      CKD stage V, with severe proteinuria, 2/2 diabetic nephropathy, admitted for initiation of renal replacement therapy, presently with hyperkalemia, he has been recommended to at least temporarily start on hemodialysis and then continue with urgent start PD but he refuses to have hemodialysis.    Hyperkalemia, 2/2 #1, received hyperkalemic protocol, patient refuses hemodialysis today  Hyperchloremia metabolic acidosis 2/2 CKD  HTN, on amlodipine, carvedilol, clonidine  Normocytic anemia, of CKD and chronic inflammation, iron saturation 13%, ferritin 1220, on epoetin dillon    --------------------------------------------------------------------------------    Probably underlying neurogenic bladder?,  To obtain bladder scan  Hyperlipidemia, on atorvastatin  Hypothyroidism, on levothyroxine        Plan:       Lokelma 10 mg p.o. 3 times daily x 6 doses   Start bicarbonate drip 150 mg of Na bicarbonate in sterile water at 50 cc/hour   Continue amlodipine 10 mg daily, carvedilol 6.25 mg twice daily, clonidine 0.1 mg twice daily  Start Bumex 2 mg twice daily, metolazone 5 mg p.o. daily  Bicarbonate 1300 mg p.o. 3 times daily  Continue epoetin alpha 10,000 units weekly  Continue amlodipine 10 mg p.o. daily  Continue to monitor renal 
Department of Internal Medicine  Nephrology  Consult Note    Events reviewed.  Had PD catheter placement earlier today.    SUBJECTIVE: We are following Mr. Brady for ESRD.  Reports no new complaints    PHYSICAL EXAM:      Vitals:    VITALS:  BP (!) 170/88   Pulse 80   Temp 97.5 °F (36.4 °C) (Oral)   Resp 18   Ht 1.753 m (5' 9\")   Wt 105.5 kg (232 lb 9.6 oz)   SpO2 100%   BMI 34.35 kg/m²   24HR INTAKE/OUTPUT:    Intake/Output Summary (Last 24 hours) at 1/18/2025 1054  Last data filed at 1/18/2025 0911  Gross per 24 hour   Intake 500 ml   Output 2 ml   Net 498 ml       Constitutional:  Awake, alert, oriented, in NAD  HEENT:  PERRLA, normocephalic, atraumatic  Respiratory:  CTA  Cardiovascular/Edema:  RRR, normal S1, normal S2  Gastrointestinal:  Soft, flat, non-distended, non-tender, PD catheter in place  Neurologic:  Nonfocal  Skin:  warm, dry, no rashes, no lesions      Scheduled Meds:   calcium gluconate  1,000 mg IntraVENous Once    sodium zirconium cyclosilicate  10 g Oral TID    sodium chloride flush  5-40 mL IntraVENous 2 times per day    atorvastatin  40 mg Oral QAM    cloNIDine  0.1 mg Oral BID    amLODIPine  10 mg Oral Daily    levothyroxine  175 mcg Oral Daily    carvedilol  6.25 mg Oral BID    metOLazone  5 mg Oral Daily    insulin lispro  0-4 Units SubCUTAneous 4x Daily WC    ceFAZolin  2,000 mg IntraVENous See Admin Instructions    bumetanide  2 mg Oral BID    sodium bicarbonate  1,300 mg Oral TID     Continuous Infusions:   dextrose      sodium chloride      sodium bicarbonate 150 mEq in sterile water 1,000 mL infusion 50 mL/hr at 01/17/25 1625     PRN Meds:.glucose, dextrose bolus **OR** dextrose bolus, glucagon (rDNA), dextrose, sodium chloride flush, sodium chloride, ondansetron **OR** ondansetron, acetaminophen **OR** acetaminophen      DATA:    CBC:   Lab Results   Component Value Date/Time    WBC 7.7 01/18/2025 04:25 AM    RBC 3.38 01/18/2025 04:25 AM    HGB 10.0 01/18/2025 04:25 AM    
PVR bladder scan 55ml  
Procedure consent for peritoneal dialysis signed and in chart   
Secure message sent to Dr. Devries about patient blood sugars and patients expressed concerns with his insulin scale. See new orders.  
Spoke to Bandar with on call dialysis, notified of order for peritoneal dialysis today 1/19/25.   
Spoke with Kriss with [facility] home dialysis. Kriss has patient information with appointment scheduled for Monday 1/19. Duane notified to call kriss tomorrow Morning at [phone]. Duane verbalized understanding to call kriss tomorrow morning. Number provided on discharge AVS.                                                                                           
Surgery    Mild pain at incisions.    AFVSS  NAD  ABD: soft, ND, approp TTP, dressing dry    POD1 lap PD cath insertion  - Ok to use PD cath  - Will sign off, available as needed    CHEO Eason MD  
without mass   Pulmonary/Chest: clear to auscultation bilaterally- no wheezes, rales or rhonchi, normal air movement, no respiratory distress  Cardiovascular: normal rate, normal S1 and S2 and no carotid bruits  Abdomen: soft, non-tender, non-distended, normal bowel sounds, no masses or organomegaly  Extremities: no cyanosis, no clubbing and no edema  Neurologic: no cranial nerve deficit and speech normal        Recent Labs     01/17/25  0945 01/17/25  1553 01/18/25  0425    142 139   K 6.5* 4.7 4.2    108* 105   CO2 19* 19* 21*   BUN 62* 61* 59*   CREATININE 6.3* 6.2* 6.3*   GLUCOSE 135* 91 163*   CALCIUM 8.4* 8.5* 8.3*       Recent Labs     01/17/25  0945 01/18/25  0425   WBC 7.6 7.7   RBC 3.41* 3.38*   HGB 10.0* 10.0*   HCT 31.3* 31.3*   MCV 91.8 92.6   MCH 29.3 29.6   MCHC 31.9* 31.9*   RDW 13.7 13.6    293   MPV 9.4 9.7       Radiology:     Assessment:    Principal Problem:    Acute kidney injury superimposed on stage 5 chronic kidney disease, not on chronic dialysis (HCC)  Active Problems:    HLD (hyperlipidemia)    Hypothyroidism    DM type 1 (diabetes mellitus, type 1) ([facility])    Hyperkalemia    Hyperphosphatemia    Anemia in stage 5 chronic kidney disease, not on chronic dialysis (HCC)    Chronic metabolic acidosis    Elevated alkaline phosphatase level  Resolved Problems:    * No resolved hospital problems. *      CKD progressed to end-stage renal disease secondary to diabetic nephropathy admitted for PD catheter placement and dialysis start tomorrow      Nephro is following continue all other medications for comorbidities      NOTE: This report was transcribed using voice recognition software. Every effort was made to ensure accuracy; however, inadvertent computerized transcription errors may be present.  Electronically signed by Nghia Devries DO on 1/18/2025 at 2:53 PM    
acidosis 2/2 CKD, potassium levels improved from bicarbonate drip.  Discontinue bicarbonate drip  HTN, on amlodipine, carvedilol, clonidine  Normocytic anemia, of CKD and chronic inflammation, iron saturation 13%, ferritin 1220, on epoetin dillon  --------------------------------------------------------------------------------    Hyperlipidemia, on atorvastatin  Hypothyroidism, on levothyroxine        Plan:     Urgent start PD today  Discontinue bicarbonate drip  Continue bicarbonate 1300 mg p.o. 3 times daily  Continue amlodipine 10 mg daily, carvedilol 6.25 mg p.o. twice daily clonidine 0.1 mg twice daily  Continue Bumex 2 mg twice daily, metolazone 5 mg p.o. daily  Continue epoetin alpha 10,000 units weekly      Electronically signed by JUANA Almeida CNP on 1/19/2025 at 10:26 AM     I saw and evaluated the patient, performing the key elements of the service. I discussed the findings, assessment and plan with NP and agree with her findings and plans as documented in her note.        Domenic Best MD

## 2025-01-20 NOTE — DISCHARGE SUMMARY
[facility] Hospitalist Physician Discharge Summary       Levi Eason MD  [address]  [address]  [place] [address]  [phone]    Call  As needed    Domenic Best MD  [address] [address] [phone]    Schedule an appointment as soon as possible for a visit  Hospital follow up    [facility]  Kriss   [phone]  Call in 1 day(s)  Call kriss first thing monday morning for dialysis instructions at [phone].      Activity level: As tolerated     Dispo: home       Condition on discharge: Stable     Patient ID:  Duane Brady  [id number]  34 y.o.  1990    Admit date: 1/17/2025    Discharge date and time:  1/20/2025  2:30 PM    Admission Diagnoses: Principal Problem:    Acute kidney injury superimposed on stage 5 chronic kidney disease, not on chronic dialysis (HCC)  Active Problems:    HLD (hyperlipidemia)    Hypothyroidism    DM type 1 (diabetes mellitus, type 1) ([facility])    Hyperkalemia    Hyperphosphatemia    Anemia in stage 5 chronic kidney disease, not on chronic dialysis (HCC)    Chronic metabolic acidosis    Elevated alkaline phosphatase level  Resolved Problems:    * No resolved hospital problems. *      Discharge Diagnoses: Principal Problem:    Acute kidney injury superimposed on stage 5 chronic kidney disease, not on chronic dialysis (HCC)  Active Problems:    HLD (hyperlipidemia)    Hypothyroidism    DM type 1 (diabetes mellitus, type 1) (HCC)    Hyperkalemia    Hyperphosphatemia    Anemia in stage 5 chronic kidney disease, not on chronic dialysis (HCC)    Chronic metabolic acidosis    Elevated alkaline phosphatase level  Resolved Problems:    * No resolved hospital problems. *      Consults:  IP CONSULT TO GENERAL SURGERY  IP CONSULT TO NEPHROLOGY  IP CONSULT TO PRIMARY CARE PROVIDER    Procedures:     Hospital Course:     34-year-old male admitted to hospital for end-stage renal disease needing a peritoneal catheter placement

## 2025-01-23 ENCOUNTER — OFFICE VISIT (OUTPATIENT)
Dept: ENDOCRINOLOGY | Age: 35
End: 2025-01-23

## 2025-01-23 VITALS
SYSTOLIC BLOOD PRESSURE: 126 MMHG | TEMPERATURE: 98.5 F | DIASTOLIC BLOOD PRESSURE: 80 MMHG | WEIGHT: 220 LBS | BODY MASS INDEX: 32.58 KG/M2 | HEIGHT: 69 IN

## 2025-01-23 DIAGNOSIS — E78.5 HYPERLIPIDEMIA, UNSPECIFIED HYPERLIPIDEMIA TYPE: ICD-10-CM

## 2025-01-23 DIAGNOSIS — E10.65 TYPE 1 DIABETES MELLITUS WITH HYPERGLYCEMIA (HCC): Primary | ICD-10-CM

## 2025-01-23 DIAGNOSIS — E66.09 CLASS 1 OBESITY DUE TO EXCESS CALORIES WITH SERIOUS COMORBIDITY AND BODY MASS INDEX (BMI) OF 31.0 TO 31.9 IN ADULT: ICD-10-CM

## 2025-01-23 DIAGNOSIS — N18.4 CKD (CHRONIC KIDNEY DISEASE) STAGE 4, GFR 15-29 ML/MIN (HCC): ICD-10-CM

## 2025-01-23 DIAGNOSIS — E03.9 HYPOTHYROIDISM, UNSPECIFIED TYPE: ICD-10-CM

## 2025-01-23 DIAGNOSIS — E66.811 CLASS 1 OBESITY DUE TO EXCESS CALORIES WITH SERIOUS COMORBIDITY AND BODY MASS INDEX (BMI) OF 31.0 TO 31.9 IN ADULT: ICD-10-CM

## 2025-01-23 LAB
ANION GAP SERPL CALCULATED.3IONS-SCNC: 20 MMOL/L (ref 7–16)
BUN SERPL-MCNC: 95 MG/DL (ref 6–20)
CALCIUM SERPL-MCNC: 7.8 MG/DL (ref 8.6–10.2)
CHLORIDE SERPL-SCNC: 95 MMOL/L (ref 98–107)
CO2 SERPL-SCNC: 19 MMOL/L (ref 22–29)
CREAT SERPL-MCNC: 8.4 MG/DL (ref 0.7–1.2)
GFR, ESTIMATED: 8 ML/MIN/1.73M2
GLUCOSE SERPL-MCNC: 172 MG/DL (ref 74–99)
HBA1C MFR BLD: 7.6 %
POTASSIUM SERPL-SCNC: 3.9 MMOL/L (ref 3.5–5)
SODIUM SERPL-SCNC: 134 MMOL/L (ref 132–146)

## 2025-01-23 RX ORDER — ACYCLOVIR 400 MG/1
TABLET ORAL
Qty: 9 EACH | Refills: 3 | Status: SHIPPED | OUTPATIENT
Start: 2025-01-23

## 2025-01-23 RX ORDER — INSULIN LISPRO 100 [IU]/ML
INJECTION, SOLUTION INTRAVENOUS; SUBCUTANEOUS
Qty: 10 ADJUSTABLE DOSE PRE-FILLED PEN SYRINGE | Refills: 3 | Status: SHIPPED
Start: 2025-01-23

## 2025-01-23 RX ORDER — INSULIN GLARGINE 100 [IU]/ML
INJECTION, SOLUTION SUBCUTANEOUS
Qty: 5 ADJUSTABLE DOSE PRE-FILLED PEN SYRINGE | Refills: 3 | Status: SHIPPED | OUTPATIENT
Start: 2025-01-23

## 2025-01-23 NOTE — PROGRESS NOTES
until BS controlled  Did discuss medtronic   Advised to perform corrections every 2 hours more often prn  Continue glucose check with meals and at bedtime    Continuous Glucose Monitoring (CGM) download and interpretation   I personally reviewed and interpreted continuous glucose monitor (CGM) download. CGM report was discussed with patient including blood glucose patterns, percentages of blood glucose at goal, above goal and below goal. Insulin dosages/antidiabetic regimen was adjusted according to CGM download. Full CGM was scanned under media.     Primary hypothyroidism  Levothyroxine 175 mcg daily  Reports compliance with medication  We will obtain TFTs     Hyperlipidemia  Lipitor 40 mg daily    Obesity  Discussed lifestyle changes including diet and exercise with patient in depth. Also, discussed with patient cardiovascular risk associated with obesity     CKD Stage 5  Follows with Nephrology  AVOID NSAIDS  Transplant referral  Had PD catheter placed, plan to start PD in a few weeks        I personally reviewed external notes from PCP and other patient's care team providers, and personally interpreted labs associated with the above diagnosis. I also ordered labs to further assess and manage the above addressed medical conditions    The above issues were reviewed with the patient who understood and agreed with the plan.    Thank you for allowing us to participate in the care of this patient. Please do not hesitate to contact us with any additional questions.      Diagnosis Orders   1. Type 1 diabetes mellitus with hyperglycemia (HCC)  GLUCOSE MONITOR, 72 HOUR, PHYS INTERP    POCT glycosylated hemoglobin (Hb A1C)    insulin lispro, 1 Unit Dial, (HUMALOG KWIKPEN) 100 UNIT/ML SOPN    insulin glargine (LANTUS SOLOSTAR) 100 UNIT/ML injection pen    Continuous Glucose Sensor (DEXCOM G7 SENSOR) MISC      2. Hypothyroidism, unspecified type  TSH    T4, Free      3. Hyperlipidemia, unspecified hyperlipidemia type

## 2025-01-23 NOTE — PATIENT INSTRUCTIONS
He is currently on Lantus 28 units nightly, ok to increase after 3 days by 2 units if  FBS   is not < 140  Pt is a Carb counter  Humalog 12 units 3 times daily with meals plus medium dose sliding scale ACHS, ok to increase dose by 2 until BS controlled  Did discuss medtronic   Advised to perform corrections every 2 hours more often as needed

## 2025-01-28 LAB
ANION GAP SERPL CALCULATED.3IONS-SCNC: 18 MMOL/L (ref 7–16)
BUN SERPL-MCNC: 93 MG/DL (ref 6–20)
CALCIUM SERPL-MCNC: 9.1 MG/DL (ref 8.6–10.2)
CHLORIDE SERPL-SCNC: 98 MMOL/L (ref 98–107)
CO2 SERPL-SCNC: 20 MMOL/L (ref 22–29)
CREAT SERPL-MCNC: 8.2 MG/DL (ref 0.7–1.2)
GFR, ESTIMATED: 8 ML/MIN/1.73M2
GLUCOSE SERPL-MCNC: 227 MG/DL (ref 74–99)
HCT VFR BLD AUTO: 36 % (ref 37–54)
HGB BLD-MCNC: 11.2 G/DL (ref 12.5–16.5)
POTASSIUM SERPL-SCNC: 5.2 MMOL/L (ref 3.5–5)
SODIUM SERPL-SCNC: 136 MMOL/L (ref 132–146)

## 2025-01-31 LAB
ANION GAP SERPL CALCULATED.3IONS-SCNC: 17 MMOL/L (ref 7–16)
BUN SERPL-MCNC: 81 MG/DL (ref 6–20)
CALCIUM SERPL-MCNC: 8.1 MG/DL (ref 8.6–10.2)
CHLORIDE SERPL-SCNC: 103 MMOL/L (ref 98–107)
CO2 SERPL-SCNC: 17 MMOL/L (ref 22–29)
CREAT SERPL-MCNC: 7.4 MG/DL (ref 0.7–1.2)
GFR, ESTIMATED: 9 ML/MIN/1.73M2
GLUCOSE SERPL-MCNC: 207 MG/DL (ref 74–99)
POTASSIUM SERPL-SCNC: 5 MMOL/L (ref 3.5–5)
SODIUM SERPL-SCNC: 137 MMOL/L (ref 132–146)

## 2025-02-04 LAB
ANION GAP SERPL CALCULATED.3IONS-SCNC: 15 MMOL/L (ref 7–16)
BUN SERPL-MCNC: 71 MG/DL (ref 6–20)
CALCIUM SERPL-MCNC: 8.1 MG/DL (ref 8.6–10.2)
CHLORIDE SERPL-SCNC: 102 MMOL/L (ref 98–107)
CO2 SERPL-SCNC: 18 MMOL/L (ref 22–29)
CREAT SERPL-MCNC: 6.9 MG/DL (ref 0.7–1.2)
GFR, ESTIMATED: 10 ML/MIN/1.73M2
GLUCOSE SERPL-MCNC: 69 MG/DL (ref 74–99)
POTASSIUM SERPL-SCNC: 4.6 MMOL/L (ref 3.5–5)
SODIUM SERPL-SCNC: 135 MMOL/L (ref 132–146)

## 2025-02-12 DIAGNOSIS — E10.65 TYPE 1 DIABETES MELLITUS WITH HYPERGLYCEMIA (HCC): ICD-10-CM

## 2025-02-13 RX ORDER — ACYCLOVIR 400 MG/1
TABLET ORAL
Qty: 9 EACH | Refills: 3 | Status: SHIPPED | OUTPATIENT
Start: 2025-02-13

## 2025-02-19 LAB
ANION GAP SERPL CALCULATED.3IONS-SCNC: 18 MMOL/L (ref 7–16)
BUN SERPL-MCNC: 75 MG/DL (ref 6–20)
CALCIUM SERPL-MCNC: 8.6 MG/DL (ref 8.6–10.2)
CHLORIDE SERPL-SCNC: 101 MMOL/L (ref 98–107)
CO2 SERPL-SCNC: 19 MMOL/L (ref 22–29)
CREAT SERPL-MCNC: 7.6 MG/DL (ref 0.7–1.2)
GFR, ESTIMATED: 9 ML/MIN/1.73M2
GLUCOSE SERPL-MCNC: 143 MG/DL (ref 74–99)
POTASSIUM SERPL-SCNC: 5.1 MMOL/L (ref 3.5–5)
SODIUM SERPL-SCNC: 138 MMOL/L (ref 132–146)

## 2025-02-24 LAB
ANION GAP SERPL CALCULATED.3IONS-SCNC: 15 MMOL/L (ref 7–16)
BUN SERPL-MCNC: 61 MG/DL (ref 6–20)
CALCIUM SERPL-MCNC: 8.2 MG/DL (ref 8.6–10.2)
CHLORIDE SERPL-SCNC: 103 MMOL/L (ref 98–107)
CO2 SERPL-SCNC: 19 MMOL/L (ref 22–29)
CREAT SERPL-MCNC: 7.2 MG/DL (ref 0.7–1.2)
GFR, ESTIMATED: 9 ML/MIN/1.73M2
GLUCOSE SERPL-MCNC: 111 MG/DL (ref 74–99)
POTASSIUM SERPL-SCNC: 5 MMOL/L (ref 3.5–5)
SODIUM SERPL-SCNC: 137 MMOL/L (ref 132–146)

## 2025-02-27 LAB
ANION GAP SERPL CALCULATED.3IONS-SCNC: 15 MMOL/L (ref 7–16)
BUN SERPL-MCNC: 52 MG/DL (ref 6–20)
CALCIUM SERPL-MCNC: 7.6 MG/DL (ref 8.6–10.2)
CHLORIDE SERPL-SCNC: 100 MMOL/L (ref 98–107)
CO2 SERPL-SCNC: 21 MMOL/L (ref 22–29)
CREAT SERPL-MCNC: 7.2 MG/DL (ref 0.7–1.2)
GFR, ESTIMATED: 9 ML/MIN/1.73M2
GLUCOSE SERPL-MCNC: 207 MG/DL (ref 74–99)
POTASSIUM SERPL-SCNC: 4.6 MMOL/L (ref 3.5–5)
SODIUM SERPL-SCNC: 136 MMOL/L (ref 132–146)

## 2025-03-10 LAB
ANION GAP SERPL CALCULATED.3IONS-SCNC: 18 MMOL/L (ref 7–16)
BUN SERPL-MCNC: 67 MG/DL (ref 6–20)
CALCIUM SERPL-MCNC: 8.4 MG/DL (ref 8.6–10.2)
CHLORIDE SERPL-SCNC: 102 MMOL/L (ref 98–107)
CO2 SERPL-SCNC: 18 MMOL/L (ref 22–29)
CREAT SERPL-MCNC: 6.3 MG/DL (ref 0.7–1.2)
GFR, ESTIMATED: 11 ML/MIN/1.73M2
GLUCOSE SERPL-MCNC: 201 MG/DL (ref 74–99)
POTASSIUM SERPL-SCNC: 4.7 MMOL/L (ref 3.5–5)
SODIUM SERPL-SCNC: 138 MMOL/L (ref 132–146)

## 2025-03-16 DIAGNOSIS — E03.9 HYPOTHYROIDISM, UNSPECIFIED TYPE: ICD-10-CM

## 2025-03-16 DIAGNOSIS — E10.65 TYPE 1 DIABETES MELLITUS WITH HYPERGLYCEMIA (HCC): ICD-10-CM

## 2025-03-17 RX ORDER — INSULIN GLARGINE 100 [IU]/ML
INJECTION, SOLUTION SUBCUTANEOUS
Qty: 27 ML | Refills: 1 | Status: SHIPPED | OUTPATIENT
Start: 2025-03-17

## 2025-03-17 RX ORDER — INSULIN LISPRO 100 [IU]/ML
INJECTION, SOLUTION INTRAVENOUS; SUBCUTANEOUS
Qty: 69 ML | Refills: 1 | Status: SHIPPED | OUTPATIENT
Start: 2025-03-17

## 2025-03-17 RX ORDER — LEVOTHYROXINE SODIUM 175 UG/1
175 TABLET ORAL DAILY
Qty: 30 TABLET | Refills: 5 | Status: SHIPPED | OUTPATIENT
Start: 2025-03-17

## 2025-03-17 NOTE — TELEPHONE ENCOUNTER
1/23/25: Levothyroxine 175 mcg daily   Humalog 12 units 3 times daily with meals plus medium dose sliding scale ACHS, ok to increase dose by 2 until BS controlled   Lantus 28 units nightly, ok to increase after 3 days by 2 units if  FBS   is not < 140

## 2025-04-24 ENCOUNTER — TELEPHONE (OUTPATIENT)
Dept: TRANSPLANT | Facility: HOSPITAL | Age: 35
End: 2025-04-24
Payer: COMMERCIAL

## 2025-05-28 ENCOUNTER — OFFICE VISIT (OUTPATIENT)
Dept: ENDOCRINOLOGY | Age: 35
End: 2025-05-28
Payer: COMMERCIAL

## 2025-05-28 VITALS
SYSTOLIC BLOOD PRESSURE: 135 MMHG | OXYGEN SATURATION: 99 % | TEMPERATURE: 97.7 F | DIASTOLIC BLOOD PRESSURE: 73 MMHG | HEIGHT: 69 IN | BODY MASS INDEX: 35.99 KG/M2 | RESPIRATION RATE: 18 BRPM | HEART RATE: 82 BPM | WEIGHT: 243 LBS

## 2025-05-28 DIAGNOSIS — E66.811 CLASS 1 OBESITY DUE TO EXCESS CALORIES WITH SERIOUS COMORBIDITY AND BODY MASS INDEX (BMI) OF 31.0 TO 31.9 IN ADULT: ICD-10-CM

## 2025-05-28 DIAGNOSIS — E66.09 CLASS 1 OBESITY DUE TO EXCESS CALORIES WITH SERIOUS COMORBIDITY AND BODY MASS INDEX (BMI) OF 31.0 TO 31.9 IN ADULT: ICD-10-CM

## 2025-05-28 DIAGNOSIS — E10.65 TYPE 1 DIABETES MELLITUS WITH HYPERGLYCEMIA (HCC): Primary | ICD-10-CM

## 2025-05-28 DIAGNOSIS — E78.5 HYPERLIPIDEMIA, UNSPECIFIED HYPERLIPIDEMIA TYPE: ICD-10-CM

## 2025-05-28 DIAGNOSIS — N18.4 CKD (CHRONIC KIDNEY DISEASE) STAGE 4, GFR 15-29 ML/MIN (HCC): ICD-10-CM

## 2025-05-28 DIAGNOSIS — E03.9 HYPOTHYROIDISM, UNSPECIFIED TYPE: ICD-10-CM

## 2025-05-28 LAB — HBA1C MFR BLD: 9.1 %

## 2025-05-28 PROCEDURE — 83036 HEMOGLOBIN GLYCOSYLATED A1C: CPT | Performed by: NURSE PRACTITIONER

## 2025-05-28 PROCEDURE — 4004F PT TOBACCO SCREEN RCVD TLK: CPT | Performed by: NURSE PRACTITIONER

## 2025-05-28 PROCEDURE — 99214 OFFICE O/P EST MOD 30 MIN: CPT | Performed by: NURSE PRACTITIONER

## 2025-05-28 PROCEDURE — 95251 CONT GLUC MNTR ANALYSIS I&R: CPT | Performed by: NURSE PRACTITIONER

## 2025-05-28 PROCEDURE — 3075F SYST BP GE 130 - 139MM HG: CPT | Performed by: NURSE PRACTITIONER

## 2025-05-28 PROCEDURE — G8417 CALC BMI ABV UP PARAM F/U: HCPCS | Performed by: NURSE PRACTITIONER

## 2025-05-28 PROCEDURE — 3078F DIAST BP <80 MM HG: CPT | Performed by: NURSE PRACTITIONER

## 2025-05-28 PROCEDURE — 3046F HEMOGLOBIN A1C LEVEL >9.0%: CPT | Performed by: NURSE PRACTITIONER

## 2025-05-28 PROCEDURE — 2022F DILAT RTA XM EVC RTNOPTHY: CPT | Performed by: NURSE PRACTITIONER

## 2025-05-28 PROCEDURE — G8427 DOCREV CUR MEDS BY ELIG CLIN: HCPCS | Performed by: NURSE PRACTITIONER

## 2025-05-28 RX ORDER — INSULIN LISPRO 100 [IU]/ML
INJECTION, SOLUTION INTRAVENOUS; SUBCUTANEOUS
Qty: 69 ML | Refills: 1 | Status: SHIPPED | OUTPATIENT
Start: 2025-05-28

## 2025-05-28 RX ORDER — INSULIN GLARGINE 100 [IU]/ML
INJECTION, SOLUTION SUBCUTANEOUS
Qty: 27 ML | Refills: 1 | Status: SHIPPED | OUTPATIENT
Start: 2025-05-28

## 2025-05-28 NOTE — PROGRESS NOTES
[employer]  [facility] Department of Endocrinology Diabetes and Metabolism   [address], [place] [address]  Phone: [phone]  Fax: [phone]      Date of service: 5/28/2025  Primary Care Physician: Adela Ramsay MD  Provider: JUANA Montoya NP      Reason for the office visit:  Diabetes Type 1    History of Present Illness:  The history is provided by the patient. Accuracy of the patient data is excellent  Duane Brady is a very pleasant 35 y.o. old male with PMH of DM1, HTN, HLD and other listed below seen today for a follow-up visit for diabetes management.    Recently admitted to [facility] for hyperkalemia and had a PD catheter placed as well     The patient was diagnosed with type I DM.Dx at age 8.  Prior to admission patient was on basaglar 28 u qhs, lispro 10 units 3 times daily with meals plus medium dose sliding scale ACHS .    OmniPod was ordered but pt felt it was too much of a hassle      He has been checking his BS via Dexcom  TIR  34%  Hyperglycemia 65%  Lows 2%  AVG   GMI 9..0%      Lab Results   Component Value Date/Time    LABA1C 9.1 05/28/2025 12:22 PM     Patient reported no hypoglycemic episodes     The patient hasn't been mindful of what has been eating and wasn't following diabetes diet    He eats one- two meals a day   Has poor appetite  I reviewed current medications and the patient has no issues with diabetes medications  Duane Brady is up to date with eye exam and denied any history of diabetic retinopathy   Last eye exam was  12/2024, + h/o diabetic retinopathy  The patient  performs  own feet care  +  Charcot right foot , and sees podiatry  Microvascular complications:  + Retinopathy, Nephropathy or Neuropathy   Macrovascular complications: no CAD, PVD, or Stroke    Past medical history:   Past Medical History:   Diagnosis Date    Abscess of back 08/2011    Anemia     Bowel obstruction (HCC)     Diabetes mellitus type 1 (HCC)

## 2025-06-02 RX ORDER — SODIUM BICARBONATE 650 MG/1
1300 TABLET ORAL DAILY
Qty: 180 TABLET | Refills: 1 | OUTPATIENT
Start: 2025-06-02

## 2025-09-03 LAB
ANION GAP SERPL CALCULATED.3IONS-SCNC: 18 MMOL/L (ref 7–16)
BUN SERPL-MCNC: 54 MG/DL (ref 6–20)
CALCIUM SERPL-MCNC: 8.5 MG/DL (ref 8.6–10)
CHLORIDE SERPL-SCNC: 92 MMOL/L (ref 98–107)
CO2 SERPL-SCNC: 22 MMOL/L (ref 22–29)
CREAT SERPL-MCNC: 7.5 MG/DL (ref 0.7–1.2)
GFR, ESTIMATED: 9 ML/MIN/1.73M2
GLUCOSE SERPL-MCNC: 342 MG/DL (ref 74–99)
POTASSIUM SERPL-SCNC: 3.5 MMOL/L (ref 3.5–5.1)
SODIUM SERPL-SCNC: 132 MMOL/L (ref 136–145)

## (undated) DEVICE — TOWEL,OR,DSP,ST,BLUE,STD,6/PK,12PK/CS: Brand: MEDLINE

## (undated) DEVICE — BIT DRL L150MM DIA2.9MM QUIK CPL W/O STP REUSE

## (undated) DEVICE — NEEDLE HYPO 25GA L1.5IN BLU POLYPR HUB S STL REG BVL STR

## (undated) DEVICE — DRESSING,GAUZE,XEROFORM,CURAD,5"X9",ST: Brand: CURAD

## (undated) DEVICE — INSUFFLATION NEEDLE TO ESTABLISH PNEUMOPERITONEUM.: Brand: INSUFFLATION NEEDLE

## (undated) DEVICE — DRESSING PETRO W3XL8IN OIL EMUL N ADH GZ KNIT IMPREG CELOS

## (undated) DEVICE — CANISTER KIT W/ GEL VAC

## (undated) DEVICE — HANDPIECE SET WITH BONE CLEANING TIP AND SUCTION TUBE: Brand: INTERPULSE

## (undated) DEVICE — LIQUIBAND RAPID ADHESIVE 36/CS 0.8ML: Brand: MEDLINE

## (undated) DEVICE — Device

## (undated) DEVICE — KIT EVAC 400CC DIA1/8IN H PAT 12.5IN 3 SPR RND SHP PVC DRN

## (undated) DEVICE — 5.5MM EGG

## (undated) DEVICE — TROCAR: Brand: KII SHIELDED BLADED ACCESS SYSTEM

## (undated) DEVICE — PADDING,UNDERCAST,COTTON, 4"X4YD STERILE: Brand: MEDLINE

## (undated) DEVICE — ELECTRODE ES AD PED L2.5IN TEF INSUL MOD NONCORDED NDL TIP

## (undated) DEVICE — BIT DRL L110MM DIA2.5MM G QUIK CPL W/O STP REUSE

## (undated) DEVICE — SCREW BNE L14MM DIA5MM S STL ST LOK FULL THRD T25 STARDRV: Type: IMPLANTABLE DEVICE | Site: LEG | Status: NON-FUNCTIONAL

## (undated) DEVICE — PAD,NON-ADHERENT,2X3,STERILE,LF,1/PK: Brand: MEDLINE

## (undated) DEVICE — SOLUTION SURG PREP 26 CC PURPREP

## (undated) DEVICE — SCREW BNE L48MM DIA4.5MM PROX CORT TIB S STL ST LOK FULL: Type: IMPLANTABLE DEVICE | Site: LEG | Status: NON-FUNCTIONAL

## (undated) DEVICE — STRAP POS MP 30X3 IN HK LOOP CLOSURE FOAM DISP

## (undated) DEVICE — GAUZE,SPONGE,4"X4",8PLY,STRL,LF,10/TRAY: Brand: MEDLINE

## (undated) DEVICE — DRAPE EQUIP CARM 72X42 IN RUBBER BND CLP

## (undated) DEVICE — STAY.SAFE® CATHETER EXTENSION SET WITH LUER-LOCK, 12 INCH: Brand: STAY.SAFE®

## (undated) DEVICE — SOLUTION IV 500ML 0.9% SOD CHL PH 5 INJ USP VIAFLX PLAS

## (undated) DEVICE — TRAP,MUCUS SPECIMEN,40CC: Brand: MEDLINE

## (undated) DEVICE — APPLICATOR MEDICATED 26 CC SOLUTION HI LT ORNG CHLORAPREP

## (undated) DEVICE — DRAPE,CHEST,FENES,15X10,STERIL: Brand: MEDLINE

## (undated) DEVICE — CATHETER PERI DLYS AD 15FR L47CM UNIV DBL CUF LIN STR RADPQ

## (undated) DEVICE — TROCAR: Brand: KII FIOS FIRST ENTRY

## (undated) DEVICE — GLOVE ORANGE PI 7   MSG9070

## (undated) DEVICE — PADDING CAST W6INXL4YD COT LO LINTING WYTEX

## (undated) DEVICE — SYRINGE MEDICAL 3ML CLEAR PLASTIC STANDARD NON CONTROL LUERLOCK TIP DISPOSABLE

## (undated) DEVICE — BANDAGE,GAUZE,4.5"X4.1YD,STERILE,LF: Brand: MEDLINE

## (undated) DEVICE — TROCAR: Brand: KII® SLEEVE

## (undated) DEVICE — GUIDEWIRE ORTH L300MM DIA2.8MM S STL THRD SHRP TRCR TIP FOR

## (undated) DEVICE — BIT DRL L145MM DIA3.2MM ST QUIK CPL W/O STP REUSE

## (undated) DEVICE — BNDG,ELSTC,MATRIX,STRL,6"X5YD,LF,HOOK&LP: Brand: MEDLINE

## (undated) DEVICE — GOWN,AURORA,BRTHSLV,2XL,18/CS: Brand: MEDLINE

## (undated) DEVICE — ELECTRODE PT RET AD L9FT HI MOIST COND ADH HYDRGEL CORDED

## (undated) DEVICE — SET INSUF TUBE HEAT ISO CONN DISP

## (undated) DEVICE — AEGIS 1" DISK 4MM HOLE, PEEL OPEN: Brand: MEDLINE

## (undated) DEVICE — BANDAGE COMPR W4INXL10YD WHITE/BEIGE E MTRX HK LOOP CLSR

## (undated) DEVICE — ANTISEPTIC 16OZ H PEROX 1ST AID ORAL DEBRIDING AGNT

## (undated) DEVICE — DRESSING,GAUZE,XEROFORM,CURAD,1"X8",ST: Brand: CURAD

## (undated) DEVICE — WARMER SCP 2 ANTIFOG LAP DISP

## (undated) DEVICE — INSUFFLATION TUBING SET WITH FILTER, FUNNEL CONNECTOR AND LUER LOCK: Brand: JOSNOE MEDICAL INC

## (undated) DEVICE — KIT,ANTI FOG,W/SPONGE & FLUID,SOFT PACK: Brand: MEDLINE

## (undated) DEVICE — GENERAL LAPAROSCOPY: Brand: MEDLINE INDUSTRIES, INC.

## (undated) DEVICE — STAY.SAFE® CAP: Brand: STAY.SAFE®

## (undated) DEVICE — BIT DRL L180MM DIA4.3MM QUIK CPL W/O STP REUSE

## (undated) DEVICE — SYRINGE MED 30ML STD CLR PLAS LUERLOCK TIP N CTRL DISP

## (undated) DEVICE — LOWER EXT KNEE DRAPE: Brand: MEDLINE INDUSTRIES, INC.

## (undated) DEVICE — GLOVE SURG SZ 8 CRM LTX FREE POLYISOPRENE POLYMER BEAD ANTI

## (undated) DEVICE — TUBING SUCT 12FR MAL ALUM SHFT FN CAP VENT UNIV CONN W/ OBT

## (undated) DEVICE — GLOVE SURG SZ 75 CRM LTX FREE POLYISOPRENE POLYMER BEAD ANTI

## (undated) DEVICE — GOWN,SIRUS,FABRNF,XL,20/CS: Brand: MEDLINE

## (undated) DEVICE — MARKER,SKIN,WI/RULER AND LABELS: Brand: MEDLINE

## (undated) DEVICE — 4-PORT MANIFOLD: Brand: NEPTUNE 2

## (undated) DEVICE — BLADE,STAINLESS-STEEL,11,STRL,DISPOSABLE: Brand: MEDLINE

## (undated) DEVICE — DECANTER BAG 9": Brand: MEDLINE INDUSTRIES, INC.

## (undated) DEVICE — TROCAR: Brand: KII® SHEILDED BLADED ACCESS SYSEM

## (undated) DEVICE — Y-TYPE TUR/BLADDER IRRIGATION SET, REGULATING CLAMP

## (undated) DEVICE — 4.0MM EGG

## (undated) DEVICE — GUIDEWIRE ORTH DIA2.5MM LOK SMOOTH DRL TIP FLX THRD FOR

## (undated) DEVICE — CATHETER PERI DLYS AD L57CM DIA15FR DBL CUF COILED LIN

## (undated) DEVICE — BOWL ASSY BM210 DUAL BLADE DISPOSABLE: Brand: MIDAS REX™

## (undated) DEVICE — BIT DRL L200MM DIA2.8MM CALIB L100MM FOR 3.5MM VA LCP PROX

## (undated) DEVICE — SYRINGE 20ML LL S/C 50

## (undated) DEVICE — BOOT,SUTURE,STANDARD,YELLOW-IN-BLUE: Brand: MEDLINE

## (undated) DEVICE — BIT DRL L145MM DIA4.5MM ST S STL QUIK CPL NONRADIOPAQUE W/O

## (undated) DEVICE — IMPLANTABLE DEVICE
Type: IMPLANTABLE DEVICE | Site: FOOT | Status: NON-FUNCTIONAL
Removed: 2024-04-10

## (undated) DEVICE — BIT DRL L160MM DIA4MM QUIK CPL W/O STP REUSE